# Patient Record
Sex: MALE | Race: WHITE | Employment: OTHER | URBAN - METROPOLITAN AREA
[De-identification: names, ages, dates, MRNs, and addresses within clinical notes are randomized per-mention and may not be internally consistent; named-entity substitution may affect disease eponyms.]

---

## 2017-02-15 ENCOUNTER — APPOINTMENT (OUTPATIENT)
Dept: LAB | Facility: HOSPITAL | Age: 77
End: 2017-02-15
Attending: UROLOGY
Payer: MEDICARE

## 2017-02-15 ENCOUNTER — OFFICE VISIT (OUTPATIENT)
Dept: SURGERY | Facility: CLINIC | Age: 77
End: 2017-02-15

## 2017-02-15 VITALS — HEART RATE: 66 BPM | RESPIRATION RATE: 20 BRPM | DIASTOLIC BLOOD PRESSURE: 83 MMHG | SYSTOLIC BLOOD PRESSURE: 149 MMHG

## 2017-02-15 DIAGNOSIS — R97.20 ELEVATED PSA: Primary | ICD-10-CM

## 2017-02-15 DIAGNOSIS — R97.20 ELEVATED PSA: ICD-10-CM

## 2017-02-15 DIAGNOSIS — N40.1 BENIGN NON-NODULAR PROSTATIC HYPERPLASIA WITH LOWER URINARY TRACT SYMPTOMS: ICD-10-CM

## 2017-02-15 LAB — PSA SERPL-MCNC: 5.8 NG/ML (ref 0–4)

## 2017-02-15 PROCEDURE — 84153 ASSAY OF PSA TOTAL: CPT

## 2017-02-15 PROCEDURE — 36415 COLL VENOUS BLD VENIPUNCTURE: CPT

## 2017-02-15 PROCEDURE — 99214 OFFICE O/P EST MOD 30 MIN: CPT | Performed by: UROLOGY

## 2017-02-15 PROCEDURE — G0463 HOSPITAL OUTPT CLINIC VISIT: HCPCS | Performed by: UROLOGY

## 2017-02-15 RX ORDER — TAMSULOSIN HYDROCHLORIDE 0.4 MG/1
CAPSULE ORAL
Qty: 90 CAPSULE | Refills: 3 | Status: SHIPPED | OUTPATIENT
Start: 2017-02-15 | End: 2019-04-03

## 2017-02-15 RX ORDER — FINASTERIDE 5 MG/1
TABLET, FILM COATED ORAL
Qty: 90 TABLET | Refills: 3 | Status: SHIPPED | OUTPATIENT
Start: 2017-02-15 | End: 2018-02-17

## 2017-02-16 NOTE — PROGRESS NOTES
Mario Linares is a 67year old male.     HPI:   Patient presents with:  BPH: f/u  elevated psa: f/u      76-year-old male presents in follow-up to a previous visit November 7, 2016 with a history of elevated serum PSA status post prostate biopsy which was notified of results once they are available. Otherwise he will continue on medications as previously described. Follow-up in 6 months I spent a total of 25 minutes with patient more than half the time in face-to-face discussion.          Orders This Visit

## 2017-03-01 ENCOUNTER — TELEPHONE (OUTPATIENT)
Dept: SURGERY | Facility: CLINIC | Age: 77
End: 2017-03-01

## 2017-03-01 NOTE — TELEPHONE ENCOUNTER
Spoke with pt and gave results in msg below and he has an appt in aug and he will complete psa prior and I also cxld an appt that he had in Nov. And does not need at this time.

## 2017-03-01 NOTE — TELEPHONE ENCOUNTER
----- Message from Christa Sanchez MD sent at 3/1/2017  9:57 AM CST -----  Staff please call this patient and let him know his PSA from 2 weeks ago was elevated at 5.8.   This is probably related to the fact he stopped his finasteride and I would recommend t

## 2017-06-06 ENCOUNTER — OFFICE VISIT (OUTPATIENT)
Dept: INTERNAL MEDICINE CLINIC | Facility: CLINIC | Age: 77
End: 2017-06-06

## 2017-06-06 VITALS
TEMPERATURE: 98 F | HEIGHT: 72 IN | DIASTOLIC BLOOD PRESSURE: 75 MMHG | HEART RATE: 60 BPM | BODY MASS INDEX: 25.19 KG/M2 | WEIGHT: 186 LBS | SYSTOLIC BLOOD PRESSURE: 128 MMHG

## 2017-06-06 DIAGNOSIS — R97.20 ELEVATED PSA: Primary | ICD-10-CM

## 2017-06-06 PROCEDURE — 99213 OFFICE O/P EST LOW 20 MIN: CPT | Performed by: INTERNAL MEDICINE

## 2017-06-06 PROCEDURE — G0463 HOSPITAL OUTPT CLINIC VISIT: HCPCS | Performed by: INTERNAL MEDICINE

## 2017-06-06 NOTE — PROGRESS NOTES
Patient ID: Shayan Posada is a 67year old male. Patient presents with:  Establish Care       HISTORY OF PRESENT ILLNESS:   HPI  Patient presents for above. Patient was told by his urologist to come see a primary care physician.   His last physician ret Alcohol Use: No    Drug Use: No    Sexual Activity: Not on file   Not on file  Other Topics Concern    Caffeine Concern Yes    Comment: coffee, occasionally    Reaction to local anesthetic No     Social History Narrative           PHYSICAL EXAM:      06/06

## 2017-06-06 NOTE — PATIENT INSTRUCTIONS
Prevention Guidelines, Men Ages 72 and Older  Screening tests and vaccines are an important part of managing your health. Health counseling is essential, too. Below are guidelines for these, for men ages 72 and older.  Talk with your healthcare provider t Prostate cancer All men in this age group, talk to healthcare provider about risks and benefits of digital rectal exam (BARRY) and prostate-specific antigen (PSA) screening1 At routine exams   Syphilis Men at increased risk for infection – talk with your hea Fall prevention (exercise, vitamin D supplements) All men in this age group At routine exams   Sexually transmitted infection Men at increased risk for infection – talk with your healthcare provider At routine exams   Use of daily aspirin Men ages 39 to 78

## 2017-06-20 ENCOUNTER — OFFICE VISIT (OUTPATIENT)
Dept: INTERNAL MEDICINE CLINIC | Facility: CLINIC | Age: 77
End: 2017-06-20

## 2017-06-20 ENCOUNTER — LAB ENCOUNTER (OUTPATIENT)
Dept: LAB | Age: 77
End: 2017-06-20
Attending: INTERNAL MEDICINE
Payer: MEDICARE

## 2017-06-20 VITALS
HEIGHT: 72 IN | WEIGHT: 184 LBS | BODY MASS INDEX: 24.92 KG/M2 | SYSTOLIC BLOOD PRESSURE: 127 MMHG | HEART RATE: 62 BPM | TEMPERATURE: 98 F | DIASTOLIC BLOOD PRESSURE: 78 MMHG

## 2017-06-20 DIAGNOSIS — Z00.00 ENCOUNTER FOR ANNUAL HEALTH EXAMINATION: ICD-10-CM

## 2017-06-20 DIAGNOSIS — E78.00 HYPERCHOLESTEROLEMIA: ICD-10-CM

## 2017-06-20 DIAGNOSIS — E78.00 HYPERCHOLESTEROLEMIA: Chronic | ICD-10-CM

## 2017-06-20 DIAGNOSIS — R97.20 ELEVATED PSA: ICD-10-CM

## 2017-06-20 DIAGNOSIS — Z00.00 MEDICARE ANNUAL WELLNESS VISIT, SUBSEQUENT: ICD-10-CM

## 2017-06-20 DIAGNOSIS — Z00.00 MEDICARE ANNUAL WELLNESS VISIT, SUBSEQUENT: Primary | ICD-10-CM

## 2017-06-20 PROCEDURE — 84153 ASSAY OF PSA TOTAL: CPT

## 2017-06-20 PROCEDURE — 80053 COMPREHEN METABOLIC PANEL: CPT

## 2017-06-20 PROCEDURE — 85025 COMPLETE CBC W/AUTO DIFF WBC: CPT

## 2017-06-20 PROCEDURE — 36415 COLL VENOUS BLD VENIPUNCTURE: CPT

## 2017-06-20 PROCEDURE — 99213 OFFICE O/P EST LOW 20 MIN: CPT | Performed by: INTERNAL MEDICINE

## 2017-06-20 PROCEDURE — G0439 PPPS, SUBSEQ VISIT: HCPCS | Performed by: INTERNAL MEDICINE

## 2017-06-20 PROCEDURE — 84443 ASSAY THYROID STIM HORMONE: CPT

## 2017-06-20 PROCEDURE — 80061 LIPID PANEL: CPT

## 2017-06-20 NOTE — PATIENT INSTRUCTIONS
1.  Get labs done as ordered. Prevention Guidelines, Men Ages 72 and Older  Screening tests and vaccines are an important part of managing your health. Health counseling is essential, too. Below are guidelines for these, for men ages 72 and older.  Talk wi Prostate cancer All men in this age group, talk to healthcare provider about risks and benefits of digital rectal exam (BARRY) and prostate-specific antigen (PSA) screening1 At routine exams   Syphilis Men at increased risk for infection – talk with your hea Fall prevention (exercise, vitamin D supplements) All men in this age group At routine exams   Sexually transmitted infection Men at increased risk for infection – talk with your healthcare provider At routine exams   Use of daily aspirin Men ages 39 to 78 CHOLESTEROL, TOTAL (mg/dL)   Date Value   07/09/2015 232*     TRIGLYCERIDES (mg/dL)   Date Value   07/09/2015 93        EKG - covered if needed at Welcome to Medicare, and non-screening if indicated for medical reasons    Electrocardiogram date Routine EKG Pneumococcal 23 (Pneumovax)  Covered Once after 65 No orders found for this or any previous visit. Please get once after your 65th birthday    Hepatitis B for Moderate/High Risk No orders found for this or any previous visit.  Medium/high risk factors:   E

## 2017-06-20 NOTE — PROGRESS NOTES
HPI:   Cortes Almonte is a 67year old male who presents for a Medicare Subsequent Annual Wellness visit (Pt already had Initial Annual Wellness).     His last annual assessment has been over 1 year: Annual Physical due on 08/11/1942       Without complaint Constitutional: Negative. HENT: Negative. Respiratory: Negative. Cardiovascular: Negative. Gastrointestinal: Negative. Endocrine: Negative. Genitourinary: Negative. Musculoskeletal: Negative. Skin: Negative.     Allergic/Immunologi Right Eye Chart Acuity: 20/50   Left Eye Visual Acuity: Uncorrected Left Eye Chart Acuity: 20/40   Both Eyes Visual Acuity: Uncorrected Both Eyes Chart Acuity: 20/50   Able To Tolerate Visual Acuity: Yes      Physical Exam    Constitutional: He is oriented taking his statin because he study felt great. He is debating whether he will start a gun if his labs show that he needs it. Diet and exercise discussed. Elevated PSA  -     PSA;  Future    Encounter for annual health examination  -     Gastro Referral another office such as Influenza, Hepatitis B, Tetanus, or Pneumococcal?: No     Functional Ability     Bathing or Showering: Able without help    Toileting: Able without help    Dressing: Able without help    Eating: Able without help    Driving: Able wit separate sheet to patient  PREVENTATIVE SERVICES  INDICATIONS AND SCHEDULE Internal Lab or Procedure External Lab or Procedure   Diabetes Screening      HbgA1C   Annually No results found for: A1C    No flowsheet data found.     Fasting Blood Sugar (FSB)Jennifer Conc  Annually No results found for: DIGOXIN, DIG, VALP No flowsheet data found. Diabetes      HgbA1C  Annually No results found for: A1C    No flowsheet data found.     Creat/alb ratio  Annually      LDL  Annually LDL CHOLESTEROL (mg/dL)   Date Value

## 2017-08-15 ENCOUNTER — APPOINTMENT (OUTPATIENT)
Dept: LAB | Facility: HOSPITAL | Age: 77
End: 2017-08-15
Attending: UROLOGY
Payer: MEDICARE

## 2017-08-15 DIAGNOSIS — R97.20 ELEVATED PSA: ICD-10-CM

## 2017-08-15 LAB — PSA SERPL-MCNC: 2.7 NG/ML (ref 0–4)

## 2017-08-15 PROCEDURE — 36415 COLL VENOUS BLD VENIPUNCTURE: CPT

## 2017-08-15 PROCEDURE — 84153 ASSAY OF PSA TOTAL: CPT

## 2017-08-16 ENCOUNTER — OFFICE VISIT (OUTPATIENT)
Dept: SURGERY | Facility: CLINIC | Age: 77
End: 2017-08-16

## 2017-08-16 VITALS
DIASTOLIC BLOOD PRESSURE: 76 MMHG | BODY MASS INDEX: 24 KG/M2 | TEMPERATURE: 98 F | HEART RATE: 73 BPM | WEIGHT: 180 LBS | SYSTOLIC BLOOD PRESSURE: 127 MMHG

## 2017-08-16 DIAGNOSIS — R97.20 ELEVATED PSA: ICD-10-CM

## 2017-08-16 DIAGNOSIS — N40.1 BENIGN NON-NODULAR PROSTATIC HYPERPLASIA WITH LOWER URINARY TRACT SYMPTOMS: Primary | ICD-10-CM

## 2017-08-16 PROCEDURE — G0463 HOSPITAL OUTPT CLINIC VISIT: HCPCS | Performed by: UROLOGY

## 2017-08-16 PROCEDURE — 99213 OFFICE O/P EST LOW 20 MIN: CPT | Performed by: UROLOGY

## 2017-08-29 ENCOUNTER — OFFICE VISIT (OUTPATIENT)
Dept: GASTROENTEROLOGY | Facility: CLINIC | Age: 77
End: 2017-08-29

## 2017-08-29 ENCOUNTER — TELEPHONE (OUTPATIENT)
Dept: GASTROENTEROLOGY | Facility: CLINIC | Age: 77
End: 2017-08-29

## 2017-08-29 ENCOUNTER — TELEPHONE (OUTPATIENT)
Dept: OTHER | Age: 77
End: 2017-08-29

## 2017-08-29 VITALS
BODY MASS INDEX: 25.49 KG/M2 | WEIGHT: 188.19 LBS | DIASTOLIC BLOOD PRESSURE: 70 MMHG | HEIGHT: 72 IN | HEART RATE: 67 BPM | SYSTOLIC BLOOD PRESSURE: 131 MMHG

## 2017-08-29 DIAGNOSIS — Z12.11 COLON CANCER SCREENING: Primary | ICD-10-CM

## 2017-08-29 DIAGNOSIS — Z12.11 SCREENING FOR COLON CANCER: Primary | ICD-10-CM

## 2017-08-29 PROCEDURE — G0463 HOSPITAL OUTPT CLINIC VISIT: HCPCS | Performed by: INTERNAL MEDICINE

## 2017-08-29 PROCEDURE — 99203 OFFICE O/P NEW LOW 30 MIN: CPT | Performed by: INTERNAL MEDICINE

## 2017-08-29 NOTE — TELEPHONE ENCOUNTER
One hour block per PL.      Scheduled for:  Colon  Provider Name: PL  Date:  11-9-17  Location:  [facility]  Sedation:  MAC  Time:  11:00am, arrival 10am  Prep: Colyte  Meds/Allergies Reconciled?:  yes  Diagnosis with codes:  Screening Z12.11  Was patient informed

## 2017-08-29 NOTE — TELEPHONE ENCOUNTER
Please advise regarding referral change. Received call from [address] regarding dx code for GI. Paulina Bush Pt is being seen for CNL screening. Referral pending for review.

## 2017-08-29 NOTE — TELEPHONE ENCOUNTER
Pt has consult with Dr Imani Beyer today. The referral is for Medicare annual wellness visit and hypercholesterolemia. I contacted Tim MELCHOR at Dr Fidelia Gibbons office to change to a GI diagnosis for the HMO. We need to know why we are seeing pt.  She will send reques

## 2017-08-29 NOTE — PROGRESS NOTES
Balbina Belcher is a 65year old male. HPI:   Patient presents with: Follow - Up: Annual       The patient is a 74-year-old male with a history of hip surgery and spinal fusion in the past.  He presents for colon cancer screening.   Patient denies any ab 74-year-old male who presents for colon cancer screening. I discussed the screening options with the patient he agrees to proceed with colonoscopy.   The risks /benefits and alternatives were outlined including risk of perforation, bleeding, missed lesion

## 2017-09-19 ENCOUNTER — TELEPHONE (OUTPATIENT)
Dept: GASTROENTEROLOGY | Facility: CLINIC | Age: 77
End: 2017-09-19

## 2017-09-19 DIAGNOSIS — Z12.11 COLON CANCER SCREENING: Primary | ICD-10-CM

## 2017-09-19 NOTE — TELEPHONE ENCOUNTER
Pt removed from provider schedule and cancellation request sent to endo. Pt does not want to reschedule at this time.

## 2017-09-22 NOTE — TELEPHONE ENCOUNTER
Your request for:     PATIENT'S NAME: Alex Snyder   :  [birth year]   ORDERING PHYSICIAN:  Dr. Kamila Liz REQUESTED:  Colonoscopy   ANESTHESIA: [facility]   DATE REQUESTED:  17   TIME REQUESTED:  11:00 a.m.      This procedure was CANCELLED     Dana

## 2018-02-19 NOTE — TELEPHONE ENCOUNTER
Dr. Mary Rodriguez, pt [address] [place] [address] 8/16/17 pt pharmacy requesting refill on tamsulosin and finasteride if you agree please review and sign med, thank you. I copied and pasted part of your last note below.     Patient presents with:  elevated psa: Nocturia 2x per night and

## 2018-02-20 RX ORDER — TAMSULOSIN HYDROCHLORIDE 0.4 MG/1
CAPSULE ORAL
Qty: 30 CAPSULE | Refills: 11 | Status: SHIPPED | OUTPATIENT
Start: 2018-02-20 | End: 2019-04-02

## 2018-02-20 RX ORDER — FINASTERIDE 5 MG/1
TABLET, FILM COATED ORAL
Qty: 30 TABLET | Refills: 11 | Status: SHIPPED | OUTPATIENT
Start: 2018-02-20 | End: 2019-04-02

## 2018-06-04 ENCOUNTER — TELEPHONE (OUTPATIENT)
Dept: INTERNAL MEDICINE CLINIC | Facility: CLINIC | Age: 78
End: 2018-06-04

## 2018-06-07 ENCOUNTER — OFFICE VISIT (OUTPATIENT)
Dept: INTERNAL MEDICINE CLINIC | Facility: CLINIC | Age: 78
End: 2018-06-07

## 2018-06-07 VITALS
WEIGHT: 183 LBS | HEART RATE: 73 BPM | TEMPERATURE: 98 F | HEIGHT: 72 IN | SYSTOLIC BLOOD PRESSURE: 110 MMHG | BODY MASS INDEX: 24.79 KG/M2 | DIASTOLIC BLOOD PRESSURE: 67 MMHG

## 2018-06-07 DIAGNOSIS — R97.20 ELEVATED PSA: ICD-10-CM

## 2018-06-07 DIAGNOSIS — Z12.11 COLON CANCER SCREENING: ICD-10-CM

## 2018-06-07 DIAGNOSIS — R21 RASH: ICD-10-CM

## 2018-06-07 DIAGNOSIS — E78.00 HYPERCHOLESTEROLEMIA: Chronic | ICD-10-CM

## 2018-06-07 DIAGNOSIS — Z00.00 ENCOUNTER FOR ANNUAL HEALTH EXAMINATION: Primary | ICD-10-CM

## 2018-06-07 DIAGNOSIS — Z13.6 SCREENING FOR CARDIOVASCULAR CONDITION: ICD-10-CM

## 2018-06-07 PROCEDURE — G0463 HOSPITAL OUTPT CLINIC VISIT: HCPCS | Performed by: INTERNAL MEDICINE

## 2018-06-07 PROCEDURE — G0439 PPPS, SUBSEQ VISIT: HCPCS | Performed by: INTERNAL MEDICINE

## 2018-06-07 PROCEDURE — 99213 OFFICE O/P EST LOW 20 MIN: CPT | Performed by: INTERNAL MEDICINE

## 2018-06-07 NOTE — PATIENT INSTRUCTIONS
Justyn Hall's SCREENING SCHEDULE   Tests on this list are recommended by your physician but may not be covered, or covered at this frequency, by your insurer. Please check with your insurance carrier before scheduling to verify coverage.     PREVENTATI with a family history    Colorectal Cancer Screening Covered up to Age 76     Colonoscopy Screen   Covered every 10 years- more often if abnormal There are no preventive care reminders to display for this patient.  Update Health Maintenance if applicable by Medicare Part B) No orders found for this or any previous visit.  This may be covered with your prescription benefits, but Medicare does not cover unless Medically needed    Zoster (Not covered by Medicare Part B) No orders found for this or any previous immunochemical test; or a stool DNA test as often as your healthcare provider advises; talk with your healthcare provider about which tests are best for you and when you no longer need colonoscopies (generally after age 76)   Depression All men in this age risk for infection – talk with your healthcare provider 3 doses over 6 months; second dose should be given 1 month after the first dose; the third dose should be given at least 2 months after the second dose and at least 4 months after the first dose   Asha

## 2018-06-07 NOTE — PROGRESS NOTES
HPI:   Mayco Sagastume is a 65year old male who presents for a Medicare Subsequent Annual Wellness visit (Pt already had Initial Annual Wellness). Patient presents for above. Here for Medicare annual wellness exam.  History of high cholesterol.   Tia The patient has this document but we do not have it in [employer], and patient is instructed to get our office a copy of it for scanning into Epic. He does have a POA but we do NOT have it on file in [employer].    The patient has this document but we do not ha He  has a past surgical history that includes spinal fusion; other surgical history (2014 ); and hip surgery. His family history is not on file. SOCIAL HISTORY:   He  reports that he has never smoked.  He has never used smokeless tobacco. He reports th Abdominal: Soft. Bowel sounds are normal. He exhibits no distension. Musculoskeletal: Normal range of motion. Neurological: He is alert and oriented to person, place, and time.    Skin:          Vaccination History     Immunization History   Administere In the past six months, have you lost more than 10 pounds without trying?: 2 - No  Has your appetite been poor?: No  How does the patient maintain a good energy level?: Other  How would you describe your daily physical activity?: None  How would you descri Covered Once after 65 No vaccine history found Please get once after your 65th birthday    Hepatitis B for Moderate/High Risk No vaccine history found Medium/high risk factors:   End-stage renal disease   Hemophiliacs who received Factor VIII or IX concent

## 2018-06-08 ENCOUNTER — LAB ENCOUNTER (OUTPATIENT)
Dept: LAB | Age: 78
End: 2018-06-08
Attending: INTERNAL MEDICINE
Payer: MEDICARE

## 2018-06-08 DIAGNOSIS — Z13.6 SCREENING FOR CARDIOVASCULAR CONDITION: ICD-10-CM

## 2018-06-08 DIAGNOSIS — Z00.00 ENCOUNTER FOR ANNUAL HEALTH EXAMINATION: ICD-10-CM

## 2018-06-08 DIAGNOSIS — E78.00 HYPERCHOLESTEROLEMIA: Chronic | ICD-10-CM

## 2018-06-08 PROCEDURE — 85025 COMPLETE CBC W/AUTO DIFF WBC: CPT

## 2018-06-08 PROCEDURE — 36415 COLL VENOUS BLD VENIPUNCTURE: CPT

## 2018-06-08 PROCEDURE — 80053 COMPREHEN METABOLIC PANEL: CPT

## 2018-06-08 PROCEDURE — 80061 LIPID PANEL: CPT

## 2018-06-08 PROCEDURE — 84443 ASSAY THYROID STIM HORMONE: CPT

## 2018-06-11 ENCOUNTER — APPOINTMENT (OUTPATIENT)
Dept: LAB | Age: 78
End: 2018-06-11
Attending: INTERNAL MEDICINE
Payer: MEDICARE

## 2018-06-11 DIAGNOSIS — Z12.11 COLON CANCER SCREENING: ICD-10-CM

## 2018-06-11 PROCEDURE — 82274 ASSAY TEST FOR BLOOD FECAL: CPT

## 2018-08-14 ENCOUNTER — TELEPHONE (OUTPATIENT)
Dept: SURGERY | Facility: CLINIC | Age: 78
End: 2018-08-14

## 2018-08-14 DIAGNOSIS — R97.20 ELEVATED PSA: Primary | ICD-10-CM

## 2018-08-15 ENCOUNTER — APPOINTMENT (OUTPATIENT)
Dept: LAB | Facility: HOSPITAL | Age: 78
End: 2018-08-15
Attending: UROLOGY
Payer: MEDICARE

## 2018-08-15 ENCOUNTER — OFFICE VISIT (OUTPATIENT)
Dept: SURGERY | Facility: CLINIC | Age: 78
End: 2018-08-15
Payer: MEDICARE

## 2018-08-15 VITALS
BODY MASS INDEX: 24.65 KG/M2 | DIASTOLIC BLOOD PRESSURE: 77 MMHG | WEIGHT: 182 LBS | HEIGHT: 72 IN | SYSTOLIC BLOOD PRESSURE: 132 MMHG | HEART RATE: 83 BPM | TEMPERATURE: 98 F

## 2018-08-15 DIAGNOSIS — N40.1 BENIGN NON-NODULAR PROSTATIC HYPERPLASIA WITH LOWER URINARY TRACT SYMPTOMS: ICD-10-CM

## 2018-08-15 DIAGNOSIS — R97.20 ELEVATED PSA: ICD-10-CM

## 2018-08-15 DIAGNOSIS — R97.20 ELEVATED PSA: Primary | ICD-10-CM

## 2018-08-15 LAB — PSA SERPL-MCNC: 3 NG/ML (ref 0–4)

## 2018-08-15 PROCEDURE — 84153 ASSAY OF PSA TOTAL: CPT

## 2018-08-15 PROCEDURE — 99214 OFFICE O/P EST MOD 30 MIN: CPT | Performed by: UROLOGY

## 2018-08-15 PROCEDURE — G0463 HOSPITAL OUTPT CLINIC VISIT: HCPCS | Performed by: UROLOGY

## 2018-08-15 PROCEDURE — 36415 COLL VENOUS BLD VENIPUNCTURE: CPT

## 2018-08-15 NOTE — PROGRESS NOTES
Estephania Loya is a 77year old male. HPI:   Patient presents with:  BPH: patient presents for annual visit here to discuss psa results has no new complaints at this time      77year old male with BPH and elevated PSA last seen in the office 8/16/2017. year.         Orders This Visit:  No orders of the defined types were placed in this encounter.       Meds This Visit:    No prescriptions requested or ordered in this encounter    Imaging & Referrals:  None     8/15/2018  Almas Mayers MD

## 2018-10-02 ENCOUNTER — OFFICE VISIT (OUTPATIENT)
Dept: PODIATRY CLINIC | Facility: CLINIC | Age: 78
End: 2018-10-02
Payer: MEDICARE

## 2018-10-02 DIAGNOSIS — B35.1 ONYCHOMYCOSIS: ICD-10-CM

## 2018-10-02 DIAGNOSIS — M79.675 PAIN IN TOES OF BOTH FEET: Primary | ICD-10-CM

## 2018-10-02 DIAGNOSIS — M79.674 PAIN IN TOES OF BOTH FEET: Primary | ICD-10-CM

## 2018-10-02 PROCEDURE — 11721 DEBRIDE NAIL 6 OR MORE: CPT | Performed by: PODIATRIST

## 2018-10-02 PROCEDURE — 99212 OFFICE O/P EST SF 10 MIN: CPT | Performed by: PODIATRIST

## 2018-10-02 NOTE — PROGRESS NOTES
HPI:    Patient ID: Padmini Velazquez is a 77year old male. HPI  This 16-year-old pain associated with his nails. I have not seen this patient in over 2 years. He admits to trying but is unable to care for most of the nails.   Review of Systems    I did

## 2018-10-08 ENCOUNTER — MED REC SCAN ONLY (OUTPATIENT)
Dept: INTERNAL MEDICINE CLINIC | Facility: CLINIC | Age: 78
End: 2018-10-08

## 2019-04-03 RX ORDER — FINASTERIDE 5 MG/1
TABLET, FILM COATED ORAL
Qty: 90 TABLET | Refills: 3 | Status: SHIPPED | OUTPATIENT
Start: 2019-04-03 | End: 2020-09-02

## 2019-04-03 RX ORDER — TAMSULOSIN HYDROCHLORIDE 0.4 MG/1
CAPSULE ORAL
Qty: 90 CAPSULE | Refills: 3 | Status: SHIPPED | OUTPATIENT
Start: 2019-04-03 | End: 2020-09-02

## 2019-04-23 ENCOUNTER — OFFICE VISIT (OUTPATIENT)
Dept: PODIATRY CLINIC | Facility: CLINIC | Age: 79
End: 2019-04-23
Payer: MEDICARE

## 2019-04-23 DIAGNOSIS — M79.674 PAIN IN TOES OF BOTH FEET: Primary | ICD-10-CM

## 2019-04-23 DIAGNOSIS — M79.675 PAIN IN TOES OF BOTH FEET: Primary | ICD-10-CM

## 2019-04-23 DIAGNOSIS — B35.1 ONYCHOMYCOSIS: ICD-10-CM

## 2019-04-23 PROCEDURE — 11721 DEBRIDE NAIL 6 OR MORE: CPT | Performed by: PODIATRIST

## 2019-04-23 NOTE — PROGRESS NOTES
HPI:    Patient ID: Ivis Lemons is a 77year old male. 69-year-old male presents for care associated with his painful toenails. He reports relief by previous treatment.       ROS:     I did review medical status, medications were noted he has no known

## 2019-06-10 ENCOUNTER — OFFICE VISIT (OUTPATIENT)
Dept: INTERNAL MEDICINE CLINIC | Facility: CLINIC | Age: 79
End: 2019-06-10
Payer: MEDICARE

## 2019-06-10 VITALS
TEMPERATURE: 97 F | WEIGHT: 182 LBS | SYSTOLIC BLOOD PRESSURE: 110 MMHG | HEIGHT: 72 IN | DIASTOLIC BLOOD PRESSURE: 76 MMHG | BODY MASS INDEX: 24.65 KG/M2 | HEART RATE: 90 BPM

## 2019-06-10 DIAGNOSIS — M79.641 RIGHT HAND PAIN: ICD-10-CM

## 2019-06-10 DIAGNOSIS — E78.00 HYPERCHOLESTEROLEMIA: Chronic | ICD-10-CM

## 2019-06-10 DIAGNOSIS — R97.20 ELEVATED PSA: ICD-10-CM

## 2019-06-10 DIAGNOSIS — Z13.6 SCREENING FOR CARDIOVASCULAR CONDITION: ICD-10-CM

## 2019-06-10 DIAGNOSIS — Z12.11 COLON CANCER SCREENING: ICD-10-CM

## 2019-06-10 DIAGNOSIS — Z00.00 ENCOUNTER FOR ANNUAL HEALTH EXAMINATION: Primary | ICD-10-CM

## 2019-06-10 DIAGNOSIS — R21 RASH: ICD-10-CM

## 2019-06-10 PROCEDURE — G0463 HOSPITAL OUTPT CLINIC VISIT: HCPCS | Performed by: INTERNAL MEDICINE

## 2019-06-10 PROCEDURE — 99213 OFFICE O/P EST LOW 20 MIN: CPT | Performed by: INTERNAL MEDICINE

## 2019-06-10 PROCEDURE — G0439 PPPS, SUBSEQ VISIT: HCPCS | Performed by: INTERNAL MEDICINE

## 2019-06-10 NOTE — PROGRESS NOTES
HPI:   Cassie Land is a 77year old male who presents for a MA (Medicare Advantage) [address] [place] [address] (Once per calendar year). Patient presents for above. Here for Medicare annual wellness exam.  History of high cholesterol.   Patient was previously on a The patient has this document but we do not have it in [facility], and patient is instructed to get our office a copy of it for scanning into Epic. He does NOT have a Power of  for [facility] on file in [address] [place] [address] Rd.    Advance care planning including th He  has a past medical history of Fracture of arm (1960). He  has a past surgical history that includes spinal fusion; other surgical history (2014 ); and hip surgery. His family history is not on file.    SOCIAL HISTORY:   He  reports that he has nev I especially have trouble understanding the speech of women and children:  No I have trouble understanding the speaker in a large room such as at a meeting or place of [employer]:  No   Many people I talk to seem to mumble (or don't speak clearly):  No People Diagnoses and all orders for this visit:    Encounter for annual health examination  -     LIPID PANEL; Future  -     COMP METABOLIC PANEL (14); Future  -     TSH W REFLEX TO FREE T4; Future  -     CBC WITH DIFFERENTIAL WITH PLATELET;  Future    Hypercholes 06/08/2018 91     GLUCOSE (P) (mg/dL)   Date Value   07/09/2015 100 (H)          Cardiovascular Disease Screening     LDL Annually LDL Cholesterol (mg/dL)   Date Value   06/08/2018 149 (H)   07/09/2015 165 (H)        EKG - w/ Initial Preventative Physical Not covered by Medicare Part B No vaccine history found This may be covered with your pharmacy  prescription benefits

## 2019-06-10 NOTE — PATIENT INSTRUCTIONS
Justyn Hall's SCREENING SCHEDULE   Tests on this list are recommended by your physician but may not be covered, or covered at this frequency, by your insurer. Please check with your insurance carrier before scheduling to verify coverage.     PREVENTATI Colorectal Cancer Screening Covered up to Age 76     Colonoscopy Screen   Covered every 10 years- more often if abnormal There are no preventive care reminders to display for this patient.  Update [employer] if applicable    Flex Sigmoidoscopy Scr by Medicare Part B) No orders found for this or any previous visit.  This may be covered with your prescription benefits, but Medicare does not cover unless Medically needed    Zoster (Not covered by Medicare Part B) No orders found for this or any previous group Yearly checkup if your blood pressure is normal  Normal blood pressure is less than 120/80 mm Hg  If your blood pressure reading is higher than normal, follow the advice of your healthcare provider   Colorectal cancer All men in this age group Flexib have a chronic health condition, ask your healthcare provider if you needs exams more often   Vaccine Who needs it How often   Chickenpox (varicella) All men in this age group who have no record of this infection or vaccine 2 doses; second dose should be g 2/1/2017  © 4682-7854 [employer] [address] [facility] [place]. All rights reserved. This information is not intended as a substitute for professional medical care. Always follow your healthcare professional's instructions.

## 2019-06-11 ENCOUNTER — HOSPITAL ENCOUNTER (OUTPATIENT)
Dept: GENERAL RADIOLOGY | Facility: HOSPITAL | Age: 79
Discharge: HOME | End: 2019-06-11
Attending: INTERNAL MEDICINE
Payer: MEDICARE

## 2019-06-11 ENCOUNTER — LAB ENCOUNTER (OUTPATIENT)
Dept: LAB | Facility: HOSPITAL | Age: 79
End: 2019-06-11
Attending: INTERNAL MEDICINE
Payer: MEDICARE

## 2019-06-11 DIAGNOSIS — Z13.6 SCREENING FOR CARDIOVASCULAR CONDITION: ICD-10-CM

## 2019-06-11 DIAGNOSIS — Z00.00 ENCOUNTER FOR ANNUAL HEALTH EXAMINATION: ICD-10-CM

## 2019-06-11 DIAGNOSIS — M79.641 RIGHT HAND PAIN: ICD-10-CM

## 2019-06-11 DIAGNOSIS — E78.00 HYPERCHOLESTEROLEMIA: Chronic | ICD-10-CM

## 2019-06-11 PROCEDURE — 36415 COLL VENOUS BLD VENIPUNCTURE: CPT

## 2019-06-11 PROCEDURE — 73130 X-RAY EXAM OF HAND: CPT | Performed by: INTERNAL MEDICINE

## 2019-06-11 PROCEDURE — 85025 COMPLETE CBC W/AUTO DIFF WBC: CPT

## 2019-06-11 PROCEDURE — 80061 LIPID PANEL: CPT

## 2019-06-11 PROCEDURE — 84443 ASSAY THYROID STIM HORMONE: CPT

## 2019-06-11 PROCEDURE — 80053 COMPREHEN METABOLIC PANEL: CPT

## 2019-06-12 ENCOUNTER — TELEPHONE (OUTPATIENT)
Dept: INTERNAL MEDICINE CLINIC | Facility: CLINIC | Age: 79
End: 2019-06-12

## 2019-06-12 DIAGNOSIS — M79.641 RIGHT HAND PAIN: Primary | ICD-10-CM

## 2019-06-12 NOTE — TELEPHONE ENCOUNTER
Patient advised of x-ray result notes. Verbalized understanding and agreed with plan. Please advise on referral, contact pt with info once entered in system. Notes recorded by Pj Garcia MD on 6/11/2019 at 3:41 PM CDT  Results reviewed.  Please inform

## 2019-06-12 NOTE — TELEPHONE ENCOUNTER
The patient was called and informed. Phone number given for Dr. Stephania Holt. Instructed to call us back if he cannot see the specialist soon with understanding.

## 2019-06-17 ENCOUNTER — OFFICE VISIT (OUTPATIENT)
Dept: DERMATOLOGY CLINIC | Facility: CLINIC | Age: 79
End: 2019-06-17
Payer: MEDICARE

## 2019-06-17 DIAGNOSIS — L30.9 DERMATITIS: Primary | ICD-10-CM

## 2019-06-17 DIAGNOSIS — L81.0 POSTINFLAMMATORY HYPERPIGMENTATION: ICD-10-CM

## 2019-06-17 DIAGNOSIS — I83.11 VARICOSE VEINS OF BOTH LOWER EXTREMITIES WITH INFLAMMATION: ICD-10-CM

## 2019-06-17 DIAGNOSIS — I83.12 VARICOSE VEINS OF BOTH LOWER EXTREMITIES WITH INFLAMMATION: ICD-10-CM

## 2019-06-17 PROCEDURE — 99202 OFFICE O/P NEW SF 15 MIN: CPT | Performed by: DERMATOLOGY

## 2019-06-17 PROCEDURE — G0463 HOSPITAL OUTPT CLINIC VISIT: HCPCS | Performed by: DERMATOLOGY

## 2019-06-27 ENCOUNTER — OFFICE VISIT (OUTPATIENT)
Dept: SURGERY | Facility: CLINIC | Age: 79
End: 2019-06-27
Payer: MEDICARE

## 2019-06-27 DIAGNOSIS — M19.041 PRIMARY OSTEOARTHRITIS OF RIGHT HAND: ICD-10-CM

## 2019-06-27 DIAGNOSIS — M79.641 PAIN IN RIGHT HAND: Primary | ICD-10-CM

## 2019-06-27 PROCEDURE — G0463 HOSPITAL OUTPT CLINIC VISIT: HCPCS | Performed by: PLASTIC SURGERY

## 2019-06-27 PROCEDURE — 99203 OFFICE O/P NEW LOW 30 MIN: CPT | Performed by: PLASTIC SURGERY

## 2019-06-27 NOTE — H&P
Mayco Sagastume is a 77year old male that presents with Patient presents with:  Pain: RRF, RSF volar distal henao  .     REFERRED BY:  Cecil Rain      Pacemaker: No  Latex Allergy: no  Coumadin: No  Plavix: No  Other anticoagulants: No  Cardiac stents: No SURGERY     • OTHER SURGICAL HISTORY  2014     prostate biopsy   • SPINAL FUSION       Social History    Socioeconomic History      Marital status: Single      Spouse name: Not on file      Number of children: Not on file      Years of education: Not on fi Grew up on a farm: Not Asked        History of tanning: Not Asked        Outdoor occupation: Not Asked        Pt has a pacemaker: Not Asked        Pt has a defibrillator: Not Asked        Reaction to local anesthetic: No        Left Handed: Not Asked program    If symptoms are not relieved, I would recommend the patient see physiatry. ASSESSMENT/PLAN:     No diagnosis found.       6/27/2019  Tano Yanez MD

## 2019-06-29 NOTE — PROGRESS NOTES
Mario Linares is a 77year old male. Patient presents with:  Rash: LOV 6/2015 with SD. Patient presents with new issue. Rash to BLE x 2 months. Red spots with leg swelling possibly, per patient.  USing TAC cream from Dr. Ada Davis with improvement to redn Financial resource strain: Not on file      Food insecurity:        Worry: Not on file        Inability: Not on file      Transportation needs:        Medical: Not on file        Non-medical: Not on file    Tobacco Use      Smoking status: Never Smoker Sunlamp Treatments for Acne: Not Asked        Hx of Spending [place] of Time in [place]: Not Asked        Bad sunburns in the past: Not Asked        Tanning Salons in the Past: Not Asked        Hx of Radiation Treatments: Not Asked        Regular use of sun bilaterally.   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 No orders of the defined types were placed in this encounter.       Meds & Refills for this Visit:   Requested Prescriptions      No prescriptions requested or ordered in this encounter       Dermatitis  (primary encounter diagnosis)  Postin

## 2019-07-02 ENCOUNTER — OFFICE VISIT (OUTPATIENT)
Dept: SURGERY | Facility: CLINIC | Age: 79
End: 2019-07-02
Payer: MEDICARE

## 2019-07-02 DIAGNOSIS — M79.601 PAIN OF RIGHT UPPER EXTREMITY: Primary | ICD-10-CM

## 2019-07-02 DIAGNOSIS — M19.041 ARTHRITIS OF HAND, RIGHT: ICD-10-CM

## 2019-07-02 DIAGNOSIS — M25.641 JOINT STIFFNESS OF HAND, RIGHT: ICD-10-CM

## 2019-07-02 PROCEDURE — 97166 OT EVAL MOD COMPLEX 45 MIN: CPT | Performed by: OCCUPATIONAL THERAPIST

## 2019-07-02 PROCEDURE — 97110 THERAPEUTIC EXERCISES: CPT | Performed by: OCCUPATIONAL THERAPIST

## 2019-07-02 PROCEDURE — 97018 PARAFFIN BATH THERAPY: CPT | Performed by: OCCUPATIONAL THERAPIST

## 2019-07-02 NOTE — PROGRESS NOTES
OCCUPATIONAL THERAPY EVALUATION:   Cassie Land   [id number]       SUBJECTIVE:    HX of Injury: Right hand stiffness, swelling and pain. Chief Complaint:   As above.     Precautions: None  Premorbid Functional Status: Independent w/ driving / sitting, In Education    GOALS:  Short term goals to be reached in x 1 month:  . Patient will have a decrease in pain of 2 points on a 1/10 scale for increased independence with self-care tasks.     Long term goals to be reached in x 1 month:    2)  Patient will get

## 2019-07-24 ENCOUNTER — OFFICE VISIT (OUTPATIENT)
Dept: PODIATRY CLINIC | Facility: CLINIC | Age: 79
End: 2019-07-24
Payer: MEDICARE

## 2019-07-24 DIAGNOSIS — M79.675 PAIN IN TOES OF BOTH FEET: Primary | ICD-10-CM

## 2019-07-24 DIAGNOSIS — B35.1 ONYCHOMYCOSIS: ICD-10-CM

## 2019-07-24 DIAGNOSIS — M79.674 PAIN IN TOES OF BOTH FEET: Primary | ICD-10-CM

## 2019-07-24 PROCEDURE — 11721 DEBRIDE NAIL 6 OR MORE: CPT | Performed by: PODIATRIST

## 2019-07-24 NOTE — PROGRESS NOTES
HPI:    Patient ID: Cortes Almonte is a 77year old male. 34-year-old male presents with recurrent pain associated with his toenails. He reports relief by previous care.       ROS:   I did review medical status, medications were noted he has no known heather

## 2019-08-07 ENCOUNTER — OFFICE VISIT (OUTPATIENT)
Dept: SURGERY | Facility: CLINIC | Age: 79
End: 2019-08-07
Payer: MEDICARE

## 2019-08-07 DIAGNOSIS — M62.81 DISTAL MUSCLE WEAKNESS: ICD-10-CM

## 2019-08-07 DIAGNOSIS — M25.641 JOINT STIFFNESS OF HAND, RIGHT: Primary | ICD-10-CM

## 2019-08-07 NOTE — PROGRESS NOTES
Subjective: I actually never got a home paraffin unit, but I am doing fine.       Objective:     Current level of performance:  ADL: Independent  Work: Retired  Leisure: Not addressed    Measurements/Tests:  ROM:         Full right hand range of motion with

## 2019-10-10 ENCOUNTER — APPOINTMENT (OUTPATIENT)
Dept: LAB | Facility: HOSPITAL | Age: 79
End: 2019-10-10
Attending: UROLOGY
Payer: MEDICARE

## 2019-10-10 ENCOUNTER — OFFICE VISIT (OUTPATIENT)
Dept: SURGERY | Facility: CLINIC | Age: 79
End: 2019-10-10
Payer: MEDICARE

## 2019-10-10 VITALS
WEIGHT: 182 LBS | SYSTOLIC BLOOD PRESSURE: 151 MMHG | DIASTOLIC BLOOD PRESSURE: 81 MMHG | BODY MASS INDEX: 25 KG/M2 | HEART RATE: 61 BPM

## 2019-10-10 DIAGNOSIS — R97.20 ELEVATED PSA: ICD-10-CM

## 2019-10-10 DIAGNOSIS — N40.1 BENIGN NON-NODULAR PROSTATIC HYPERPLASIA WITH LOWER URINARY TRACT SYMPTOMS: ICD-10-CM

## 2019-10-10 DIAGNOSIS — R97.20 ELEVATED PSA: Primary | ICD-10-CM

## 2019-10-10 PROCEDURE — 36415 COLL VENOUS BLD VENIPUNCTURE: CPT

## 2019-10-10 PROCEDURE — 99213 OFFICE O/P EST LOW 20 MIN: CPT | Performed by: UROLOGY

## 2019-10-10 PROCEDURE — G0463 HOSPITAL OUTPT CLINIC VISIT: HCPCS | Performed by: UROLOGY

## 2019-10-10 PROCEDURE — 84153 ASSAY OF PSA TOTAL: CPT

## 2019-10-11 NOTE — PROGRESS NOTES
Marissa Garcia is a [de-identified]year old male. HPI:   Patient presents with:  BPH: patient presents for annual visit       60-year-old male in follow-up to visit August 15, 2018. Has a history of BPH and elevated PSA.   His urination symptoms continue to be st Encounter      PSA Diagnostic [E]      Meds This Visit:  Requested Prescriptions      No prescriptions requested or ordered in this encounter       Imaging & Referrals:  None     10/11/2019  Tereza Olivares MD Bill [id number] For Specimen Handling/Conveyance To Laboratory?: no Wound Care: Bacitracin Was A Bandage Applied: Yes Consent was obtained from the patient. The risks and benefits to therapy were discussed in detail. Specifically, the risks of infection, scarring, bleeding, prolonged wound healing, incomplete removal, allergy to anesthesia, nerve injury and recurrence were addressed. Prior to the procedure, the treatment site was clearly identified and confirmed by the patient. All components of Universal Protocol/PAUSE Rule completed. Biopsy Method: Dermablade Billing Type: [employer] Body Location Override (Optional - Billing Will Still Be Based On Selected Body Map Location If Applicable): left preauricular Path Notes (To The Dermatopathologist): R/o:  DN\\n0.8 cm Size Of Lesion In Cm (Required): 0.8 Medical Necessity Information: It is in your best interest to select a reason for this procedure from the list below. All of these items fulfill various CMS LCD requirements except the new and changing color options. Detail Level: Detailed Lab: [id number] Medical Necessity Clause: This procedure was medically necessary because the lesion that was treated was: mild atypia Anesthesia Type: 1% lidocaine with epinephrine Notification Instructions: Patient will be notified of biopsy results. However, patient instructed to call the office if not contacted within 2 weeks. X Size Of Lesion In Cm (Optional): 0 Post-Care Instructions: I reviewed with the patient in detail post-care instructions. Patient is to keep the biopsy site dry overnight, and then apply bacitracin twice daily until healed. Patient may apply hydrogen peroxide soaks to remove any crusting. Hemostasis: Electrocautery Body Location Override (Optional - Billing Will Still Be Based On Selected Body Map Location If Applicable): left lateral chest wall Lab: [id number] Path Notes (To The Dermatopathologist): R/o:  DN\\n0.6 cm Size Of Lesion In Cm (Required): 0.6 Billing Type: Third-Party Bill Path Notes (To The Dermatopathologist): R/o:  DN\\n0.4 cm Size Of Lesion In Cm (Required): 0.4 Lab: [id number] Body Location Override (Optional - Billing Will Still Be Based On Selected Body Map Location If Applicable): left distal palm Path Notes (To The Dermatopathologist): R/o: DN\\n0.4 cm Lab: [id number] Body Location Override (Optional - Billing Will Still Be Based On Selected Body Map Location If Applicable): right medial thigh Body Location Override (Optional - Billing Will Still Be Based On Selected Body Map Location If Applicable): left lower abdomen Lab: [id number] Path Notes (To The Dermatopathologist): R/o: DN\\n0.8 cm

## 2019-10-14 ENCOUNTER — TELEPHONE (OUTPATIENT)
Dept: SURGERY | Facility: CLINIC | Age: 79
End: 2019-10-14

## 2019-10-14 NOTE — TELEPHONE ENCOUNTER
----- Message from DEEPAK Dial sent at 10/10/2019  3:17 PM CDT -----  Staff,    Please let patient know his PSA level is stable at 2.46.  Continue with plan as discussed with Dr. Freddie Burns.     Thank you,  [address]

## 2019-12-03 ENCOUNTER — MED REC SCAN ONLY (OUTPATIENT)
Dept: INTERNAL MEDICINE CLINIC | Facility: CLINIC | Age: 79
End: 2019-12-03

## 2019-12-03 ENCOUNTER — OFFICE VISIT (OUTPATIENT)
Dept: PODIATRY CLINIC | Facility: CLINIC | Age: 79
End: 2019-12-03
Payer: MEDICARE

## 2019-12-03 DIAGNOSIS — M79.675 PAIN IN TOES OF BOTH FEET: Primary | ICD-10-CM

## 2019-12-03 DIAGNOSIS — M79.674 PAIN IN TOES OF BOTH FEET: Primary | ICD-10-CM

## 2019-12-03 DIAGNOSIS — B35.1 ONYCHOMYCOSIS: ICD-10-CM

## 2019-12-03 PROCEDURE — 11721 DEBRIDE NAIL 6 OR MORE: CPT | Performed by: PODIATRIST

## 2019-12-03 NOTE — PROGRESS NOTES
HPI:    Patient ID: Sandi Holder is a [de-identified]year old male. Pleasant 60-year-old male presents with recurrent pain. Patient states that he is here for care associated with his recurrent painful toenails. He reports relief by previous treatment.     ROS:

## 2020-09-02 RX ORDER — FINASTERIDE 5 MG/1
TABLET, FILM COATED ORAL
Qty: 90 TABLET | Refills: 0 | Status: SHIPPED | OUTPATIENT
Start: 2020-09-02

## 2020-09-02 RX ORDER — TAMSULOSIN HYDROCHLORIDE 0.4 MG/1
CAPSULE ORAL
Qty: 90 CAPSULE | Refills: 0 | Status: SHIPPED | OUTPATIENT
Start: 2020-09-02

## 2020-09-25 ENCOUNTER — MED REC SCAN ONLY (OUTPATIENT)
Dept: INTERNAL MEDICINE CLINIC | Facility: CLINIC | Age: 80
End: 2020-09-25

## 2021-02-24 ENCOUNTER — TELEPHONE (OUTPATIENT)
Dept: INTERNAL MEDICINE CLINIC | Facility: CLINIC | Age: 81
End: 2021-02-24

## 2021-03-13 DIAGNOSIS — Z23 NEED FOR VACCINATION: ICD-10-CM

## 2022-03-16 ENCOUNTER — TELEPHONE (OUTPATIENT)
Dept: INTERNAL MEDICINE CLINIC | Facility: CLINIC | Age: 82
End: 2022-03-16

## 2022-09-22 ENCOUNTER — TELEPHONE (OUTPATIENT)
Dept: INTERNAL MEDICINE CLINIC | Facility: CLINIC | Age: 82
End: 2022-09-22

## 2023-10-04 NOTE — PROGRESS NOTES
Intubation    Date/Time: 10/4/2023 11:59 AM    Performed by: Crystal Murray CRNA  Authorized by: Russel Howard DO    Intubation:     Induction:  Intravenous    Intubated:  Postinduction    Mask Ventilation:  Easy mask    Attempts:  1    Attempted By:  CRNA    Method of Intubation:  Direct    Blade:  Blum 2    Laryngeal View Grade: Grade I - full view of cords      Difficult Airway Encountered?: No      Complications:  None    Airway Device:  Oral endotracheal tube    Airway Device Size:  7.5    Style/Cuff Inflation:  Cuffed (inflated to minimal occlusive pressure)    Tube secured:  23    Secured at:  The lips    Placement Verified By:  Capnometry    Complicating Factors:  None    Findings Post-Intubation:  BS equal bilateral and atraumatic/condition of teeth unchanged       Paolo Rodgers is a 65year old male. HPI:   Patient presents with:  elevated psa: Nocturia 2x per night and c/o weak stream. Denies dysuria and gross hematuria. 74-year-old male in follow-up to a visit February 15, 2017.   He has a history of elev with patient. Recommended follow-up in 1 year with a repeat PSA 1-2 days prior to the visit. Patient understands plan and agrees and will follow up accordingly. Orders This Visit:  No orders of the defined types were placed in this encounter.

## 2025-04-04 ENCOUNTER — APPOINTMENT (OUTPATIENT)
Dept: GENERAL RADIOLOGY | Facility: HOSPITAL | Age: 85
End: 2025-04-04
Attending: EMERGENCY MEDICINE
Payer: MEDICARE

## 2025-04-04 ENCOUNTER — HOSPITAL ENCOUNTER (OUTPATIENT)
Facility: HOSPITAL | Age: 85
Setting detail: OBSERVATION
Discharge: HOME HEALTH | End: 2025-04-06
Attending: EMERGENCY MEDICINE | Admitting: HOSPITALIST
Payer: MEDICARE

## 2025-04-04 DIAGNOSIS — S40.012A CONTUSION OF LEFT SHOULDER, INITIAL ENCOUNTER: ICD-10-CM

## 2025-04-04 DIAGNOSIS — S70.02XA CONTUSION OF LEFT HIP, INITIAL ENCOUNTER: Primary | ICD-10-CM

## 2025-04-04 DIAGNOSIS — G20.C PARKINSONISM, UNSPECIFIED PARKINSONISM TYPE (HCC): ICD-10-CM
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
## 2025-04-04 PROBLEM — M25.552 LEFT HIP PAIN: Status: ACTIVE | Noted: 2025-04-04

## 2025-04-04 LAB
ANION GAP SERPL CALC-SCNC: 11 MMOL/L (ref 0–18)
BASOPHILS # BLD AUTO: 0.06 X10(3) UL (ref 0–0.2)
BASOPHILS NFR BLD AUTO: 0.4 %
BUN BLD-MCNC: 28 MG/DL (ref 9–23)
BUN/CREAT SERPL: 22 (ref 10–20)
CALCIUM BLD-MCNC: 9 MG/DL (ref 8.7–10.4)
CHLORIDE SERPL-SCNC: 102 MMOL/L (ref 98–112)
CO2 SERPL-SCNC: 26 MMOL/L (ref 21–32)
CREAT BLD-MCNC: 1.27 MG/DL
DEPRECATED RDW RBC AUTO: 43.4 FL (ref 35.1–46.3)
EGFRCR SERPLBLD CKD-EPI 2021: 55 ML/MIN/1.73M2 (ref 60–?)
EOSINOPHIL # BLD AUTO: 0.02 X10(3) UL (ref 0–0.7)
EOSINOPHIL NFR BLD AUTO: 0.1 %
ERYTHROCYTE [DISTWIDTH] IN BLOOD BY AUTOMATED COUNT: 14.4 % (ref 11–15)
GLUCOSE BLD-MCNC: 121 MG/DL (ref 70–99)
HCT VFR BLD AUTO: 47.1 %
HGB BLD-MCNC: 15.3 G/DL
IMM GRANULOCYTES # BLD AUTO: 0.07 X10(3) UL (ref 0–1)
IMM GRANULOCYTES NFR BLD: 0.4 %
LYMPHOCYTES # BLD AUTO: 0.56 X10(3) UL (ref 1–4)
LYMPHOCYTES NFR BLD AUTO: 3.5 %
MCH RBC QN AUTO: 27 PG (ref 26–34)
MCHC RBC AUTO-ENTMCNC: 32.5 G/DL (ref 31–37)
MCV RBC AUTO: 83.2 FL
MONOCYTES # BLD AUTO: 0.82 X10(3) UL (ref 0.1–1)
MONOCYTES NFR BLD AUTO: 5.2 %
NEUTROPHILS # BLD AUTO: 14.25 X10 (3) UL (ref 1.5–7.7)
NEUTROPHILS # BLD AUTO: 14.25 X10(3) UL (ref 1.5–7.7)
NEUTROPHILS NFR BLD AUTO: 90.4 %
OSMOLALITY SERPL CALC.SUM OF ELEC: 295 MOSM/KG (ref 275–295)
PLATELET # BLD AUTO: 331 10(3)UL (ref 150–450)
POTASSIUM SERPL-SCNC: 4.1 MMOL/L (ref 3.5–5.1)
RBC # BLD AUTO: 5.66 X10(6)UL
SODIUM SERPL-SCNC: 139 MMOL/L (ref 136–145)
WBC # BLD AUTO: 15.8 X10(3) UL (ref 4–11)

## 2025-04-04 PROCEDURE — 73502 X-RAY EXAM HIP UNI 2-3 VIEWS: CPT | Performed by: EMERGENCY MEDICINE

## 2025-04-04 PROCEDURE — 99222 1ST HOSP IP/OBS MODERATE 55: CPT | Performed by: HOSPITALIST

## 2025-04-04 RX ORDER — ENOXAPARIN SODIUM 100 MG/ML
40 INJECTION SUBCUTANEOUS DAILY
Status: DISCONTINUED | OUTPATIENT
Start: 2025-04-04 | End: 2025-04-06

## 2025-04-04 RX ORDER — ACETAMINOPHEN 500 MG
500 TABLET ORAL EVERY 4 HOURS PRN
Status: DISCONTINUED | OUTPATIENT
Start: 2025-04-04 | End: 2025-04-06

## 2025-04-04 RX ORDER — METOCLOPRAMIDE HYDROCHLORIDE 5 MG/ML
10 INJECTION INTRAMUSCULAR; INTRAVENOUS EVERY 8 HOURS PRN
Status: DISCONTINUED | OUTPATIENT
Start: 2025-04-04 | End: 2025-04-06

## 2025-04-04 RX ORDER — TEMAZEPAM 15 MG/1
15 CAPSULE ORAL NIGHTLY PRN
Status: DISCONTINUED | OUTPATIENT
Start: 2025-04-04 | End: 2025-04-06

## 2025-04-04 RX ORDER — HYDROCODONE BITARTRATE AND ACETAMINOPHEN 5; 325 MG/1; MG/1
1 TABLET ORAL EVERY 4 HOURS PRN
Status: DISCONTINUED | OUTPATIENT
Start: 2025-04-04 | End: 2025-04-06

## 2025-04-04 RX ORDER — ACETAMINOPHEN 500 MG
1000 TABLET ORAL ONCE
Status: COMPLETED | OUTPATIENT
Start: 2025-04-04 | End: 2025-04-04

## 2025-04-04 RX ORDER — ACETAMINOPHEN 325 MG/1
650 TABLET ORAL EVERY 4 HOURS PRN
Status: DISCONTINUED | OUTPATIENT
Start: 2025-04-04 | End: 2025-04-06

## 2025-04-04 RX ORDER — KETOROLAC TROMETHAMINE 10 MG/1
10 TABLET, FILM COATED ORAL EVERY 6 HOURS PRN
Qty: 20 TABLET | Refills: 0 | Status: SHIPPED | OUTPATIENT
Start: 2025-04-04 | End: 2025-04-06

## 2025-04-04 RX ORDER — ONDANSETRON 2 MG/ML
4 INJECTION INTRAMUSCULAR; INTRAVENOUS EVERY 6 HOURS PRN
Status: DISCONTINUED | OUTPATIENT
Start: 2025-04-04 | End: 2025-04-06

## 2025-04-04 RX ORDER — HYDROCODONE BITARTRATE AND ACETAMINOPHEN 5; 325 MG/1; MG/1
2 TABLET ORAL EVERY 4 HOURS PRN
Status: DISCONTINUED | OUTPATIENT
Start: 2025-04-04 | End: 2025-04-06

## 2025-04-04 NOTE — PLAN OF CARE
Patient admitted from ED for fall at home with pain to L hip. A/Ox4, delayed responses. On RA. Tolerating diet. Check void. Bedrest. Call light and personal items within reach. Plan for PT/OT eval tomorrow.      Problem: PAIN - ADULT  Goal: Verbalizes/displays adequate comfort level or patient's stated pain goal  Description: INTERVENTIONS:- Encourage pt to monitor pain and request assistance- Assess pain using appropriate pain scale- Administer analgesics based on type and severity of pain and evaluate response- Implement non-pharmacological measures as appropriate and evaluate response- Consider cultural and social influences on pain and pain management- Manage/alleviate anxiety- Utilize distraction and/or relaxation techniques- Monitor for opioid side effects- Notify MD/LIP if interventions unsuccessful or patient reports new pain- Anticipate increased pain with activity and pre-medicate as appropriate  Outcome: Progressing     Problem: SAFETY ADULT - FALL  Goal: Free from fall injury  Description: INTERVENTIONS:- Assess pt frequently for physical needs- Identify cognitive and physical deficits and behaviors that affect risk of falls.- [place] fall precautions as indicated by assessment.- Educate pt/family on patient safety including physical limitations- Instruct pt to call for assistance with activity based on assessment- Modify environment to reduce risk of injury- Provide assistive devices as appropriate- Consider OT/PT consult to assist with strengthening/mobility- Encourage toileting schedule  Outcome: Progressing     Problem: DISCHARGE PLANNING  Goal: Discharge to home or other facility with appropriate resources  Description: INTERVENTIONS:- Identify barriers to discharge w/pt and caregiver- Include patient/family/discharge partner in discharge planning- Arrange for needed discharge resources and transportation as appropriate- Identify discharge learning needs (meds, wound care, etc)- Arrange for  interpreters to assist at discharge as needed- Consider post-discharge preferences of patient/family/discharge partner- Complete POLST form as appropriate- Assess patient's ability to be responsible for managing their own health- Refer to Case Management Department for coordinating discharge planning if the patient needs post-hospital services based on physician/LIP order or complex needs related to functional status, cognitive ability or social support system  Outcome: Progressing     Problem: METABOLIC/FLUID AND ELECTROLYTES - ADULT  Goal: Electrolytes maintained within normal limits  Description: INTERVENTIONS:- Monitor labs and rhythm and assess patient for signs and symptoms of electrolyte imbalances- Administer electrolyte replacement as ordered- Monitor response to electrolyte replacements, including rhythm and repeat lab results as appropriate- Fluid restriction as ordered- Instruct patient on fluid and nutrition restrictions as appropriate  Outcome: Progressing     Problem: SKIN/TISSUE INTEGRITY - ADULT  Goal: Skin integrity remains intact  Description: INTERVENTIONS- Assess and document risk factors for pressure ulcer development- Assess and document skin integrity- Monitor for areas of redness and/or skin breakdown- Initiate interventions, skin care algorithm/standards of care as needed  Outcome: Progressing

## 2025-04-04 NOTE — ED QUICK NOTES
Per sister, does not want patient to go home because he cannot put weight on left leg. Sister stated \"even if its for one night in the hospital he will be taken care of\".    ED MD aware. Patient set up for admission.

## 2025-04-04 NOTE — ED PROVIDER NOTES
Patient Seen in: [facility] Emergency Department    History     Chief Complaint   Patient presents with    Fall       HPI    The patient presents to the ED after falling in his bathtub while taking a shower today.  He states he slipped on the soap and fell.  He hit his left hip in the fall and complains of left hip pain on ED arrival.  Also has mild left shoulder pain but states that he can move his shoulder without difficulty.  Denies any head injury or headache in the ED.  Denies being on anticoagulants.    History reviewed.   Past Medical History:    Fracture of arm    fracture of left arm - cast       History reviewed.   Past Surgical History:   Procedure Laterality Date    Hip surgery      Other surgical history  2014     prostate biopsy    Spinal fusion           Medications :  Prescriptions Prior to Admission[1]     No family history on file.    Smoking Status:   Social History     Socioeconomic History    Marital status: Single   Tobacco Use    Smoking status: Never    Smokeless tobacco: Never   Vaping Use    Vaping status: Never Used   Substance and Sexual Activity    Alcohol use: No     Alcohol/week: 0.0 standard drinks of alcohol    Drug use: No   Other Topics Concern    Caffeine Concern Yes     Comment: coffee, occasionally    Reaction to local anesthetic No    Right Handed Yes       Constitutional and vital signs reviewed.      Social History and Family History elements reviewed from today, pertinent positives to the presenting problem noted.    Physical Exam     ED Triage Vitals [04/04/25 1407]   BP (!) 156/101   Pulse 92   Resp 20   Temp 98 °F (36.7 °C)   Temp src Temporal   SpO2 98 %   O2 Device None (Room air)       All measures to prevent infection transmission during my interaction with the patient were taken. Handwashing was performed prior to and after the exam.  Stethoscope and any equipment used during my examination was cleaned with super sani-cloth germicidal wipes following the  exam.     Physical Exam  Vitals and nursing note reviewed.   Constitutional:       General: He is not in acute distress.     Appearance: He is well-developed. He is not ill-appearing or toxic-appearing.   HENT:      Head: Normocephalic and atraumatic.   Eyes:      General:         Right eye: No discharge.         Left eye: No discharge.      Conjunctiva/sclera: Conjunctivae normal.   Neck:      Trachea: No tracheal deviation.   Cardiovascular:      Rate and Rhythm: Normal rate and regular rhythm.   Pulmonary:      Effort: Pulmonary effort is normal. No respiratory distress.      Breath sounds: No stridor.   Abdominal:      General: There is no distension.      Palpations: Abdomen is soft.   Musculoskeletal:         General: Tenderness present. No deformity.      Comments: Tenderness to the left hip.  Able to raise the left leg passively without any discomfort.   Skin:     General: Skin is warm and dry.   Neurological:      Mental Status: He is alert and oriented to person, place, and time.   Psychiatric:         Mood and Affect: Mood normal.         Behavior: Behavior normal.         ED Course        Labs Reviewed   BASIC METABOLIC PANEL (8)   CBC WITH DIFFERENTIAL WITH PLATELET       As Interpreted by me    Imaging Results Available and Reviewed while in ED: XR HIP W OR WO PELVIS 2 OR 3 VIEWS, LEFT (CPT=73502)    Result Date: 4/4/2025  CONCLUSION:   No acute fracture or dislocation.  Prior postsurgical changes of left hip arthroplasty with no evidence of hardware complication.  Vascular calcifications.  Otherwise soft tissues are unremarkable.       elm-remote   Dictated by (CST): Mich Leach MD on 4/04/2025 at 3:18 PM     Finalized by (CST): Mich Leach MD on 4/04/2025 at 3:20 PM         ED Medications Administered:   Medications   acetaminophen (Tylenol Extra Strength) tab 1,000 mg (1,000 mg Oral Given 4/4/25 1650)         MDM     Vitals:    04/04/25 1407 04/04/25 1538   BP: (!) 156/101 150/86   Pulse: 92 88    Resp: 20    Temp: 98 °F (36.7 °C)    TempSrc: Temporal    SpO2: 98% 98%     *I personally reviewed and interpreted all ED vitals.    Pulse Ox: 98%, Room air, Normal       Differential Diagnosis/ Diagnostic Considerations: Hip contusion, hip fracture, other    Complicating Factors: The patient already has does not have any pertinent problems on file. to contribute to the complexity of this ED evaluation.    Medical Decision Making  Patient presents to the ED with left hip pain after falling in the shower.  No bony deformity on exam and x-ray imaging without fracture.  Patient states that he feels he will be unable to walk due to pain in the hip.  Able to stand and pivot with walker but states he cannot walk.  Will admit for PT OT eval.    Problems Addressed:  Contusion of left hip, initial encounter: acute illness or injury  Contusion of left shoulder, initial encounter: acute illness or injury    Amount and/or Complexity of Data Reviewed  Labs: ordered. Decision-making details documented in ED Course.  Radiology: ordered and independent interpretation performed. Decision-making details documented in ED Course.     Details: Personally viewed the patient's left hip x-ray images and noted no fracture  Discussion of management or test interpretation with external provider(s): I discussed with Dr. Clarke for admission.        Condition upon leaving the department: Stable    Disposition and Plan     Clinical Impression:  1. Contusion of left hip, initial encounter    2. Contusion of left shoulder, initial encounter        Disposition:  Admit    Follow-up:  No follow-up provider specified.    Medications Prescribed:  Current Discharge Medication List        START taking these medications    Details   Ketorolac Tromethamine 10 MG Oral Tab Take 1 tablet (10 mg total) by mouth every 6 (six) hours as needed for Pain.  Qty: 20 tablet, Refills: 0             Hospital Problems       Present on Admission  Date Reviewed:  12/3/2019            ICD-10-CM Noted POA    * (Principal) Contusion of left hip, initial encounter S70.02XA 4/4/2025 Unknown                       [1] (Not in a hospital admission)

## 2025-04-04 NOTE — CM/SW NOTE
[facility]. When I arrived in the room the patient was getting dressed, and putting on his shoes.  Patient states \" I am leaving and going to a better hospital.\" I introduced myself to the patient and his sister.  I offered respite options, but the sister said \" my brother is very stubborn and he will not pay for it. \" I offered a packet of caregiver for the home. The sister refused and said \" the little that they its not worth it . I help him.\" I offered HHC and both agreed to that. The patient is asking for pain medication and his discharge paperwork. The sister asked that I do the C referral. I verified address, number, and other information. Primary nurse and MD updated    9108- when nurse went in to discharge patient the sister and patient decided they no longer want to leave and want him to stay. The nurse and myself made them both aware that an observation will still not be enough forSAR placement. They both v/u and requested to stay. I will leave the C referral in Aidin and extend the deadline. Referral number [id number].

## 2025-04-04 NOTE — ED QUICK NOTES
Orders for admission, patient is aware of plan and ready to go upstairs. Any questions, please call ED RUIZ Welsh at extension [phone].     Patient Covid vaccination status: Fully vaccinated     COVID Test Ordered in ED: None    COVID Suspicion at Admission: N/A    Running Infusions:  None    Mental Status/LOC at time of transport: ALERT    Other pertinent information: On RA, ambulates with walker    CIWA score: N/A   NIH score:  N/A

## 2025-04-04 NOTE — ED INITIAL ASSESSMENT (HPI)
Patient arrives via EMS for a fall. Patient was in the shower when he slipped on soap. Denies blood thinner use.

## 2025-04-05 ENCOUNTER — APPOINTMENT (OUTPATIENT)
Dept: CT IMAGING | Facility: HOSPITAL | Age: 85
End: 2025-04-05
Attending: Other
Payer: MEDICARE

## 2025-04-05 PROBLEM — G20.C PARKINSONISM (HCC): Status: ACTIVE | Noted: 2025-04-05

## 2025-04-05 LAB
ANION GAP SERPL CALC-SCNC: 10 MMOL/L (ref 0–18)
BUN BLD-MCNC: 26 MG/DL (ref 9–23)
BUN/CREAT SERPL: 21.8 (ref 10–20)
CALCIUM BLD-MCNC: 8.8 MG/DL (ref 8.7–10.4)
CHLORIDE SERPL-SCNC: 103 MMOL/L (ref 98–112)
CK SERPL-CCNC: 122 U/L
CO2 SERPL-SCNC: 26 MMOL/L (ref 21–32)
CREAT BLD-MCNC: 1.19 MG/DL
DEPRECATED RDW RBC AUTO: 43.8 FL (ref 35.1–46.3)
EGFRCR SERPLBLD CKD-EPI 2021: 60 ML/MIN/1.73M2 (ref 60–?)
ERYTHROCYTE [DISTWIDTH] IN BLOOD BY AUTOMATED COUNT: 14.4 % (ref 11–15)
FOLATE SERPL-MCNC: 13.1 NG/ML (ref 5.4–?)
GLUCOSE BLD-MCNC: 129 MG/DL (ref 70–99)
HCT VFR BLD AUTO: 46.1 %
HGB BLD-MCNC: 14.8 G/DL
MCH RBC QN AUTO: 26.8 PG (ref 26–34)
MCHC RBC AUTO-ENTMCNC: 32.1 G/DL (ref 31–37)
MCV RBC AUTO: 83.5 FL
OSMOLALITY SERPL CALC.SUM OF ELEC: 294 MOSM/KG (ref 275–295)
PLATELET # BLD AUTO: 308 10(3)UL (ref 150–450)
POTASSIUM SERPL-SCNC: 3.8 MMOL/L (ref 3.5–5.1)
RBC # BLD AUTO: 5.52 X10(6)UL
SODIUM SERPL-SCNC: 139 MMOL/L (ref 136–145)
TSI SER-ACNC: 2.51 UIU/ML (ref 0.55–4.78)
VIT B12 SERPL-MCNC: 640 PG/ML (ref 211–911)
WBC # BLD AUTO: 12 X10(3) UL (ref 4–11)

## 2025-04-05 PROCEDURE — 70450 CT HEAD/BRAIN W/O DYE: CPT | Performed by: OTHER

## 2025-04-05 PROCEDURE — 99233 SBSQ HOSP IP/OBS HIGH 50: CPT | Performed by: INTERNAL MEDICINE

## 2025-04-05 PROCEDURE — 99223 1ST HOSP IP/OBS HIGH 75: CPT | Performed by: OTHER

## 2025-04-05 PROCEDURE — 72131 CT LUMBAR SPINE W/O DYE: CPT | Performed by: OTHER

## 2025-04-05 PROCEDURE — 72128 CT CHEST SPINE W/O DYE: CPT | Performed by: OTHER

## 2025-04-05 PROCEDURE — 72125 CT NECK SPINE W/O DYE: CPT | Performed by: OTHER

## 2025-04-05 RX ORDER — LABETALOL HYDROCHLORIDE 5 MG/ML
10 INJECTION, SOLUTION INTRAVENOUS EVERY 4 HOURS PRN
Status: DISCONTINUED | OUTPATIENT
Start: 2025-04-05 | End: 2025-04-06

## 2025-04-05 RX ORDER — SODIUM CHLORIDE 9 MG/ML
INJECTION, SOLUTION INTRAVENOUS CONTINUOUS
Status: DISCONTINUED | OUTPATIENT
Start: 2025-04-05 | End: 2025-04-06

## 2025-04-05 RX ORDER — CARBIDOPA AND LEVODOPA 25; 100 MG/1; MG/1
0.5 TABLET ORAL 3 TIMES DAILY
Status: DISCONTINUED | OUTPATIENT
Start: 2025-04-05 | End: 2025-04-06

## 2025-04-05 RX ORDER — LISINOPRIL 20 MG/1
20 TABLET ORAL DAILY
Status: DISCONTINUED | OUTPATIENT
Start: 2025-04-05 | End: 2025-04-06

## 2025-04-05 NOTE — PROGRESS NOTES
[facility]     Justyn Hall Patient Status:  Observation    1940 MRN [id number]   Location [facility] 4W/SW/SE Attending Williams Figueroa MD   Hosp Day # 0 PCP Chong Quintero MD     Chief Complaint:   Chief Complaint   Patient presents with    Fall        Subjective:     Patient seen sitting in chair.  No family at bedside.  Patient reports still having some left hip and left shoulder pain.  Pain improving with current pain regimen.  Able to work with PT earlier.    Objective:      Vital signs:  Vitals:    25 0616 25 0638 25 0639 25 0843   BP: (!) 171/86 160/80 (!) 167/86 (!) 182/85   BP Location: Right arm Left arm Left arm Right arm   Pulse:  70 71 69   Resp: 18   18   Temp: 98.2 °F (36.8 °C)   98 °F (36.7 °C)   TempSrc: Oral   Oral   SpO2:    98%   Weight:       Height:           Intake/Output Summary (Last 24 hours) at 2025 1101  Last data filed at 2025 0843  Gross per 24 hour   Intake --   Output 600 ml   Net -600 ml           Physical Exam:    GENERAL:  Awake and alert, in no acute distress.  HEART:  Regular rhythm, regular rate  LUNGS:  Air entry was good.  No increased work of breathing or wheezes   ABDOMEN: Soft and non-tender.    MUSCULOSKELETAL: Pain with range of motion of left upper and lower extremities  EXTREMITIES: No edema appreciated  NEUROLOGICAL: Awake and alert, mild confusion.  Faint upper extremity intention tremors  SKIN:  Warm and well perfused  PSYCHIATRIC: Flat mood    Diagnostic Data:    Labs:    Recent Labs   Lab 25   WBC 15.8* 12.0*   HGB 15.3 14.8   MCV 83.2 83.5   .0 308.0       Recent Labs   Lab 25   * 129*   BUN 28* 26*   CREATSERUM 1.27 1.19   CA 9.0 8.8    139   K 4.1 3.8    103   CO2 26.0 26.0           Estimated Creatinine Clearance: 49 mL/min (based on SCr of 1.19 mg/dL).    No  results for input(s): \"PTP\", \"INR\" in the last 168 hours.         COVID-19  No results found for: \"COVID19\"    Pro-Calcitonin  No results for input(s): \"PCT\" in the last 168 hours.    Cardiac  No results for input(s): \"TROP\", \"PBNP\" in the last 168 hours.    Inflammatory Markers  No results for input(s): \"CRP\", \"TARIK\", \"LDH\", \"DDIMER\" in the last 168 hours.    Culture:  No results found for this visit on 04/04/25.    XR HIP W OR WO PELVIS 2 OR 3 VIEWS, LEFT (CPT=73502)    Result Date: 4/4/2025  CONCLUSION:   No acute fracture or dislocation.  Prior postsurgical changes of left hip arthroplasty with no evidence of hardware complication.  Vascular calcifications.  Otherwise soft tissues are unremarkable.       elm-remote   Dictated by (CST): Mich Leach MD on 4/04/2025 at 3:18 PM     Finalized by (CST): Mich Leach MD on 4/04/2025 at 3:20 PM                 Medications:    lisinopril  20 mg Oral Daily    enoxaparin  40 mg Subcutaneous Daily       Assessment & Plan:      Fall  -contusion of left hip  -Contusion of left shoulder  - Without head trauma or loss of consciousness.  -X-ray of hip with no acute fracture or dislocation.  -Fall precautions  - Pain control as able  - PT/OT  - Neurology consulted, appreciate recommendations    HTN  - Elevations noted  -Decreased after working with PT.  - Started on lisinopril  -Check orthostatic vitals  - Monitor and adjust as needed      Plan of care discussed with patient at bedside . Discussed management/test result(s) with Rn and neurology consultant    Quality:  DVT Prophylaxis: Lovenox  CODE status: Full  Estimated date of discharge: TBD  Discharge is dependent on: clinical stability    52 minutes spent discussing with other providers, examining patient, obtaining history, reviewing medical records, interpreting and communicating test results/imaging, ordering tests/medications, discussing plan of care and documenting information.      Williams Figueroa MD          This  note was prepared using Dragon Medical voice recognition dictation software. As a result errors may occur. When identified these errors have been corrected. While every attempt is made to correct errors during dictation discrepancies may still exist

## 2025-04-05 NOTE — H&P
[facility]    PATIENT'S NAME: EFRA FLORES   ATTENDING PHYSICIAN: Tg Clarke MD   PATIENT ACCOUNT#:   [id number]    LOCATION:  [address] [place] [address]  MEDICAL RECORD #:   [id number]       YOB: 1940  ADMISSION DATE:       04/04/2025    HISTORY AND PHYSICAL EXAMINATION    CHIEF COMPLAINT:  Left hip pain.    HISTORY OF PRESENT ILLNESS:  The patient is an 84-year-old  male who woke up this morning and shortly after he sustained a fall.  He did not fall to the ground completely.  He thinks he pulled the muscle in his hip, and he has been afraid to walk.  Brought into the emergency department for evaluation.  X-ray of the left hip showed no acute fracture or dislocation prior surgical changes of left hip arthroplasty without evidence of hardware complication.  Patient will be admitted to the hospital overnight for observation and physical therapy evaluation.    PAST MEDICAL HISTORY:  Generalized osteoarthritis, benign prostate hypertrophy.    PAST SURGICAL HISTORY:  Left total hip arthroplasty, prostate biopsy, and lumbar spinal fusion.    MEDICATIONS:  Please see medication reconciliation list.      ALLERGIES:  No known drug allergies.    FAMILY HISTORY:  Positive for hypertension.    SOCIAL HISTORY:  No tobacco, alcohol, or drug use.  Lives with his sister.  Independent in his basic activities of daily living.     REVIEW OF SYSTEMS:  Patient reports pain in his left hip anterior area and goes to the medial aspect of his left thigh.  He is able to flex his hip and able to stand up, but he is afraid to walk.  Other 12-point review of systems is negative.       PHYSICAL EXAMINATION:    GENERAL:  Alert and oriented to time, place, and person.  No acute distress.  VITAL SIGNS:  Temperature 98.0, pulse 88, respiratory rate 20, blood pressure 150/86, pulse ox 98% on room air.    HEENT:  Atraumatic.  Oropharynx clear.  Moist mucous membranes.  Ears, nose normal.  Eyes:  Anicteric sclerae.    NECK:  Supple.  No lymphadenopathy.  Trachea midline.  Full range of motion.  LUNGS:  Clear to auscultation bilaterally.  Normal respiratory effort.    HEART:  Regular rate, rhythm.  S1 and S2 auscultated.  No murmur.  ABDOMEN:  Soft, nondistended.  No tenderness.  Positive bowel sounds.    EXTREMITIES:  No peripheral edema, clubbing, or cyanosis.  MUSCULOSKELETAL:  Patient is able to flex his left hip without difficulty.  He said when he does that he has some discomfort in his left medial aspect of his left thigh go to the anterior leg toward the knee.  Otherwise, musculoskeletal examination is negative.  NEUROLOGIC:  Motor and sensory intact.     ASSESSMENT AND PLAN:  Left hip pain without evidence of fracture or prosthetic complication.  Patient will be admitted to general medical floor.  Physical and occupational therapy.  Fall precautions.  Pain control.   evaluation.  Further recommendations to follow.     Dictated By Tg Clarke MD  d: 04/04/2025 17:38:31  t: 04/04/2025 19:12:37  Job [id number]  FB/

## 2025-04-05 NOTE — PHYSICAL THERAPY NOTE
PHYSICAL THERAPY EVALUATION - INPATIENT     Room Number: 420/420-A  Evaluation Date: 4/5/2025  Type of Evaluation: Initial   Physician Order: PT Eval and Treat    Presenting Problem: Pt with fall in home admitted with left hip and shoulder pain. Currently radiology in chart negative for fx of hip.  Per Primary care activity as tolerated.  Co-Morbidities : prior left [facility] many years ago  Reason for Therapy: Mobility Dysfunction and Discharge Planning    PHYSICAL THERAPY ASSESSMENT   Patient is a 84 year old male admitted 4/4/2025 for Presenting Problem: Pt with fall in home admitted with left hip and shoulder pain. Currently radiology in chart negative for fx of hip.  Per Primary care activity as tolerated..  Prior to admission, patient's baseline is Indep ADL and community mobility with no AD , pt reports driving prior to admission .  Pt reports he takes no medication and his sister he lives with does all the bills. Pt is alert , oriented to questions of person , place and time with a few prompts.  Pt provided HPI reporting this is his first fall slipping on soap getting out of shower .   Pt has a cane at home but does not use it.     Patient is currently functioning below baseline with bed mobility, transfers, gait, stair negotiation, standing prolonged periods, and performing household tasks.  Patient is requiring minimal assist and agreeable to only short distance ambulation in room today when offering further ambulation , pt did not progress to stair training today   as a result of the following impairments: decreased functional strength, decreased endurance/aerobic capacity, pain, impaired standing  balance, decreased muscular endurance, and medical status and decrease left hip AROM .     Physical Therapy will continue to follow for duration of hospitalization.    Patient will benefit from continued skilled PT Services at discharge to promote prior level of function.  Anticipate patient will return home with  home health PT.  Pt with goal for DC to home setting.  No family is present. Pt is currently requiring assisted fxn mobility presenting with increase fall risk. Pt lives with his sister , therapy unsure of sister ability to provide assistance .   Pt will  need a RW at DC .       PLAN DURING HOSPITALIZATION  Nursing Mobility Recommendation : 1 Assist     PT Treatment Plan: Bed mobility, Body mechanics, Endurance, Energy conservation, Patient education, Family education, Gait training, Balance training, Transfer training, Stair training  Rehab Potential : Good  Frequency (Obs): 5x/week     PHYSICAL THERAPY MEDICAL/SOCIAL HISTORY   History related to current admission:  From primary care MD note :     ASSESSMENT AND PLAN:  Left hip pain without evidence of fracture or prosthetic complication.  Patient will be admitted to general medical floor.  Physical and occupational therapy.  Fall precautions.  Pain control.   evaluation.  Further recommendations to follow.      Dictated By Tg Clarke MD    Problem List  Principal Problem:    Contusion of left hip, initial encounter  Active Problems:    Contusion of left shoulder, initial encounter    Left hip pain      HOME SITUATION  Type of Home: House  Home Layout: One level (5 steps to enter)                     Lives With: Family (sister)    Drives: Yes   Patient Regularly Uses: None     SUBJECTIVE  Agreeable to limited mobility requesting need for tolieting     PHYSICAL THERAPY EXAMINATION   OBJECTIVE  Precautions: Bed/chair alarm, Hard of hearing  Fall Risk: High fall risk    WEIGHT BEARING RESTRICTION  L Lower Extremity: Weight Bearing as Tolerated      PAIN ASSESSMENT  Ratin  Location: left hip and left shoulder  Management Techniques: Activity promotion, Body mechanics, Breathing techniques, Relaxation, Repositioning    COGNITION  Overall Cognitive Status:  WFL - within functional limits  Safety Judgement:  decreased awareness of need for  assistance and decreased awareness of need for safety  Awareness of Deficits:  decreased awareness of deficits    RANGE OF MOTION AND STRENGTH ASSESSMENT  Upper extremity ROM and strength are within functional limits   Right Lower extremity ROM is within functional limits   Right Lower extremity strength is within functional limits     Left LE not formally assessed ,pt requiring RW use at DC due to pain rated 2/10 with ambulation presenting with generalized decrease left Hip and knee ROM and strength and decrease postural muscle strength observing flexed posture .  Pt returning demonstration of B AP     BALANCE  Static Sitting: Good  Dynamic Sitting: Fair +  Static Standing: Fair -  Dynamic Standing: Poor +                   ACTIVITY TOLERANCE           BP:  (resting /85   post activity no symptoms  96/72)   RN informed of asymptomatic BP drop ,          O2 WALK  Oxygen Therapy  SPO2% on Room Air at Rest: 100    AM-PAC '6-Clicks' INPATIENT SHORT FORM - BASIC MOBILITY  How much difficulty does the patient currently have...  Patient Difficulty: Turning over in bed (including adjusting bedclothes, sheets and blankets)?: A Little   Patient Difficulty: Sitting down on and standing up from a chair with arms (e.g., wheelchair, bedside commode, etc.): A Little   Patient Difficulty: Moving from lying on back to sitting on the side of the bed?: A Little   How much help from another person does the patient currently need...   Help from Another: Moving to and from a bed to a chair (including a wheelchair)?: A Little   Help from Another: Need to walk in hospital room?: A Little   Help from Another: Climbing 3-5 steps with a railing?: A Lot     AM-PAC Score:  Raw Score: 17   Approx Degree of Impairment: 50.57%   Standardized Score (AM-PAC Scale): 42.13   CMS Modifier (G-Code): CK    FUNCTIONAL ABILITY STATUS  Functional Mobility/Gait Assessment  Gait Assistance: Minimum assistance  Distance (ft): 10 ft x 1   20 ft x  1  Assistive Device: Rolling walker  Pattern: L Decreased stance time, R Steppage, L Steppage, R Foot flat, L Foot flat (flexed posture   cues for proper use of AD and posture)    Supine to Sit: minimal assist    Sit to Stand: minimal assist to CGA  cues needed for proper sequencing and safety . Pt completed tolieting activity during session with OT support.  See OT note.  Ambulation into bathroom CGA to min support provided     Skilled Therapy Provided: Co session with OT , PT eval , fxn mobility training as above , pt education , B AP encouraged and DC Planning   Education Provided To: Patient   Patient Education: Role of Physical Therapy, Plan of Care, Discharge Recommendations, DME Recommendations, Functional Transfer Techniques, Fall Prevention, Posture/Positioning, Energy Conservation, Proper Body Mechanics, Gait Training, Other   Patient's Response to Education: Verbalized Understanding, Returned Demonstration, Requires Further Education      The patient's Approx Degree of Impairment: 50.57% has been calculated based on documentation in the [facility] '6 clicks' Inpatient Basic Mobility Short Form.  Research supports that patients with this level of impairment may benefit from home with family vs need for rehab facility .  Final disposition will be made by interdisciplinary medical team.    Patient End of Session: Up in chair, Needs met, Call light within reach, RN aware of session/findings, All patient questions and concerns addressed, Alarm set    CURRENT GOALS  Goals to be met by: 4/20/25  Patient Goal Patient's self-stated goal is: home with sister and tolieting    Goal #1 Patient is able to demonstrate supine - sit EOB @ level: supervision     Goal #1   Current Status    Goal #2 Patient is able to demonstrate transfers EOB to/from Chair/Wheelchair at assistance level: supervision with walker - rolling     Goal #2  Current Status    Goal #3 Patient is able to ambulate  progressing distance  feet with  assist device: walker - rolling at assistance level: CGA   Goal #3   Current Status    Goal #4 Patient will negotiate 5 stairs/one curb w/ assistive device CGA   Goal #4   Current Status    Goal #5 Patient to demonstrate independence with home activity/exercise instructions provided to patient in preparation for discharge.   Goal #5   Current Status    Goal #6    Goal #6  Current Status      Patient Evaluation Complexity Level:  History Moderate - 1 or 2 personal factors and/or co-morbidities   Examination of body systems Low -  addressing 1-2 elements   Clinical Presentation  Moderate - Evolving   Clinical Decision Making  Moderate Complexity   PT eval and therapy activity 2 units

## 2025-04-05 NOTE — OCCUPATIONAL THERAPY NOTE
OCCUPATIONAL THERAPY EVALUATION - INPATIENT     Room Number: 420/420-A  Evaluation Date: 4/5/2025  Type of Evaluation: Initial  Presenting Problem: contusion of L hip    Physician Order: IP Consult to Occupational Therapy  Reason for Therapy: ADL/IADL Dysfunction and Discharge Planning    OCCUPATIONAL THERAPY ASSESSMENT   Patient is a 84 year old male admitted 4/4/2025 for contusion of L hip.  Prior to admission, patient's baseline is I with ADLS, IADLs, driving.  Patient is currently functioning below baseline with toileting, bathing, upper body dressing, lower body dressing, bed mobility, and transfers.  Patient is requiring minimal assist as a result of the following impairments: decreased functional strength, decreased functional reach, decreased endurance, pain, impaired dynamic balance, impaired motor planning, decreased muscular endurance, and medical status. Occupational Therapy will continue to follow for duration of hospitalization.    Patient will benefit from continued skilled OT Services at discharge to promote prior level of function and safety with additional support and return home with home health OT.    PLAN DURING HOSPITALIZATION  OT Device Recommendations: TBD  OT Treatment Plan: Balance activities, Energy conservation/work simplification techniques, ADL training, IADL training, Functional transfer training, Patient/Family education, Patient/Family training     OCCUPATIONAL THERAPY MEDICAL/SOCIAL HISTORY   Problem List   Principal Problem:    Contusion of left hip, initial encounter  Active Problems:    Contusion of left shoulder, initial encounter    Left hip pain    HOME SITUATION  Type of Home: House  Home Layout: One level (5 steps to enter)  Lives With: Family (sister)  Toilet and Equipment: Standard height toilet  Shower/Tub and Equipment: Tub-shower combo  Drives: Yes  Patient Regularly Uses: None    Stairs in Home: 5 to enter  Assistive Device(s) Used: none, has a cane     Prior Level of  [place]: I with ADLs, IADLs, driving    SUBJECTIVE  \"I need to use the bathroom\"    OCCUPATIONAL THERAPY EXAMINATION   OBJECTIVE  Precautions: Bed/chair alarm; Hard of hearing  Fall Risk: High fall risk    WEIGHT BEARING RESTRICTION  L Lower Extremity: Weight Bearing as Tolerated    PAIN ASSESSMENT  Ratin  Location: L hip      COGNITION  Overall Cognitive Status:  WFL - within functional limits    RANGE OF MOTION   Upper extremity ROM is within functional limits     STRENGTH ASSESSMENT  Upper extremity strength is within functional limits     ACTIVITIES OF DAILY LIVING ASSESSMENT  AM-PAC ‘6-Clicks’ Inpatient Daily Activity Short Form  How much help from another person does the patient currently need…  -   Putting on and taking off regular lower body clothing?: A Little  -   Bathing (including washing, rinsing, drying)?: A Little  -   Toileting, which includes using toilet, bedpan or urinal? : A Little  -   Putting on and taking off regular upper body clothing?: A Little  -   Taking care of personal grooming such as brushing teeth?: None  -   Eating meals?: None    AM-PAC Score:  Score: 20  Approx Degree of Impairment: 38.32%  Standardized Score (AM-PAC Scale): 42.03  CMS Modifier (G-Code): CJ    FUNCTIONAL TRANSFER ASSESSMENT  Sit to Stand: Edge of Bed  Edge of Bed: Minimal Assist  Toilet Transfer: Minimal Assist    BED MOBILITY  Supine to Sit : Minimal Assist    BALANCE ASSESSMENT  Static Sitting: Stand-by Assist  Static Standing: Minimal Assist    FUNCTIONAL ADL ASSESSMENT  Eating: Stand-by Assist  Grooming Seated: Stand-by Assist  Bathing Seated: Minimal Assist  UB Dressing Seated: Contact Guard Assist  LB Dressing Seated: Minimal Assist  Toileting Seated: Minimal Assist    THERAPEUTIC EXERCISE     Skilled Therapy Provided: Pt seen for skilled OT evaluation session to address pt's I and safety with functional mobility, t/fs, and ADLs. Pt's RN approved session and pt agreeable. Pt performing functional  mobility and t/fs with MIN A with RW, max cues for safe use of RW. Pt performed toileting and LBD with MIN A. Pt edu provided re: role of OT, safe t/f techniques, DME/AE for ADLs, fall prevention, safe RW management. Pt demo'd understanding of all concepts covered. End of session, pt up in recliner, with alarm on, all needs met. RN aware of pt status. Continue skilled OT.       EDUCATION PROVIDED  Patient Education : Role of Occupational Therapy; Plan of Care; Discharge Recommendations; DME Recommendations; Functional Transfer Techniques; Fall Prevention; Weight Bear Status; Edema Reduction; Posture/Positioning; Energy Conservation; Proper Body Mechanics  Patient's Response to Education: Returned Demonstration; Verbalized Understanding    The patient's Approx Degree of Impairment: 38.32% has been calculated based on documentation in the [facility] '6 clicks' Inpatient Daily Activity Short Form.  Research supports that patients with this level of impairment may benefit from HHOT.  Final disposition will be made by interdisciplinary medical team.    Patient End of Session: Up in chair, Needs met, RN aware of session/findings, Call light within reach, All patient questions and concerns addressed, Alarm set, Ice applied, Hospital anti-slip socks    OT Goals  Patient self-stated goal is: unknown     Patient will complete LE dressing with MOD I   Comment:     Patient will complete toilet transfer with MOD I   Comment:     Patient will complete self care task at sink level with MOD I    Comment:             Goals  on: 25  Frequency: 3-5x/week    Patient Evaluation Complexity Level:   Occupational Profile/Medical History MODERATE - Expanded review of history including review of medical or therapy record   Specific performance deficits impacting engagement in ADL/IADL MODERATE  3 - 5 performance deficits   Client Assessment/Performance Deficits MODERATE - Comorbidities and min to mod modifications of tasks    Clinical  Decision Making MODERATE - Analysis of occupational profile, detailed assessments, several treatment options    Overall Complexity MODERATE     OT Session Time:   Self-Care Home Management: 15 minutes    Tri Lindsay, Occupational Therapist, MS, OTR/L

## 2025-04-05 NOTE — PLAN OF CARE
Pt alert and oriented x4 on room air but forgetful at times. Denies need for PRN pain meds, states pain is easing up a little. PRN labetalol given for BP. Had a BM overnight. Voiding via urinal. Call light within reach.    Problem: Patient Centered Care  Goal: Patient preferences are identified and integrated in the patient's plan of care  Description: Interventions:- What would you like us to know as we care for you? - Provide timely, complete, and accurate information to patient/family- Incorporate patient and family knowledge, values, beliefs, and cultural backgrounds into the planning and delivery of care- Encourage patient/family to participate in care and decision-making at the level they choose- Honor patient and family perspectives and choices  Outcome: Progressing     Problem: Patient/Family Goals  Goal: Patient/Family Long Term Goal  Description: Patient's Long Term Goal: Interventions:-   - See additional Care Plan goals for specific interventions  Outcome: Progressing  Goal: Patient/Family Short Term Goal  Description: Patient's Short Term Goal: Interventions: -   - See additional Care Plan goals for specific interventions  Outcome: Progressing     Problem: PAIN - ADULT  Goal: Verbalizes/displays adequate comfort level or patient's stated pain goal  Description: INTERVENTIONS:- Encourage pt to monitor pain and request assistance- Assess pain using appropriate pain scale- Administer analgesics based on type and severity of pain and evaluate response- Implement non-pharmacological measures as appropriate and evaluate response- Consider cultural and social influences on pain and pain management- Manage/alleviate anxiety- Utilize distraction and/or relaxation techniques- Monitor for opioid side effects- Notify MD/LIP if interventions unsuccessful or patient reports new pain- Anticipate increased pain with activity and pre-medicate as appropriate  Outcome: Progressing     Problem: SAFETY ADULT - FALL  Goal:  Free from fall injury  Description: INTERVENTIONS:- Assess pt frequently for physical needs- Identify cognitive and physical deficits and behaviors that affect risk of falls.- [place] fall precautions as indicated by assessment.- Educate pt/family on patient safety including physical limitations- Instruct pt to call for assistance with activity based on assessment- Modify environment to reduce risk of injury- Provide assistive devices as appropriate- Consider OT/PT consult to assist with strengthening/mobility- Encourage toileting schedule  Outcome: Progressing     Problem: DISCHARGE PLANNING  Goal: Discharge to home or other facility with appropriate resources  Description: INTERVENTIONS:- Identify barriers to discharge w/pt and caregiver- Include patient/family/discharge partner in discharge planning- Arrange for needed discharge resources and transportation as appropriate- Identify discharge learning needs (meds, wound care, etc)- Arrange for interpreters to assist at discharge as needed- Consider post-discharge preferences of patient/family/discharge partner- Complete POLST form as appropriate- Assess patient's ability to be responsible for managing their own health- Refer to Case Management Department for coordinating discharge planning if the patient needs post-hospital services based on physician/LIP order or complex needs related to functional status, cognitive ability or social support system  Outcome: Progressing     Problem: METABOLIC/FLUID AND ELECTROLYTES - ADULT  Goal: Electrolytes maintained within normal limits  Description: INTERVENTIONS:- Monitor labs and rhythm and assess patient for signs and symptoms of electrolyte imbalances- Administer electrolyte replacement as ordered- Monitor response to electrolyte replacements, including rhythm and repeat lab results as appropriate- Fluid restriction as ordered- Instruct patient on fluid and nutrition restrictions as appropriate  Outcome: Progressing      Problem: SKIN/TISSUE INTEGRITY - ADULT  Goal: Skin integrity remains intact  Description: INTERVENTIONS- Assess and document risk factors for pressure ulcer development- Assess and document skin integrity- Monitor for areas of redness and/or skin breakdown- Initiate interventions, skin care algorithm/standards of care as needed  Outcome: Progressing

## 2025-04-05 NOTE — CONSULTS
[facility]  [address] [place] [address], [address]  [facility] [address]  [phone]          NEUROLOGY CONSULTATION NOTE    [facility]  part of [facility]    Report of Consultation  Justyn Hall Patient Status:  Observation     1940 MRN [id number]    Location [facility] 4W/SW/SE Attending Williams Figueroa MD    Hosp Day # 0 PCP Chong Quintero MD      Date of Admission:  2025  Date of Consult:  2025  Reason for Admission/Consultation: evaluate for Parkinson disease   Requested by: Williams Figueroa MD  _________________________________________________________________________________________  Chief Complaint:   Chief Complaint   Patient presents with    Fall     HPI:   Justyn Hall is a 84-year-old man with a past medical history of a left arm fracture, hip surgery, spinal fusion, hypertension, nummular dermatitis, who presents after a fall in the shower resulting in contusion of the left hip and left shoulder and is now being evaluate for Parkinson disease.       Patient reports slipping on soap on the floor of the shower. No head trauma or loss of consciousness reported. Patient was down for approximately half an hour before being found. He is being evaluated for Parkinsonism due to slow movements and elevated blood pressures. Patient reports difficulty initiating movement and has been observed to have slow movements, particularly in his hands. He denies noticing any tremors himself. Patient reports his balance is 'all right' but later mentions it's getting worse due to the recent injury. He denies double vision, blurred vision, or his voice getting quieter. Patient reports some difficulty getting out of a chair, car, or bed, stating 'It's a little bit difficult.' He denies problems with eating, dressing, or grooming himself. Patient reports nocturia but denies constipation. He denies lightheadedness upon standing up (prior to the  fall). Patient reports pain in his hip and a little in his shoulder following the fall.      Parkinson Review    Motor symptoms:   Tremor: no but observed in the room on exam   Dyskinesia:  no  Dystonia:  no  Slowness/stiffness:  ?yes  Hard time reaching behind:  no  Difficulty bending over:   no  Gait:  no  Balance / Falls:  balance is worse  dt last injury/fall in the shower per pt  Freezing / Festination:  no  Difficulty getting out of a chair, car, or a bed:  yes  Speech/Hypophonia:  no  Drooling / Chewing / Swallowing:  no   Diplopia:  no  Dystonia:  no   Fatigue:  no  Eating Tasks / Dressing / Hygiene:  no   Handwriting / Micrographia:  no \"more rigid.\"   Hobbies & Activities: no     Non-motor symptoms:  Nocturia / Urinary Problems:  yes   Constipation:  no  Orthostasis / Autonomic Dysfunction:  no  Cognition:  no issues. Lives w sister. He is driving.     Hallucination:  no  Psychosis:  no  Driving:  no    Sleep-night/ Bed Mobility:  no  Sleep-day/ Daytime Drowsiness:  no  REM Behavioral Symptoms:  no   Hypo- / Anosmia:  no  Pain:  ?  No chronic pain but reports acute pain in his left hip and possibly left shoulder.    Became tearful after the possibility the diagnosis of Parkinson was discussed.  I reassured the patient as best I could.  Provided patient education on Parkinson's disease, emphasizing that it is treatable and does not shorten lifespan, and if he does have Parkinson disease treating it would improve the quality of his life.    The patient was counseled on the possibility of Parkinson's disease, but reassured that even if confirmed, it is very treatable.  Explained we will start medication and closely monitor his response. The patient was encouraged to report any changes in symptoms or new concerns.      Review and summation of prior records      ROS:  Pertinent positive and negatives per HPI.  All others were reviewed and negative.    Past Medical History:    Fracture of arm    fracture of left  arm - cast       Past Surgical History:   Procedure Laterality Date    Hip surgery      Other surgical history  2014     prostate biopsy    Spinal fusion         No family history on file.    Social History     Socioeconomic History    Marital status: Single     Spouse name: Not on file    Number of children: Not on file    Years of education: Not on file    Highest education level: Not on file   Occupational History    Not on file   Tobacco Use    Smoking status: Never    Smokeless tobacco: Never   Vaping Use    Vaping status: Never Used   Substance and Sexual Activity    Alcohol use: No     Alcohol/week: 0.0 standard drinks of alcohol    Drug use: No    Sexual activity: Not on file   Other Topics Concern     Service Not Asked    Blood Transfusions Not Asked    Caffeine Concern Yes     Comment: coffee, occasionally    Occupational Exposure Not Asked    Hobby Hazards Not Asked    Sleep Concern Not Asked    Stress Concern Not Asked    Weight Concern Not Asked    Special Diet Not Asked    Back Care Not Asked    Exercise Not Asked    Bike Helmet Not Asked    Seat Belt Not Asked    Self-Exams Not Asked    Grew up on a farm Not Asked    History of tanning Not Asked    Outdoor occupation Not Asked    Pt has a pacemaker Not Asked    Pt has a defibrillator Not Asked    Reaction to local anesthetic No    Left Handed Not Asked    Right Handed Yes    Currently spends a great deal of time in the sun Not Asked    Past Sunlamp Treatments for Acne Not Asked    Hx of Spending Great Deal of Time in Sun Not Asked    Bad sunburns in the past Not Asked    Tanning Salons in the Past Not Asked    Hx of Radiation Treatments Not Asked    Regular use of sun block Not Asked   Social History Narrative    Not on file     Social Drivers of Health     Food Insecurity: No Food Insecurity (4/4/2025)    NCSS - Food Insecurity     Worried About Running Out of Food in the Last Year: No     Ran Out of Food in the Last Year: No   Transportation  Needs: No Transportation Needs (4/4/2025)    NCSS - Transportation     Lack of Transportation: No   Stress: Not on file   Housing Stability: Not At Risk (4/4/2025)    NCSS - Housing/Utilities     Has Housing: Yes     Worried About Losing Housing: No     Unable to Get Utilities: No       Objective:    Last vitals and weight :  Vitals:    04/05/25 0843   BP: (!) 182/85   Pulse: 69   Resp: 18   Temp: 98 °F (36.7 °C)     @FLOWCHS(14)@    Exam:  - General: appears stated age and no distress  - CV: Symmetric pulses.   Carotids:   - Pulmonary: No sign of respiratory distress.   Neurologic Exam  - Mental Status: Alert and attentive.  He was oriented except he said the date was 4/4/2025.  It is the fifth..  Speech is spontaneous, fluent, and prosodic. Comprehension intact. Repetition intact. Phrase length and rate are normal. No paraphasic errors, neologisms, anomia, acalculia, apraxia, anosognosia, or R/L confusion. No neglect.   - Cranial Nerves: No gaze preference. Visual fields:normal Pupils are 3mm briskly constricting to 2mm and equally round and reactive to light  in a well lit room.  EOMI. No nystagmus. No ptosis. V1-V3 intact B/L to light touch.No pathological facial asymmetry. No flattening of the nasolabial fold. .  Hearing grossly intact.  Tongue midline. No atrophy or fasiculations of the tongue noted. Palate and uvula elevate symmetrically.  Shoulder shrug symmetric.  He keeps his mouth open at rest.  He is not particularly hypophonic.  He may have some?  Dysarthria or his voice may be nasal at baseline.  A he has a head tremor.  Keeps mouth open at rest    - Fundoscopic exam:normal w/o hemorrhages, exudates, or papilledema.No attenuation. No pallor.  - Motor: He has very minimal rigidity in his left arm/left wrist.  Tone is increased on the left.  He has minimal/questionable. Very bradykinetic  Minimal left sided rigidity No interosseous wasting. No flattening of hypothenar eminences.       Right Left      Motor Strength   Deltoids 5 5  Triceps 5 5  Biceps 5 5  Wrist Extensors 5 5   5 5   Hip Flexors   Knee extensors 5 4 - limited d/t hip pain   Plantar flexion 5 5  Dorsiflexion 5 5      Pronator drift: No pronator drift   Arm Rolling: No orbiting.   Finger Taps: Bradykinetic   Rapid movements: Slow.   Leg Drift: none   Foot Taps: Foot taps are symmetric.      Asterixis: No asterixis noted.   Tremor: Head tremor.  No obvious rest tremor.  No intention tremor.      Reflexes:    C5 C6 C7  L4 S1   R 0 0 0 3+ 2+   L 2+ 2+ 2+ 3+ 2+   Adductor Spread: No adductor spread noted.    Frontal release signs:Not assessed.    Jaw Jerk:    Antwan's sign:absent   Nonsustained clonus: Absent   Sustained clonus: Absent   - Sensory:   Light touch: intact  Temperature: Gradient loss in lower extremities  Pinprick:   Vibration: Absent first toe bilaterally.  Gradient loss bilaterally     - Cerebellum: No truncal ataxia. No titubations. No dysmetria, no dysdiadochokinesis. No overshoot.   - Gait/station: He is stooped.  He walks slowly.  He does not have a classic stooped posture of Parkinson though..  He lifts his feet when he walks.  He leads with his left foot.  Questionable en bloc turning.  Walks with a walker.  Symmetric arm swing.     - Plantar response: flexor bilaterally    Inpatient Medications   lisinopril  20 mg Oral Daily    enoxaparin  40 mg Subcutaneous Daily          labetalol    acetaminophen    acetaminophen **OR** HYDROcodone-acetaminophen **OR** HYDROcodone-acetaminophen    ondansetron    metoclopramide    temazepam      Home Medications  Current Outpatient Medications   Medication Instructions    Ketorolac Tromethamine (TORADOL) 10 mg, Oral, Every 6 hours PRN        Data reviewed  Laboratory Data:  Lab Results   Component Value Date    WBC 12.0 (H) 04/05/2025    HGB 14.8 04/05/2025    HCT 46.1 04/05/2025    .0 04/05/2025    CREATSERUM 1.19 04/05/2025    BUN 26 (H) 04/05/2025     04/05/2025    K 3.8  04/05/2025     04/05/2025    CO2 26.0 04/05/2025     (H) 04/05/2025    CA 8.8 04/05/2025    ALB 3.9 06/11/2019    ALKPHO 103 06/11/2019    TP 7.9 06/11/2019    AST 17 06/11/2019    ALT 24 06/11/2019    T4F 1.10 07/09/2015    TSH 2.050 06/11/2019    PSA 2.46 10/10/2019     Recent Results (from the past 72 hours)   Basic Metabolic Panel (8)    Collection Time: 04/04/25  5:39 PM   Result Value Ref Range    Glucose 121 (H) 70 - 99 mg/dL    Sodium 139 136 - 145 mmol/L    Potassium 4.1 3.5 - 5.1 mmol/L    Chloride 102 98 - 112 mmol/L    CO2 26.0 21.0 - 32.0 mmol/L    Anion Gap 11 0 - 18 mmol/L    BUN 28 (H) 9 - 23 mg/dL    Creatinine 1.27 0.70 - 1.30 mg/dL    BUN/CREA Ratio 22.0 (H) 10.0 - 20.0    Calcium, Total 9.0 8.7 - 10.4 mg/dL    Calculated Osmolality 295 275 - 295 mOsm/kg    eGFR-Cr 55 (L) >=60 mL/min/1.73m2   CBC With Differential With Platelet    Collection Time: 04/04/25  5:39 PM   Result Value Ref Range    WBC 15.8 (H) 4.0 - 11.0 x10(3) uL    RBC 5.66 3.80 - 5.80 x10(6)uL    HGB 15.3 13.0 - 17.5 g/dL    HCT 47.1 39.0 - 53.0 %    MCV 83.2 80.0 - 100.0 fL    MCH 27.0 26.0 - 34.0 pg    MCHC 32.5 31.0 - 37.0 g/dL    RDW-SD 43.4 35.1 - 46.3 fL    RDW 14.4 11.0 - 15.0 %    .0 150.0 - 450.0 10(3)uL    Neutrophil Absolute Prelim 14.25 (H) 1.50 - 7.70 x10 (3) uL    Neutrophil Absolute 14.25 (H) 1.50 - 7.70 x10(3) uL    Lymphocyte Absolute 0.56 (L) 1.00 - 4.00 x10(3) uL    Monocyte Absolute 0.82 0.10 - 1.00 x10(3) uL    Eosinophil Absolute 0.02 0.00 - 0.70 x10(3) uL    Basophil Absolute 0.06 0.00 - 0.20 x10(3) uL    Immature Granulocyte Absolute 0.07 0.00 - 1.00 x10(3) uL    Neutrophil % 90.4 %    Lymphocyte % 3.5 %    Monocyte % 5.2 %    Eosinophil % 0.1 %    Basophil % 0.4 %    Immature Granulocyte % 0.4 %   Basic Metabolic Panel (8)    Collection Time: 04/05/25  5:21 AM   Result Value Ref Range    Glucose 129 (H) 70 - 99 mg/dL    Sodium 139 136 - 145 mmol/L    Potassium 3.8 3.5 - 5.1 mmol/L     Chloride 103 98 - 112 mmol/L    CO2 26.0 21.0 - 32.0 mmol/L    Anion Gap 10 0 - 18 mmol/L    BUN 26 (H) 9 - 23 mg/dL    Creatinine 1.19 0.70 - 1.30 mg/dL    BUN/CREA Ratio 21.8 (H) 10.0 - 20.0    Calcium, Total 8.8 8.7 - 10.4 mg/dL    Calculated Osmolality 294 275 - 295 mOsm/kg    eGFR-Cr 60 >=60 mL/min/1.73m2   CBC, Platelet; No Differential    Collection Time: 04/05/25  5:21 AM   Result Value Ref Range    WBC 12.0 (H) 4.0 - 11.0 x10(3) uL    RBC 5.52 3.80 - 5.80 x10(6)uL    HGB 14.8 13.0 - 17.5 g/dL    HCT 46.1 39.0 - 53.0 %    MCV 83.5 80.0 - 100.0 fL    MCH 26.8 26.0 - 34.0 pg    MCHC 32.1 31.0 - 37.0 g/dL    RDW 14.4 11.0 - 15.0 %    RDW-SD 43.8 35.1 - 46.3 fL    .0 150.0 - 450.0 10(3)uL        HGBA1C: No results found for: \"A1C\", \"EAG\"     Test results/Imaging:   XR HIP W OR WO PELVIS 2 OR 3 VIEWS, LEFT (CPT=73502)    Result Date: 4/4/2025  CONCLUSION:   No acute fracture or dislocation.  Prior postsurgical changes of left hip arthroplasty with no evidence of hardware complication.  Vascular calcifications.  Otherwise soft tissues are unremarkable.       elm-remote   Dictated by (CST): Mich Leach MD on 4/04/2025 at 3:18 PM     Finalized by (CST): Mich Leach MD on 4/04/2025 at 3:20 PM             No results found.    Performed an independent visualization of xr hip  Imaging revealed:   agree w read         He does have features of parkinsonism including bradykinesia and minimal rigidity in his left arm.  Did not appreciate a significant rest tremor.  He does have a head tremor.  Gait abnormalities could also be multifactorial including undiagnosed peripheral neuropathy and suspected cervical and lumbar spinal stenosis and spondyloarthropathy.  Will trial Sinemet.  Will start on half a tablet of  mg of carbidopa levodopa 3 times a day every with 5 hours between each dose.  Monitor for orthostatic hypotension, hallucinations, impulsivity etc.  May not have significant improvement with half a  tab alone.  Will also evaluate for peripheral neuropathy creating gradient loss to temperature vibration multiple modalities.  Will order imaging to evaluate for other possibilities such as normal pressure hydrocephalus and cervical and lumbar myelopathy.  Lastly we will also order imaging to exclude NPH.    Parkinsonism and fall  Differential Diagnosis:  Idiopathic Parkinson disease  Vascular parkinsonism ;patient has sustained hypertension  NPH  Cervical and lumbar myelopathy which may be comorbid with parkinsonism  Diagnostics:  Neuropathy labs as below  Intracranial imaging; can start off with CT head and CT of the C, T and, L-spine.  Patient denies hitting his head but will also help exclude any acute process such as subdural.  If there is concern for findings of myelopathy on his imaging that would order MRI of the C and L-spine.  Therapeutics:  Neuro checks Q4 while awake.  Start carbidopa levodopa /2 a tablet 3 times a day.  5 hours between doses  PT OT   delirium precaution  - carbidopa-levodopa (SINEMET)  MG per tab 0.5 table  - CT BRAIN OR HEAD (CPT=70450)  - CT SPINE CERVICAL (CPT=72125)  - CT SPINE THORACIC (CPT=72128)  - CT SPINE LUMBAR (CPT=72131)      2.  Concern for peripheral neuropathy  Differential Diagnosis:    Diagnostics:  Neuropathy labs;    Therapeutics:  Pt ot        Education/Instructions given to: patient   Barriers to Learning:None  Content: Refer to note above. Evaluation/Outcome: Verbalized understanding    This document is not intended to support charting by exception.  Sections left blank in a completed note should be presumed not to have been done.    Disclaimer:   This record was dictated using Dragon software. There may be errors due to voice recognition problems that were not realized and corrected during the completion of the note.            Thank you.  Ck Melendrez D.O.   Vascular & General Neurology  4/5/2025  2:37 PM

## 2025-04-05 NOTE — PLAN OF CARE
Alert and oriented x 3-4. Up with one and a walker. Frequent ambulation encouraged. Voiding via urinal and toilet. SCDs for DVT prophylaxis, refusing Lovenox, educated on benefits and potential risks, MD made aware. Denies need for pain medication. Vital signs monitored. No acute changes noted throughout shift. Tolerating diet. Fall precautions in place- bed in lowest position, call light and personal belongings within reach, non-skid socks in place. Frequent rounding by nursing staff. Neurology placed on consult. Plan is home pending clearance.    Problem: Patient Centered Care  Goal: Patient preferences are identified and integrated in the patient's plan of care  Description: Interventions:- What would you like us to know as we care for you? From home with family  - Provide timely, complete, and accurate information to patient/family- Incorporate patient and family knowledge, values, beliefs, and cultural backgrounds into the planning and delivery of care- Encourage patient/family to participate in care and decision-making at the level they choose- Honor patient and family perspectives and choices  Outcome: Progressing     Problem: Patient/Family Goals  Goal: Patient/Family Long Term Goal  Description: Patient's Long Term Goal: go back home  Interventions:- therapy eval  - See additional Care Plan goals for specific interventions  Outcome: Progressing  Goal: Patient/Family Short Term Goal  Description: Patient's Short Term Goal: be able to walk again  Interventions: - medications  -diagnostics   - See additional Care Plan goals for specific interventions  Outcome: Progressing     Problem: PAIN - ADULT  Goal: Verbalizes/displays adequate comfort level or patient's stated pain goal  Description: INTERVENTIONS:- Encourage pt to monitor pain and request assistance- Assess pain using appropriate pain scale- Administer analgesics based on type and severity of pain and evaluate response- Implement non-pharmacological  measures as appropriate and evaluate response- Consider cultural and social influences on pain and pain management- Manage/alleviate anxiety- Utilize distraction and/or relaxation techniques- Monitor for opioid side effects- Notify MD/LIP if interventions unsuccessful or patient reports new pain- Anticipate increased pain with activity and pre-medicate as appropriate  Outcome: Progressing     Problem: SAFETY ADULT - FALL  Goal: Free from fall injury  Description: INTERVENTIONS:- Assess pt frequently for physical needs- Identify cognitive and physical deficits and behaviors that affect risk of falls.- [place] fall precautions as indicated by assessment.- Educate pt/family on patient safety including physical limitations- Instruct pt to call for assistance with activity based on assessment- Modify environment to reduce risk of injury- Provide assistive devices as appropriate- Consider OT/PT consult to assist with strengthening/mobility- Encourage toileting schedule  Outcome: Progressing     Problem: DISCHARGE PLANNING  Goal: Discharge to home or other facility with appropriate resources  Description: INTERVENTIONS:- Identify barriers to discharge w/pt and caregiver- Include patient/family/discharge partner in discharge planning- Arrange for needed discharge resources and transportation as appropriate- Identify discharge learning needs (meds, wound care, etc)- Arrange for interpreters to assist at discharge as needed- Consider post-discharge preferences of patient/family/discharge partner- Complete POLST form as appropriate- Assess patient's ability to be responsible for managing their own health- Refer to Case Management Department for coordinating discharge planning if the patient needs post-hospital services based on physician/LIP order or complex needs related to functional status, cognitive ability or social support system  Outcome: Progressing     Problem: METABOLIC/FLUID AND ELECTROLYTES - ADULT  Goal:  Electrolytes maintained within normal limits  Description: INTERVENTIONS:- Monitor labs and rhythm and assess patient for signs and symptoms of electrolyte imbalances- Administer electrolyte replacement as ordered- Monitor response to electrolyte replacements, including rhythm and repeat lab results as appropriate- Fluid restriction as ordered- Instruct patient on fluid and nutrition restrictions as appropriate  Outcome: Progressing     Problem: SKIN/TISSUE INTEGRITY - ADULT  Goal: Skin integrity remains intact  Description: INTERVENTIONS- Assess and document risk factors for pressure ulcer development- Assess and document skin integrity- Monitor for areas of redness and/or skin breakdown- Initiate interventions, skin care algorithm/standards of care as needed  Outcome: Progressing

## 2025-04-06 VITALS
TEMPERATURE: 98 F | HEIGHT: 72 IN | OXYGEN SATURATION: 100 % | SYSTOLIC BLOOD PRESSURE: 150 MMHG | DIASTOLIC BLOOD PRESSURE: 100 MMHG | BODY MASS INDEX: 22.78 KG/M2 | RESPIRATION RATE: 17 BRPM | HEART RATE: 74 BPM | WEIGHT: 168.19 LBS

## 2025-04-06 LAB
ANION GAP SERPL CALC-SCNC: 11 MMOL/L (ref 0–18)
BASOPHILS # BLD AUTO: 0.1 X10(3) UL (ref 0–0.2)
BASOPHILS NFR BLD AUTO: 0.8 %
BUN BLD-MCNC: 29 MG/DL (ref 9–23)
BUN/CREAT SERPL: 22 (ref 10–20)
C DIFF TOX B STL QL: NEGATIVE
CALCIUM BLD-MCNC: 8.4 MG/DL (ref 8.7–10.4)
CHLORIDE SERPL-SCNC: 104 MMOL/L (ref 98–112)
CO2 SERPL-SCNC: 24 MMOL/L (ref 21–32)
CREAT BLD-MCNC: 1.32 MG/DL
DEPRECATED RDW RBC AUTO: 44.6 FL (ref 35.1–46.3)
EGFRCR SERPLBLD CKD-EPI 2021: 53 ML/MIN/1.73M2 (ref 60–?)
EOSINOPHIL # BLD AUTO: 0.62 X10(3) UL (ref 0–0.7)
EOSINOPHIL NFR BLD AUTO: 4.8 %
ERYTHROCYTE [DISTWIDTH] IN BLOOD BY AUTOMATED COUNT: 14.6 % (ref 11–15)
GLUCOSE BLD-MCNC: 116 MG/DL (ref 70–99)
HCT VFR BLD AUTO: 42.8 %
HGB BLD-MCNC: 14.2 G/DL
IMM GRANULOCYTES # BLD AUTO: 0.07 X10(3) UL (ref 0–1)
IMM GRANULOCYTES NFR BLD: 0.5 %
LYMPHOCYTES # BLD AUTO: 1.69 X10(3) UL (ref 1–4)
LYMPHOCYTES NFR BLD AUTO: 13.1 %
MCH RBC QN AUTO: 27.8 PG (ref 26–34)
MCHC RBC AUTO-ENTMCNC: 33.2 G/DL (ref 31–37)
MCV RBC AUTO: 83.9 FL
MONOCYTES # BLD AUTO: 0.92 X10(3) UL (ref 0.1–1)
MONOCYTES NFR BLD AUTO: 7.1 %
NEUTROPHILS # BLD AUTO: 9.54 X10 (3) UL (ref 1.5–7.7)
NEUTROPHILS # BLD AUTO: 9.54 X10(3) UL (ref 1.5–7.7)
NEUTROPHILS NFR BLD AUTO: 73.7 %
OSMOLALITY SERPL CALC.SUM OF ELEC: 295 MOSM/KG (ref 275–295)
PLATELET # BLD AUTO: 286 10(3)UL (ref 150–450)
POTASSIUM SERPL-SCNC: 3.9 MMOL/L (ref 3.5–5.1)
RBC # BLD AUTO: 5.1 X10(6)UL
SODIUM SERPL-SCNC: 139 MMOL/L (ref 136–145)
WBC # BLD AUTO: 12.9 X10(3) UL (ref 4–11)

## 2025-04-06 PROCEDURE — 99239 HOSP IP/OBS DSCHRG MGMT >30: CPT | Performed by: INTERNAL MEDICINE

## 2025-04-06 PROCEDURE — 99233 SBSQ HOSP IP/OBS HIGH 50: CPT | Performed by: OTHER

## 2025-04-06 RX ORDER — CARBIDOPA AND LEVODOPA 25; 100 MG/1; MG/1
TABLET ORAL
Qty: 249 TABLET | Refills: 0 | Status: ON HOLD | OUTPATIENT
Start: 2025-04-06 | End: 2025-07-05

## 2025-04-06 RX ORDER — ASPIRIN 81 MG/1
81 TABLET ORAL DAILY
Qty: 30 TABLET | Refills: 0 | Status: ON HOLD | OUTPATIENT
Start: 2025-04-06

## 2025-04-06 RX ORDER — HYDROCODONE BITARTRATE AND ACETAMINOPHEN 5; 325 MG/1; MG/1
1 TABLET ORAL EVERY 6 HOURS PRN
Qty: 10 TABLET | Refills: 0 | Status: ON HOLD | OUTPATIENT
Start: 2025-04-06

## 2025-04-06 RX ORDER — CARVEDILOL 6.25 MG/1
6.25 TABLET ORAL 2 TIMES DAILY WITH MEALS
Qty: 60 TABLET | Refills: 0 | Status: ON HOLD | OUTPATIENT
Start: 2025-04-06 | End: 2025-05-06

## 2025-04-06 RX ORDER — CARVEDILOL 6.25 MG/1
6.25 TABLET ORAL 2 TIMES DAILY WITH MEALS
Status: DISCONTINUED | OUTPATIENT
Start: 2025-04-06 | End: 2025-04-06

## 2025-04-06 RX ORDER — ASPIRIN 81 MG/1
81 TABLET ORAL DAILY
Status: DISCONTINUED | OUTPATIENT
Start: 2025-04-06 | End: 2025-04-06

## 2025-04-06 RX ORDER — LISINOPRIL 20 MG/1
20 TABLET ORAL DAILY
Qty: 30 TABLET | Refills: 0 | Status: ON HOLD | OUTPATIENT
Start: 2025-04-07

## 2025-04-06 NOTE — PLAN OF CARE
Problem: Patient Centered Care  Goal: Patient preferences are identified and integrated in the patient's plan of care  Description: Interventions:- What would you like us to know as we care for you? From home with family  - Provide timely, complete, and accurate information to patient/family- Incorporate patient and family knowledge, values, beliefs, and cultural backgrounds into the planning and delivery of care- Encourage patient/family to participate in care and decision-making at the level they choose- Honor patient and family perspectives and choices  Outcome: Progressing     Problem: Patient/Family Goals  Goal: Patient/Family Long Term Goal  Description: Patient's Long Term Goal: go back home  Interventions:- therapy eval  - See additional Care Plan goals for specific interventions  Outcome: Progressing  Goal: Patient/Family Short Term Goal  Description: Patient's Short Term Goal:   Interventions: -   - See additional Care Plan goals for specific interventions  Outcome: Progressing     Problem: PAIN - ADULT  Goal: Verbalizes/displays adequate comfort level or patient's stated pain goal  Description: INTERVENTIONS:- Encourage pt to monitor pain and request assistance- Assess pain using appropriate pain scale- Administer analgesics based on type and severity of pain and evaluate response- Implement non-pharmacological measures as appropriate and evaluate response- Consider cultural and social influences on pain and pain management- Manage/alleviate anxiety- Utilize distraction and/or relaxation techniques- Monitor for opioid side effects- Notify MD/LIP if interventions unsuccessful or patient reports new pain- Anticipate increased pain with activity and pre-medicate as appropriate  Outcome: Progressing     Problem: SAFETY ADULT - FALL  Goal: Free from fall injury  Description: INTERVENTIONS:- Assess pt frequently for physical needs- Identify cognitive and physical deficits and behaviors that affect risk of  falls.- [place] fall precautions as indicated by assessment.- Educate pt/family on patient safety including physical limitations- Instruct pt to call for assistance with activity based on assessment- Modify environment to reduce risk of injury- Provide assistive devices as appropriate- Consider OT/PT consult to assist with strengthening/mobility- Encourage toileting schedule  Outcome: Progressing     Problem: DISCHARGE PLANNING  Goal: Discharge to home or other facility with appropriate resources  Description: INTERVENTIONS:- Identify barriers to discharge w/pt and caregiver- Include patient/family/discharge partner in discharge planning- Arrange for needed discharge resources and transportation as appropriate- Identify discharge learning needs (meds, wound care, etc)- Arrange for interpreters to assist at discharge as needed- Consider post-discharge preferences of patient/family/discharge partner- Complete POLST form as appropriate- Assess patient's ability to be responsible for managing their own health- Refer to Case Management Department for coordinating discharge planning if the patient needs post-hospital services based on physician/LIP order or complex needs related to functional status, cognitive ability or social support system  Outcome: Progressing     Problem: METABOLIC/FLUID AND ELECTROLYTES - ADULT  Goal: Electrolytes maintained within normal limits  Description: INTERVENTIONS:- Monitor labs and rhythm and assess patient for signs and symptoms of electrolyte imbalances- Administer electrolyte replacement as ordered- Monitor response to electrolyte replacements, including rhythm and repeat lab results as appropriate- Fluid restriction as ordered- Instruct patient on fluid and nutrition restrictions as appropriate  Outcome: Progressing     Problem: SKIN/TISSUE INTEGRITY - ADULT  Goal: Skin integrity remains intact  Description: INTERVENTIONS- Assess and document risk factors for pressure ulcer  development- Assess and document skin integrity- Monitor for areas of redness and/or skin breakdown- Initiate interventions, skin care algorithm/standards of care as needed  Outcome: Maite Alejandra was up on the recliner at beginning of shift, able to ambulate x 1 assist with a walker to the bathroom and voided freely. PRN Norco was given for pain management. He c/o more of weakness. He also had a BM last night with stool sample sent down to lab. Orthostatic BP was done during shift as ordered, pt denied any lightheadedness during ambulation. IVF infusing. PT eval scheduled later today.

## 2025-04-06 NOTE — PHYSICAL THERAPY NOTE
Chart reviewed attempted treatment pt declined therapy today. Pt just returned from the chair and the bathroom. Discussed with the pt as PCT was doing orthostatic BP. Assisted pt back to supine in the bed with min A with B LE's. Will follow up tomorrow as appropriate.

## 2025-04-06 NOTE — DISCHARGE INSTRUCTIONS
Home health  [facility]  [address] [place] [address]  Phone: [phone]  Fax: [phone]

## 2025-04-06 NOTE — DISCHARGE SUMMARY
[facility]  part of [facility]    Discharge Summary    Justyn Hall Patient Status:  Observation    1940 MRN [id number]   Location [facility] 4W/SW/SE Attending Williams Figueroa MD   Hosp Day # 0 PCP Chong Quintero MD     Date of Admission: 2025     Date of Discharge: 25      Lace+ Score: 46  59-90 High Risk  29-58 Medium Risk  0-28   Low Risk.    TCM Follow-Up Recommendation:  LACE 29-58: Moderate Risk of readmission after discharge from the hospital.    DISCHARGE DX: Principal Problem:    Contusion of left hip, initial encounter  Active Problems:    Contusion of left shoulder, initial encounter    Left hip pain    Parkinsonism (HCC)       The patient was seen and examined on day of discharge and this discharge summary is in conjunction with any daily progress note from day of discharge.    HPI per admitting physician: \"The patient is an 84-year-old  male who woke up this morning and shortly after he sustained a fall. He did not fall to the ground completely. He thinks he pulled the muscle in his hip, and he has been afraid to walk. Brought into the emergency department for evaluation. X-ray of the left hip showed no acute fracture or dislocation prior surgical changes of left hip arthroplasty without evidence of hardware complication. Patient will be admitted to the hospital overnight for observation and physical therapy evaluation. \"    Hospital Course:       Fall  -contusion of left hip  -Contusion of left shoulder  - Without head trauma or loss of consciousness.  -X-ray of hip with no acute fracture or dislocation.  -Fall precautions  - Pain control as able  - PT/OT  - Concern for possible signs of parkinsonism, Neurology consulted  -Imaging reviewed without significant acute process  -Started on trial of Carbidopa/levidopa.   -Plan for follow up as outpt.       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  -No acute changes on imaging  -BP control as above  -ASA 81      Physical Exam:    Vitals:    04/06/25 0826 04/06/25 1043 04/06/25 1045 04/06/25 1047   BP: 140/89 136/70 (!) 137/91 (!) 150/100   BP Location: Right arm Right arm Right arm Right arm   Pulse: 69 66 80 74   Resp: 16 16 16 17   Temp: 97.8 °F (36.6 °C)      TempSrc: Oral      SpO2: 100% 98% 99% 100%   Weight:       Height:         Patient Weight for the past 72 hrs:   Weight   04/04/25 1832 168 lb 3.2 oz (76.3 kg)       Intake/Output Summary (Last 24 hours) at 4/6/2025 1250  Last data filed at 4/6/2025 0951  Gross per 24 hour   Intake 200 ml   Output 52 ml   Net 148 ml       GENERAL:  Awake and alert, in no acute distress.  HEART:  Regular rhythm, regular rate  LUNGS:  Air entry was good.  No increased work of breathing or wheezes   ABDOMEN: Soft and non-tender.    SKIN:  Warm and well perfused  PSYCHIATRIC: Normal mood    CULTURE:   No results found for this visit on 04/04/25.    IMAGING STUDIES: SOME MAY NEED FOLLOW UP WITH PCP   CT SPINE LUMBAR (CPT=72131)    Result Date: 4/6/2025  CONCLUSION:  1. No acute fracture or subluxation. 2. Multilevel degenerative disc disease and facet arthrosis with ligamentum flavum hypertrophy.  Mild-to-moderate central narrowing at L3-4.  No high-grade central narrowing. 3. 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 4. 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     Dictated by (CST): Clay Richards MD on 4/06/2025 at 0:29 AM     Finalized by (CST): Clay Richards MD on 4/06/2025 at 0:40 AM          CT SPINE THORACIC (CPT=72128)    Result Date: 4/6/2025  CONCLUSION:  1. No acute finding.  No spinal stenosis. 2. Moderate hiatal hernia with gastroesophageal reflux. 3. Coronary artery calcification.     Dictated by (CST): Clay Richards MD on 4/06/2025 at 0:18 AM     Finalized by (CST):  Clay Richards MD on 4/06/2025 at 0:28 AM          CT SPINE CERVICAL (CPT=72125)    Result Date: 4/6/2025  CONCLUSION:  1. Multilevel disc and facet degeneration.  No significant central narrowing.  Moderate to severe right C4 foraminal narrowing. 2. No acute fracture or subluxation.    Dictated by (CST): Clay Richards MD on 4/05/2025 at 11:57 PM     Finalized by (CST): Clay Richards MD on 4/06/2025 at 0:02 AM          CT BRAIN OR HEAD (CPT=70450)    Result Date: 4/5/2025  CONCLUSION:  1. No acute intracranial finding. 2. Advanced changes of chronic small vessel disease in cerebral white matter.    Dictated by (CST): Clay Richards MD on 4/05/2025 at 11:54 PM     Finalized by (CST): Clay Richards MD on 4/05/2025 at 11:57 PM          XR HIP W OR WO PELVIS 2 OR 3 VIEWS, LEFT (CPT=73502)    Result Date: 4/4/2025  CONCLUSION:   No acute fracture or dislocation.  Prior postsurgical changes of left hip arthroplasty with no evidence of hardware complication.  Vascular calcifications.  Otherwise soft tissues are unremarkable.       elm-remote   Dictated by (CST): Mich Leach MD on 4/04/2025 at 3:18 PM     Finalized by (CST): Mich Leach MD on 4/04/2025 at 3:20 PM           LABS :     Lab Results   Component Value Date    WBC 12.9 (H) 04/06/2025    HGB 14.2 04/06/2025    HCT 42.8 04/06/2025    .0 04/06/2025    CREATSERUM 1.32 (H) 04/06/2025    BUN 29 (H) 04/06/2025     04/06/2025    K 3.9 04/06/2025     04/06/2025    CO2 24.0 04/06/2025     (H) 04/06/2025    CA 8.4 (L) 04/06/2025    ALB 3.9 06/11/2019    ALKPHO 103 06/11/2019    BILT 1.2 06/11/2019    TP 7.9 06/11/2019    AST 17 06/11/2019    ALT 24 06/11/2019    T4F 1.10 07/09/2015    TSH 2.507 04/05/2025    PSA 2.46 10/10/2019     04/05/2025    B12 640 04/05/2025       Recent Labs   Lab 04/04/25  1739 04/05/25  0521 04/06/25  0506   RBC 5.66 5.52 5.10   HGB 15.3 14.8 14.2   HCT 47.1 46.1 42.8   MCV 83.2 83.5 83.9   MCH 27.0 26.8 27.8    MCHC 32.5 32.1 33.2   RDW 14.4 14.4 14.6   NEPRELIM 14.25*  --  9.54*   WBC 15.8* 12.0* 12.9*   .0 308.0 286.0     Recent Labs   Lab 04/04/25  1739 04/05/25  0521 04/06/25  0506   * 129* 116*   BUN 28* 26* 29*   CREATSERUM 1.27 1.19 1.32*   CA 9.0 8.8 8.4*    139 139   K 4.1 3.8 3.9    103 104   CO2 26.0 26.0 24.0     No results found for: \"PT\", \"INR\"    Disposition: Discharge to [facility]with [facility]    Condition at Discharge: Stable     Discharge Medications:      Discharge Medications        START taking these medications        Instructions Prescription details   aspirin 81 MG Tbec      Take 1 tablet (81 mg total) by mouth daily.   Quantity: 30 tablet  Refills: 0     carbidopa-levodopa  MG Tabs  Commonly known as: SINEMET  Start taking on: April 6, 2025      Take 0.5 tablets by mouth 3 (three) times daily for 14 days, THEN 1 tablet 3 (three) times daily. Give half a tab 3 times a day, 5 hours between each dose for 3 days.  Then full tab 3 times a day 5 hours between each dose.  Give ideally on empty stomach.  Give 30 to 45 minutes before a meal or 45 to 60 minutes after a meal..   Stop taking on: July 5, 2025  Quantity: 249 tablet  Refills: 0     carvedilol 6.25 MG Tabs  Commonly known as: Coreg      Take 1 tablet (6.25 mg total) by mouth 2 (two) times daily with meals.   Stop taking on: May 6, 2025  Quantity: 60 tablet  Refills: 0     HYDROcodone-acetaminophen 5-325 MG Tabs  Commonly known as: Norco      Take 1 tablet by mouth every 6 (six) hours as needed.   Quantity: 10 tablet  Refills: 0     Ketorolac Tromethamine 10 MG Tabs  Commonly known as: TORADOL      Take 1 tablet (10 mg total) by mouth every 6 (six) hours as needed for Pain.   Stop taking on: April 9, 2025  Quantity: 20 tablet  Refills: 0     lisinopril 20 MG Tabs  Commonly known as: Prinivil; Zestril  Start taking on: April 7, 2025      Take 1 tablet (20 mg total) by mouth daily.   Quantity: 30 tablet  Refills: 0                Where to Get Your Medications        These medications were sent to [id number] - [place], [place] - [address] [address] [phone], [phone] [address] [facility] [facility] [address], [facility] [address]      Phone: [phone]   aspirin 81 MG Tbec  carbidopa-levodopa  MG Tabs  carvedilol 6.25 MG Tabs  HYDROcodone-acetaminophen 5-325 MG Tabs  Ketorolac Tromethamine 10 MG Tabs  lisinopril 20 MG Tabs         Follow up Visits  Chong Quintero MD  [address] [place] [address] [id number] [phone]    Follow up in 1 week(s)      Ck Melendrez DO  [address] [address]  [phone]    Follow up in 1 month(s)      Chong Quintero MD    Consultants         Provider   Role Specialty     Ck Melendrez DO      Consulting Physician NEUROLOGY              Other Discharge Instructions:   Discharge References/Attachments    Hip Contusion (English)         ----------------------------------------------------  38 MIN SPENT ON THIS DC   Williams Figueroa MD    4/6/2025
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
## 2025-04-06 NOTE — CM/SW NOTE
CANDACE followed up on DC planning.     CANDACE met with sister and pt in the room to discuss DC planning    Confirmed plan is for home with HHC.     C list provided - family would like [facility]    CANDACE notified them they are choice    [facility]  [address] [place] [address]  Phone: [phone]  Fax: [phone]    Addendum, 4/7/2025    Received message from [facility] stating they can no longer see the pt.     CANDACE reopened the referral and called pt to update. Spoke with pt's sister, who state they would like to cancel HHC for right now as pt is moving fine but just not feeling well.     CANDACE educated on contacting the PCP to notify when they feel ready for HHC again. Sister voiced appreciation and understanding    PLAN: home, self care with family    Hoa SALMONW, MSW ext. [phone]

## 2025-04-06 NOTE — PLAN OF CARE
Patient cleared for discharge. After visit summary reviewed with patient, sister, and niece. Aware to  prescriptions at pharmacy on file and follow up appointments as listed. Personal belongings sent with patient, walker provided, transported via car.    Problem: Patient Centered Care  Goal: Patient preferences are identified and integrated in the patient's plan of care  Description: Interventions:- What would you like us to know as we care for you? From home with family  - Provide timely, complete, and accurate information to patient/family- Incorporate patient and family knowledge, values, beliefs, and cultural backgrounds into the planning and delivery of care- Encourage patient/family to participate in care and decision-making at the level they choose- Honor patient and family perspectives and choices  Outcome: Adequate for Discharge     Problem: Patient/Family Goals  Goal: Patient/Family Long Term Goal  Description: Patient's Long Term Goal: go back home  Interventions:- therapy eval  - See additional Care Plan goals for specific interventions  Outcome: Adequate for Discharge  Goal: Patient/Family Short Term Goal  Description: Patient's Short Term Goal: tolerable pain  Interventions: - medications  -ice  -rest  - See additional Care Plan goals for specific interventions  Outcome: Adequate for Discharge     Problem: PAIN - ADULT  Goal: Verbalizes/displays adequate comfort level or patient's stated pain goal  Description: INTERVENTIONS:- Encourage pt to monitor pain and request assistance- Assess pain using appropriate pain scale- Administer analgesics based on type and severity of pain and evaluate response- Implement non-pharmacological measures as appropriate and evaluate response- Consider cultural and social influences on pain and pain management- Manage/alleviate anxiety- Utilize distraction and/or relaxation techniques- Monitor for opioid side effects- Notify MD/LIP if interventions unsuccessful or  patient reports new pain- Anticipate increased pain with activity and pre-medicate as appropriate  Outcome: Adequate for Discharge     Problem: SAFETY ADULT - FALL  Goal: Free from fall injury  Description: INTERVENTIONS:- Assess pt frequently for physical needs- Identify cognitive and physical deficits and behaviors that affect risk of falls.- [place] fall precautions as indicated by assessment.- Educate pt/family on patient safety including physical limitations- Instruct pt to call for assistance with activity based on assessment- Modify environment to reduce risk of injury- Provide assistive devices as appropriate- Consider OT/PT consult to assist with strengthening/mobility- Encourage toileting schedule  Outcome: Adequate for Discharge     Problem: DISCHARGE PLANNING  Goal: Discharge to home or other facility with appropriate resources  Description: INTERVENTIONS:- Identify barriers to discharge w/pt and caregiver- Include patient/family/discharge partner in discharge planning- Arrange for needed discharge resources and transportation as appropriate- Identify discharge learning needs (meds, wound care, etc)- Arrange for interpreters to assist at discharge as needed- Consider post-discharge preferences of patient/family/discharge partner- Complete POLST form as appropriate- Assess patient's ability to be responsible for managing their own health- Refer to Case Management Department for coordinating discharge planning if the patient needs post-hospital services based on physician/LIP order or complex needs related to functional status, cognitive ability or social support system  Outcome: Adequate for Discharge     Problem: METABOLIC/FLUID AND ELECTROLYTES - ADULT  Goal: Electrolytes maintained within normal limits  Description: INTERVENTIONS:- Monitor labs and rhythm and assess patient for signs and symptoms of electrolyte imbalances- Administer electrolyte replacement as ordered- Monitor response to electrolyte  replacements, including rhythm and repeat lab results as appropriate- Fluid restriction as ordered- Instruct patient on fluid and nutrition restrictions as appropriate  Outcome: Adequate for Discharge     Problem: SKIN/TISSUE INTEGRITY - ADULT  Goal: Skin integrity remains intact  Description: INTERVENTIONS- Assess and document risk factors for pressure ulcer development- Assess and document skin integrity- Monitor for areas of redness and/or skin breakdown- Initiate interventions, skin care algorithm/standards of care as needed  Outcome: Adequate for Discharge

## 2025-04-06 NOTE — PHYSICAL THERAPY NOTE
PHYSICAL THERAPY TREATMENT NOTE - INPATIENT     Room Number: 420/420-A       Presenting Problem: Pt with fall in home admitted with left hip and shoulder pain. Currently radiology in chart negative for fx of hip.  Per Primary care activity as tolerated.  Co-Morbidities : prior left [facility] many years ago    Problem List  Principal Problem:    Contusion of left hip, initial encounter  Active Problems:    Contusion of left shoulder, initial encounter    Left hip pain    Parkinsonism (HCC)      PHYSICAL THERAPY ASSESSMENT   Patient demonstrates limited progress this session, goals  remain in progress.      Patient is requiring contact guard assist as a result of the following impairments: decreased functional strength, decreased endurance/aerobic capacity, pain, and decreased muscular endurance.     Patient continues to function below baseline with bed mobility, transfers, gait, stair negotiation, maintaining seated position, and standing prolonged periods.  Next session anticipate patient to progress bed mobility, transfers, gait, stair negotiation, maintaining seated position, and standing prolonged periods.  Physical Therapy will continue to follow patient for duration of hospitalization.    Patient continues to benefit from continued skilled PT services: at discharge to promote prior level of function and safety with additional support and return home with home health PT.    PLAN DURING HOSPITALIZATION  Nursing Mobility Recommendation : 1 Assist     PT Treatment Plan: Bed mobility, Body mechanics, Coordination, Endurance, Energy conservation, Patient education, Gait training, Strengthening, Stoop training, Stair training, Transfer training, Balance training  Frequency (Obs): 5x/week     SUBJECTIVE  Pt was agreeable to therapy session.         OBJECTIVE  Precautions: Bed/chair alarm, Hard of hearing    WEIGHT BEARING RESTRICTION  L Lower Extremity: Weight Bearing as Tolerated      PAIN ASSESSMENT   Rating:  (did not  rate)  Location: L hip  Management Techniques: Activity promotion, Body mechanics, Relaxation, Repositioning    BALANCE  Static Sitting: Fair +  Dynamic Sitting: Fair  Static Standing: Fair -  Dynamic Standing: Fair -    ACTIVITY TOLERANCE                          O2 WALK       AM-PAC '6-Clicks' INPATIENT SHORT FORM - BASIC MOBILITY  How much difficulty does the patient currently have...  Patient Difficulty: Turning over in bed (including adjusting bedclothes, sheets and blankets)?: A Little   Patient Difficulty: Sitting down on and standing up from a chair with arms (e.g., wheelchair, bedside commode, etc.): A Little   Patient Difficulty: Moving from lying on back to sitting on the side of the bed?: A Little   How much help from another person does the patient currently need...   Help from Another: Moving to and from a bed to a chair (including a wheelchair)?: A Little   Help from Another: Need to walk in hospital room?: A Little   Help from Another: Climbing 3-5 steps with a railing?: A Little     AM-PAC Score:  Raw Score: 18   Approx Degree of Impairment: 46.58%   Standardized Score (AM-PAC Scale): 43.63   CMS Modifier (G-Code): CK    FUNCTIONAL ABILITY STATUS  Functional Mobility/Gait Assessment  Gait Assistance: Contact guard assist  Distance (ft): 50'  Assistive Device: Rolling walker  Pattern: L Decreased stance time, Shuffle  Stairs: Stairs  How Many Stairs: 4  Device: 2 Rails  Assist: Contact guard assist  Pattern: Ascend and Descend  Ascend and Descend : Step to  Rolling: minimal assist  Supine to Sit: minimal assist  Sit to Supine:  NT  Sit to Stand: minimal assist    Skilled Therapy Provided: Pt is received AMB out of the bathroom with the RN. Pt was CGA with sit<>stand transfers with the RW. Pt was able to AMB about 50' with the RW CGA for balance and safety. Pt with slow gait and shuffling steps. Pt was brought to the therapy gym for stair training. Pt was educated and able to negotiate 4 stairs with 2  HR's CGA for balance and safety. Pt is cued for proper sequencing and technique. Pt with good balance on the stairs. Returned pt back to the room and to sitting in the chair with all needs within reach. Pt is on track to dc to home once medically cleared. Reported to the RN on the status of the pt.     The patient's Approx Degree of Impairment: 46.58% has been calculated based on documentation in the [facility] '6 clicks' Inpatient Daily Activity Short Form.  Research supports that patients with this level of impairment may benefit from Home with HHC and assist.  Final disposition will be made by interdisciplinary medical team.      NT  Patient End of Session: Up in chair, Needs met, Call light within reach, RN aware of session/findings, All patient questions and concerns addressed, Hospital anti-slip socks    CURRENT GOALS   Goals to be met by: 4/20/25  Patient Goal Patient's self-stated goal is: home with sister and jese    Goal #1 Patient is able to demonstrate supine - sit EOB @ level: supervision     Goal #1   Current Status NT received in the bathroom with RN   Goal #2 Patient is able to demonstrate transfers EOB to/from Chair/Wheelchair at assistance level: supervision with walker - rolling     Goal #2  Current Status CGA with the RW    Goal #3 Patient is able to ambulate  progressing distance  feet with assist device: walker - rolling at assistance level: CGA   Goal #3   Current Status 50' with the RW CGA   Goal #4 Patient will negotiate 5 stairs/one curb w/ assistive device CGA   Goal #4   Current Status 4 stairs with 2 HR's CGA    Goal #5 Patient to demonstrate independence with home activity/exercise instructions provided to patient in preparation for discharge.   Goal #5   Current Status IN PROGRESS   Goal #6    Goal #6  Current Status        Therapeutic Activity: 25 minutes

## 2025-04-07 ENCOUNTER — PATIENT OUTREACH (OUTPATIENT)
Dept: CASE MANAGEMENT | Age: 85
End: 2025-04-07

## 2025-04-07 LAB
ALBUMIN SERPL ELPH-MCNC: 3.59 G/DL (ref 3.75–5.21)
ALBUMIN/GLOB SERPL: 1.23 {RATIO} (ref 1–2)
ALPHA1 GLOB SERPL ELPH-MCNC: 0.34 G/DL (ref 0.19–0.46)
ALPHA2 GLOB SERPL ELPH-MCNC: 0.72 G/DL (ref 0.48–1.05)
B-GLOBULIN SERPL ELPH-MCNC: 0.82 G/DL (ref 0.68–1.23)
GAMMA GLOB SERPL ELPH-MCNC: 1.04 G/DL (ref 0.62–1.7)
KAPPA LC FREE SER-MCNC: 2.78 MG/DL (ref 0.33–1.94)
KAPPA LC FREE/LAMBDA FREE SER NEPH: 1.07 {RATIO} (ref 0.26–1.65)
LAMBDA LC FREE SERPL-MCNC: 2.6 MG/DL (ref 0.57–2.63)
PROT SERPL-MCNC: 6.5 G/DL (ref 5.7–8.2)

## 2025-04-07 NOTE — PROGRESS NOTES
Transitional Care Management   Discharge Date: 25  Contact Date: 2025    Assessment:  (Insert appropriate Smartphrase below after completing flowsheet)  TCM Initial Assessment    General:  Assessment completed with: Family  Patient Subjective: He had a really bad night. Stomach was upset all night he was vomitting. He slep in a recliner, this morning he was able to eat a little bit.  Chief Complaint: Contusion of left hip, initial encounter  Contusion of left shoulder, initial encounter  Left hip pain  Parkinsonism (HCC)  Verify patient name and  with patient/ caregiver: Yes    Hospital Stay/Discharge:  Tell me what you understand of why you were in the hospital or emergency department: He went in after a fall, injuring his hip and shoulder.  Prior to leaving the hospital were your Discharge Instructions reviewed with you?: Yes  Did you receive a copy of your written Discharge Instructions?: Yes  What questions do you have about your Discharge Instructions?: None  Do you feel better or worse since you left the hospital or emergency department?: Same    Follow - Up Appointment:  Do you have a follow-up appointment?: No  Are there any barriers to getting to your follow-up appointment?: No    Home Health/DME:  Prior to leaving the hospital was Home Health (HH) arranged for you?: Yes  Has HH been out or set up a visit to see you?: No (The patient sister declined)     Prior to leaving the hospital or emergency department was Durable Medical Equipment (DME), medical supplies, or infusions arranged for you?: No  Are DME/medical supply/infusions needs identified by staff during this assessment?: No     Medications/Diet:       Were you given a different diet per your Discharge Instructions?: No     Questions/Concerns:  Do you have any questions or concerns that have not been discussed?: No      Follow-up Appointments:  Your appointments       Date & Time Appointment Department ([place])    Apr 10, 2025 12:30 PM CDT  Hospital Follow Up with Fahad Aly MD [facility] ([facility])              [address] [address]  [phone]          Transitional Care Clinic  Was TCC Ordered: No  Was TCC Scheduled: No   - If yes: []  Advised patient to bring all medications and blood glucose meter/supplies if applicable.    Primary Care Provider (If no TCC appointment)  Does patient already have a PCP appointment scheduled? No  Care Manager Scheduled PCP office TCM appointment with patient   -If no appointment scheduled:The patient sister declined an appointment for tomorrow. States she wants to give his symtoms some time. I encouraged her to reconsider HH and PT.    Specialist  Does the patient have any other follow-up appointment(s) that need to be scheduled? No   -If yes: Care Manager reviewed upcoming specialist appointments with patient: No   -Does the patient need assistance scheduling appointment(s): No      Book By Date: 4/18/2025

## 2025-04-09 LAB — VITAMIN B6: 6.5 UG/L

## 2025-04-10 ENCOUNTER — APPOINTMENT (OUTPATIENT)
Dept: CT IMAGING | Facility: HOSPITAL | Age: 85
End: 2025-04-10
Attending: EMERGENCY MEDICINE
Payer: MEDICARE

## 2025-04-10 ENCOUNTER — APPOINTMENT (OUTPATIENT)
Dept: CT IMAGING | Facility: HOSPITAL | Age: 85
End: 2025-04-10
Attending: INTERNAL MEDICINE
Payer: MEDICARE

## 2025-04-10 ENCOUNTER — HOSPITAL ENCOUNTER (INPATIENT)
Facility: HOSPITAL | Age: 85
LOS: 7 days | Discharge: NURSING, REHAB OR LONG-TERM CARE | End: 2025-04-17
Attending: EMERGENCY MEDICINE | Admitting: EMERGENCY MEDICINE
Payer: MEDICARE

## 2025-04-10 ENCOUNTER — APPOINTMENT (OUTPATIENT)
Dept: GENERAL RADIOLOGY | Facility: HOSPITAL | Age: 85
End: 2025-04-10
Attending: EMERGENCY MEDICINE
Payer: MEDICARE

## 2025-04-10 DIAGNOSIS — E87.5 HYPERKALEMIA: ICD-10-CM

## 2025-04-10 DIAGNOSIS — N17.9 ACUTE RENAL FAILURE, UNSPECIFIED ACUTE RENAL FAILURE TYPE: ICD-10-CM

## 2025-04-10 DIAGNOSIS — R10.9 ABDOMINAL PAIN, ACUTE: ICD-10-CM

## 2025-04-10 DIAGNOSIS — R33.9 URINARY RETENTION: Primary | ICD-10-CM

## 2025-04-10 DIAGNOSIS — D64.9 ANEMIA, UNSPECIFIED TYPE: ICD-10-CM

## 2025-04-10 DIAGNOSIS — J18.9 COMMUNITY ACQUIRED PNEUMONIA, UNSPECIFIED LATERALITY: ICD-10-CM

## 2025-04-10 PROBLEM — N13.9 OBSTRUCTED, UROPATHY: Status: ACTIVE | Noted: 2025-04-10

## 2025-04-10 LAB
ALBUMIN SERPL-MCNC: 3.5 G/DL (ref 3.2–4.8)
ALP LIVER SERPL-CCNC: 62 U/L (ref 45–117)
ALT SERPL-CCNC: <7 U/L (ref 10–49)
ANION GAP SERPL CALC-SCNC: 15 MMOL/L (ref 0–18)
ANION GAP SERPL CALC-SCNC: 16 MMOL/L (ref 0–18)
APTT PPP: 27.8 SECONDS (ref 23–36)
AST SERPL-CCNC: 13 U/L (ref ?–34)
ATRIAL RATE: 76 BPM
BASOPHILS # BLD AUTO: 0.14 X10(3) UL (ref 0–0.2)
BASOPHILS NFR BLD AUTO: 0.6 %
BILIRUB DIRECT SERPL-MCNC: 0.1 MG/DL (ref ?–0.3)
BILIRUB SERPL-MCNC: 0.4 MG/DL (ref 0.2–1.1)
BILIRUB UR QL: NEGATIVE
BUN BLD-MCNC: 147 MG/DL (ref 9–23)
BUN BLD-MCNC: 163 MG/DL (ref 9–23)
BUN/CREAT SERPL: 21.6 (ref 10–20)
BUN/CREAT SERPL: 24.9 (ref 10–20)
CALCIUM BLD-MCNC: 6.2 MG/DL (ref 8.7–10.4)
CALCIUM BLD-MCNC: 7.3 MG/DL (ref 8.7–10.4)
CHLORIDE SERPL-SCNC: 103 MMOL/L (ref 98–112)
CHLORIDE SERPL-SCNC: 98 MMOL/L (ref 98–112)
CLARITY UR: CLEAR
CO2 SERPL-SCNC: 14 MMOL/L (ref 21–32)
CO2 SERPL-SCNC: 19 MMOL/L (ref 21–32)
COLOR UR: YELLOW
CREAT BLD-MCNC: 5.91 MG/DL (ref 0.7–1.3)
CREAT BLD-MCNC: 7.54 MG/DL (ref 0.7–1.3)
DEPRECATED HBV CORE AB SER IA-ACNC: 171 NG/ML (ref 50–336)
DEPRECATED RDW RBC AUTO: 43.2 FL (ref 35.1–46.3)
EGFRCR SERPLBLD CKD-EPI 2021: 7 ML/MIN/1.73M2 (ref 60–?)
EGFRCR SERPLBLD CKD-EPI 2021: 9 ML/MIN/1.73M2 (ref 60–?)
EOSINOPHIL # BLD AUTO: 0.07 X10(3) UL (ref 0–0.7)
EOSINOPHIL NFR BLD AUTO: 0.3 %
ERYTHROCYTE [DISTWIDTH] IN BLOOD BY AUTOMATED COUNT: 14.6 % (ref 11–15)
FLUAV + FLUBV RNA SPEC NAA+PROBE: NEGATIVE
FLUAV + FLUBV RNA SPEC NAA+PROBE: NEGATIVE
GLUCOSE BLD-MCNC: 109 MG/DL (ref 70–99)
GLUCOSE BLD-MCNC: 158 MG/DL (ref 70–99)
GLUCOSE BLDC GLUCOMTR-MCNC: 127 MG/DL (ref 70–99)
GLUCOSE UR-MCNC: NEGATIVE MG/DL
HCT VFR BLD AUTO: 33.6 % (ref 39–53)
HGB BLD-MCNC: 11.6 G/DL (ref 13–17.5)
IMM GRANULOCYTES # BLD AUTO: 0.23 X10(3) UL (ref 0–1)
IMM GRANULOCYTES NFR BLD: 1.1 %
INR BLD: 1.11 (ref 0.8–1.2)
KETONES UR-MCNC: NEGATIVE MG/DL
LACTATE SERPL-SCNC: 0.9 MMOL/L (ref 0.5–2)
LEUKOCYTE ESTERASE UR QL STRIP.AUTO: NEGATIVE
LIPASE SERPL-CCNC: 63 U/L (ref 12–53)
LYMPHOCYTES # BLD AUTO: 0.7 X10(3) UL (ref 1–4)
LYMPHOCYTES NFR BLD AUTO: 3.2 %
MCH RBC QN AUTO: 27.9 PG (ref 26–34)
MCHC RBC AUTO-ENTMCNC: 34.5 G/DL (ref 31–37)
MCV RBC AUTO: 80.8 FL (ref 80–100)
MONOCYTES # BLD AUTO: 1.48 X10(3) UL (ref 0.1–1)
MONOCYTES NFR BLD AUTO: 6.8 %
NEUTROPHILS # BLD AUTO: 19.22 X10 (3) UL (ref 1.5–7.7)
NEUTROPHILS # BLD AUTO: 19.22 X10(3) UL (ref 1.5–7.7)
NEUTROPHILS NFR BLD AUTO: 88 %
NITRITE UR QL STRIP.AUTO: NEGATIVE
NT-PROBNP SERPL-MCNC: 1520 PG/ML (ref ?–450)
OSMOLALITY SERPL CALC.SUM OF ELEC: 320 MOSM/KG (ref 275–295)
OSMOLALITY SERPL CALC.SUM OF ELEC: 335 MOSM/KG (ref 275–295)
P AXIS: 23 DEGREES
P-R INTERVAL: 296 MS
PH UR: 5.5 [PH] (ref 5–8)
PLATELET # BLD AUTO: 376 10(3)UL (ref 150–450)
POTASSIUM SERPL-SCNC: 4.7 MMOL/L (ref 3.5–5.1)
POTASSIUM SERPL-SCNC: 6.5 MMOL/L (ref 3.5–5.1)
PROT SERPL-MCNC: 5.6 G/DL (ref 5.7–8.2)
PROT UR-MCNC: NEGATIVE MG/DL
PROTHROMBIN TIME: 15 SECONDS (ref 11.6–14.8)
Q-T INTERVAL: 346 MS
QRS DURATION: 90 MS
QTC CALCULATION (BEZET): 389 MS
R AXIS: -47 DEGREES
RBC # BLD AUTO: 4.16 X10(6)UL (ref 3.8–5.8)
RBC #/AREA URNS AUTO: >10 /HPF
RBC #/AREA URNS AUTO: >10 /HPF
RSV RNA SPEC NAA+PROBE: NEGATIVE
SARS-COV-2 RNA RESP QL NAA+PROBE: NOT DETECTED
SODIUM SERPL-SCNC: 128 MMOL/L (ref 136–145)
SODIUM SERPL-SCNC: 137 MMOL/L (ref 136–145)
SP GR UR STRIP: <=1.005 (ref 1–1.03)
T AXIS: 29 DEGREES
TROPONIN I SERPL HS-MCNC: 11 NG/L (ref ?–53)
UROBILINOGEN UR STRIP-ACNC: 0.2
VENTRICULAR RATE: 76 BPM
VITAMIN B1 WHOLE BLD: 154 NMOL/L
WBC # BLD AUTO: 21.8 X10(3) UL (ref 4–11)

## 2025-04-10 PROCEDURE — 99223 1ST HOSP IP/OBS HIGH 75: CPT | Performed by: INTERNAL MEDICINE

## 2025-04-10 PROCEDURE — 99223 1ST HOSP IP/OBS HIGH 75: CPT | Performed by: HOSPITALIST

## 2025-04-10 PROCEDURE — 71250 CT THORAX DX C-: CPT | Performed by: INTERNAL MEDICINE

## 2025-04-10 PROCEDURE — 74176 CT ABD & PELVIS W/O CONTRAST: CPT | Performed by: EMERGENCY MEDICINE

## 2025-04-10 PROCEDURE — 99223 1ST HOSP IP/OBS HIGH 75: CPT | Performed by: UROLOGY

## 2025-04-10 PROCEDURE — 71045 X-RAY EXAM CHEST 1 VIEW: CPT | Performed by: EMERGENCY MEDICINE

## 2025-04-10 RX ORDER — POLYETHYLENE GLYCOL 3350 17 G/17G
17 POWDER, FOR SOLUTION ORAL 2 TIMES DAILY
Status: DISCONTINUED | OUTPATIENT
Start: 2025-04-10 | End: 2025-04-17

## 2025-04-10 RX ORDER — MORPHINE SULFATE 2 MG/ML
2 INJECTION, SOLUTION INTRAMUSCULAR; INTRAVENOUS ONCE
Status: COMPLETED | OUTPATIENT
Start: 2025-04-10 | End: 2025-04-10

## 2025-04-10 RX ORDER — TAMSULOSIN HYDROCHLORIDE 0.4 MG/1
0.4 CAPSULE ORAL NIGHTLY
Status: DISCONTINUED | OUTPATIENT
Start: 2025-04-10 | End: 2025-04-17

## 2025-04-10 RX ORDER — METOCLOPRAMIDE HYDROCHLORIDE 5 MG/ML
10 INJECTION INTRAMUSCULAR; INTRAVENOUS DAILY PRN
Status: DISCONTINUED | OUTPATIENT
Start: 2025-04-10 | End: 2025-04-16

## 2025-04-10 RX ORDER — ONDANSETRON 2 MG/ML
4 INJECTION INTRAMUSCULAR; INTRAVENOUS EVERY 6 HOURS PRN
Status: DISCONTINUED | OUTPATIENT
Start: 2025-04-10 | End: 2025-04-17

## 2025-04-10 RX ORDER — INDOMETHACIN 25 MG/1
50 CAPSULE ORAL ONCE
Status: COMPLETED | OUTPATIENT
Start: 2025-04-10 | End: 2025-04-10

## 2025-04-10 RX ORDER — HEPARIN SODIUM 5000 [USP'U]/ML
5000 INJECTION, SOLUTION INTRAVENOUS; SUBCUTANEOUS EVERY 12 HOURS SCHEDULED
Status: CANCELLED | OUTPATIENT
Start: 2025-04-10

## 2025-04-10 RX ORDER — CALCIUM GLUCONATE 10 MG/ML
1 INJECTION, SOLUTION INTRAVENOUS ONCE
Status: COMPLETED | OUTPATIENT
Start: 2025-04-10 | End: 2025-04-10

## 2025-04-10 RX ORDER — SODIUM CHLORIDE 9 MG/ML
INJECTION, SOLUTION INTRAVENOUS CONTINUOUS
Status: CANCELLED | OUTPATIENT
Start: 2025-04-10

## 2025-04-10 RX ORDER — ACETAMINOPHEN 500 MG
500 TABLET ORAL EVERY 4 HOURS PRN
Status: DISCONTINUED | OUTPATIENT
Start: 2025-04-10 | End: 2025-04-17

## 2025-04-10 RX ORDER — ALBUTEROL SULFATE 5 MG/ML
10 SOLUTION RESPIRATORY (INHALATION) ONCE
Status: COMPLETED | OUTPATIENT
Start: 2025-04-10 | End: 2025-04-10

## 2025-04-10 RX ORDER — DEXTROSE MONOHYDRATE AND SODIUM CHLORIDE 5; .9 G/100ML; G/100ML
INJECTION, SOLUTION INTRAVENOUS CONTINUOUS
Status: DISCONTINUED | OUTPATIENT
Start: 2025-04-10 | End: 2025-04-12

## 2025-04-10 RX ORDER — METOCLOPRAMIDE HYDROCHLORIDE 5 MG/ML
10 INJECTION INTRAMUSCULAR; INTRAVENOUS EVERY 8 HOURS PRN
Status: DISCONTINUED | OUTPATIENT
Start: 2025-04-10 | End: 2025-04-10

## 2025-04-10 RX ORDER — SODIUM CHLORIDE 9 MG/ML
1000 INJECTION, SOLUTION INTRAVENOUS ONCE
Status: COMPLETED | OUTPATIENT
Start: 2025-04-10 | End: 2025-04-10

## 2025-04-10 RX ORDER — SODIUM CHLORIDE 9 MG/ML
1000 INJECTION, SOLUTION INTRAVENOUS ONCE
Status: DISCONTINUED | OUTPATIENT
Start: 2025-04-10 | End: 2025-04-10

## 2025-04-10 RX ORDER — DEXTROSE MONOHYDRATE 25 G/50ML
50 INJECTION, SOLUTION INTRAVENOUS ONCE
Status: COMPLETED | OUTPATIENT
Start: 2025-04-10 | End: 2025-04-10

## 2025-04-10 RX ORDER — CARVEDILOL 6.25 MG/1
6.25 TABLET ORAL 2 TIMES DAILY WITH MEALS
Status: DISCONTINUED | OUTPATIENT
Start: 2025-04-10 | End: 2025-04-16

## 2025-04-10 RX ORDER — CARBIDOPA AND LEVODOPA 25; 100 MG/1; MG/1
0.5 TABLET ORAL 3 TIMES DAILY
Status: DISCONTINUED | OUTPATIENT
Start: 2025-04-10 | End: 2025-04-17

## 2025-04-10 NOTE — PROGRESS NOTES
04/10/25 1353   VISIT TYPE   SLP Inpatient Visit Type (Documentation Required) Attempted Evaluation  (BSE order received and acknowledged, chart reviewed. D/W RN, will keep pt NPO for possible GI w/u. SLP to f/u 4/11 if appropriate.)   FOLLOW UP/PLAN   Follow Up Needed (Documentation Required) Yes   SLP Follow-up Date 04/11/25     Lily Schwartz M.S. CCC-SLP  Speech Language Pathologist  Phone Number Ext. [phone]

## 2025-04-10 NOTE — CONSULTS
[facility]  part of [facility]    Report of Consultation    Justyn Hall Patient Status:  Emergency    1940 MRN [id number]   Location [facility] EMERGENCY DEPARTMENT Attending Elliott Hill DO   Hosp Day # 0 PCP Chong Quintero MD     Date of Admission:  4/10/2025    Reason for Consultation:   Hemoptysis    History of Present Illness:   Patient is a 84-year-old male with past medical history significant for hypertension, recent fall with hospitalization discharged on 2025 who presents with chief complaint of some lower quadrant abdominal discomfort and hemoptysis for 3 days duration.  Denies any significant history of known lung disease.  Denies pleuritic pain.  Significant kidney injury on presentation.  Family states he has not been urinating over the last several days.  Patient not on anticoagulation therapy    Past Medical History  Past Medical History[1]    Past Surgical History  Past Surgical History[2]    Family History  Family History[3]    Social History  Social Hx on file[4]        Current Medications:  Current Hospital Medications[5]  Prescriptions Prior to Admission[6]    Allergies  Allergies[7]    Review of Systems:   Constitutional: Fatigue, weakness  HEENT: denies headache, sore throat, vision loss  Cardio: denies chest pain, chest pressure, palpitations  Respiratory: denies dyspnea, cough, wheezing, + hemoptysis  GI: denies nausea, vomiting, + abdominal pain  : denies dysuria, hematuria  Musculoskeletal: denies arthralgia, myalgia  Integumentary: denies rash, itching  Neurological: denies syncope, weakness, dizziness,   Psychiatric: denies depression, anxiety  Hematologic: denies bruising        Physical Exam:   Blood pressure 135/73, pulse 83, temperature 97.5 °F (36.4 °C), temperature source Temporal, resp. rate 26, height 6' (1.829 m), weight 180 lb (81.6 kg), SpO2 100%.    Constitutional: no acute distress  Eyes: PERRL  ENT: nares patent  Neck: neck  supple, no JVD  Cardio: RRR, S1 S2  Respiratory: clear to auscultation bilaterally, no wheezing, rales, rhonchi, crackles  GI: abdomen soft, non tender, active bowel souds, no organomegaly  Extremities: no clubbing, cyanosis, edema  Neurologic: no gross motor deficits  Skin: warm, dry    Results:   Laboratory Data  Lab Results   Component Value Date    WBC 21.8 (H) 04/10/2025    HGB 11.6 (L) 04/10/2025    HCT 33.6 (L) 04/10/2025    .0 04/10/2025    CREATSERUM 7.54 (H) 04/10/2025     (H) 04/10/2025     (L) 04/10/2025    K 6.5 (HH) 04/10/2025    CL 98 04/10/2025    CO2 14.0 (L) 04/10/2025     (H) 04/10/2025    CA 6.2 (L) 04/10/2025    ALB 3.5 04/10/2025    ALKPHO 62 04/10/2025    TP 5.6 (L) 04/10/2025    AST 13 04/10/2025    ALT <7 (L) 04/10/2025    PTT 27.8 04/10/2025    INR 1.11 04/10/2025    PTP 15.0 (H) 04/10/2025    T4F 1.10 07/09/2015    TSH 2.507 04/05/2025    LIP 63 (H) 04/10/2025    PSA 2.46 10/10/2019     04/05/2025    B12 640 04/05/2025         Imaging  CT ABDOMEN+PELVIS(CPT=74176)  Result Date: 4/10/2025  CONCLUSION:  1.  Urinary bladder is collapsed around a Espinal catheter.  Mild bilateral hydroureteronephrosis which may be related to recent bladder outlet obstruction.  However correlate for signs of upper tract infection. 2.  Granulomatous calcifications within the liver and spleen. 3.  High density material within the gallbladder suggesting vicarious excretion of IV contrast, stones, or sludge. 4.  Coarse calcifications within the pancreatic head which may represent sequela of previous pancreatitis. 5.  Small bilateral pleural effusions. 6.  Moderate-sized hiatal hernia. 7.  Coronary atherosclerosis. 8.  Post left hip arthroplasty.  The hardware causes beam hardening artifact limiting assessment of adjacent structures. 9.  Prostate enlargement.    Dictated by (CST): Luis Terrell MD on 4/10/2025 at 9:28 AM     Finalized by (CST): Luis Terrell MD on 4/10/2025 at 9:33 AM           XR CHEST AP PORTABLE  (CPT=71045)  Result Date: 4/10/2025  CONCLUSION: Patchy bibasilar pulmonary opacities which could represent atelectasis or pneumonia.  Small left effusion.   Dictated by (CST): Luis Terrell MD on 4/10/2025 at 7:06 AM     Finalized by (CST): Luis Terrell MD on 4/10/2025 at 7:07 AM            Assessment   1.  Acute kidney injury  2.  Hyperkalemia  3.  Hemoptysis  4.  Hypertension  5.  Recent fall with left hip and shoulder contusion  6.  Leukocytosis    Plan   -Patient presents with evidence of lower quadrant abdominal discomfort with decreased urination and hemoptysis after discharge on 4/6/2025.  - Chest x-ray with bibasilar opacities seen.  - In light of hemoptysis will obtain CT chest to further evaluate.  Patient not on any anticoagulation therapy.  Monitor hemoptysis at this time.  - Further recommendations per nephrology for management of acute kidney injury and hyperkalemia.  Significant output after Espinal catheter placed.  Concern for urinary obstruction.  - Reviewed vitals, labs and imaging    Tony Chauhan DO  Pulmonary Critical Care Medicine  [facility]  4/10/2025  11:30 AM        [1]   Past Medical History:   Fracture of arm    fracture of left arm - cast   [2]   Past Surgical History:  Procedure Laterality Date    Hip surgery      Other surgical history  2014     prostate biopsy    Spinal fusion     [3] History reviewed. No pertinent family history.  [4]   Social History  Socioeconomic History    Marital status: Single   Tobacco Use    Smoking status: Never    Smokeless tobacco: Never   Vaping Use    Vaping status: Never Used   Substance and Sexual Activity    Alcohol use: No     Alcohol/week: 0.0 standard drinks of alcohol    Drug use: No   Other Topics Concern    Caffeine Concern Yes     Comment: coffee, occasionally    Reaction to local anesthetic No    Right Handed Yes   [5]   Current Facility-Administered Medications   Medication Dose Route Frequency    calcium  gluconate 1g in 100mL iso-NaCl IVPB premix  1 g Intravenous Once   [6] (Not in a hospital admission)  [7] No Known Allergies

## 2025-04-10 NOTE — ED INITIAL ASSESSMENT (HPI)
Brought in from home with complaint of constipation for 1 week, abdominal pain,  hemoptysis for 1 week. Pt has been  here in the ED for a fall last week, hx of Parkinson's.

## 2025-04-10 NOTE — ED QUICK NOTES
Rounding Completed. Wife at the bedside, updated on the plan of care    Plan of Care reviewed. Waiting for labs and Xray result  Elimination needs assessed.  Provided urinal at the bedside.    Bed is locked and in lowest position. Call light within reach.

## 2025-04-10 NOTE — ED QUICK NOTES
Lab was called regarding blood work, she states they will run the holds now. MD made aware, CT pending.

## 2025-04-10 NOTE — ED QUICK NOTES
Orders for admission, patient is aware of plan and ready to go upstairs. Any questions, please call ED RN Yolie at extension [phone].     Patient Covid vaccination status: Fully vaccinated     COVID Test Ordered in ED: SARS-CoV-2/Flu A and B/RSV by PCR (GeneXpert)    COVID Suspicion at Admission: N/A    Running Infusions: Medication Infusions[1]     Mental Status/LOC at time of transport: Aox3-4    Other pertinent information:   CIWA score: N/A   NIH score:  N/A             [1]

## 2025-04-10 NOTE — CONSULTS
[facility]  part of [facility]    Urology Consult Note    History of Present Illness:   Patient is a 84 year old male with PMHx of Parkinsons, HTN, and BPH who presented to the ER this morning with c/o constipation, abdominal pain, and hemoptysis x 1 week. The patient was noted to be in urinary retention and a santiago was placed with an output of 1600 mL of tea colored urine. He was also noted to have a significantly elevated creatinine and potassium. He is being admitted for JORDON, hyperkalemia, hemoptysis, HTN, leukocytosis, and urinary retention and urology was placed on consult.     He was previously seen by Dr Osorio in 2019 for BPH and elevated PSA, however he was lost to follow-up. At that time, he was on Tamsulosin and Finasteride.    The patient is currently resting in bed. Afebrile, VSS. Santiago is draining clear yellow urine.    Cr (baseline 1.19): 7.54 =>   WBC 21.8 =>  Lactic acid 0.9 =>    UA showed >10 RBC, no evidence of infection.    4/10/2025 CT showed urinary bladder is collapsed around a Santiago catheter.  Mild bilateral hydroureteronephrosis which may be related to recent bladder outlet obstruction.  However correlate for signs of upper tract infection. Prostate enlargement.    HISTORY:  Past Medical History[1]   Past Surgical History[2]   Family History[3]   Social History: Short Social Hx on File[4]     Allergies  Allergies[5]    Review of Systems:   A 10-point review of systems was completed and is negative other than as noted above.    Physical Exam:   /52 (BP Location: Right arm)   Pulse 88   Temp 97.5 °F (36.4 °C) (Temporal)   Resp 18   Ht 6' (1.829 m)   Wt 175 lb 4.3 oz (79.5 kg)   SpO2 97%   BMI 23.77 kg/m²     GENERAL APPEARANCE: in no acute distress  NEUROLOGIC: terse conversation  HEAD: atraumatic, normocephalic  EYES: sclera non-icteric  ORAL CAVITY: mucosa moist  NECK/THYROID: no obvious masses or goiter  LUNGS: non-labored breathing    Results:      Laboratory Data:  Lab Results   Component Value Date    WBC 21.8 (H) 04/10/2025    HGB 11.6 (L) 04/10/2025    .0 04/10/2025     Lab Results   Component Value Date     (L) 04/10/2025    K 6.5 (HH) 04/10/2025    CL 98 04/10/2025    CO2 14.0 (L) 04/10/2025     (H) 04/10/2025     (H) 04/10/2025    GFRAA 62 06/11/2019    AST 13 04/10/2025    ALT <7 (L) 04/10/2025    TP 5.6 (L) 04/10/2025    ALB 3.5 04/10/2025    CA 6.2 (L) 04/10/2025       Urinalysis Results (last three years):  Recent Labs     04/10/25  0907   COLORUR Yellow   CLARITY Clear   SPECGRAVITY <=1.005   PHURINE 5.5   PROUR Negative   GLUUR Negative   KETUR Negative   BILUR Negative   BLOODURINE Moderate*   NITRITE Negative   UROBILINOGEN 0.2   LEUUR Negative   WBCUR 1-5  1-5   RBCUR >10*  >10*   BACUR None Seen  None Seen       Urine Culture Results (last three years):  No results found for: \"URINECUL\"    Imaging  CT ABDOMEN+PELVIS(CPT=74176)  Result Date: 4/10/2025  PROCEDURE:   CT ABDOMEN+PELVIS(CPT=74176)  COMPARISON:   None.  INDICATIONS:   constipation, abdominal pain, coughing out blood  TECHNIQUE: CT images of the abdomen and pelvis were obtained without intravenous contrast material.  Automated exposure control for dose reduction was used. Adjustment of the mA and/or kV was done based on the patient's size. Use of iterative reconstruction technique for dose reduction was used.  Dose information is transmitted to the ACR (American College of Radiology) NRDR (National Radiology Data Registry) which includes the Dose Index Registry.  FINDINGS:  LIVER: Granulomatous calcification in the posterior right hepatic lobe.  Otherwise no suspicious non contrast hepatic lesion, biliary ductal dilatation, or fatty infiltration.  BILIARY: High density foci within the gallbladder which could represent vicarious excretion of IV contrast, tiny stones, and/or sludge. SPLEEN: Spleen has normal size.  Granulomatous calcifications in the  spleen.  STOMACH: Moderate-sized hiatal hernia.  No gastric obstruction.  Duodenum is unremarkable. PANCREAS: Coarse calcifications within the pancreatic head/uncinate process. ADRENALS: No nodule or enlargement. KIDNEYS: Mild bilateral hydroureteronephrosis extending from the renal calices to the urinary bladder.  No urinary stone to account for this appearance. AORTA/VASCULAR:   Atherosclerosis of the abdominal aorta.  No aneurysm. RETROPERITONEUM: No mass or enlarged adenopathy.  BOWEL/MESENTERY:  There is no small or large bowel obstruction. The terminal ileum and appendix (series 5, image 51) are within normal limits. There is no significant colonic diverticulosis. There is no free air, free fluid, or lymphadenopathy in the abdomen or pelvis. ABDOMINAL WALL: No suspicious mass lesion or significant hernia. URINARY BLADDER: Bladder is collapsed around a Espinal catheter. PELVIC NODES: No enlarged mass or adenopathy.   PELVIC ORGANS: Prostate is enlarged at 63 mm in transverse diameter. BONES:   Left hip arthroplasty is present causing beam hardening artifact limiting assessment of adjacent structures.  Mild degenerative change throughout the spine. LUNG BASES: Small bilateral pleural effusions.  Patchy and linear bibasilar pulmonary opacities.  Coronary atherosclerosis. OTHER: Negative.          CONCLUSION:  1.  Urinary bladder is collapsed around a Espinal catheter.  Mild bilateral hydroureteronephrosis which may be related to recent bladder outlet obstruction.  However correlate for signs of upper tract infection. 2.  Granulomatous calcifications within the liver and spleen. 3.  High density material within the gallbladder suggesting vicarious excretion of IV contrast, stones, or sludge. 4.  Coarse calcifications within the pancreatic head which may represent sequela of previous pancreatitis. 5.  Small bilateral pleural effusions. 6.  Moderate-sized hiatal hernia. 7.  Coronary atherosclerosis. 8.  Post left hip  arthroplasty.  The hardware causes beam hardening artifact limiting assessment of adjacent structures. 9.  Prostate enlargement.    Dictated by (CST): Luis Terrell MD on 4/10/2025 at 9:28 AM     Finalized by (CST): Luis Terrell MD on 4/10/2025 at 9:33 AM          XR CHEST AP PORTABLE  (CPT=71045)  Result Date: 4/10/2025  PROCEDURE: XR CHEST AP PORTABLE  (CPT=71045) TIME: 6:51 a.m.   COMPARISON: [facility], X CHEST PA LAT ROUTINE, 7/09/2015, 11:07 AM.  INDICATIONS: Constipation and hemoptysis for one week.  TECHNIQUE:   Single view.   FINDINGS:  CARDIAC/VASC: No cardiac silhouette abnormality or cardiomegaly.  Unremarkable pulmonary vasculature.  MEDIAST/MAITE:   Atherosclerotic aorta with no visible aneurysm.  LUNGS/PLEURA: Patchy bibasilar pulmonary opacities.  Small left effusion. BONES: Scattered mild degenerative endplate changes in the visualized thoracolumbar spine. OTHER: Negative.          CONCLUSION: Patchy bibasilar pulmonary opacities which could represent atelectasis or pneumonia.  Small left effusion.   Dictated by (CST): Luis Terrell MD on 4/10/2025 at 7:06 AM     Finalized by (CST): Luis Terrell MD on 4/10/2025 at 7:07 AM          CT SPINE LUMBAR (CPT=72131)  Result Date: 4/6/2025  PROCEDURE: CT SPINE LUMBAR (CPT=72131)  COMPARISON: None.  INDICATIONS: fall , trauma low back pain, left hip pain; eval for myelopathy  TECHNIQUE:   Multi-planar CT images were obtained without intravenous contrast material.  Automated exposure control for dose reduction was used. Adjustment of the mA and/or kV was done based on the patient's size. Use of iterative reconstruction technique for dose reduction was used.  Dose information is transmitted to the ACR (American College of Radiology) NRDR (National Radiology Data Registry) which includes the Dose Index Registry.   FINDINGS:  PARASPINAL AREA: Normal with no visible mass.  BONES:   No acute fracture or subluxation.  L1 and L3 vertebral body  hemangiomas.  Vertebral bodies are intact and anatomically aligned.  Mild multilevel degenerative disc disease/spondylosis and moderate L4-5 and advanced L5-S1 facet arthrosis.  Mild facet arthrosis at other lumbar levels.  Ligamentum flavum hypertrophy at all lumbar levels.  Mild-to-moderate central narrowing at L3-4.  Mild central narrowing at other lumbar levels.  No high-grade central narrowing mild to moderate lateral recess narrowing at L4-5 .  Mild-to-moderate multilevel foraminal narrowing.                  Mild bilateral SI joint osteoarthritis.  OTHER: Chronic focal dissection of the infrarenal abdominal aorta and proximal left common iliac artery and sequential focal dissections of the right external iliac artery with displaced intimal calcifications.  Partially visualized distended urinary bladder.         CONCLUSION:  1. No acute fracture or subluxation. 2. Multilevel degenerative disc disease and facet arthrosis with ligamentum flavum hypertrophy.  Mild-to-moderate central narrowing at L3-4.  No high-grade central narrowing. 3. 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 4. 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     Dictated by (CST): Clay Richards MD on 4/06/2025 at 0:29 AM     Finalized by (CST): Clay Richards MD on 4/06/2025 at 0:40 AM          CT SPINE THORACIC (CPT=72128)  Result Date: 4/6/2025  PROCEDURE: CT SPINE THORACIC (CPT=72128)  COMPARISON: None.  INDICATIONS: Recent fall.  Back pain,  eval for myleopathy  TECHNIQUE:   Multi-planar CT images were obtained without intravenous contrast.  Automated exposure control for dose reduction was used. Adjustment of the mA and/or kV was done based on the patient's size. Use of iterative reconstruction technique for dose reduction was used.  Dose information is transmitted to the ACR (American College of Radiology) NRDR (National Radiology Data Registry) which includes the Dose Index Registry.  FINDINGS:  PARASPINAL  AREA: Normal with no visible mass.   BONES/SPINE: Vertebral bodies are intact and anatomically aligned.  A T9 and a L1 vertebral body hemangioma.  Small Schmorl's node along the inferior endplate of L1 and T7.  Mild-to-moderate multilevel degenerative disc disease/spondylosis.  No significant posterior disc contour abnormality or spinal stenosis.  No moderate or high-grade foraminal narrowing.  OTHER:    Atherosclerotic vascular calcification including coronary artery calcification.  Moderate sized hiatal hernia with small amount of fluid in the esophagus compatible with reflux.         CONCLUSION:  1. No acute finding.  No spinal stenosis. 2. Moderate hiatal hernia with gastroesophageal reflux. 3. Coronary artery calcification.     Dictated by (CST): Clay Richards MD on 4/06/2025 at 0:18 AM     Finalized by (CST): Clay Richards MD on 4/06/2025 at 0:28 AM          CT SPINE CERVICAL (CPT=72125)  Result Date: 4/6/2025  PROCEDURE: CT SPINE CERVICAL (CPT=72125)  COMPARISON: None.  INDICATIONS: Neck pain, recent fall.  A  eval for myelopathy; absent reflexes in R UE; intact in L UE  TECHNIQUE:   Multi-planar CT images were obtained without intravenous contrast material.  Automated exposure control for dose reduction was used. Adjustment of the mA and/or kV was done based on the patient's size. Use of iterative reconstruction technique for dose reduction was used.  Dose information is transmitted to the ACR (American College of Radiology) NRDR (National Radiology Data Registry) which includes the Dose Index Registry.   FINDINGS:  CRANIOCERVICAL AREA:   Normal foramen magnum with no Chiari malformation. PARASPINAL AREA: Normal with no visible mass.  BONES: Vertebral bodies are intact and anatomically aligned.  Degeneration between the anterior arch of C1 and the dens.  Multilevel degenerative disc disease/spondylosis most pronounced at C6-7 and to a lesser extent C5-6.  Mild multilevel facet arthrosis.  No significant  central narrowing.  Moderate to severe right C4 foraminal narrowing.  Other levels of mild foraminal narrowing.  No additional high-grade stenosis.   OTHER:   Vascular calcification including carotid bifurcations.          CONCLUSION:  1. Multilevel disc and facet degeneration.  No significant central narrowing.  Moderate to severe right C4 foraminal narrowing. 2. No acute fracture or subluxation.    Dictated by (CST): Clay Richards MD on 4/05/2025 at 11:57 PM     Finalized by (CST): Clay Richards MD on 4/06/2025 at 0:02 AM          CT BRAIN OR HEAD (CPT=70450)  Result Date: 4/5/2025  PROCEDURE: CT BRAIN OR HEAD (CPT=70450)  COMPARISON: None.  INDICATIONS: Loss of balance resulting in a fall.  eval for nph eval for small vessel disease; possible vascular parkinson disease  TECHNIQUE: CT images were obtained without contrast material.  Automated exposure control for dose reduction was used.  Dose information is transmitted to the ACR (American College of Radiology) NRDR (National Radiology Data Registry) which includes the Dose Index Registry.  FINDINGS:  CEREBRUM: Can decreased attenuation in periventricular and subcortical white matter bilaterally.  No edema, hemorrhage, mass or acute infarct. CEREBELLUM: No edema, hemorrhage, mass or acute infarct. BRAINSTEM: No edema, hemorrhage, mass or acute infarct. CSF SPACES: Age-appropriate prominence of ventricles and sulci.  No hydrocephalus, subarachnoid hemorrhage, or mass.  SKULL: Skull base and calvarium are intact. SINUSES: Normal visualized portions of paranasal sinuses and mastoids. ORBITS: Limited views are unremarkable.  OTHER: Mild atherosclerotic calcification cavernous carotid arteries.         CONCLUSION:  1. No acute intracranial finding. 2. Advanced changes of chronic small vessel disease in cerebral white matter.    Dictated by (CST): Clay Richards MD on 4/05/2025 at 11:54 PM     Finalized by (CST): Clay Richards MD on 4/05/2025 at 11:57 PM          XR  HIP W OR WO PELVIS 2 OR 3 VIEWS, LEFT (CPT=73502)  Result Date: 4/4/2025  PROCEDURE: XR HIP W OR WO PELVIS 2 OR 3 VIEWS, LEFT (CPT=73502)  COMPARISON: [facility], X HIP UNILATERAL 1 VIEW LT, 8/05/2015, 9:17 AM.  [facility] 2nd Floor, X HIP LT, 6/15/2015, 11:17 AM.  [facility], X HIP LT, 12/19/2012, 3:16 PM.  INDICATIONS: Left hip and leg pain due to fall today.  FINDINGS: Combined with conclusion.         CONCLUSION:   No acute fracture or dislocation.  Prior postsurgical changes of left hip arthroplasty with no evidence of hardware complication.  Vascular calcifications.  Otherwise soft tissues are unremarkable.       elm-remote   Dictated by (CST): Mich Leach MD on 4/04/2025 at 3:18 PM     Finalized by (CST): Mich Leach MD on 4/04/2025 at 3:20 PM              Impression:     Patient is an 84 year old male admitted for JORDON, hemoptysis, abnormal labs, and urinary retention. Cr elevated to 7.54, elevated WBC and CT showing mild hydronephrosis. Patient does not recall if he's stopped BPH medications, but per chart review it appears he stopped them a few years ago.     Recommendations:  - Maintain santiago for 7-10 days  - Constipation management  - Trend creatinine  - Restart Tamsulosin 0.4 mg at bedtime   - RBUS in 48 hours to assess hydronephrosis    Thank you very much for this consult. Please call if there are any questions or concerns. Urology will follow peripherally.     Brooks Mart PA-C      Date: 4/10/2025           [1]   Past Medical History:   Fracture of arm    fracture of left arm - cast   [2]   Past Surgical History:  Procedure Laterality Date    Hip surgery      Other surgical history  2014     prostate biopsy    Spinal fusion     [3] History reviewed. No pertinent family history.  [4]   Social History  Socioeconomic History    Marital status: Single   Tobacco Use    Smoking status: Never    Smokeless tobacco: Never   Vaping Use     Vaping status: Never Used   Substance and Sexual Activity    Alcohol use: No     Alcohol/week: 0.0 standard drinks of alcohol    Drug use: No   Other Topics Concern    Caffeine Concern Yes     Comment: coffee, occasionally    Reaction to local anesthetic No    Right Handed Yes     Social Drivers of Health     Food Insecurity: No Food Insecurity (4/10/2025)    NCSS - Food Insecurity     Worried About Running Out of Food in the Last Year: No     Ran Out of Food in the Last Year: No   Transportation Needs: No Transportation Needs (4/10/2025)    NCSS - Transportation     Lack of Transportation: No   Housing Stability: Not At Risk (4/10/2025)    NCSS - Housing/Utilities     Has Housing: Yes     Worried About Losing Housing: No     Unable to Get Utilities: No   [5] No Known Allergies

## 2025-04-10 NOTE — H&P
[facility]    PATIENT'S NAME: EFRA FLORES   ATTENDING PHYSICIAN: Tg Clarke MD   PATIENT ACCOUNT#:   [id number]    LOCATION:  [address] [place] [address]  MEDICAL RECORD #:   [id number]       YOB: 1940  ADMISSION DATE:       04/10/2025    HISTORY AND PHYSICAL EXAMINATION    CHIEF COMPLAINT:  Acute kidney injury secondary to obstructive uropathy, possible aspiration pneumonia, hyperkalemia, and possible upper gastrointestinal bleed.    HISTORY OF PRESENT ILLNESS:  Patient is an 84-year-old  male who was hospitalized earlier this month for hip pain after a fall.  Imaging studies showed degenerative joint disease of cervical, thoracic, and lumbar spine; no hip fracture.  Also found to have chronic infrarenal abdominal aortic aneurysm.  Started on low-dose aspirin.  Evaluated by Neurology and diagnosed with possible Parkinson disease and started on carbidopa/levodopa.  Discharged home in a stable condition.  Family brought him in today for inability to urinate for the last few days and abdominal discomfort.  Also has been spitting up blood per his wife.  CBC today showed hemoglobin 11.6, which is way below his baseline; white blood cell count of 21.8 with left shift.  Urinalysis showed no evidence of urinary tract infection.  Chemistry showed GFR of 7, BUN and creatinine of 163 and 7.54, potassium 6.5.  He received hyperkalemia medications including dextrose D50, calcium gluconate, and sodium bicarb.  He was given IV fluids and Espinal catheter inserted, around 1.5 L released from his bladder.  Chest x-ray showed bibasilar infiltrates, and CT scan of the abdomen showed no acute findings.  He does have large hiatal hernia.      PAST MEDICAL HISTORY:  As outlined above.  Recent diagnosis with Parkinson disease.  He has generalized osteoarthritis, history of pancreatitis, benign prostatic hypertrophy, infrarenal abdominal aortic aneurysm, and hypertension.       PAST SURGICAL HISTORY:  Left total  hip arthroplasty, prostate biopsy, and lumbar spinal fusion.     MEDICATIONS:  Please see medication reconciliation list.     ALLERGIES:  No known drug allergies.     FAMILY HISTORY:  Positive for hypertension.  Unable to obtain further details.     SOCIAL HISTORY:  No tobacco, alcohol, or drug use.  Lives with his family.  Requires assistance in his basic activities of daily living.     REVIEW OF SYSTEMS:  Difficult to obtain from the patient.  Per his wife, he has been having difficulty urinating since he was discharged from the hospital.  He does have underlying history of benign prostatic hypertrophy, but he does not take medications for it.  Also, he has been spitting up blood, not vomiting, per his wife.  It is not clear to get accurate information.  Patient himself is obtunded and unable to provide any significant history.  His EKG showed peak T waves in the anterolateral leads.        PHYSICAL EXAMINATION:    GENERAL:  Obtunded, but no acute distress.  VITAL SIGNS:  Temperature 97.5, pulse 82, respiratory rate 26, blood pressure 125/69, pulse ox 100% on room air.   HEENT:  Atraumatic.  Oropharynx clear.  Dry mucous membranes.  Ears and nose normal.  Eyes:  Anicteric sclerae.   NECK:  Supple.  No lymphadenopathy.  Trachea midline.  Full range of motion.   LUNGS:  Clear to auscultation bilaterally.  Diminished breathing sounds, both lung bases.     HEART:  Regular rate and rhythm.  S1 and S2 auscultated.  No murmur.    ABDOMEN:  Soft, nondistended.  No tenderness.  Positive bowel sounds.   EXTREMITIES:  No edema, clubbing or cyanosis.   GENITOURINARY:  Espinal catheter in place.  Clear urine.  No blood.    ASSESSMENT:    1.   Obstructive uropathy with acute kidney injury.  2.   Hyperkalemia.  3.   Drop in hemoglobin.  Rule out upper gastrointestinal bleed.  4.   Possible aspiration pneumonia with leukocytosis.    PLAN:  Patient will be admitted to progressive care unit.  IV fluids.  N.p.o.  Aspiration  precautions.  IV Protonix.  Gastroenterology, nephrology, urology, and pulmonary consults.  Monitor his kidney function and rhythm.  Monitor his hemodynamic status and temperature curve.  Further recommendations to follow.     Dictated By Tg Clarke MD  d: 04/10/2025 11:49:08  t: 04/10/2025 12:28:59  Job [id number]  FB/

## 2025-04-10 NOTE — PLAN OF CARE
Problem: Patient Centered Care  Goal: Patient preferences are identified and integrated in the patient's plan of care  Description: Interventions:- What would you like us to know as we care for you? From home with sister - Provide timely, complete, and accurate information to patient/family- Incorporate patient and family knowledge, values, beliefs, and cultural backgrounds into the planning and delivery of care- Encourage patient/family to participate in care and decision-making at the level they choose- Honor patient and family perspectives and choices  Outcome: Progressing     Problem: Patient/Family Goals  Goal: Patient/Family Long Term Goal  Description: Patient's Long Term Goal: Discharge home   Interventions:  - IV abx   - O2 as needed  - Monitor VS  - Follow MD orders  - See additional Care Plan goals for specific interventions  Outcome: Progressing  Goal: Patient/Family Short Term Goal  Description: Patient's Short Term Goal: Relief of abdominal pain  Interventions: -   - IV abx   - Pain meds as needed  - Monitor VS  - Follow MD orders  - See additional Care Plan goals for specific interventions  Outcome: Progressing     Problem: RESPIRATORY - ADULT  Goal: Achieves optimal ventilation and oxygenation  Description: INTERVENTIONS:- Assess for changes in respiratory status- Assess for changes in mentation and behavior- Position to facilitate oxygenation and minimize respiratory effort- Oxygen supplementation based on oxygen saturation or ABGs- Provide Smoking Cessation handout, if applicable- Encourage broncho-pulmonary hygiene including cough, deep breathe, Incentive Spirometry- Assess the need for suctioning and perform as needed- Assess and instruct to report SOB or any respiratory difficulty- Respiratory Therapy support as indicated- Manage/alleviate anxiety- Monitor for signs/symptoms of CO2 retention  Outcome: Progressing     Problem: GASTROINTESTINAL - ADULT  Goal: Minimal or absence of nausea and  vomiting  Description: INTERVENTIONS:- Maintain adequate hydration with IV or PO as ordered and tolerated- Nasogastric tube to low intermittent suction as ordered- Evaluate effectiveness of ordered antiemetic medications- Provide nonpharmacologic comfort measures as appropriate- Advance diet as tolerated, if ordered- Obtain nutritional consult as needed- Evaluate fluid balance  Outcome: Progressing  Goal: Maintains or returns to baseline bowel function  Description: INTERVENTIONS:- Assess bowel function- Maintain adequate hydration with IV or PO as ordered and tolerated- Evaluate effectiveness of GI medications- Encourage mobilization and activity- Obtain nutritional consult as needed- Establish a toileting routine/schedule- Consider collaborating with pharmacy to review patient's medication profile  Outcome: Progressing  Goal: Maintains adequate nutritional intake (undernourished)  Description: INTERVENTIONS:- Monitor percentage of each meal consumed- Identify factors contributing to decreased intake, treat as appropriate- Assist with meals as needed- Monitor I&O, WT and lab values- Obtain nutritional consult as needed- Optimize oral hygiene and moisture- Encourage food from home; allow for food preferences- Enhance eating environment  Outcome: Progressing  Goal: Achieves appropriate nutritional intake (bariatric)  Description: INTERVENTIONS:- Monitor for over-consumption- Identify factors contributing to increased intake, treat as appropriate- Monitor I&O, WT and lab values- Obtain nutritional consult as needed- Evaluate psychosocial factors contributing to over-consumption  Outcome: Progressing     Problem: GENITOURINARY - ADULT  Goal: Absence of urinary retention  Description: INTERVENTIONS:- Assess patient’s ability to void and empty bladder- Monitor intake/output and perform bladder scan as needed- Follow urinary retention protocol/standard of care- Consider collaborating with pharmacy to review patient's  medication profile- Implement strategies to promote bladder emptying  Outcome: Progressing     Problem: PAIN - ADULT  Goal: Verbalizes/displays adequate comfort level or patient's stated pain goal  Description: INTERVENTIONS:- Encourage pt to monitor pain and request assistance- Assess pain using appropriate pain scale- Administer analgesics based on type and severity of pain and evaluate response- Implement non-pharmacological measures as appropriate and evaluate response- Consider cultural and social influences on pain and pain management- Manage/alleviate anxiety- Utilize distraction and/or relaxation techniques- Monitor for opioid side effects- Notify MD/LIP if interventions unsuccessful or patient reports new pain- Anticipate increased pain with activity and pre-medicate as appropriate  Outcome: Progressing     Problem: SAFETY ADULT - FALL  Goal: Free from fall injury  Description: INTERVENTIONS:- Assess pt frequently for physical needs- Identify cognitive and physical deficits and behaviors that affect risk of falls.- [place] fall precautions as indicated by assessment.- Educate pt/family on patient safety including physical limitations- Instruct pt to call for assistance with activity based on assessment- Modify environment to reduce risk of injury- Provide assistive devices as appropriate- Consider OT/PT consult to assist with strengthening/mobility- Encourage toileting schedule  Outcome: Progressing     Problem: DISCHARGE PLANNING  Goal: Discharge to home or other facility with appropriate resources  Description: INTERVENTIONS:- Identify barriers to discharge w/pt and caregiver- Include patient/family/discharge partner in discharge planning- Arrange for needed discharge resources and transportation as appropriate- Identify discharge learning needs (meds, wound care, etc)- Arrange for interpreters to assist at discharge as needed- Consider post-discharge preferences of patient/family/discharge partner-  Complete POLST form as appropriate- Assess patient's ability to be responsible for managing their own health- Refer to Case Management Department for coordinating discharge planning if the patient needs post-hospital services based on physician/LIP order or complex needs related to functional status, cognitive ability or social support system  Outcome: Progressing

## 2025-04-10 NOTE — ED PROVIDER NOTES
Patient Seen in: [facility] Emergency Department    History     Chief Complaint   Patient presents with    Constipation    Abdomen/Flank Pain    Hemoptysis       HPI    84-year-old male with a history of Parkinson's, hypertension, BPH who presents ER today complaining of constipation abdominal pain over the past week.  Also has been coughing up blood for the past week.  Patient's family at the bedside and giving history along with EMS and previous chart records    History reviewed. Past Medical History[1]    History reviewed. Past Surgical History[2]      Medications :  Prescriptions Prior to Admission[3]     Family History[4]    Smoking Status: Social Hx on file[5]    Constitutional and vital signs reviewed.      Social History and Family History elements reviewed from today, pertinent positives to the presenting problem noted.    Physical Exam     ED Triage Vitals [04/10/25 0602]   /85   Pulse 75   Resp (!) 30   Temp 97.5 °F (36.4 °C)   Temp src Temporal   SpO2 100 %   O2 Device None (Room air)       All measures to prevent infection transmission during my interaction with the patient were taken. Handwashing was performed prior to and after the exam.  Stethoscope and any equipment used during my examination was cleaned with super sani-cloth germicidal wipes following the exam.     Physical Exam  Vitals and nursing note reviewed.   Cardiovascular:      Rate and Rhythm: Normal rate.      Heart sounds: Normal heart sounds.   Pulmonary:      Effort: Pulmonary effort is normal.   Abdominal:      Palpations: Abdomen is soft.      Tenderness: There is generalized abdominal tenderness.   Skin:     General: Skin is warm and dry.   Neurological:      Mental Status: He is alert.         ED Course        Labs Reviewed   CBC WITH DIFFERENTIAL WITH PLATELET - Abnormal; Notable for the following components:       Result Value    WBC 21.8 (*)     HGB 11.6 (*)     HCT 33.6 (*)     Neutrophil Absolute Prelim 19.22  (*)     Neutrophil Absolute 19.22 (*)     Lymphocyte Absolute 0.70 (*)     Monocyte Absolute 1.48 (*)     All other components within normal limits   BASIC METABOLIC PANEL (8) - Abnormal; Notable for the following components:    Glucose 109 (*)     Sodium 128 (*)     Potassium 6.5 (*)     CO2 14.0 (*)      (*)     Creatinine 7.54 (*)     BUN/CREA Ratio 21.6 (*)     Calcium, Total 6.2 (*)     Calculated Osmolality 320 (*)     eGFR-Cr 7 (*)     All other components within normal limits   HEPATIC FUNCTION PANEL (7) - Abnormal; Notable for the following components:    ALT <7 (*)     Total Protein 5.6 (*)     All other components within normal limits   LIPASE - Abnormal; Notable for the following components:    Lipase 63 (*)     All other components within normal limits   URINALYSIS, ROUTINE - Abnormal; Notable for the following components:    Blood Urine Moderate (*)     RBC Urine >10 (*)     All other components within normal limits   PRO BETA NATRIURETIC PEPTIDE - Abnormal; Notable for the following components:    Pro-Beta Natriuretic Peptide 1,520 (*)     All other components within normal limits   PROTHROMBIN TIME (PT) - Abnormal; Notable for the following components:    PT 15.0 (*)     All other components within normal limits   UA MICROSCOPIC ONLY, URINE - Abnormal; Notable for the following components:    RBC Urine >10 (*)     All other components within normal limits   POCT GLUCOSE - Abnormal; Notable for the following components:    POC Glucose  127 (*)     All other components within normal limits   TROPONIN I HIGH SENSITIVITY - Normal   PTT, ACTIVATED - Normal   LACTIC ACID, PLASMA - Normal   SARS-COV-2/FLU A AND B/RSV BY PCR (GEN[employer]) - Normal    Narrative:     This test is intended for the qualitative detection and differentiation of SARS-CoV-2, influenza A, influenza B, and respiratory syncytial virus (RSV) viral RNA in nasopharyngeal or nares swabs from individuals suspected of respiratory viral  infection consistent with COVID-19 by their healthcare provider. Signs and symptoms of respiratory viral infection due to SARS-CoV-2, influenza, and RSV can be similar.                                    Test performed using the Xpert Xpress SARS-CoV-2/FLU/RSV (real time RT-PCR)  assay on the GeneXpert instrument, [employer], [place] [address].                   This test is being used under the Food and Drug Administration's Emergency Use Authorization.                                    The authorized Fact Sheet for Healthcare Providers for this assay is available upon request from the laboratory.   RAINBOW DRAW LAVENDER   RAINBOW DRAW LIGHT GREEN   RAINBOW DRAW BLUE   BLOOD CULTURE   BLOOD CULTURE     EKG    Rate, intervals and axes as noted on EKG Report.  Rate: 76  Rhythm: Sinus Rhythm  Reading: Sinus rhythm, heart rate 76, first-degree AV block, prolonged SD interval, left axis deviation.           As Interpreted by me    Imaging Results Available and Reviewed while in ED: CT ABDOMEN+PELVIS(CPT=74176)  Result Date: 4/10/2025  CONCLUSION:  1.  Urinary bladder is collapsed around a Espinal catheter.  Mild bilateral hydroureteronephrosis which may be related to recent bladder outlet obstruction.  However correlate for signs of upper tract infection. 2.  Granulomatous calcifications within the liver and spleen. 3.  High density material within the gallbladder suggesting vicarious excretion of IV contrast, stones, or sludge. 4.  Coarse calcifications within the pancreatic head which may represent sequela of previous pancreatitis. 5.  Small bilateral pleural effusions. 6.  Moderate-sized hiatal hernia. 7.  Coronary atherosclerosis. 8.  Post left hip arthroplasty.  The hardware causes beam hardening artifact limiting assessment of adjacent structures. 9.  Prostate enlargement.    Dictated by (CST): Luis Terrell MD on 4/10/2025 at 9:28 AM     Finalized by (CST): Luis Terrell MD on 4/10/2025 at 9:33 AM          XR CHEST  AP PORTABLE  (CPT=71045)  Result Date: 4/10/2025  CONCLUSION: Patchy bibasilar pulmonary opacities which could represent atelectasis or pneumonia.  Small left effusion.   Dictated by (CST): Luis Terrell MD on 4/10/2025 at 7:06 AM     Finalized by (CST): Luis Terrell MD on 4/10/2025 at 7:07 AM          ED Medications Administered:   Medications   calcium gluconate 1g in 100mL iso-NaCl IVPB premix (1 g Intravenous Handoff 4/10/25 1143)   sodium chloride 0.9 % IV bolus 500 mL (0 mL Intravenous Stopped 4/10/25 1144)   morphINE PF 2 MG/ML injection 2 mg (2 mg Intravenous Given 4/10/25 0711)   cefTRIAXone (Rocephin) 2 g in sodium chloride 0.9% 100 mL IVPB-ADDV (0 g Intravenous Stopped 4/10/25 1144)   azithromycin (Zithromax) 500 mg in sodium chloride 0.9% 250mL IVPB premix (0 mg Intravenous Stopped 4/10/25 1144)   sodium bicarbonate injection 50 mEq (50 mEq Intravenous Given 4/10/25 1022)   insulin regular human (Novolin R, Humulin R) 100 UNIT/ML injection 10 Units (10 Units Intravenous Given 4/10/25 1027)   dextrose 50% injection 50 mL (50 mL Intravenous Given 4/10/25 1022)   albuterol (Ventolin) (5 MG/ML) 0.5% nebulizer solution 10 mg (10 mg Nebulization Given 4/10/25 1038)   sodium zirconium cyclosilicate (Lokelma) oral packet 10 g (10 g Oral Given 4/10/25 1022)   sodium chloride 0.9% infusion 1,000 mL (1,000 mL Intravenous Handoff 4/10/25 1143)         MDM     Vitals:    04/10/25 0602 04/10/25 0630 04/10/25 1100 04/10/25 1115   BP: 140/85 156/81 135/73 125/69   Pulse: 75 75 83 82   Resp: (!) 30 26 26 26   Temp: 97.5 °F (36.4 °C)      TempSrc: Temporal      SpO2: 100% 98% 100% 100%   Weight: 81.6 kg      Height: 182.9 cm (6')        *I personally reviewed and interpreted all ED vitals.    Pulse Ox: 100%, Room air, Normal     Monitor Interpretation:   normal sinus rhythm as interpreted by me.  The cardiac monitor was ordered to monitor cardiac rate and rhythm.    Differential Diagnosis/ Diagnostic Considerations:  Obstruction, gastroenteritis, diverticulitis, pneumonia, aspiration, bronchitis    Complicating Factors: The patient already has does not have any pertinent problems on file. to contribute to the complexity of this ED evaluation.    Medical Decision Making  Amount and/or Complexity of Data Reviewed  Independent Historian: spouse and EMS  External Data Reviewed: labs, radiology and notes.     Details: Reviewed multiple outpatient notes from his primary care provider of internal medicine along with previous hospitalization help contribute to patient's medical history, treatment plan, medication list with previous labs and imaging to compare to today's  Labs: ordered. Decision-making details documented in ED Course.  Radiology: ordered and independent interpretation performed. Decision-making details documented in ED Course.  ECG/medicine tests: ordered and independent interpretation performed. Decision-making details documented in ED Course.    Risk  Decision regarding hospitalization.    Critical Care  Total time providing critical care: 45 minutes    Is notified by RN that after Espinal was placed patient had over 1 L of urine output immediately.    I reviewed all results with the patient and his wife at the bedside.  Patient has multiple electrolyte abnormalities including elevated potassium, BUN, creatinine low sodium, all these electrolyte abnormalities are most likely from urinary obstruction caused by his urinary retention which was resolved via Espinal being placed.  Patient's x-ray and CT do show bilateral opacities which could be pneumonia especially with his hemoptysis.  Symptoms blood cultures given Rocephin and azithromycin.  Also gave him multiple medications to treat the hyperkalemia including IV meds, albuterol and oral Lokelma.  Due to all these medical issues will need to be admitted to the PCU for further evaluation along with multiple specialty consultations.  They both agree with this plan    I  discussed case with Dr. Clarke who accepts admission    I discussed case with Dr. Ashley-nephrology notified of consult    I discussed case with [facility] and notified of consult    I discussed case with Dr. Akbar-pulmonology notified of critical consult    Discussed case with Dr. [id number] of consult        Condition upon leaving the department: Stable    Disposition and Plan     Clinical Impression:  1. Urinary retention    2. Abdominal pain, acute    3. Acute renal failure, unspecified acute renal failure type    4. Community acquired pneumonia, unspecified laterality    5. Hyperkalemia    6. Anemia, unspecified type        Disposition:  Admit    Follow-up:  No follow-up provider specified.    Medications Prescribed:  Current Discharge Medication List          Hospital Problems       Present on Admission  Date Reviewed: 12/3/2019          ICD-10-CM Noted POA    * (Principal) Urinary retention R33.9 4/10/2025 Unknown                       [1]   Past Medical History:   Fracture of arm    fracture of left arm - cast   [2]   Past Surgical History:  Procedure Laterality Date    Hip surgery      Other surgical history  2014     prostate biopsy    Spinal fusion     [3] (Not in a hospital admission)   [4] History reviewed. No pertinent family history.  [5]   Social History  Socioeconomic History    Marital status: Single   Tobacco Use    Smoking status: Never    Smokeless tobacco: Never   Vaping Use    Vaping status: Never Used   Substance and Sexual Activity    Alcohol use: No     Alcohol/week: 0.0 standard drinks of alcohol    Drug use: No   Other Topics Concern    Caffeine Concern Yes     Comment: coffee, occasionally    Reaction to local anesthetic No    Right Handed Yes

## 2025-04-10 NOTE — CONSULTS
[facility]   Gastroenterology Consultation Note    Justyn Hall  Patient Status:    Inpatient  Date of Admission:         4/10/2025, Hospital day #0  Attending:   Tg Clarke MD  PCP:     Chong Quintero MD    Reason for Consultation:  anemia    History of Present Illness:  Justyn Hall is a a(n) 84 year old male w/ a history of Parkinson's disease, hypertension, BPH, who presents with lower abdominal pain, urinary retention, constipation.  Patient was seen this afternoon after Espinal catheter placement for urinary retention, he states his abdominal pain is significantly better and nearly resolved.  No nausea or vomiting.  No diarrhea.  No blood in stool such as melena or hematochezia.  States he has chronic constipation and this has not changed recently.  No hematemesis.    History:  Past Medical History[1]  Past Surgical History[2]  Family History[3]   reports that he has never smoked. He has never used smokeless tobacco. He reports that he does not drink alcohol and does not use drugs.    Allergies:  Allergies[4]    Medications:  Current Hospital Medications[5]  Prescriptions Prior to Admission[6]    Review of Systems:  CONSTITUTIONAL:  negative for fevers, rigors  EYES:  negative for diplopia   RESPIRATORY:  negative for severe shortness of breath  CARDIOVASCULAR:  negative for crushing sub-sternal chest pain  GASTROINTESTINAL:  see HPI  GENITOURINARY:  negative for dysuria or gross hematuria  INTEGUMENT/BREAST:  SKIN:  negative for jaundice   ALLERGIC/IMMUNOLOGIC:  negative for hay fever  ENDOCRINE:  negative for cold intolerance and heat intolerance  MUSCULOSKELETAL:  negative for joint effusion/severe erythema  NEURO: negative for dizziness or loss of conscious or paresthesias  BEHAVIOR/PSYCH:  negative for psychotic behavior    Physical Exam:    Blood pressure 102/52, pulse 88, temperature 97.7 °F (36.5 °C), temperature source Axillary, resp. rate 18, height 6' (1.829 m), weight 175  lb 4.3 oz (79.5 kg), SpO2 95%. Body mass index is 23.77 kg/m².    General: awake, alert and oriented, no acute distress  HEENT: moist mucus membranes  PULM: no conversational dyspnea  CARDIOVASCULAR: regular rate and rhythm, the extremities are warm and well perfused  GI: soft, non-tender, non-distended, + BS, no rebound/guarding   EXTREMITIES: no edema, moving all extremities  SKIN: no visible rash  NEURO: appropriate and interactive    Laboratory Data:  Lab Results   Component Value Date    WBC 21.8 04/10/2025    HGB 11.6 04/10/2025    HCT 33.6 04/10/2025    .0 04/10/2025    CREATSERUM 7.54 04/10/2025     04/10/2025     04/10/2025    K 6.5 04/10/2025    CL 98 04/10/2025    CO2 14.0 04/10/2025     04/10/2025    CA 6.2 04/10/2025    ALB 3.5 04/10/2025    ALKPHO 62 04/10/2025    BILT 0.4 04/10/2025    TP 5.6 04/10/2025    AST 13 04/10/2025    ALT <7 04/10/2025    PTT 27.8 04/10/2025    INR 1.11 04/10/2025    LIP 63 04/10/2025       Imaging:  CT ABDOMEN+PELVIS(CPT=74176)  Result Date: 4/10/2025  CONCLUSION:  1.  Urinary bladder is collapsed around a Espinal catheter.  Mild bilateral hydroureteronephrosis which may be related to recent bladder outlet obstruction.  However correlate for signs of upper tract infection. 2.  Granulomatous calcifications within the liver and spleen. 3.  High density material within the gallbladder suggesting vicarious excretion of IV contrast, stones, or sludge. 4.  Coarse calcifications within the pancreatic head which may represent sequela of previous pancreatitis. 5.  Small bilateral pleural effusions. 6.  Moderate-sized hiatal hernia. 7.  Coronary atherosclerosis. 8.  Post left hip arthroplasty.  The hardware causes beam hardening artifact limiting assessment of adjacent structures. 9.  Prostate enlargement.    Dictated by (CST): Luis Terrell MD on 4/10/2025 at 9:28 AM     Finalized by (CST): Luis Terrell MD on 4/10/2025 at 9:33 AM          XR CHEST AP PORTABLE   (CPT=71045)  Result Date: 4/10/2025  CONCLUSION: Patchy bibasilar pulmonary opacities which could represent atelectasis or pneumonia.  Small left effusion.   Dictated by (CST): Luis Terrell MD on 4/10/2025 at 7:06 AM     Finalized by (CST): Luis Terrell MD on 4/10/2025 at 7:07 AM            Assessment & Plan   Justyn Hall is a a(n) 84 year old male w/ a history of Parkinson's disease, hypertension, BPH, who presents with lower abdominal pain, urinary retention, constipation.  CT on admission with mild bilateral hydro ureteral nephrosis in the setting of acute renal failure with creatinine 7.5.  GI consulted for acute anemia, hemoglobin 11.6.  No signs of overt GI bleeding such as melena or hematochezia.  Will check iron studies but suspect there is a degree of anemia related to renal failure.  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  However unclear if recent prior colonoscopy thus an underlying occult neoplasm cannot be excluded.  No plans for immediate endoscopic evaluation.      Hattie Alex MD  [facility] Gastroenterology  4/10/2025    This note was partially prepared using Dragon Medical voice recognition dictation software. As a result, errors may occur. When identified, these errors have been corrected. While every attempt is made to correct errors during dictation, discrepancies may still exist.          [1]   Past Medical History:   Fracture of arm    fracture of left arm - cast   [2]   Past Surgical History:  Procedure Laterality Date    Hip surgery      Other surgical history  2014     prostate biopsy    Spinal fusion     [3] History reviewed. No pertinent family history.  [4] No Known Allergies  [5]   Current Facility-Administered Medications:     acetaminophen (Tylenol Extra Strength) tab 500 mg, 500 mg, Oral, Q4H PRN    ondansetron (Zofran) 4 MG/2ML injection 4 mg, 4 mg, Intravenous, Q6H PRN    carbidopa-levodopa (SINEMET)  MG per tab 0.5 tablet, 0.5  tablet, Oral, TID    carvedilol (Coreg) tab 6.25 mg, 6.25 mg, Oral, BID with meals    piperacillin-tazobactam (Zosyn) 3.375 g in dextrose 5% 100 mL IVPB-ADDV, 3.375 g, Intravenous, Q12H    dextrose 5%-sodium chloride 0.9% infusion, , Intravenous, Continuous    pantoprazole (Protonix) 40 mg in sodium chloride 0.9% PF 10 mL IV push, 40 mg, Intravenous, Daily    metoclopramide (Reglan) 5 mg/mL injection 10 mg, 10 mg, Intravenous, Daily PRN    tamsulosin (Flomax) cap 0.4 mg, 0.4 mg, Oral, Nightly  [6]   Medications Prior to Admission   Medication Sig Dispense Refill Last Dose/Taking    carbidopa-levodopa  MG Oral Tab Take 0.5 tablets by mouth 3 (three) times daily for 14 days, THEN 1 tablet 3 (three) times daily. Give half a tab 3 times a day, 5 hours between each dose for 3 days.  Then full tab 3 times a day 5 hours between each dose.  Give ideally on empty stomach.  Give 30 to 45 minutes before a meal or 45 to 60 minutes after a meal.. 249 tablet 0 4/9/2025 Evening    aspirin 81 MG Oral Tab EC Take 1 tablet (81 mg total) by mouth daily. 30 tablet 0 4/9/2025 Morning    carvedilol 6.25 MG Oral Tab Take 1 tablet (6.25 mg total) by mouth 2 (two) times daily with meals. 60 tablet 0 4/9/2025 Evening    lisinopril 20 MG Oral Tab Take 1 tablet (20 mg total) by mouth daily. 30 tablet 0 4/9/2025 Morning    HYDROcodone-acetaminophen 5-325 MG Oral Tab Take 1 tablet by mouth every 6 (six) hours as needed. (Patient not taking: Reported on 4/10/2025) 10 tablet 0 Not Taking

## 2025-04-11 ENCOUNTER — APPOINTMENT (OUTPATIENT)
Dept: ULTRASOUND IMAGING | Facility: HOSPITAL | Age: 85
End: 2025-04-11
Payer: MEDICARE

## 2025-04-11 ENCOUNTER — APPOINTMENT (OUTPATIENT)
Dept: GENERAL RADIOLOGY | Facility: HOSPITAL | Age: 85
End: 2025-04-11
Attending: REGISTERED NURSE
Payer: MEDICARE

## 2025-04-11 PROBLEM — Z51.5 PALLIATIVE CARE BY SPECIALIST: Status: ACTIVE | Noted: 2025-04-11

## 2025-04-11 PROBLEM — Z91.81 HISTORY OF RECENT FALL: Status: ACTIVE | Noted: 2025-04-11

## 2025-04-11 PROBLEM — D72.829 LEUKOCYTOSIS: Status: ACTIVE | Noted: 2025-04-11

## 2025-04-11 PROBLEM — K59.00 CONSTIPATION: Status: ACTIVE | Noted: 2025-04-11

## 2025-04-11 PROBLEM — R13.10 DYSPHAGIA: Status: ACTIVE | Noted: 2025-04-11

## 2025-04-11 LAB
ANION GAP SERPL CALC-SCNC: 14 MMOL/L (ref 0–18)
BASOPHILS # BLD AUTO: 0.1 X10(3) UL (ref 0–0.2)
BASOPHILS NFR BLD AUTO: 0.8 %
BUN BLD-MCNC: 136 MG/DL (ref 9–23)
BUN/CREAT SERPL: 27.5 (ref 10–20)
CALCIUM BLD-MCNC: 7.2 MG/DL (ref 8.7–10.4)
CHLORIDE SERPL-SCNC: 107 MMOL/L (ref 98–112)
CO2 SERPL-SCNC: 20 MMOL/L (ref 21–32)
CREAT BLD-MCNC: 4.95 MG/DL (ref 0.7–1.3)
DEPRECATED RDW RBC AUTO: 43.5 FL (ref 35.1–46.3)
EGFRCR SERPLBLD CKD-EPI 2021: 11 ML/MIN/1.73M2 (ref 60–?)
EOSINOPHIL # BLD AUTO: 0.11 X10(3) UL (ref 0–0.7)
EOSINOPHIL NFR BLD AUTO: 0.9 %
ERYTHROCYTE [DISTWIDTH] IN BLOOD BY AUTOMATED COUNT: 15 % (ref 11–15)
GLUCOSE BLD-MCNC: 173 MG/DL (ref 70–99)
HCT VFR BLD AUTO: 29.8 % (ref 39–53)
HGB BLD-MCNC: 10 G/DL (ref 13–17.5)
IMM GRANULOCYTES # BLD AUTO: 0.17 X10(3) UL (ref 0–1)
IMM GRANULOCYTES NFR BLD: 1.3 %
IRON SATN MFR SERPL: 9 % (ref 20–50)
IRON SERPL-MCNC: 21 UG/DL (ref 65–175)
LYMPHOCYTES # BLD AUTO: 0.37 X10(3) UL (ref 1–4)
LYMPHOCYTES NFR BLD AUTO: 2.9 %
MCH RBC QN AUTO: 26.9 PG (ref 26–34)
MCHC RBC AUTO-ENTMCNC: 33.6 G/DL (ref 31–37)
MCV RBC AUTO: 80.1 FL (ref 80–100)
MONOCYTES # BLD AUTO: 0.84 X10(3) UL (ref 0.1–1)
MONOCYTES NFR BLD AUTO: 6.6 %
NEUTROPHILS # BLD AUTO: 11.05 X10 (3) UL (ref 1.5–7.7)
NEUTROPHILS # BLD AUTO: 11.05 X10(3) UL (ref 1.5–7.7)
NEUTROPHILS NFR BLD AUTO: 87.5 %
OSMOLALITY SERPL CALC.SUM OF ELEC: 340 MOSM/KG (ref 275–295)
PLATELET # BLD AUTO: 335 10(3)UL (ref 150–450)
POTASSIUM SERPL-SCNC: 4.4 MMOL/L (ref 3.5–5.1)
RBC # BLD AUTO: 3.72 X10(6)UL (ref 3.8–5.8)
SODIUM SERPL-SCNC: 141 MMOL/L (ref 136–145)
TOTAL IRON BINDING CAPACITY: 226 UG/DL (ref 250–425)
TRANSFERRIN SERPL-MCNC: 163 MG/DL (ref 215–365)
WBC # BLD AUTO: 12.6 X10(3) UL (ref 4–11)

## 2025-04-11 PROCEDURE — 71045 X-RAY EXAM CHEST 1 VIEW: CPT | Performed by: REGISTERED NURSE

## 2025-04-11 PROCEDURE — 99223 1ST HOSP IP/OBS HIGH 75: CPT | Performed by: NURSE PRACTITIONER

## 2025-04-11 PROCEDURE — 99232 SBSQ HOSP IP/OBS MODERATE 35: CPT | Performed by: INTERNAL MEDICINE

## 2025-04-11 PROCEDURE — 99497 ADVNCD CARE PLAN 30 MIN: CPT | Performed by: NURSE PRACTITIONER

## 2025-04-11 PROCEDURE — 99232 SBSQ HOSP IP/OBS MODERATE 35: CPT | Performed by: REGISTERED NURSE

## 2025-04-11 PROCEDURE — 99233 SBSQ HOSP IP/OBS HIGH 50: CPT | Performed by: INTERNAL MEDICINE

## 2025-04-11 PROCEDURE — 76770 US EXAM ABDO BACK WALL COMP: CPT

## 2025-04-11 PROCEDURE — 99233 SBSQ HOSP IP/OBS HIGH 50: CPT | Performed by: HOSPITALIST

## 2025-04-11 RX ORDER — IPRATROPIUM BROMIDE AND ALBUTEROL SULFATE 2.5; .5 MG/3ML; MG/3ML
3 SOLUTION RESPIRATORY (INHALATION)
Status: DISCONTINUED | OUTPATIENT
Start: 2025-04-11 | End: 2025-04-12

## 2025-04-11 NOTE — PROGRESS NOTES
[facility]  part of [facility]    Progress Note    Justyn Hall Patient Status:  Inpatient    1940 MRN [id number]   Location [facility]5W Attending Denise Huertas MD   Hosp Day # 1 PCP Chong Quintero MD     Chief Complaint: JORDON    SUBJECTIVE:    Patient failed swallow eval today.  With significant sputum production.  No fevers/chills.  No chest pain or sob.     10 ROS completed and otherwise negative.    OBJECTIVE:  Vital signs in last 24 hours:  BP 94/58 (BP Location: Right arm)   Pulse 89   Temp 98 °F (36.7 °C) (Axillary)   Resp 20   Ht 6' (1.829 m)   Wt 175 lb 4.3 oz (79.5 kg)   SpO2 99%   BMI 23.77 kg/m²     Intake/Output:    Intake/Output Summary (Last 24 hours) at 2025 1349  Last data filed at 2025 1149  Gross per 24 hour   Intake 1295 ml   Output 6550 ml   Net -5255 ml       Wt Readings from Last 3 Encounters:   04/10/25 175 lb 4.3 oz (79.5 kg)   25 168 lb 3.2 oz (76.3 kg)   10/10/19 182 lb (82.6 kg)       Exam     Gen: No acute distress  Pulm: coarse breath sounds  CV: Heart with regular rate and rhythm  Abd: Abdomen soft, nontender, bowel sounds present  Neuro: some dysarthria  MSK: moves extremities  Skin: Warm and dry  Psych: Normal affect  Ext: no c/c/e    Data Review:     Labs:   Lab Results   Component Value Date    WBC 12.6 2025    HGB 10.0 2025    HCT 29.8 2025    .0 2025    CREATSERUM 4.95 2025     2025     2025    K 4.4 2025     2025    CO2 20.0 2025     2025    CA 7.2 2025       Imaging: Reviewed    CT ABDOMEN+PELVIS(CPT=74176)  Result Date: 4/10/2025  CONCLUSION:  1.  Urinary bladder is collapsed around a Espinal catheter.  Mild bilateral hydroureteronephrosis which may be related to recent bladder outlet obstruction.  However correlate for signs of upper tract infection. 2.  Granulomatous calcifications within the liver and  spleen. 3.  High density material within the gallbladder suggesting vicarious excretion of IV contrast, stones, or sludge. 4.  Coarse calcifications within the pancreatic head which may represent sequela of previous pancreatitis. 5.  Small bilateral pleural effusions. 6.  Moderate-sized hiatal hernia. 7.  Coronary atherosclerosis. 8.  Post left hip arthroplasty.  The hardware causes beam hardening artifact limiting assessment of adjacent structures. 9.  Prostate enlargement.    Dictated by (CST): Luis Terrell MD on 4/10/2025 at 9:28 AM     Finalized by (CST): Luis Terrell MD on 4/10/2025 at 9:33 AM          XR CHEST AP PORTABLE  (CPT=71045)  Result Date: 4/10/2025  CONCLUSION: Patchy bibasilar pulmonary opacities which could represent atelectasis or pneumonia.  Small left effusion.   Dictated by (CST): Luis Terrell MD on 4/10/2025 at 7:06 AM     Finalized by (CST): Luis Terrell MD on 4/10/2025 at 7:07 AM            Meds:   Current Hospital Medications[1]      Assessment  Problem List[2]    Plan:     Obstructive uropathy with JORDON  BPH  - seen by urology and nephrology  - started IVFs  - santiago placed  - cont flomax     Aspiration pneumonia  Hemoptysis  - failed swallow eval  - pt would want to having feeding tube if unable to pass swallow  - at this time place dobhoff  - gi on consult for feeding tube if needed  - cont IV abx  - pulm following for pneumonia    Constipation  Abnormal CT chest  Anemia - CHRISTINA  - gi following  - cont ppi bid  - keep npo  - place DHT  - consider EGD but not at this time  - miralax bid  - IV iron    HTN  - bp running low at this time, hold bp meds for now    Parkinsons disease  - cont home meds with DHT placed    Global A/P  - reviewed labs and vitals from today  - reviewed notes of the day  - cbc, bmp, mag ordered for tomorrow  - discussed need to stay in the hospital today due to above  - cont IV abx  - discussed with patient/RN and care team  - dispo pending clinical course      Denise  MD Kiya  4/11/2025  1:49 PM    Supplementary Documentation:   DVT Mechanical Prophylaxis:        DVT Pharmacologic Prophylaxis   Medication   None                Code Status: Not on file  Espinal: Espinal catheter in place  Espinal Duration (in days): 2  Central line:    ELISE:         **Certification      PHYSICIAN Certification of Need for Inpatient Hospitalization - Initial Certification    Patient will require inpatient services that will reasonably be expected to span two midnight's based on the clinical documentation in H+P.   Based on patients current state of illness, I anticipate that, after discharge, patient will require TBD.                   MDM: High complexity- acute illness posing a threat to life. IV meds requiring close inpatient monitoring. I personally spent time on chart/note review, review of labs/imaging, discussion with patient, physical exam, discussion with staff, writing progress note, and discussion of plan of care.         [1]   Current Facility-Administered Medications   Medication Dose Route Frequency    ipratropium-albuterol (Duoneb) 0.5-2.5 (3) MG/3ML inhalation solution 3 mL  3 mL Nebulization Q4H WA (5 times daily)    pantoprazole (Protonix) 40 mg in sodium chloride 0.9% PF 10 mL IV push  40 mg Intravenous Q12H    sodium ferric gluconate (Ferrlecit) 125 mg in sodium chloride 0.9% 100mL IVPB premix  125 mg Intravenous Daily    acetaminophen (Tylenol Extra Strength) tab 500 mg  500 mg Oral Q4H PRN    ondansetron (Zofran) 4 MG/2ML injection 4 mg  4 mg Intravenous Q6H PRN    carbidopa-levodopa (SINEMET)  MG per tab 0.5 tablet  0.5 tablet Oral TID    carvedilol (Coreg) tab 6.25 mg  6.25 mg Oral BID with meals    piperacillin-tazobactam (Zosyn) 3.375 g in dextrose 5% 100 mL IVPB-ADDV  3.375 g Intravenous Q12H    dextrose 5%-sodium chloride 0.9% infusion   Intravenous Continuous    metoclopramide (Reglan) 5 mg/mL injection 10 mg  10 mg Intravenous Daily PRN    tamsulosin (Flomax)  cap 0.4 mg  0.4 mg Oral Nightly    polyethylene glycol (PEG 3350) (Miralax) 17 g oral packet 17 g  17 g Oral BID   [2]   Patient Active Problem List  Diagnosis    Elevated PSA    Hypercholesterolemia    Contusion of left hip, initial encounter    Contusion of left shoulder, initial encounter    Left hip pain    Parkinsonism (HCC)    Urinary retention    Abdominal pain, acute    Acute renal failure, unspecified acute renal failure type    Community acquired pneumonia, unspecified laterality    Hyperkalemia    Anemia, unspecified type    Obstructed, uropathy

## 2025-04-11 NOTE — PLAN OF CARE
Problem: Patient Centered Care  Goal: Patient preferences are identified and integrated in the patient's plan of care  Description: Interventions:- What would you like us to know as we care for you? From home with sister - Provide timely, complete, and accurate information to patient/family- Incorporate patient and family knowledge, values, beliefs, and cultural backgrounds into the planning and delivery of care- Encourage patient/family to participate in care and decision-making at the level they choose- Honor patient and family perspectives and choices  Outcome: Progressing     Problem: Patient/Family Goals  Goal: Patient/Family Long Term Goal  Description: Patient's Long Term Goal: Discharge home   Interventions:  - IV abx   - O2 as needed  - Monitor VS  - Follow MD orders  - See additional Care Plan goals for specific interventions  Outcome: Progressing  Goal: Patient/Family Short Term Goal  Description: Patient's Short Term Goal: Relief of abdominal pain  Interventions: -   - IV abx   - Pain meds as needed  - Monitor VS  - Follow MD orders  - See additional Care Plan goals for specific interventions  Outcome: Progressing     Problem: RESPIRATORY - ADULT  Goal: Achieves optimal ventilation and oxygenation  Description: INTERVENTIONS:- Assess for changes in respiratory status- Assess for changes in mentation and behavior- Position to facilitate oxygenation and minimize respiratory effort- Oxygen supplementation based on oxygen saturation or ABGs- Provide Smoking Cessation handout, if applicable- Encourage broncho-pulmonary hygiene including cough, deep breathe, Incentive Spirometry- Assess the need for suctioning and perform as needed- Assess and instruct to report SOB or any respiratory difficulty- Respiratory Therapy support as indicated- Manage/alleviate anxiety- Monitor for signs/symptoms of CO2 retention  Outcome: Progressing     Problem: GASTROINTESTINAL - ADULT  Goal: Minimal or absence of nausea and  vomiting  Description: INTERVENTIONS:- Maintain adequate hydration with IV or PO as ordered and tolerated- Nasogastric tube to low intermittent suction as ordered- Evaluate effectiveness of ordered antiemetic medications- Provide nonpharmacologic comfort measures as appropriate- Advance diet as tolerated, if ordered- Obtain nutritional consult as needed- Evaluate fluid balance  Outcome: Progressing  Goal: Maintains or returns to baseline bowel function  Description: INTERVENTIONS:- Assess bowel function- Maintain adequate hydration with IV or PO as ordered and tolerated- Evaluate effectiveness of GI medications- Encourage mobilization and activity- Obtain nutritional consult as needed- Establish a toileting routine/schedule- Consider collaborating with pharmacy to review patient's medication profile  Outcome: Progressing  Goal: Maintains adequate nutritional intake (undernourished)  Description: INTERVENTIONS:- Monitor percentage of each meal consumed- Identify factors contributing to decreased intake, treat as appropriate- Assist with meals as needed- Monitor I&O, WT and lab values- Obtain nutritional consult as needed- Optimize oral hygiene and moisture- Encourage food from home; allow for food preferences- Enhance eating environment  Outcome: Progressing  Goal: Achieves appropriate nutritional intake (bariatric)  Description: INTERVENTIONS:- Monitor for over-consumption- Identify factors contributing to increased intake, treat as appropriate- Monitor I&O, WT and lab values- Obtain nutritional consult as needed- Evaluate psychosocial factors contributing to over-consumption  Outcome: Progressing     Problem: GENITOURINARY - ADULT  Goal: Absence of urinary retention  Description: INTERVENTIONS:- Assess patient’s ability to void and empty bladder- Monitor intake/output and perform bladder scan as needed- Follow urinary retention protocol/standard of care- Consider collaborating with pharmacy to review patient's  medication profile- Implement strategies to promote bladder emptying  Outcome: Progressing     Problem: PAIN - ADULT  Goal: Verbalizes/displays adequate comfort level or patient's stated pain goal  Description: INTERVENTIONS:- Encourage pt to monitor pain and request assistance- Assess pain using appropriate pain scale- Administer analgesics based on type and severity of pain and evaluate response- Implement non-pharmacological measures as appropriate and evaluate response- Consider cultural and social influences on pain and pain management- Manage/alleviate anxiety- Utilize distraction and/or relaxation techniques- Monitor for opioid side effects- Notify MD/LIP if interventions unsuccessful or patient reports new pain- Anticipate increased pain with activity and pre-medicate as appropriate  Outcome: Progressing     Problem: SAFETY ADULT - FALL  Goal: Free from fall injury  Description: INTERVENTIONS:- Assess pt frequently for physical needs- Identify cognitive and physical deficits and behaviors that affect risk of falls.- [place] fall precautions as indicated by assessment.- Educate pt/family on patient safety including physical limitations- Instruct pt to call for assistance with activity based on assessment- Modify environment to reduce risk of injury- Provide assistive devices as appropriate- Consider OT/PT consult to assist with strengthening/mobility- Encourage toileting schedule  Outcome: Progressing     Problem: DISCHARGE PLANNING  Goal: Discharge to home or other facility with appropriate resources  Description: INTERVENTIONS:- Identify barriers to discharge w/pt and caregiver- Include patient/family/discharge partner in discharge planning- Arrange for needed discharge resources and transportation as appropriate- Identify discharge learning needs (meds, wound care, etc)- Arrange for interpreters to assist at discharge as needed- Consider post-discharge preferences of patient/family/discharge partner-  Complete POLST form as appropriate- Assess patient's ability to be responsible for managing their own health- Refer to Case Management Department for coordinating discharge planning if the patient needs post-hospital services based on physician/LIP order or complex needs related to functional status, cognitive ability or social support system  Outcome: Progressing

## 2025-04-11 NOTE — CM/SW NOTE
Anticipated therapy need: Gradual Rehabilitative Therapy.    CM started BRIANNA referrals.  CM will follow-up with family and assist with dc planning needs.    Evette Gardiner MBA BSN RN CRRN   RN Case Manager  [phone]

## 2025-04-11 NOTE — PROGRESS NOTES
[facility]     Gastroenterology Progress Note    Justyn Hall Patient Status:  Inpatient    1940 MRN [id number]   Location [facility]5W Attending Denise Huertas MD   Hosp Day # 1 PCP Chong Quintero MD       Subjective:   Pt sitting up in bed with sister at bedside  States he's thirsty  No complaints of difficult/painful swallowing, dyspepsia, early satiety, regurgitation  No BM but passing gas  No fever, chills  No chest pain, SOB  Objective:   Blood pressure 116/61, pulse 85, temperature 98.2 °F (36.8 °C), temperature source Axillary, resp. rate 20, height 6' (1.829 m), weight 175 lb 4.3 oz (79.5 kg), SpO2 97%. Body mass index is 23.77 kg/m².    Gen: awake, alert patient, NAD  HEENT: EOMI, the sclera appears anicteric, oropharynx clear, mucus membranes appear moist  CV: RRR  Lung: no conversational dyspnea   Abdomen: soft NTND abdomen with NABS appreciated   Skin: dry, warm, no jaundice  Ext: no LE edema is evident  Neuro: Alert, oriented x2-3 and interactive, jerking/tremors  Psych: calm, cooperative    Assessment and Plan:   Justyn Hall is a a(n) 84 year old male w/ a history of Parkinson's disease, hypertension, BPH, who presents with lower abdominal pain, urinary retention, constipation.  GI consulted for anemia.    #Anemia  -CT on admission with mild bilateral hydro ureteral nephrosis in the setting of acute renal failure with creatinine 7.5.    -Hemoglobin 11.6.  No signs of overt GI bleeding such as melena or hematochezia.    -Iron studies indicate deficiency, will order IV iron.  -Suspect there is a degree of anemia related to renal failure.      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  However unclear if prior colonoscopy thus an underlying occult neoplasm cannot be excluded.    -No plans for immediate endoscopic evaluation.  Mobility as tolerated along with bowel regimen.    #Abnormal CT chest  -CT chest  with wall thickening and fat stranding around the upper intrathoracic esophagus  -Pt denies dysphagia/odynophagia, vomiting/regurgitation  -Failed bedside swallow, has not been able to take his medications including sinemet though Pt's sister is not convince he has Parkinson's so he has not been taking medication at home either  -No prior EGD  -Discussed DHT placement for medications and possible initiation of enteral feedings of swallow does not improve with sinemet.  Pt and sister agree to placement  -Continue conservative management with empiric PPI BID    Recommend:  -NPO given failed swallow study  -DHT placement  -SLP following, appreciate recs  -Empiric PPI BID  -PT for increased mobility  -Miralax BID once DHT placed and TWE prn  -IV iron  -Monitor for overt GIB  -Trend Hgb, transfuse to goal >7  -Endoscopic evaluation pending clinical course    Case discussed with Stephanie Weaver MD and Mary MELCHOR.    Minerva Black DNP, FNP-BC  [facility]  4/11/2025      Results:     Lab Results   Component Value Date    WBC 12.6 (H) 04/11/2025    HGB 10.0 (L) 04/11/2025    HCT 29.8 (L) 04/11/2025    .0 04/11/2025    CREATSERUM 4.95 (H) 04/11/2025     (H) 04/11/2025     04/11/2025    K 4.4 04/11/2025     04/11/2025    CO2 20.0 (L) 04/11/2025     (H) 04/11/2025    CA 7.2 (L) 04/11/2025    ALB 3.5 04/10/2025    ALKPHO 62 04/10/2025    BILT 0.4 04/10/2025    TP 5.6 (L) 04/10/2025    AST 13 04/10/2025    ALT <7 (L) 04/10/2025    PTT 27.8 04/10/2025    INR 1.11 04/10/2025    T4F 1.10 07/09/2015    TSH 2.507 04/05/2025    LIP 63 (H) 04/10/2025    PSA 2.46 10/10/2019     04/05/2025    B12 640 04/05/2025       CT ABDOMEN+PELVIS(CPT=74176)  Result Date: 4/10/2025  CONCLUSION:  1.  Urinary bladder is collapsed around a Espinal catheter.  Mild bilateral hydroureteronephrosis which may be related to recent bladder outlet obstruction.  However correlate for signs of upper tract  infection. 2.  Granulomatous calcifications within the liver and spleen. 3.  High density material within the gallbladder suggesting vicarious excretion of IV contrast, stones, or sludge. 4.  Coarse calcifications within the pancreatic head which may represent sequela of previous pancreatitis. 5.  Small bilateral pleural effusions. 6.  Moderate-sized hiatal hernia. 7.  Coronary atherosclerosis. 8.  Post left hip arthroplasty.  The hardware causes beam hardening artifact limiting assessment of adjacent structures. 9.  Prostate enlargement.    Dictated by (CST): Luis Terrell MD on 4/10/2025 at 9:28 AM     Finalized by (CST): Luis Terrell MD on 4/10/2025 at 9:33 AM          XR CHEST AP PORTABLE  (CPT=71045)  Result Date: 4/10/2025  CONCLUSION: Patchy bibasilar pulmonary opacities which could represent atelectasis or pneumonia.  Small left effusion.   Dictated by (CST): Luis Terrell MD on 4/10/2025 at 7:06 AM     Finalized by (CST): Luis Terrell MD on 4/10/2025 at 7:07 AM          EKG  Result Date: 4/10/2025  Sinus rhythm with 1st degree A-V block Left axis deviation Abnormal ECG No previous ECGs found in [facility] Confirmed by DO Kilgore Asif ([id number]) on 4/10/2025 9:52:25 PM

## 2025-04-11 NOTE — PLAN OF CARE
Problem: Patient Centered Care  Goal: Patient preferences are identified and integrated in the patient's plan of care  Description: Interventions:- What would you like us to know as we care for you? From home with sister - Provide timely, complete, and accurate information to patient/family- Incorporate patient and family knowledge, values, beliefs, and cultural backgrounds into the planning and delivery of care- Encourage patient/family to participate in care and decision-making at the level they choose- Honor patient and family perspectives and choices  Outcome: Progressing     Problem: Patient/Family Goals  Goal: Patient/Family Short Term Goal  Description: Patient's Short Term Goal: Relief of abdominal pain  Interventions: -   - IV abx   - Pain meds as needed  - Monitor VS  - Follow MD orders  - See additional Care Plan goals for specific interventions  Outcome: Progressing     Problem: RESPIRATORY - ADULT  Goal: Achieves optimal ventilation and oxygenation  Description: INTERVENTIONS:- Assess for changes in respiratory status- Assess for changes in mentation and behavior- Position to facilitate oxygenation and minimize respiratory effort- Oxygen supplementation based on oxygen saturation or ABGs- Provide Smoking Cessation handout, if applicable- Encourage broncho-pulmonary hygiene including cough, deep breathe, Incentive Spirometry- Assess the need for suctioning and perform as needed- Assess and instruct to report SOB or any respiratory difficulty- Respiratory Therapy support as indicated- Manage/alleviate anxiety- Monitor for signs/symptoms of CO2 retention  Outcome: Progressing     Problem: GASTROINTESTINAL - ADULT  Goal: Minimal or absence of nausea and vomiting  Description: INTERVENTIONS:- Maintain adequate hydration with IV or PO as ordered and tolerated- Nasogastric tube to low intermittent suction as ordered- Evaluate effectiveness of ordered antiemetic medications- Provide nonpharmacologic comfort  measures as appropriate- Advance diet as tolerated, if ordered- Obtain nutritional consult as needed- Evaluate fluid balance  Outcome: Progressing  Goal: Maintains adequate nutritional intake (undernourished)  Description: INTERVENTIONS:- Monitor percentage of each meal consumed- Identify factors contributing to decreased intake, treat as appropriate- Assist with meals as needed- Monitor I&O, WT and lab values- Obtain nutritional consult as needed- Optimize oral hygiene and moisture- Encourage food from home; allow for food preferences- Enhance eating environment  Outcome: Progressing     Problem: GENITOURINARY - ADULT  Goal: Absence of urinary retention  Description: INTERVENTIONS:- Assess patient’s ability to void and empty bladder- Monitor intake/output and perform bladder scan as needed- Follow urinary retention protocol/standard of care- Consider collaborating with pharmacy to review patient's medication profile- Implement strategies to promote bladder emptying  Outcome: Progressing     Problem: PAIN - ADULT  Goal: Verbalizes/displays adequate comfort level or patient's stated pain goal  Description: INTERVENTIONS:- Encourage pt to monitor pain and request assistance- Assess pain using appropriate pain scale- Administer analgesics based on type and severity of pain and evaluate response- Implement non-pharmacological measures as appropriate and evaluate response- Consider cultural and social influences on pain and pain management- Manage/alleviate anxiety- Utilize distraction and/or relaxation techniques- Monitor for opioid side effects- Notify MD/LIP if interventions unsuccessful or patient reports new pain- Anticipate increased pain with activity and pre-medicate as appropriate  Outcome: Progressing     Problem: SAFETY ADULT - FALL  Goal: Free from fall injury  Description: INTERVENTIONS:- Assess pt frequently for physical needs- Identify cognitive and physical deficits and behaviors that affect risk of  falls.- [place] fall precautions as indicated by assessment.- Educate pt/family on patient safety including physical limitations- Instruct pt to call for assistance with activity based on assessment- Modify environment to reduce risk of injury- Provide assistive devices as appropriate- Consider OT/PT consult to assist with strengthening/mobility- Encourage toileting schedule  Outcome: Progressing     Problem: DISCHARGE PLANNING  Goal: Discharge to home or other facility with appropriate resources  Description: INTERVENTIONS:- Identify barriers to discharge w/pt and caregiver- Include patient/family/discharge partner in discharge planning- Arrange for needed discharge resources and transportation as appropriate- Identify discharge learning needs (meds, wound care, etc)- Arrange for interpreters to assist at discharge as needed- Consider post-discharge preferences of patient/family/discharge partner- Complete POLST form as appropriate- Assess patient's ability to be responsible for managing their own health- Refer to Case Management Department for coordinating discharge planning if the patient needs post-hospital services based on physician/LIP order or complex needs related to functional status, cognitive ability or social support system  Outcome: Progressing     Problem: Patient/Family Goals  Goal: Patient/Family Long Term Goal  Description: Patient's Long Term Goal: Discharge home   Interventions:  - IV abx   - O2 as needed  - Monitor VS  - Follow MD orders  - See additional Care Plan goals for specific interventions  Outcome: Not Progressing     Problem: GASTROINTESTINAL - ADULT  Goal: Maintains or returns to baseline bowel function  Description: INTERVENTIONS:- Assess bowel function- Maintain adequate hydration with IV or PO as ordered and tolerated- Evaluate effectiveness of GI medications- Encourage mobilization and activity- Obtain nutritional consult as needed- Establish a toileting routine/schedule-  Consider collaborating with pharmacy to review patient's medication profile  Outcome: Not Progressing

## 2025-04-11 NOTE — PHYSICAL THERAPY NOTE
PHYSICAL THERAPY EVALUATION - INPATIENT     Room Number: 515/515-A  Evaluation Date: 4/11/2025  Type of Evaluation: Initial   Physician Order: PT Eval and Treat    Presenting Problem: Urinary retention, acute renal failure, pneumonia  Co-Morbidities : Parkinson's disease, OA, pancreatitis, BPH, AAA, HTN, L EDITH, lumbar fusion  Reason for Therapy: Mobility Dysfunction and Discharge Planning    PHYSICAL THERAPY ASSESSMENT   Patient is a 84 year old male admitted 4/10/2025 for urinary retention, acute renal failure, pneumonia.  Prior to admission, patient's baseline is independent without an assistive device prior to recent hospital admission, since has been using a walker.  Patient is currently functioning below baseline with bed mobility, transfers, gait, and stair negotiation.  Patient is requiring moderate assist and maximum assist as a result of the following impairments: impaired standing balance, impaired coordination, and medical status.  Physical Therapy will continue to follow for duration of hospitalization.    Patient will benefit from continued skilled PT Services to promote return to prior level of function and safety with continuous assistance and gradual rehabilitative therapy .    PLAN DURING HOSPITALIZATION  Nursing Mobility Recommendation : 1 Assist  PT Device Recommendation: Rolling walker, Gait belt  PT Treatment Plan: Bed mobility, Body mechanics, Endurance, Energy conservation, Patient education, Family education, Gait training, Range of motion, Strengthening, Stair training, Transfer training, Balance training  Rehab Potential : Fair  Frequency (Obs): 3-5x/week     PHYSICAL THERAPY MEDICAL/SOCIAL HISTORY   History related to current admission: Admitted 4/4/25  for L hip and shoulder pain, was CGA-David using a walker, recommended  PT which family and patient declined 2/2 resolution of sx and D/C'ed home 4/6/25     Problem List  Principal Problem:    Urinary retention  Active Problems:     Abdominal pain, acute    Acute renal failure, unspecified acute renal failure type    Community acquired pneumonia, unspecified laterality    Hyperkalemia    Anemia, unspecified type    Obstructed, uropathy      HOME SITUATION  Type of Home: House  Home Layout: One level  Stairs to Enter : 5   Railing: Yes    Stairs to Bedroom: 0    Railing: No    Lives With: Family (sister)    Drives: Yes   Patient Regularly Uses: None, Rolling walker (prior to recent admission no device, since has been using walker)     Prior Level of [place]: independent no device prior to admission 4/4/25, since discharge on 4/6/25 patient reports has been using walker    SUBJECTIVE  \"Something still doesn't feel fully right.\" When asked about L sided pain.    PHYSICAL THERAPY EXAMINATION   OBJECTIVE  Precautions: Bed/chair alarm, Aspiration  Fall Risk: High fall risk    WEIGHT BEARING RESTRICTION  none    PAIN ASSESSMENT  Rating: Unable to rate  Location: L hip, shoulder  Management Techniques: Body mechanics    COGNITION  Overall Cognitive Status:  WFL - within functional limits  Memory:  decreased recall of recent events and patient forgetful (not remembering he is NPO, initially saying he had already gotten up this AM but then denying being assisted to bathroom or chair prior to therapy)    RANGE OF MOTION AND STRENGTH ASSESSMENT  Upper extremity ROM and strength are within functional limits BUEs to shoulder height  Lower extremity ROM is within functional limits BLEs  Lower extremity strength is functionally limited requiring increased assist for transfers    BALANCE  Static Sitting: Fair +  Dynamic Sitting: Fair  Static Standing: Poor +  Dynamic Standing: Poor    ACTIVITY TOLERANCE  Pulse: 78  Heart Rate Source: Monitor     BP: 119/78  BP Location: Right arm  BP Method: Automatic  Patient Position: Sitting    O2 WALK  Oxygen Therapy  SPO2% on Room Air at Rest: 97    AM-PAC '6-Clicks' INPATIENT SHORT FORM - BASIC MOBILITY  How much  difficulty does the patient currently have...  Patient Difficulty: Turning over in bed (including adjusting bedclothes, sheets and blankets)?: A Little   Patient Difficulty: Sitting down on and standing up from a chair with arms (e.g., wheelchair, bedside commode, etc.): A Lot   Patient Difficulty: Moving from lying on back to sitting on the side of the bed?: A Little   How much help from another person does the patient currently need...   Help from Another: Moving to and from a bed to a chair (including a wheelchair)?: A Lot   Help from Another: Need to walk in hospital room?: A Lot   Help from Another: Climbing 3-5 steps with a railing?: Total     AM-PAC Score:  Raw Score: 13   Approx Degree of Impairment: 64.91%   Standardized Score (AM-PAC Scale): 36.74   CMS Modifier (G-Code): CL    FUNCTIONAL ABILITY STATUS  Functional Mobility/Gait Assessment  Gait Assistance: Moderate assistance, Maximum assistance  Distance (ft): 3  Assistive Device: Rolling walker  Pattern: Shuffle, Festinating (retropulsion, difficulty with forward weight shift and RLE limb advancement, assist with weight shift, balance, and walker management)  Supine to Sit: moderate assist - head of bed elevated, impaired sequencing, cues for motor initiation  Sit to Stand: minimal assist and moderate assist - David from elevated edge of bed, modA from padded bedside chair    Exercise/Education Provided:  Education Provided To: Patient  Patient Education: Role of Physical Therapy, Plan of Care, Discharge Recommendations, Functional Transfer Techniques, DME Recommendations, Fall Prevention, Posture/Positioning, Energy Conservation, Proper Body Mechanics, Gait Training  Patient's Response to Education: Requires Further Education          Skilled Therapy Provided: Patient presents with worsening of Parkinsonian sx including posterior lean/retropulsion, festinating gait, small movements, slowed motor initiation. Patient standing balance impaired requiring  mod-maxA to prevent posterior loss of balance. Patient unable to ambulate household distances 2/2 impaired propulsion and balance. Patient forgetful requiring increased repetition for clarification, cueing for sequencing.     Patient received semi-fowlers in bed, agreeable to physical therapy evaluation. Vital signs monitored as noted above, no adverse symptoms and patient stable during session. Next session anticipate to progress bed mobility, transfers, and gait.    Patient history and/or personal factors that may impact the plan of care include home accessibility concerns, history of falls, co-morbidities (Parkinson's disease, OA, L EDITH, lumbar fusion) affecting coordination, balance, medical status, fall risk, and has history of recent hospitalization. Based on the physical therapy examination of the noted systems and functional activity/participation limitations, the patient presentation is unstable given the patient demonstrates worsening of previously stable condition, demonstrates worsening of co-morbidities, and has history of frequent/recent falls.    The patient's Approx Degree of Impairment: 64.91% has been calculated based on documentation in the [facility] '6 clicks' Inpatient Basic Mobility Short Form.  Research supports that patients with this level of impairment may benefit from a rehab facility.  Final disposition will be made by interdisciplinary medical team.    Patient End of Session: Up in chair, Needs met, Call light within reach, RN aware of session/findings, All patient questions and concerns addressed, Hospital anti-slip socks, Alarm set    CURRENT GOALS  Goals to be met by: 5/11/25  Patient Goal Patient's self-stated goal is: fall prevention   Goal #1 Patient is able to demonstrate supine - sit EOB @ level: minimum assistance     Goal #1   Current Status    Goal #2 Patient is able to demonstrate transfers EOB to/from [facility] [place] [place] at assistance level: minimum assistance with walker - rolling     Goal  #2  Current Status    Goal #3 Patient is able to ambulate 50 feet with assist device: walker - rolling at assistance level: minimum assistance   Goal #3   Current Status    Goal #4 Patient will negotiate 5 stairs/one curb w/ assistive device and minimum assistance   Goal #4   Current Status    Goal #5 Patient to demonstrate independence with home activity/exercise instructions provided to patient in preparation for discharge.   Goal #5   Current Status    Goal #6    Goal #6  Current Status      Patient Evaluation Complexity Level:  History High - 3 or more personal factors and/or co-morbidities   Examination of body systems High - addressing a total of 4 or more elements   Clinical Presentation  High - Unstable   Clinical Decision Making  High Complexity       Therapeutic Activity:  16 minutes      Kary Franco, PT, DPT  [facility]  Rehab Services - Physical Therapy  [id number]

## 2025-04-11 NOTE — CONSULTS
Chart reviewed . Met with pt and sister. .Full consult to follow  Did meet with sister Geetha who is HCPOA- will try to bring in paperwork. and pt. Pt is now DNAR/ Selective. They will look over paperwork of POLST. Also talked more about dysphagia and asp pneumonia and more regarding LT feeding tube and just wants to see how trial goes. Sister does have a niece that is neurologist in [place] she may want Dr Dacosta to talk to her. Sister is realistic but remaining cautiously hopeful. She has gone through this with her other siblings and sees similarities.   PLAN:   -Continue current supportive medical plan of care with codes status limitation of DNAR/ selective treatment at this time  -Palliative care will continue to follow to facilitate ongoing discussions, education and guidance on evolving illness trajectory and need for change in GOC and thus POC moving forward   - Several written resources given to sister/ CHANTEL Iniguez for her review and provide reenforcement and further understanding   -Sister has my contact info and aware can call me over the weekend but not physically here again until Monday.

## 2025-04-11 NOTE — SLP NOTE
ADULT SWALLOWING EVALUATION    ASSESSMENT    ASSESSMENT/OVERALL IMPRESSION:      Proper PPE worn. Hands sanitized upon entrance/exit Pt room.         BSE ordered 2/2 RN dysphagia screen. Pt admitted 4/10/25 with urinary retention. Pt on solid/thin liquids at home with sister (Pt requires assistance). PMH includes AAA, Parkinson's, HTN, Pancreatitis, BPH. No PMH of dysphagia at [facility]. Pt denies any past swallowing difficulty. Currently, Pt NPO.    CXR 4/10/25:  CONCLUSION: Patchy bibasilar pulmonary opacities which could represent atelectasis or pneumonia. Small left effusion.     Pt alert, on room air, afebrile and assessed having placing upright in bed (after consulting with RN). Pt cooperative. Learning preference verbal. No verbal or non-verbal indication of pain. Vocal quality/intensity weak. Volitional swallow and cough present; considered reduced in strength. Oral motor exam revealed overall reduced labial and lingual skills for rate, ROM and strength. Functional dentition. Pt was fed 1/2 tsp mildly-thick liquids and tsp moderately-thick liquids. Bilabial seal functional; no anterior loss. Lingual skills slow and uncoordinated for bolus formation and preparation of both modified liquid trials. Oral cavity grossly clear post swallows.     Pharyngeal response appeared delayed per hyolaryngeal elevation to completion (considered reduced in strength/rise to palpation). Immediate aggressive cough S/P both mildly-thick and moderately thick controlled trials. Overall, swallow function appeared weak and uncoordinated (may be related to Parkinson's). No additional consistencies presented; deemed unsafe. Sp02 ~95% during this assessment.        IMPRESSIONS:    Pt presents with moderate oral dysphagia and probable pharyngeal dysfunction. Collaborated with RN regarding Pt's swallowing plan of care. BSE results/recommendations discussed with Pt. Pt with fair understanding; would benefit from additional reinforcement and  ongoing education. Call light within Pt's reach upon SLP discharge from room.            PLAN: Pt to remain NPO with RE-BSE 4/12/25. RN in agreement with plan of care. Oral care to be completed by nursing staff x3 daily.       RECOMMENDATIONS   Diet Recommendations - Solids: NPO  Diet Recommendations - Liquids: NPO    Medication Administration Recommendations: Non-oral  Treatment Plan/Recommendations: Aspiration precautions    HISTORY   MEDICAL HISTORY  Reason for Referral: RN dysphagia screen    Problem List  Principal Problem:    Urinary retention  Active Problems:    Abdominal pain, acute    Acute renal failure, unspecified acute renal failure type    Community acquired pneumonia, unspecified laterality    Hyperkalemia    Anemia, unspecified type    Obstructed, uropathy      Past Medical History  Past Medical History[1]    Prior Living Situation:  (Lives with sister)  Diet Prior to Admission: Regular, Thin liquids  Precautions: Aspiration    Patient/Family Goals: to eat    SWALLOWING HISTORY  Current Diet Consistency: NPO    OBJECTIVE   ORAL MOTOR EXAMINATION  Dentition: Natural, Functional  Symmetry: Within Functional Limits  Strength: Overall reduced  Range of Motion: Overall reduced  Rate of Motion: Reduced    Voice Quality: Weak  Respiratory Status: Unlabored  Consistencies Trialed: Honey thick liquids, Nectar thick liquids  Method of Presentation: Staff/Clinician assistance (1/2 tsp, tsp)  Patient Positioned: Upright, Midline    Oral Phase of Swallow: Impaired  Bolus Formation: Impaired  Bolus Propulsion: Impaired    Pharyngeal Phase of Swallow: Appears Impaired  Laryngeal Elevation Timing: Appears impaired  Laryngeal Elevation Strength: Appears impaired  Laryngeal Elevation Coordination: Appears impaired  (Please note: Silent aspiration cannot be evaluated clinically. Videofluoroscopic Swallow Study is required to rule-out silent aspiration.)      GOALS  Goal #1 Oral care to be completed x3 daily by nursing  staff.     In Progress   Goal #2 RE-BSE to be completed.     In Progress     FOLLOW UP  Treatment Plan/Recommendations: Aspiration precautions  Duration: 1 week  Follow Up Needed (Documentation Required): Yes  SLP Follow-up Date: 04/12/25    Thank you for your referral.   If you have any questions, please contact   Ibis Guillen M.S. [facility]/SLP  Speech-Language Pathologist  [id number]         [1]   Past Medical History:   Fracture of arm    fracture of left arm - cast

## 2025-04-11 NOTE — PROGRESS NOTES
[facility]  part of [facility]     Progress Note    Justyn Hall Patient Status:  Inpatient    1940 MRN [id number]   Location [facility]5W Attending Denise Huertas MD   Hosp Day # 1 PCP Chong Quintero MD       Subjective:   Patient seen and examined.  Admits to some occasional mild dyspnea.  Denies dysphagia although did not pass swallow evaluation.  Denies significant hemoptysis since arrival    Objective:   Blood pressure 116/61, pulse 85, temperature 98.2 °F (36.8 °C), temperature source Axillary, resp. rate 20, height 6' (1.829 m), weight 175 lb 4.3 oz (79.5 kg), SpO2 97%.  Intake/Output:   Last 3 shifts: I/O last 3 completed shifts:  In: 2245 [I.V.:1695; IV PIGGYBACK:550]  Out: 9950 [Urine:9950]   This shift: I/O this shift:  In: -   Out: 850 [Urine:850]     Vent Settings:      Hemodynamic parameters (last 24 hours):      Scheduled Meds: Current Hospital Medications[1]    Continuous Infusions: Medication Infusions[2]    Physical Exam  Constitutional: no acute distress  Eyes: PERRL  ENT: nares pateint  Neck: supple, no JVD  Cardio: RRR, S1 S2  Respiratory: Basilar crackles  GI: abdomen soft, non tender, active bowel sounds, no organomegaly  Extremities: no clubbing, cyanosis, edema  Neurologic: no gross motor deficits  Skin: warm, dry      Results:     Lab Results   Component Value Date    WBC 12.6 2025    HGB 10.0 2025    HCT 29.8 2025    .0 2025    CREATSERUM 4.95 2025     2025     2025    K 4.4 2025     2025    CO2 20.0 2025     2025    CA 7.2 2025    ALB 3.5 04/10/2025    ALKPHO 62 04/10/2025    BILT 0.4 04/10/2025    TP 5.6 04/10/2025    AST 13 04/10/2025    ALT <7 04/10/2025    LIP 63 04/10/2025       CT ABDOMEN+PELVIS(CPT=74176)  Result Date: 4/10/2025  CONCLUSION:  1.  Urinary bladder is collapsed around a Espinal catheter.  Mild bilateral  hydroureteronephrosis which may be related to recent bladder outlet obstruction.  However correlate for signs of upper tract infection. 2.  Granulomatous calcifications within the liver and spleen. 3.  High density material within the gallbladder suggesting vicarious excretion of IV contrast, stones, or sludge. 4.  Coarse calcifications within the pancreatic head which may represent sequela of previous pancreatitis. 5.  Small bilateral pleural effusions. 6.  Moderate-sized hiatal hernia. 7.  Coronary atherosclerosis. 8.  Post left hip arthroplasty.  The hardware causes beam hardening artifact limiting assessment of adjacent structures. 9.  Prostate enlargement.    Dictated by (CST): Luis Terrell MD on 4/10/2025 at 9:28 AM     Finalized by (CST): Luis Terrell MD on 4/10/2025 at 9:33 AM          XR CHEST AP PORTABLE  (CPT=71045)  Result Date: 4/10/2025  CONCLUSION: Patchy bibasilar pulmonary opacities which could represent atelectasis or pneumonia.  Small left effusion.   Dictated by (CST): Luis Terrell MD on 4/10/2025 at 7:06 AM     Finalized by (CST): Luis Terrell MD on 4/10/2025 at 7:07 AM            EKG  Result Date: 4/10/2025  Sinus rhythm with 1st degree A-V block Left axis deviation Abnormal ECG No previous ECGs found in [facility] Confirmed by DO Kilgore Asif ([id number]) on 4/10/2025 9:52:25 PM      Assessment   1.  Acute kidney injury  2.  Hyperkalemia, resolved  3.  Hemoptysis  4.  Hypertension  5.  Recent fall with left hip and shoulder contusion  6.  Leukocytosis  7.  Left lower lobe pneumonia  8.  Esophagitis  9.  Dysphagia     Plan   -Patient presents with evidence of lower quadrant abdominal discomfort with decreased urination and hemoptysis after discharge on 4/6/2025.  Renal function improving.  - Chest x-ray with bibasilar opacities seen.  -CT chest on 4/10/2025 with wall thickening and stranding seen around upper esophagus.  Mucous plugging seen in basilar airways most pronounced left lower lobe.  - No  additional hemoptysis since arrival to hospital.  Closely monitor  - Further recommendations per nephrology for management of acute kidney injury and hyperkalemia.  Significant output after Espinal catheter placed.  Concern for urinary obstruction.  - Continue antibiotic therapy with Zosyn at this time  - Further recommendations per GI  - DVT prophylaxis: Alexsandra Chauhan DO  Pulmonary Critical Care Medicine  [facility]        [1]   Current Facility-Administered Medications   Medication Dose Route Frequency    ipratropium-albuterol (Duoneb) 0.5-2.5 (3) MG/3ML inhalation solution 3 mL  3 mL Nebulization Q4H WA (5 times daily)    pantoprazole (Protonix) 40 mg in sodium chloride 0.9% PF 10 mL IV push  40 mg Intravenous Q12H    acetaminophen (Tylenol Extra Strength) tab 500 mg  500 mg Oral Q4H PRN    ondansetron (Zofran) 4 MG/2ML injection 4 mg  4 mg Intravenous Q6H PRN    carbidopa-levodopa (SINEMET)  MG per tab 0.5 tablet  0.5 tablet Oral TID    carvedilol (Coreg) tab 6.25 mg  6.25 mg Oral BID with meals    piperacillin-tazobactam (Zosyn) 3.375 g in dextrose 5% 100 mL IVPB-ADDV  3.375 g Intravenous Q12H    dextrose 5%-sodium chloride 0.9% infusion   Intravenous Continuous    metoclopramide (Reglan) 5 mg/mL injection 10 mg  10 mg Intravenous Daily PRN    tamsulosin (Flomax) cap 0.4 mg  0.4 mg Oral Nightly    polyethylene glycol (PEG 3350) (Miralax) 17 g oral packet 17 g  17 g Oral BID   [2]    dextrose 5%-sodium chloride 0.9% 125 mL/hr at 04/11/25 5155

## 2025-04-11 NOTE — CONSULTS
[facility]  part of [facility]    Report of Consultation    Justyn Hall Patient Status:  Inpatient    1940 MRN [id number]   Location [facility]5W Attending Tg Clarke MD   Hosp Day # 1 PCP Chong Quintero MD     Date of Admission:  4/10/2025  Date of Consult:  2025  Reason for Consultation:   Fernie due to urinary obstruction    History of Present Illness:   Patient is a 84 year old male who was admitted to the hospital for Urinary retention:  He has hx parkinson's diasease came in with abd pain for a few days and dec urinartion  K was 6.5  bicarb 14  bun 163  creat 7.5   on   bun 29 c resat 1.32  Pt admits to making very little urine  ct abd mild hydro  santiago placed so far 9500 ml  urine out     Pt came in a few days ago w contusion left hip no fx.  Pt is non smoke  hx arthritis pancreatitis bph  htn  Past Medical History  Past Medical History[1]    Past Surgical History  Past Surgical History[2]    Family History  Family History[3]    Social History  Social History     Socioeconomic History    Marital status: Single     Spouse name: Not on file    Number of children: Not on file    Years of education: Not on file    Highest education level: Not on file   Occupational History    Not on file   Tobacco Use    Smoking status: Never    Smokeless tobacco: Never   Vaping Use    Vaping status: Never Used   Substance and Sexual Activity    Alcohol use: No     Alcohol/week: 0.0 standard drinks of alcohol    Drug use: No    Sexual activity: Not on file   Other Topics Concern     Service Not Asked    Blood Transfusions Not Asked    Caffeine Concern Yes     Comment: coffee, occasionally    Occupational Exposure Not Asked    Hobby Hazards Not Asked    Sleep Concern Not Asked    Stress Concern Not Asked    Weight Concern Not Asked    Special Diet Not Asked    Back Care Not Asked    Exercise Not Asked    Bike Helmet Not Asked    Seat Belt Not Asked    Self-Exams Not  Asked    Grew up on a farm Not Asked    History of tanning Not Asked    Outdoor occupation Not Asked    Pt has a pacemaker Not Asked    Pt has a defibrillator Not Asked    Reaction to local anesthetic No    Left Handed Not Asked    Right Handed Yes    Currently spends a great deal of time in the sun Not Asked    Past Sunlamp Treatments for Acne Not Asked    Hx of Spending Great Deal of Time in Sun Not Asked    Bad sunburns in the past Not Asked    Tanning Salons in the Past Not Asked    Hx of Radiation Treatments Not Asked    Regular use of sun block Not Asked   Social History Narrative    Not on file     Social Drivers of Health     Food Insecurity: No Food Insecurity (4/10/2025)    NCSS - Food Insecurity     Worried About Running Out of Food in the Last Year: No     Ran Out of Food in the Last Year: No   Transportation Needs: No Transportation Needs (4/10/2025)    NCSS - Transportation     Lack of Transportation: No   Stress: Not on file   Housing Stability: Not At Risk (4/10/2025)    NCSS - Housing/Utilities     Has Housing: Yes     Worried About Losing Housing: No     Unable to Get Utilities: No          Current Medications:  Current Hospital Medications[4]  Prescriptions Prior to Admission[5]    Allergies  Allergies[6]      Review of Systems:   Constitutional:very weak  Eyes: negative for irritation, redness and visual disturbance  Ears, nose, mouth, throat, and face: negative for hearing loss and sore throat  Respiratory: negative for cough, hemoptysis and wheezing  Cardiovascular: negative for chest pain, exertional dyspnea, lower extremity edema and palpitations  Gastrointestinal:+ nausea emesis  Genitourinaryfeels miserable w 3 way santiago in  Hematologic/lymphatic: negative for bleeding and easy bruising  Musculoskeletal:negative for back pain, bone pain and muscle weakness  Neurological: negative for gait problems, memory problems and seizures  Behavioral/Psych: negative for anxiety and  depression  Endocrine: negative for polyuria and weight loss     Physical Exam:   Blood pressure 115/69, pulse 84, temperature 97.3 °F (36.3 °C), temperature source Oral, resp. rate 24, height 6' (1.829 m), weight 175 lb 4.3 oz (79.5 kg), SpO2 97%.    Intake/Output Summary (Last 24 hours) at 4/11/2025 0404  Last data filed at 4/11/2025 0336  Gross per 24 hour   Intake 1747.08 ml   Output 9500 ml   Net -7752.92 ml     Wt Readings from Last 3 Encounters:   04/10/25 175 lb 4.3 oz (79.5 kg)   04/04/25 168 lb 3.2 oz (76.3 kg)   10/10/19 182 lb (82.6 kg)       General appearance: alert, appears stated age and cooperative  Head: Normocephalic, atraumatic  Eyes: conjunctivae/corneas clear  Throat: lips, mucosa, and tongue normal; teeth and gums normal  Neck:  no JVD, supple, symmetrical  Pulmonary:  clear to auscultation bilaterally  Cardiovascular: S1, S2 normal, no rub or gallop, regular rate and rhythm  Abdominal: soft, non-tender; non distended, bowel sounds normal   Extremities: extremities normal, no edema  Pulses: pedal pulses palpable  Skin: No rashes or lesions  Lymph nodes: Cervical, supraclavicular normal.  Neurologic: + tremors  Psychiatric: calm    Results:     Laboratory Data:  Lab Results   Component Value Date    WBC 21.8 (H) 04/10/2025    HGB 11.6 (L) 04/10/2025    HCT 33.6 (L) 04/10/2025    .0 04/10/2025    CREATSERUM 5.91 (H) 04/10/2025     (H) 04/10/2025     04/10/2025    K 4.7 04/10/2025     04/10/2025    CO2 19.0 (L) 04/10/2025     (H) 04/10/2025    CA 7.3 (L) 04/10/2025    ALB 3.5 04/10/2025    ALKPHO 62 04/10/2025    BILT 0.4 04/10/2025    TP 5.6 (L) 04/10/2025    AST 13 04/10/2025    ALT <7 (L) 04/10/2025    PTT 27.8 04/10/2025    INR 1.11 04/10/2025    T4F 1.10 07/09/2015    TSH 2.507 04/05/2025    LIP 63 (H) 04/10/2025    PSA 2.46 10/10/2019     04/05/2025    B12 640 04/05/2025       Imaging:  [unfilled]   CT ABDOMEN+PELVIS(CPT=74176)  Result Date:  4/10/2025  CONCLUSION:  1.  Urinary bladder is collapsed around a Espinal catheter.  Mild bilateral hydroureteronephrosis which may be related to recent bladder outlet obstruction.  However correlate for signs of upper tract infection. 2.  Granulomatous calcifications within the liver and spleen. 3.  High density material within the gallbladder suggesting vicarious excretion of IV contrast, stones, or sludge. 4.  Coarse calcifications within the pancreatic head which may represent sequela of previous pancreatitis. 5.  Small bilateral pleural effusions. 6.  Moderate-sized hiatal hernia. 7.  Coronary atherosclerosis. 8.  Post left hip arthroplasty.  The hardware causes beam hardening artifact limiting assessment of adjacent structures. 9.  Prostate enlargement.    Dictated by (CST): Luis Terrell MD on 4/10/2025 at 9:28 AM     Finalized by (CST): Luis Terrell MD on 4/10/2025 at 9:33 AM          XR CHEST AP PORTABLE  (CPT=71045)  Result Date: 4/10/2025  CONCLUSION: Patchy bibasilar pulmonary opacities which could represent atelectasis or pneumonia.  Small left effusion.   Dictated by (CST): Luis Terrell MD on 4/10/2025 at 7:06 AM     Finalized by (CST): Luis Terrell MD on 4/10/2025 at 7:07 AM                 Impression:   Problem List[7]     Recommendations:  1 urnary obstruction  good output now   Appreciate urology consult  started flomax.. will keep cath for 7=10 days  urien culture pending  Wbc up,placed on zosyn    2.  Fernie   renal fx improved tonight  bicarb 19  k 4.7  creat down to 5.9   No dialysis indicated   continue ivf to keep ahead of output    3.  Parkinson's  same meds  4.  ? Uti on zosyn   Will follow w you   thanks  Thank you for allowing me to participate in the care of your patient.    Elliott Ashley MD  4/11/2025         [1]   Past Medical History:   Fracture of arm    fracture of left arm - cast   [2]   Past Surgical History:  Procedure Laterality Date    Hip surgery      Other surgical history   2014     prostate biopsy    Spinal fusion     [3] History reviewed. No pertinent family history.  [4]   Current Facility-Administered Medications   Medication Dose Route Frequency    acetaminophen (Tylenol Extra Strength) tab 500 mg  500 mg Oral Q4H PRN    ondansetron (Zofran) 4 MG/2ML injection 4 mg  4 mg Intravenous Q6H PRN    carbidopa-levodopa (SINEMET)  MG per tab 0.5 tablet  0.5 tablet Oral TID    carvedilol (Coreg) tab 6.25 mg  6.25 mg Oral BID with meals    piperacillin-tazobactam (Zosyn) 3.375 g in dextrose 5% 100 mL IVPB-ADDV  3.375 g Intravenous Q12H    dextrose 5%-sodium chloride 0.9% infusion   Intravenous Continuous    pantoprazole (Protonix) 40 mg in sodium chloride 0.9% PF 10 mL IV push  40 mg Intravenous Daily    metoclopramide (Reglan) 5 mg/mL injection 10 mg  10 mg Intravenous Daily PRN    tamsulosin (Flomax) cap 0.4 mg  0.4 mg Oral Nightly    polyethylene glycol (PEG 3350) (Miralax) 17 g oral packet 17 g  17 g Oral BID   [5]   Medications Prior to Admission   Medication Sig    carbidopa-levodopa  MG Oral Tab Take 0.5 tablets by mouth 3 (three) times daily for 14 days, THEN 1 tablet 3 (three) times daily. Give half a tab 3 times a day, 5 hours between each dose for 3 days.  Then full tab 3 times a day 5 hours between each dose.  Give ideally on empty stomach.  Give 30 to 45 minutes before a meal or 45 to 60 minutes after a meal..    aspirin 81 MG Oral Tab EC Take 1 tablet (81 mg total) by mouth daily.    carvedilol 6.25 MG Oral Tab Take 1 tablet (6.25 mg total) by mouth 2 (two) times daily with meals.    lisinopril 20 MG Oral Tab Take 1 tablet (20 mg total) by mouth daily.    HYDROcodone-acetaminophen 5-325 MG Oral Tab Take 1 tablet by mouth every 6 (six) hours as needed. (Patient not taking: Reported on 4/10/2025)   [6] No Known Allergies  [7]   Patient Active Problem List  Diagnosis    Elevated PSA    Hypercholesterolemia    Contusion of left hip, initial encounter    Contusion of  left shoulder, initial encounter    Left hip pain    Parkinsonism (HCC)    Urinary retention    Abdominal pain, acute    Acute renal failure, unspecified acute renal failure type    Community acquired pneumonia, unspecified laterality    Hyperkalemia    Anemia, unspecified type    Obstructed, uropathy

## 2025-04-11 NOTE — OCCUPATIONAL THERAPY NOTE
OCCUPATIONAL THERAPY EVALUATION - INPATIENT     Room Number: 515/515-A  Evaluation Date: 4/11/2025  Type of Evaluation: Initial  Presenting Problem: abdominal pain - urinary retention, JORDON; hx parkinson's (recent fall)    Physician Order: IP Consult to Occupational Therapy  Reason for Therapy: ADL/IADL Dysfunction and Discharge Planning    OCCUPATIONAL THERAPY ASSESSMENT   Patient is a 84 year old male admitted 4/10/2025 for abdominal pain - urinary retention, JORDON; hx parkinson's (recent fall).  Prior to admission, patient's baseline is Mod I with ADLs and functional mobility; recent fall at home.  Patient is currently functioning below baseline with ADLs and functional mobility.  Patient is requiring Mod-Max A as a result of the following impairments: impaired standing balance, limited reach, impaired motor planning, posterior lean, slowed processing. Occupational Therapy will continue to follow for duration of hospitalization.    Patient will benefit from continued skilled OT Services to promote return to prior level of function and safety with continuous assistance and gradual rehabilitative therapy.    PLAN DURING HOSPITALIZATION  OT Device Recommendations: TBD  OT Treatment Plan: Balance activities, Energy conservation/work simplification techniques, ADL training, Functional transfer training, Endurance training, Equipment eval/education, Compensatory technique education     OCCUPATIONAL THERAPY MEDICAL/SOCIAL HISTORY   Problem List  Principal Problem:    Urinary retention  Active Problems:    Abdominal pain, acute    Acute renal failure, unspecified acute renal failure type    Community acquired pneumonia, unspecified laterality    Hyperkalemia    Anemia, unspecified type    Obstructed, uropathy    HOME SITUATION  Type of Home: House  Home Layout: One level  Lives With: Family (sister)  Drives: Yes  Patient Regularly Uses: None; Rolling walker (prior to recent admission no device, since has been using  walker)      SUBJECTIVE  Agreeable to activity     OCCUPATIONAL THERAPY EXAMINATION      OBJECTIVE  Precautions: Bed/chair alarm; Aspiration  Fall Risk: High fall risk      PAIN ASSESSMENT  Rating: Unable to rate  Location: left side (moderate pain -  from fall)  Management Techniques: Activity promotion; Repositioning      ACTIVITY TOLERANCE  Pulse: 78                      O2 SATURATIONS  Oxygen Therapy  SPO2% on Room Air at Rest: 97    COGNITION  Impaired  Poor historian  Impaired short term memory     RANGE OF MOTION   Upper extremity ROM is within functional limits     STRENGTH ASSESSMENT  Upper extremity strength is within functional limits     ACTIVITIES OF DAILY LIVING ASSESSMENT  AM-PAC ‘6-Clicks’ Inpatient Daily Activity Short Form  How much help from another person does the patient currently need…  -   Putting on and taking off regular lower body clothing?: A Lot  -   Bathing (including washing, rinsing, drying)?: A Lot  -   Toileting, which includes using toilet, bedpan or urinal? : A Lot  -   Putting on and taking off regular upper body clothing?: A Little  -   Taking care of personal grooming such as brushing teeth?: A Little  -   Eating meals?: A Little    AM-PAC Score:  Score: 15  Approx Degree of Impairment: 56.46%  Standardized Score (AM-PAC Scale): 34.69  CMS Modifier (G-Code): CK    FUNCTIONAL TRANSFER ASSESSMENT  Sit to Stand: Edge of Bed; Chair  Edge of Bed: Moderate Assist  Chair: Moderate Assist    BED MOBILITY  Supine to Sit : Moderate Assist    FUNCTIONAL ADL ASSESSMENT  Grooming Seated: Contact Guard Assist (unsupported sitting balance)  LB Dressing Seated: Moderate Assist  LB dressing standing: MAX A     Skilled Therapy Provided: RN cleared pt for participation in occupational therapy session, which was completed in collaboration with PT . Upon arrival, pt was supine in bed and agreeable to activity. No family present during session. Pt benefited from additional time, verbal cues, RW to  maximize participation.    Pt with flat affect, followed all cues with additional time. Pt came to standing position with initial Max A , progress to Mod A with external cues for anterior hip placement. Simulated commode transfer with Mod A - Max A  , posterior lean with RW.  Pt's upper extremity strength and range of motion are WFL for oral facial hygiene and self- feeding in supported sitting position provided set-up.       EDUCATION PROVIDED  Patient Education : Role of Occupational Therapy; Plan of Care; Functional Transfer Techniques; Other (safety)  Patient's Response to Education: Verbalized Understanding; Returned Demonstration    The patient's Approx Degree of Impairment: 56.46% has been calculated based on documentation in the [facility] '6 clicks' Inpatient Daily Activity Short Form.  Research supports that patients with this level of impairment may benefit from rehab.  Final disposition will be made by interdisciplinary medical team.     Patient End of Session: Needs met, Call light within reach, RN aware of session/findings, Up in chair, Alarm set    OT Goals  Patients self stated goal is: did not state     Patient will complete functional transfer with SBA  Comment:     Patient will complete toileting with SBA  Comment:     Patient will tolerate standing for 3 minutes in prep for adls with SBA   Comment:    Patient will complete item retrieval with SBA  Comment:          Goals  on: 25  Frequency: 3-5x/w    Patient Evaluation Complexity Level:   Occupational Profile/Medical History MODERATE - Expanded review of history including review of medical or therapy record   Specific performance deficits impacting engagement in ADL/IADL MODERATE  3 - 5 performance deficits   Client Assessment/Performance Deficits MODERATE - Comorbidities and min to mod modifications of tasks    Clinical Decision Making MODERATE - Analysis of occupational profile, detailed assessments, several treatment options    Overall  Complexity MODERATE     OT Session Time: 15 minutes  Self-Care Home Management: 15 minutes

## 2025-04-12 LAB
ANION GAP SERPL CALC-SCNC: 10 MMOL/L (ref 0–18)
BASOPHILS # BLD AUTO: 0.17 X10(3) UL (ref 0–0.2)
BASOPHILS NFR BLD AUTO: 1.5 %
BUN BLD-MCNC: 98 MG/DL (ref 9–23)
BUN/CREAT SERPL: 32 (ref 10–20)
CALCIUM BLD-MCNC: 7.2 MG/DL (ref 8.7–10.4)
CHLORIDE SERPL-SCNC: 119 MMOL/L (ref 98–112)
CO2 SERPL-SCNC: 24 MMOL/L (ref 21–32)
CREAT BLD-MCNC: 3.06 MG/DL (ref 0.7–1.3)
DEPRECATED RDW RBC AUTO: 46.5 FL (ref 35.1–46.3)
EGFRCR SERPLBLD CKD-EPI 2021: 19 ML/MIN/1.73M2 (ref 60–?)
EOSINOPHIL # BLD AUTO: 0.23 X10(3) UL (ref 0–0.7)
EOSINOPHIL NFR BLD AUTO: 2 %
ERYTHROCYTE [DISTWIDTH] IN BLOOD BY AUTOMATED COUNT: 15.6 % (ref 11–15)
GLUCOSE BLD-MCNC: 145 MG/DL (ref 70–99)
HCT VFR BLD AUTO: 32 % (ref 39–53)
HGB BLD-MCNC: 10.5 G/DL (ref 13–17.5)
IMM GRANULOCYTES # BLD AUTO: 0.27 X10(3) UL (ref 0–1)
IMM GRANULOCYTES NFR BLD: 2.3 %
LYMPHOCYTES # BLD AUTO: 0.89 X10(3) UL (ref 1–4)
LYMPHOCYTES NFR BLD AUTO: 7.7 %
MAGNESIUM SERPL-MCNC: 2.2 MG/DL (ref 1.6–2.6)
MCH RBC QN AUTO: 27.1 PG (ref 26–34)
MCHC RBC AUTO-ENTMCNC: 32.8 G/DL (ref 31–37)
MCV RBC AUTO: 82.5 FL (ref 80–100)
MONOCYTES # BLD AUTO: 1.11 X10(3) UL (ref 0.1–1)
MONOCYTES NFR BLD AUTO: 9.6 %
NEUTROPHILS # BLD AUTO: 8.92 X10 (3) UL (ref 1.5–7.7)
NEUTROPHILS # BLD AUTO: 8.92 X10(3) UL (ref 1.5–7.7)
NEUTROPHILS NFR BLD AUTO: 76.9 %
OSMOLALITY SERPL CALC.SUM OF ELEC: 349 MOSM/KG (ref 275–295)
PLATELET # BLD AUTO: 376 10(3)UL (ref 150–450)
POTASSIUM SERPL-SCNC: 4.2 MMOL/L (ref 3.5–5.1)
RBC # BLD AUTO: 3.88 X10(6)UL (ref 3.8–5.8)
SODIUM SERPL-SCNC: 153 MMOL/L (ref 136–145)
WBC # BLD AUTO: 11.6 X10(3) UL (ref 4–11)

## 2025-04-12 PROCEDURE — 99232 SBSQ HOSP IP/OBS MODERATE 35: CPT | Performed by: INTERNAL MEDICINE

## 2025-04-12 PROCEDURE — 99233 SBSQ HOSP IP/OBS HIGH 50: CPT | Performed by: HOSPITALIST

## 2025-04-12 PROCEDURE — 99233 SBSQ HOSP IP/OBS HIGH 50: CPT

## 2025-04-12 RX ORDER — DEXTROSE MONOHYDRATE 50 MG/ML
INJECTION, SOLUTION INTRAVENOUS CONTINUOUS
Status: DISCONTINUED | OUTPATIENT
Start: 2025-04-12 | End: 2025-04-14

## 2025-04-12 RX ORDER — IPRATROPIUM BROMIDE AND ALBUTEROL SULFATE 2.5; .5 MG/3ML; MG/3ML
SOLUTION RESPIRATORY (INHALATION)
Status: COMPLETED
Start: 2025-04-12 | End: 2025-04-12

## 2025-04-12 RX ORDER — IPRATROPIUM BROMIDE AND ALBUTEROL SULFATE 2.5; .5 MG/3ML; MG/3ML
3 SOLUTION RESPIRATORY (INHALATION)
Status: DISCONTINUED | OUTPATIENT
Start: 2025-04-12 | End: 2025-04-17

## 2025-04-12 NOTE — CONSULTS
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 wishes/preferences , change in code status, confirmation of code status , family discussions to continue privately , and family to bring in medical ACP forms from home to be verified and scanned.   Summary:   Total time dedicated to ACP >16 minutes.     Assessment and Recommendations        Palliative Care encounter    Counseling regarding goals of care an advance care planning     Urinary retention     Bilateral hydronephrosis      Hemoptysis     Abdominal pain-lower , acute    Acute renal failure, unspecified acute renal failure type    Community acquired pneumonia, unspecified laterality    Hyperkalemia    Obstructed, uropathy    Constipation     Anemia acute, unspecified    Leukocytosis      Dysphagia     History of recent fall in shower with left hip and left shoulder contusions s/p admission/discharge, BPH, HTN  and recent diagnosis of parkinsonism       Goals of care: ongoing discussions are needed as pt's illness trajectory during this hospital admission evolves and upon discharge as well. Continue current supportive medical plan of care within code status limitation of DNAR/ selective treatment at this time . Several written resources given to sister/  CHANTEL Iniguez for her review and provide reenforcement and further understanding of all issues we discussed  Code Status: Change from Full Code DNAR/Selective Treatment POLST form provided and will be reviewed. Emphasized importance of completion prior to discharge   Healthcare Agent's Name: Janet Hall which is pt's sister who pt lives with in private home in [place], [place] She reports they have  completed HCPOA paperwork and she will work on bringing a copy in so we can secure into our records . She reports  it lists just her as primary HCPOA  with no successors       -Discussed today's visit with:   pt, RN, SW/CM, and hospitalist in person or.via secure and/or perfect serve     Palliative Care Follow Up: Palliative care team will Continue to follow while inpatient.to facilitate ongoing discussions & provide further education and guidance as pt's illness trajectory evolves during this admission.  Sister is aware PC does not round on the weekend but I let her know I am available by phone and she has my contact information if she needs to discuss issues or has questions   Palliative care follow up outpatient: is indicated and likely be helpful given what we have been seeing with pt's illness trajectory . Will discuss more at next visit     Thank you for allowing Palliative Care services to participate in the care of Justyn Hall.    A total of 75 minutes were spent on this consult, which included all of the following: chart review, direct face to face contact, history taking, physical examination, counseling and coordinating care, and documentation.     DEEPAK Ahmadi, CNP. ACHPN  4/11/2025  Palliative Care Services at [facility] :  extension [phone]   or [phone]         [1]   Past Medical History:   Fracture of arm    fracture of left arm - cast   [2]   Past Surgical History:  Procedure Laterality Date    Hip surgery      Other surgical history  2014     prostate biopsy    Spinal  fusion     [3] History reviewed. No pertinent family history.  [4] No Known Allergies  [5]   Current Facility-Administered Medications:     ipratropium-albuterol (Duoneb) 0.5-2.5 (3) MG/3ML inhalation solution 3 mL, 3 mL, Nebulization, Q4H WA (5 times daily)    pantoprazole (Protonix) 40 mg in sodium chloride 0.9% PF 10 mL IV push, 40 mg, Intravenous, Q12H    sodium ferric gluconate (Ferrlecit) 125 mg in sodium chloride 0.9% 100mL IVPB premix, 125 mg, Intravenous, Daily    acetaminophen (Tylenol Extra Strength) tab 500 mg, 500 mg, Oral, Q4H PRN    ondansetron (Zofran) 4 MG/2ML injection 4 mg, 4 mg, Intravenous, Q6H PRN    carbidopa-levodopa (SINEMET)  MG per tab 0.5 tablet, 0.5 tablet, Oral, TID    carvedilol (Coreg) tab 6.25 mg, 6.25 mg, Oral, BID with meals    piperacillin-tazobactam (Zosyn) 3.375 g in dextrose 5% 100 mL IVPB-ADDV, 3.375 g, Intravenous, Q12H    dextrose 5%-sodium chloride 0.9% infusion, , Intravenous, Continuous    metoclopramide (Reglan) 5 mg/mL injection 10 mg, 10 mg, Intravenous, Daily PRN    tamsulosin (Flomax) cap 0.4 mg, 0.4 mg, Oral, Nightly    polyethylene glycol (PEG 3350) (Miralax) 17 g oral packet 17 g, 17 g, Oral, BID

## 2025-04-12 NOTE — PLAN OF CARE
Passed swallow re-eval with speech, pureed and honey thick liquids; eating about 15-25% of meals. NG tube remains in place. Espinal draining and intact.     Problem: Patient Centered Care  Goal: Patient preferences are identified and integrated in the patient's plan of care  Description: Interventions:- What would you like us to know as we care for you? From home with sister - Provide timely, complete, and accurate information to patient/family- Incorporate patient and family knowledge, values, beliefs, and cultural backgrounds into the planning and delivery of care- Encourage patient/family to participate in care and decision-making at the level they choose- Honor patient and family perspectives and choices  Outcome: Progressing     Problem: Patient/Family Goals  Goal: Patient/Family Long Term Goal  Description: Patient's Long Term Goal: Discharge home   Interventions:  - IV abx   - O2 as needed  - Monitor VS  - Follow MD orders  - See additional Care Plan goals for specific interventions  Outcome: Progressing  Goal: Patient/Family Short Term Goal  Description: Patient's Short Term Goal: Relief of abdominal pain  Interventions: -   - IV abx   - Pain meds as needed  - Monitor VS  - Follow MD orders  - See additional Care Plan goals for specific interventions  Outcome: Progressing     Problem: RESPIRATORY - ADULT  Goal: Achieves optimal ventilation and oxygenation  Description: INTERVENTIONS:- Assess for changes in respiratory status- Assess for changes in mentation and behavior- Position to facilitate oxygenation and minimize respiratory effort- Oxygen supplementation based on oxygen saturation or ABGs- Provide Smoking Cessation handout, if applicable- Encourage broncho-pulmonary hygiene including cough, deep breathe, Incentive Spirometry- Assess the need for suctioning and perform as needed- Assess and instruct to report SOB or any respiratory difficulty- Respiratory Therapy support as indicated- Manage/alleviate  anxiety- Monitor for signs/symptoms of CO2 retention  Outcome: Progressing     Problem: GASTROINTESTINAL - ADULT  Goal: Minimal or absence of nausea and vomiting  Description: INTERVENTIONS:- Maintain adequate hydration with IV or PO as ordered and tolerated- Nasogastric tube to low intermittent suction as ordered- Evaluate effectiveness of ordered antiemetic medications- Provide nonpharmacologic comfort measures as appropriate- Advance diet as tolerated, if ordered- Obtain nutritional consult as needed- Evaluate fluid balance  Outcome: Progressing  Goal: Maintains or returns to baseline bowel function  Description: INTERVENTIONS:- Assess bowel function- Maintain adequate hydration with IV or PO as ordered and tolerated- Evaluate effectiveness of GI medications- Encourage mobilization and activity- Obtain nutritional consult as needed- Establish a toileting routine/schedule- Consider collaborating with pharmacy to review patient's medication profile  Outcome: Progressing  Goal: Maintains adequate nutritional intake (undernourished)  Description: INTERVENTIONS:- Monitor percentage of each meal consumed- Identify factors contributing to decreased intake, treat as appropriate- Assist with meals as needed- Monitor I&O, WT and lab values- Obtain nutritional consult as needed- Optimize oral hygiene and moisture- Encourage food from home; allow for food preferences- Enhance eating environment  Outcome: Progressing  Goal: Achieves appropriate nutritional intake (bariatric)  Description: INTERVENTIONS:- Monitor for over-consumption- Identify factors contributing to increased intake, treat as appropriate- Monitor I&O, WT and lab values- Obtain nutritional consult as needed- Evaluate psychosocial factors contributing to over-consumption  Outcome: Progressing     Problem: GENITOURINARY - ADULT  Goal: Absence of urinary retention  Description: INTERVENTIONS:- Assess patient’s ability to void and empty bladder- Monitor  intake/output and perform bladder scan as needed- Follow urinary retention protocol/standard of care- Consider collaborating with pharmacy to review patient's medication profile- Implement strategies to promote bladder emptying  Outcome: Progressing     Problem: PAIN - ADULT  Goal: Verbalizes/displays adequate comfort level or patient's stated pain goal  Description: INTERVENTIONS:- Encourage pt to monitor pain and request assistance- Assess pain using appropriate pain scale- Administer analgesics based on type and severity of pain and evaluate response- Implement non-pharmacological measures as appropriate and evaluate response- Consider cultural and social influences on pain and pain management- Manage/alleviate anxiety- Utilize distraction and/or relaxation techniques- Monitor for opioid side effects- Notify MD/LIP if interventions unsuccessful or patient reports new pain- Anticipate increased pain with activity and pre-medicate as appropriate  Outcome: Progressing     Problem: SAFETY ADULT - FALL  Goal: Free from fall injury  Description: INTERVENTIONS:- Assess pt frequently for physical needs- Identify cognitive and physical deficits and behaviors that affect risk of falls.- [place] fall precautions as indicated by assessment.- Educate pt/family on patient safety including physical limitations- Instruct pt to call for assistance with activity based on assessment- Modify environment to reduce risk of injury- Provide assistive devices as appropriate- Consider OT/PT consult to assist with strengthening/mobility- Encourage toileting schedule  Outcome: Progressing     Problem: DISCHARGE PLANNING  Goal: Discharge to home or other facility with appropriate resources  Description: INTERVENTIONS:- Identify barriers to discharge w/pt and caregiver- Include patient/family/discharge partner in discharge planning- Arrange for needed discharge resources and transportation as appropriate- Identify discharge learning needs  (meds, wound care, etc)- Arrange for interpreters to assist at discharge as needed- Consider post-discharge preferences of patient/family/discharge partner- Complete POLST form as appropriate- Assess patient's ability to be responsible for managing their own health- Refer to Case Management Department for coordinating discharge planning if the patient needs post-hospital services based on physician/LIP order or complex needs related to functional status, cognitive ability or social support system  Outcome: Progressing

## 2025-04-12 NOTE — PROGRESS NOTES
[facility]  part of [facility]    Progress Note    Justyn Hall Patient Status:  Inpatient    1940 MRN [id number]   Location [facility]5W Attending Denise Huertas MD   Hosp Day # 2 PCP Chong Quintero MD     Chief Complaint: JORDON    SUBJECTIVE:    Patient passed swallow eval today.  Still with significant sputum production.  No fevers/chills.  No chest pain or sob.     10 ROS completed and otherwise negative.    OBJECTIVE:  Vital signs in last 24 hours:  /82 (BP Location: Right arm)   Pulse 74   Temp 98.3 °F (36.8 °C) (Oral)   Resp 18   Ht 6' (1.829 m)   Wt 175 lb 4.3 oz (79.5 kg)   SpO2 99%   BMI 23.77 kg/m²     Intake/Output:    Intake/Output Summary (Last 24 hours) at 2025 1151  Last data filed at 2025 1050  Gross per 24 hour   Intake 3100.17 ml   Output 4000 ml   Net -899.83 ml       Wt Readings from Last 3 Encounters:   04/10/25 175 lb 4.3 oz (79.5 kg)   25 168 lb 3.2 oz (76.3 kg)   10/10/19 182 lb (82.6 kg)       Exam     Gen: No acute distress  Pulm: coarse breath sounds  CV: Heart with regular rate and rhythm  Abd: Abdomen soft, nontender, bowel sounds present  Neuro: some dysarthria  MSK: moves extremities  Skin: Warm and dry  Psych: Normal affect  Ext: no c/c/e    Data Review:     Labs:   Lab Results   Component Value Date    WBC 11.6 2025    HGB 10.5 2025    HCT 32.0 2025    .0 2025    CREATSERUM 3.06 2025    BUN 98 2025     2025    K 4.2 2025     2025    CO2 24.0 2025     2025    CA 7.2 2025    MG 2.2 2025       Imaging: Reviewed    CT ABDOMEN+PELVIS(CPT=74176)  Result Date: 4/10/2025  CONCLUSION:  1.  Urinary bladder is collapsed around a Espinal catheter.  Mild bilateral hydroureteronephrosis which may be related to recent bladder outlet obstruction.  However correlate for signs of upper tract infection. 2.  Granulomatous  calcifications within the liver and spleen. 3.  High density material within the gallbladder suggesting vicarious excretion of IV contrast, stones, or sludge. 4.  Coarse calcifications within the pancreatic head which may represent sequela of previous pancreatitis. 5.  Small bilateral pleural effusions. 6.  Moderate-sized hiatal hernia. 7.  Coronary atherosclerosis. 8.  Post left hip arthroplasty.  The hardware causes beam hardening artifact limiting assessment of adjacent structures. 9.  Prostate enlargement.    Dictated by (CST): Luis Terrell MD on 4/10/2025 at 9:28 AM     Finalized by (CST): Luis Terrell MD on 4/10/2025 at 9:33 AM          XR CHEST AP PORTABLE  (CPT=71045)  Result Date: 4/10/2025  CONCLUSION: Patchy bibasilar pulmonary opacities which could represent atelectasis or pneumonia.  Small left effusion.   Dictated by (CST): Luis Terrell MD on 4/10/2025 at 7:06 AM     Finalized by (CST): Luis Terrell MD on 4/10/2025 at 7:07 AM            Meds:   Current Hospital Medications[1]      Assessment  Problem List[2]    Plan:     Obstructive uropathy with JORDON  BPH  - seen by urology and nephrology  - started IVFs  - santiago placed  - cont flomax   - cr improving    Aspiration pneumonia  Hemoptysis  - failed swallow eval initially but now passed and will start modified diet  - at this time place dobhoff - hold tube feeds now and see how he does with diet  - no further hemoptysis noted  - gi on consult for feeding tube if needed  - cont IV abx  - pulm following for pneumonia    Constipation  Abnormal CT chest  Anemia - CHRISTINA  - gi following  - cont ppi bid  - keep npo  - place DHT 4/11 - hold for now and will remove if tolerating diet  - consider EGD but not at this time  - miralax bid  - IV iron    HTN  - bp running low at this time, hold bp meds for now    Parkinsons disease  - cont home meds with DHT placed    Global A/P  - reviewed labs and vitals from today  - reviewed notes of the day  - cbc, bmp, mag ordered  for tomorrow  - discussed need to stay in the hospital today due to above  - cont IV abx  - discussed with patient/RN and care team  - dispo pending clinical course        Supplementary Documentation:   DVT Mechanical Prophylaxis:        DVT Pharmacologic Prophylaxis   Medication   None                Code Status: DNAR/Selective Treatment  Espinal: Espinal catheter in place  Espinal Duration (in days): 2  Central line:    ELISE:         **Certification      PHYSICIAN Certification of Need for Inpatient Hospitalization - Initial Certification    Patient will require inpatient services that will reasonably be expected to span two midnight's based on the clinical documentation in H+P.   Based on patients current state of illness, I anticipate that, after discharge, patient will require TBD.                   MDM: High complexity- acute illness posing a threat to life. IV meds requiring close inpatient monitoring. I personally spent time on chart/note review, review of labs/imaging, discussion with patient, physical exam, discussion with staff, writing progress note, and discussion of plan of care.         [1]   Current Facility-Administered Medications   Medication Dose Route Frequency    dextrose 5% infusion   Intravenous Continuous    ipratropium-albuterol (Duoneb) 0.5-2.5 (3) MG/3ML inhalation solution 3 mL  3 mL Nebulization QID    pantoprazole (Protonix) 40 mg in sodium chloride 0.9% PF 10 mL IV push  40 mg Intravenous Q12H    sodium ferric gluconate (Ferrlecit) 125 mg in sodium chloride 0.9% 100mL IVPB premix  125 mg Intravenous Daily    acetaminophen (Tylenol Extra Strength) tab 500 mg  500 mg Oral Q4H PRN    ondansetron (Zofran) 4 MG/2ML injection 4 mg  4 mg Intravenous Q6H PRN    carbidopa-levodopa (SINEMET)  MG per tab 0.5 tablet  0.5 tablet Oral TID    carvedilol (Coreg) tab 6.25 mg  6.25 mg Oral BID with meals    piperacillin-tazobactam (Zosyn) 3.375 g in dextrose 5% 100 mL IVPB-ADDV  3.375 g Intravenous Q12H     metoclopramide (Reglan) 5 mg/mL injection 10 mg  10 mg Intravenous Daily PRN    tamsulosin (Flomax) cap 0.4 mg  0.4 mg Oral Nightly    polyethylene glycol (PEG 3350) (Miralax) 17 g oral packet 17 g  17 g Oral BID   [2]   Patient Active Problem List  Diagnosis    Elevated PSA    Hypercholesterolemia    Contusion of left hip, initial encounter    Contusion of left shoulder, initial encounter    Left hip pain    Parkinsonism (HCC)    Urinary retention    Abdominal pain, acute    Acute renal failure, unspecified acute renal failure type    Community acquired pneumonia, unspecified laterality    Hyperkalemia    Anemia, unspecified type    Obstructed, uropathy    Dysphagia    Palliative care by specialist    Constipation    Leukocytosis    History of recent fall

## 2025-04-12 NOTE — PLAN OF CARE
Problem: Patient Centered Care  Goal: Patient preferences are identified and integrated in the patient's plan of care  Description: Interventions:- What would you like us to know as we care for you? From home with sister - Provide timely, complete, and accurate information to patient/family- Incorporate patient and family knowledge, values, beliefs, and cultural backgrounds into the planning and delivery of care- Encourage patient/family to participate in care and decision-making at the level they choose- Honor patient and family perspectives and choices  Outcome: Progressing     Problem: Patient/Family Goals  Goal: Patient/Family Long Term Goal  Description: Patient's Long Term Goal: Discharge home   Interventions:  - IV abx   - O2 as needed  - Monitor VS  - Follow MD orders  - See additional Care Plan goals for specific interventions  Outcome: Progressing  Goal: Patient/Family Short Term Goal  Description: Patient's Short Term Goal: Relief of abdominal pain  Interventions: -   - IV abx   - Pain meds as needed  - Monitor VS  - Follow MD orders  - See additional Care Plan goals for specific interventions  Outcome: Progressing     Problem: RESPIRATORY - ADULT  Goal: Achieves optimal ventilation and oxygenation  Description: INTERVENTIONS:- Assess for changes in respiratory status- Assess for changes in mentation and behavior- Position to facilitate oxygenation and minimize respiratory effort- Oxygen supplementation based on oxygen saturation or ABGs- Provide Smoking Cessation handout, if applicable- Encourage broncho-pulmonary hygiene including cough, deep breathe, Incentive Spirometry- Assess the need for suctioning and perform as needed- Assess and instruct to report SOB or any respiratory difficulty- Respiratory Therapy support as indicated- Manage/alleviate anxiety- Monitor for signs/symptoms of CO2 retention  Outcome: Progressing     Problem: GASTROINTESTINAL - ADULT  Goal: Minimal or absence of nausea and  vomiting  Description: INTERVENTIONS:- Maintain adequate hydration with IV or PO as ordered and tolerated- Nasogastric tube to low intermittent suction as ordered- Evaluate effectiveness of ordered antiemetic medications- Provide nonpharmacologic comfort measures as appropriate- Advance diet as tolerated, if ordered- Obtain nutritional consult as needed- Evaluate fluid balance  Outcome: Progressing  Goal: Maintains or returns to baseline bowel function  Description: INTERVENTIONS:- Assess bowel function- Maintain adequate hydration with IV or PO as ordered and tolerated- Evaluate effectiveness of GI medications- Encourage mobilization and activity- Obtain nutritional consult as needed- Establish a toileting routine/schedule- Consider collaborating with pharmacy to review patient's medication profile  Outcome: Progressing  Goal: Maintains adequate nutritional intake (undernourished)  Description: INTERVENTIONS:- Monitor percentage of each meal consumed- Identify factors contributing to decreased intake, treat as appropriate- Assist with meals as needed- Monitor I&O, WT and lab values- Obtain nutritional consult as needed- Optimize oral hygiene and moisture- Encourage food from home; allow for food preferences- Enhance eating environment  Outcome: Progressing  Goal: Achieves appropriate nutritional intake (bariatric)  Description: INTERVENTIONS:- Monitor for over-consumption- Identify factors contributing to increased intake, treat as appropriate- Monitor I&O, WT and lab values- Obtain nutritional consult as needed- Evaluate psychosocial factors contributing to over-consumption  Outcome: Progressing     Problem: GENITOURINARY - ADULT  Goal: Absence of urinary retention  Description: INTERVENTIONS:- Assess patient’s ability to void and empty bladder- Monitor intake/output and perform bladder scan as needed- Follow urinary retention protocol/standard of care- Consider collaborating with pharmacy to review patient's  medication profile- Implement strategies to promote bladder emptying  Outcome: Progressing     Problem: PAIN - ADULT  Goal: Verbalizes/displays adequate comfort level or patient's stated pain goal  Description: INTERVENTIONS:- Encourage pt to monitor pain and request assistance- Assess pain using appropriate pain scale- Administer analgesics based on type and severity of pain and evaluate response- Implement non-pharmacological measures as appropriate and evaluate response- Consider cultural and social influences on pain and pain management- Manage/alleviate anxiety- Utilize distraction and/or relaxation techniques- Monitor for opioid side effects- Notify MD/LIP if interventions unsuccessful or patient reports new pain- Anticipate increased pain with activity and pre-medicate as appropriate  Outcome: Progressing     Problem: SAFETY ADULT - FALL  Goal: Free from fall injury  Description: INTERVENTIONS:- Assess pt frequently for physical needs- Identify cognitive and physical deficits and behaviors that affect risk of falls.- [place] fall precautions as indicated by assessment.- Educate pt/family on patient safety including physical limitations- Instruct pt to call for assistance with activity based on assessment- Modify environment to reduce risk of injury- Provide assistive devices as appropriate- Consider OT/PT consult to assist with strengthening/mobility- Encourage toileting schedule  Outcome: Progressing     Problem: DISCHARGE PLANNING  Goal: Discharge to home or other facility with appropriate resources  Description: INTERVENTIONS:- Identify barriers to discharge w/pt and caregiver- Include patient/family/discharge partner in discharge planning- Arrange for needed discharge resources and transportation as appropriate- Identify discharge learning needs (meds, wound care, etc)- Arrange for interpreters to assist at discharge as needed- Consider post-discharge preferences of patient/family/discharge partner-  Complete POLST form as appropriate- Assess patient's ability to be responsible for managing their own health- Refer to Case Management Department for coordinating discharge planning if the patient needs post-hospital services based on physician/LIP order or complex needs related to functional status, cognitive ability or social support system  Outcome: Progressing

## 2025-04-12 NOTE — PROGRESS NOTES
[facility]  part of [facility]    Progress Note    Justyn Hall Patient Status:  Inpatient    1940 MRN [id number]   Location [facility]5W Attending Denise Huertas MD   Hosp Day # 2 PCP Chong Quintero MD       Subjective:   Justyn Hall is a(n) 84 year old male who I am seeing for JORDON    Resting      Objective:   /82   Pulse 74   Temp 98.3 °F (36.8 °C)   Resp 18   Ht 6' (1.829 m)   Wt 175 lb 4.3 oz (79.5 kg)   SpO2 95%   BMI 23.77 kg/m²      Intake/Output Summary (Last 24 hours) at 2025 0909  Last data filed at 2025 0659  Gross per 24 hour   Intake 2754.17 ml   Output 3050 ml   Net -295.83 ml     Wt Readings from Last 1 Encounters:   04/10/25 175 lb 4.3 oz (79.5 kg)       Exam  Gen: No acute distress, Heent: NC AT, mucous memb clear, neck supple  Pulm: Lungs clear, normal respiratory effort  CV: Heart with regular rate and rhythm, no edema  Abd: Abdomen soft, nontender, nondistended, no organomegaly, bowel sounds present  Skin: no symptoms reported  Psych: alert and oriented    Assessment and Plan:     1 - JORDON/obstructive uropathy  Kidney function improving with FFoley  Excellent urine output.    2 -aspiration pneumonia/hemoptysis  The patient failed a swallowing trial.  Has an NG tube in place  Pulmonary is following him.  He is on a PPI and n.p.o.    3 -hypernatremia  Change IV fluids to dextrose, will also add water flushes  Discontinue normal saline    4 -Parkinson's disease  Continue medications      Results:     Recent Labs   Lab 04/10/25  0606 25  0452 25  0428   RBC 4.16 3.72* 3.88   HGB 11.6* 10.0* 10.5*   HCT 33.6* 29.8* 32.0*   MCV 80.8 80.1 82.5   MCH 27.9 26.9 27.1   MCHC 34.5 33.6 32.8   RDW 14.6 15.0 15.6*   NEPRELIM 19.22* 11.05* 8.92*   WBC 21.8* 12.6* 11.6*   .0 335.0 376.0         Recent Labs   Lab 04/10/25  2211 25  0452 25  0428   * 173* 145*   * 136* 98*   CREATSERUM 5.91* 4.95*  3.06*   CA 7.3* 7.2* 7.2*    141 153*   K 4.7 4.4 4.2    107 119*   CO2 19.0* 20.0* 24.0          CT ABDOMEN+PELVIS(CPT=74176)  Result Date: 4/10/2025  CONCLUSION:  1.  Urinary bladder is collapsed around a Espinal catheter.  Mild bilateral hydroureteronephrosis which may be related to recent bladder outlet obstruction.  However correlate for signs of upper tract infection. 2.  Granulomatous calcifications within the liver and spleen. 3.  High density material within the gallbladder suggesting vicarious excretion of IV contrast, stones, or sludge. 4.  Coarse calcifications within the pancreatic head which may represent sequela of previous pancreatitis. 5.  Small bilateral pleural effusions. 6.  Moderate-sized hiatal hernia. 7.  Coronary atherosclerosis. 8.  Post left hip arthroplasty.  The hardware causes beam hardening artifact limiting assessment of adjacent structures. 9.  Prostate enlargement.    Dictated by (CST): Luis Terrell MD on 4/10/2025 at 9:28 AM     Finalized by (CST): Luis Terrell MD on 4/10/2025 at 9:33 AM                THERESA DOMINGUEZ MD  4/12/2025

## 2025-04-12 NOTE — PROGRESS NOTES
[facility]  part of [facility]    Progress Note    Justyn Hall Patient Status:  Inpatient    1940 MRN [id number]   Location [facility]5W Attending Denise Huertas MD   Hosp Day # 1 PCP Chong Quintero MD       Subjective:   Justyn Hall is a(n) 84 year old male     ROS:     Constitutional: Feels very weak  ENMT:  Negative for ear drainage, hearing loss and nasal drainage  Eyes:  Negative for eye discharge and vision loss  Cardiovascular:  Negative for chest pain, sob, irregular heartbeat/palpitations  Respiratory:  Negative for cough, dyspnea and wheezing  Gastrointestinal:  Negative for abdominal pain, constipation, decreased appetite, diarrhea and vomiting  Genitourinary: Has Espinal in  Endocrine:  Negative for abnormal sleep patterns, increased activity, polydipsia and polyphagia  Hema/Lymph:  Negative for easy bleeding and easy bruising  Integumentary:  Negative for pruritus and rash  Musculoskeletal:  Negative for bone/joint symptoms  Neurological:  Negative for gait disturbance  Psychiatric: Very depressed      Vitals:    25 1559   BP: 104/65   Pulse: 75   Resp: 18   Temp: 97.4 °F (36.3 °C)           PHYSICAL EXAM:   Constitutional: appears chronically ill  Head/Face: normocephalic  Eyes/Vision: normal extraocular motion is intact  Nose/Mouth/Throat:mucous membranes are moist and no oral lesions are noted  Neck/Thyroid: neck is supple without adenopathy  Lymphatic: no abnormal cervical, supraclavicular adenopathy is noted  Respiratory:  lungs are clear to auscultation bilaterally, normal respiratory effort  Cardiovascular: regular rate and rhythm no murmurs, gallups, or rubs  Abdomen: soft, non-tender, non-distended, BS normal  Vascular: well perfused femoral, and pedal pulses normal  Skin/Hair: no unusual rashes present, no abnormal bruising noted  Back/Spine: no abnormalities noted  Musculoskeletal: full ROM all extremities good strength  no  deformities  Extremities: no edema, cyanosis  Neurological: Tremor from Parkinson's    Results:     Laboratory Data:  Lab Results   Component Value Date    WBC 12.6 (H) 04/11/2025    HGB 10.0 (L) 04/11/2025    HCT 29.8 (L) 04/11/2025    .0 04/11/2025    CREATSERUM 4.95 (H) 04/11/2025     (H) 04/11/2025     04/11/2025    K 4.4 04/11/2025     04/11/2025    CO2 20.0 (L) 04/11/2025     (H) 04/11/2025    CA 7.2 (L) 04/11/2025    ALB 3.5 04/10/2025    ALKPHO 62 04/10/2025    BILT 0.4 04/10/2025    TP 5.6 (L) 04/10/2025    AST 13 04/10/2025    ALT <7 (L) 04/10/2025    PTT 27.8 04/10/2025    INR 1.11 04/10/2025    T4F 1.10 07/09/2015    TSH 2.507 04/05/2025    LIP 63 (H) 04/10/2025    PSA 2.46 10/10/2019     04/05/2025    B12 640 04/05/2025       Imaging:  @[email or web address]@   CT ABDOMEN+PELVIS(CPT=74176)  Result Date: 4/10/2025  CONCLUSION:  1.  Urinary bladder is collapsed around a Espinal catheter.  Mild bilateral hydroureteronephrosis which may be related to recent bladder outlet obstruction.  However correlate for signs of upper tract infection. 2.  Granulomatous calcifications within the liver and spleen. 3.  High density material within the gallbladder suggesting vicarious excretion of IV contrast, stones, or sludge. 4.  Coarse calcifications within the pancreatic head which may represent sequela of previous pancreatitis. 5.  Small bilateral pleural effusions. 6.  Moderate-sized hiatal hernia. 7.  Coronary atherosclerosis. 8.  Post left hip arthroplasty.  The hardware causes beam hardening artifact limiting assessment of adjacent structures. 9.  Prostate enlargement.    Dictated by (CST): Luis Terrell MD on 4/10/2025 at 9:28 AM     Finalized by (CST): Luis Terrell MD on 4/10/2025 at 9:33 AM          XR CHEST AP PORTABLE  (CPT=71045)  Result Date: 4/10/2025  CONCLUSION: Patchy bibasilar pulmonary opacities which could represent atelectasis or pneumonia.  Small left effusion.   Dictated  by (CST): Luis Terrell MD on 4/10/2025 at 7:06 AM     Finalized by (CST): Luis Terrell MD on 4/10/2025 at 7:07 AM              ASSESSMENT/PLAN:   Assessment       #1 JORDON urinary obstruction is on Flomax urine output excellent  Over 9000 cc yesterday creatinine coming down  4.95 today bicarb better potassium okay continue IV fluids at 83 cc/h to keep up with output watch labs defer to urology     #2 Parkinson's not doing well appreciate palliative care consult  #3 anemia hemoglobin 10 iron 9% start IV iron    Not exactly sure why he is on antibiotics but I defer to Dr. Dacosta  4/11/2025  Elliott Ashley MD  '

## 2025-04-12 NOTE — PROGRESS NOTES
[facility]     Gastroenterology Progress Note    Justyn Hall Patient Status:  Inpatient    1940 MRN [id number]   Location [facility]5W Attending Denise Huertas MD   Hosp Day # 2 PCP Chong Quintero MD       Subjective:   Pt sitting up in chair. Oriented to person & year and general place.   He describes dysphagia as \"going down slowly\" but feels \"better\" today. He is thirsty, asking for water. He is on moderately thickened liquids. DHT is clamped.  No BM but passing gas    VSS    Objective:   Blood pressure 112/82, pulse 74, temperature 98.3 °F (36.8 °C), resp. rate 18, height 6' (1.829 m), weight 175 lb 4.3 oz (79.5 kg), SpO2 95%. Body mass index is 23.77 kg/m².    Gen: awake, alert patient, NAD  HEENT: EOMI, the sclera appears anicteric, oropharynx clear, mucus membranes appear moist DHT in nare  CV: RRR  Lung: no conversational dyspnea   Abdomen: soft NTND abdomen with NABS appreciated   Skin: dry, warm, no jaundice  Ext: no LE edema is evident  Neuro: Alert, oriented x2-3 and interactive, jerking/tremors  Psych: calm, cooperative    Assessment and Plan:   Justyn Hall is a a(n) 84 year old male w/ a history of Parkinson's disease, hypertension, BPH, who presents with lower abdominal pain, urinary retention, constipation.  GI consulted for anemia.    #Anemia  -CT on admission with mild bilateral hydro ureteral nephrosis in the setting of acute renal failure with creatinine 7.5.  lab improving, today creatinine 3.06  -Hemoglobin 10.5.  No signs of overt GI bleeding such as melena or hematochezia.    -Iron studies indicate deficiency, ordered IV iron.  -Suspect there is a degree of anemia related to renal failure.      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  However unclear if prior colonoscopy thus an underlying occult neoplasm cannot be excluded.    -No plans for immediate endoscopic evaluation.  Mobility as  tolerated along with bowel regimen.  -abdomen non-tender, states he is passing gas    #Abnormal CT chest  #dysphagia  -CT chest with wall thickening and fat stranding around the upper intrathoracic esophagus  -Pt denies dysphagia/odynophagia, vomiting/regurgitation  -Failed bedside swallow, has not been able to take his medications including sinemet though Pt's sister is not convince he has Parkinson's so he has not been taking medication at home either  -No prior EGD  -Discussed DHT placement for medications and possible initiation of enteral feedings of swallow does not improve with sinemet.  Pt and sister agree to placement  -Continue conservative management with empiric PPI BID  -spoke with sister Geetha. She reports dysphagia only the past few days . She feels swallowing is okay if he takes his time. She observed him this afternoon and felt he was doing well. Spoke with RN, patient only consumed about 15% of last meal. Discussed discontinuing DHT, will see how next meal goes. Okay to use DHT for free water in treatment of hypernatremia. RN reports IVF was changed in response to high Na.    Recommend:  -regular diet with thickened liquids per SLP  -DHT with tube feeds held, encourage PO intake.   -Empiric PPI BID  -PT for increased mobility  -Miralax BID   -IV iron x 3 doses  -Monitor for overt GIB  -Trend Hgb, transfuse to goal >7  -defer Endoscopic evaluation at this time.    Case discussed with Stephanie Weaver MD and Mary MELCHOR.    Lauren Barrow DNP, [facility] Gastroenterology  4/12/2025      Results:     Lab Results   Component Value Date    WBC 11.6 (H) 04/12/2025    HGB 10.5 (L) 04/12/2025    HCT 32.0 (L) 04/12/2025    .0 04/12/2025    CREATSERUM 3.06 (H) 04/12/2025    BUN 98 (H) 04/12/2025     (H) 04/12/2025    K 4.2 04/12/2025     (H) 04/12/2025    CO2 24.0 04/12/2025     (H) 04/12/2025    CA 7.2 (L) 04/12/2025    ALB 3.5 04/10/2025    ALKPHO 62 04/10/2025    BILT  0.4 04/10/2025    TP 5.6 (L) 04/10/2025    AST 13 04/10/2025    ALT <7 (L) 04/10/2025    PTT 27.8 04/10/2025    INR 1.11 04/10/2025    T4F 1.10 07/09/2015    TSH 2.507 04/05/2025    LIP 63 (H) 04/10/2025    PSA 2.46 10/10/2019    MG 2.2 04/12/2025     04/05/2025    B12 640 04/05/2025       CT ABDOMEN+PELVIS(CPT=74176)  Result Date: 4/10/2025  CONCLUSION:  1.  Urinary bladder is collapsed around a Espinal catheter.  Mild bilateral hydroureteronephrosis which may be related to recent bladder outlet obstruction.  However correlate for signs of upper tract infection. 2.  Granulomatous calcifications within the liver and spleen. 3.  High density material within the gallbladder suggesting vicarious excretion of IV contrast, stones, or sludge. 4.  Coarse calcifications within the pancreatic head which may represent sequela of previous pancreatitis. 5.  Small bilateral pleural effusions. 6.  Moderate-sized hiatal hernia. 7.  Coronary atherosclerosis. 8.  Post left hip arthroplasty.  The hardware causes beam hardening artifact limiting assessment of adjacent structures. 9.  Prostate enlargement.    Dictated by (CST): Luis Terrell MD on 4/10/2025 at 9:28 AM     Finalized by (CST): Luis Terrell MD on 4/10/2025 at 9:33 AM

## 2025-04-12 NOTE — PROGRESS NOTES
[facility]  part of [facility]     Progress Note    Justyn Hall Patient Status:  Inpatient    1940 MRN [id number]   Location [facility]5W Attending Denise Huertas MD   Hosp Day # 2 PCP Chong Quintero MD       Subjective:   Patient seen and examined.  Admits to some occasional mild dyspnea and cough.    Objective:   Blood pressure 112/82, pulse 74, temperature 98.3 °F (36.8 °C), resp. rate 18, height 6' (1.829 m), weight 175 lb 4.3 oz (79.5 kg), SpO2 100%.  Intake/Output:   Last 3 shifts: I/O last 3 completed shifts:  In: 4049.2 [I.V.:3549.2; NG/GT:100; IV PIGGYBACK:400]  Out: 6850 [Urine:6850]   This shift: No intake/output data recorded.     Vent Settings:      Hemodynamic parameters (last 24 hours):      Scheduled Meds: Current Hospital Medications[1]    Continuous Infusions: Medication Infusions[2]    Physical Exam  Constitutional: no acute distress  Eyes: PERRL  ENT: nares pateint  Neck: supple, no JVD  Cardio: RRR, S1 S2  Respiratory: Basilar crackles  GI: abdomen soft, non tender, active bowel sounds, no organomegaly  Extremities: no clubbing, cyanosis, edema  Neurologic: no gross motor deficits  Skin: warm, dry      Results:     Lab Results   Component Value Date    WBC 11.6 2025    HGB 10.5 2025    HCT 32.0 2025    .0 2025    CREATSERUM 3.06 2025    BUN 98 2025     2025    K 4.2 2025     2025    CO2 24.0 2025     2025    CA 7.2 2025    MG 2.2 2025       CT ABDOMEN+PELVIS(CPT=74176)  Result Date: 4/10/2025  CONCLUSION:  1.  Urinary bladder is collapsed around a Espinal catheter.  Mild bilateral hydroureteronephrosis which may be related to recent bladder outlet obstruction.  However correlate for signs of upper tract infection. 2.  Granulomatous calcifications within the liver and spleen. 3.  High density material within the gallbladder suggesting vicarious  excretion of IV contrast, stones, or sludge. 4.  Coarse calcifications within the pancreatic head which may represent sequela of previous pancreatitis. 5.  Small bilateral pleural effusions. 6.  Moderate-sized hiatal hernia. 7.  Coronary atherosclerosis. 8.  Post left hip arthroplasty.  The hardware causes beam hardening artifact limiting assessment of adjacent structures. 9.  Prostate enlargement.    Dictated by (CST): Luis Terrell MD on 4/10/2025 at 9:28 AM     Finalized by (CST): Luis Terrell MD on 4/10/2025 at 9:33 AM                    Assessment   1.  Acute kidney injury  2.  Hyperkalemia, resolved  3.  Hemoptysis  4.  Hypertension  5.  Recent fall with left hip and shoulder contusion  6.  Leukocytosis  7.  Left lower lobe pneumonia  8.  Esophagitis  9.  Dysphagia  10.  Parkinson's     Plan   -Patient presents with evidence of lower quadrant abdominal discomfort with decreased urination and hemoptysis after discharge on 4/6/2025.  Renal function improving.  - Chest x-ray with bibasilar opacities seen.  -CT chest on 4/10/2025 with wall thickening and stranding seen around upper esophagus.  Mucous plugging seen in basilar airways most pronounced left lower lobe.  - No additional hemoptysis since arrival to hospital.  Closely monitor  - Further recommendations per nephrology for management of acute kidney injury and hyperkalemia.  Significant output after Espinal catheter placed.  Concern for urinary obstruction.  - Continue antibiotic therapy with Zosyn at this time  - Further recommendations per GI.  Patient failed swallow study Dobbhoff placed.  Endoscopic evaluation depending on clinical course.  - DVT prophylaxis: Alexsandra Chauhan DO  Pulmonary Critical Care Medicine  [facility]          [1]   Current Facility-Administered Medications   Medication Dose Route Frequency    ipratropium-albuterol (Duoneb) 0.5-2.5 (3) MG/3ML inhalation solution 3 mL  3 mL Nebulization Q4H WA (5 times daily)     pantoprazole (Protonix) 40 mg in sodium chloride 0.9% PF 10 mL IV push  40 mg Intravenous Q12H    sodium ferric gluconate (Ferrlecit) 125 mg in sodium chloride 0.9% 100mL IVPB premix  125 mg Intravenous Daily    acetaminophen (Tylenol Extra Strength) tab 500 mg  500 mg Oral Q4H PRN    ondansetron (Zofran) 4 MG/2ML injection 4 mg  4 mg Intravenous Q6H PRN    carbidopa-levodopa (SINEMET)  MG per tab 0.5 tablet  0.5 tablet Oral TID    carvedilol (Coreg) tab 6.25 mg  6.25 mg Oral BID with meals    piperacillin-tazobactam (Zosyn) 3.375 g in dextrose 5% 100 mL IVPB-ADDV  3.375 g Intravenous Q12H    dextrose 5%-sodium chloride 0.9% infusion   Intravenous Continuous    metoclopramide (Reglan) 5 mg/mL injection 10 mg  10 mg Intravenous Daily PRN    tamsulosin (Flomax) cap 0.4 mg  0.4 mg Oral Nightly    polyethylene glycol (PEG 3350) (Miralax) 17 g oral packet 17 g  17 g Oral BID   [2]    dextrose 5%-sodium chloride 0.9% 125 mL/hr at 04/11/25 1800

## 2025-04-12 NOTE — SLP NOTE
ADULT SWALLOWING EVALUATION    ASSESSMENT    ASSESSMENT/OVERALL IMPRESSION:  PPE REQUIRED. THIS THERAPIST WORE GLOVES, DROPLET MASK, AND GOGGLES FOR DURATION OF EVALUATION. HANDS WASHED UPON ENTRANCE/EXIT.    SLP BSSE orders received and acknowledged. A swallow evaluation warranted as pt currently NPO and seen by speech therapy on 4/11. At that time, patient not appropriate for PO diet.   Pt afebrile with weak vocal quality, on room air, with oxygen saturation at 100%. Pt with no hx of dysphagia at [facility].   Pt positioned upright at 90 degrees in bed. Pt with no complaints of pain, RN aware. Patient provided trials of moderately-thick liquids, mildly-thick liquids, and puree solids. Pt with reduced oral acceptance and bilabial seal across all trials. Pt with reduced bolus formation and manipulation and reduced A-P transit. Pt's swallow response appears delayed with premature spillage and suspected reduced hyolaryngeal elevation/excursion judged clinically to palpation. With trials of puree and moderately-thick liquids, patient presented with no clinical signs of aspiration (e.g., immediate/delayed throat clear, immediate/delayed cough, wet vocal quality, increased O2 effort) observed across all trials. With trials of mildly-thick liquids, patient presented with immediate throat clearing and a wet/gurgly vocal quality.  4/10 CXR indicates \"Patchy bibasilar pulmonary opacities which could represent atelectasis or pneumonia.  Small left effusion \" . Oxygen status remained stable t/o the entire evaluation.     At this time, pt presents with moderate-severe oral dysphagia and probable pharyngeal dysfunction. Recommend a puree diet and moderately-thick liquids with strict adherence to safe swallowing compensatory strategies. Results and recommendations reviewed with RN, pt, and family. Pt v/u to all results/recommendations. Recommendations remain written on whiteboard. SLP collaborated with RN for MD diet orders.     PLAN:  SLP to f/u 2-3x to ensure tolerance of current diet, diet advancement trials when deemed appropriate, monitor CXR, and VFSS if clinically indicated.            RECOMMENDATIONS   Diet Recommendations - Solids: Puree  Diet Recommendations - Liquids: Honey thick liquids/ Moderately thick                        Compensatory Strategies Recommended: Alternate consistencies, Multiple swallows  Aspiration Precautions: Upright position, Slow rate, Small bites, Small sips, No straw, 1:1 feeding  Medication Administration Recommendations: Crushed in puree  Treatment Plan/Recommendations: Aspiration precautions    HISTORY   MEDICAL HISTORY  Reason for Referral: RN dysphagia screen, R/O aspiration    Problem List  Principal Problem:    Urinary retention  Active Problems:    Abdominal pain, acute    Acute renal failure, unspecified acute renal failure type    Community acquired pneumonia, unspecified laterality    Hyperkalemia    Anemia, unspecified type    Obstructed, uropathy    Dysphagia    Palliative care by specialist    Constipation    Leukocytosis    History of recent fall      Past Medical History  Past Medical History[1]    Prior Living Situation:  (Lives with sister)  Diet Prior to Admission: Regular, Thin liquids  Precautions: Aspiration    Patient/Family Goals: NA    SWALLOWING HISTORY  Current Diet Consistency: NPO  Dysphagia History: NA  Imaging Results:     4/10 CXR: Patchy bibasilar pulmonary opacities which could represent atelectasis or pneumonia.  Small left effusion.    SUBJECTIVE       OBJECTIVE   ORAL MOTOR EXAMINATION  Dentition: Natural, Functional  Symmetry: Within Functional Limits  Strength: Overall reduced     Range of Motion: Overall reduced  Rate of Motion: Reduced    Voice Quality: Weak  Respiratory Status: Unlabored  Consistencies Trialed: Nectar thick liquids, Honey thick liquids, Puree  Method of Presentation: Staff/Clinician assistance, Spoon, Cup  Patient Positioned: Upright, Midline    Oral  Phase of Swallow: Impaired  Bolus Retrieval: Impaired  Bilabial Seal: Intact  Bolus Formation: Impaired  Bolus Propulsion: Impaired          Pharyngeal Phase of Swallow: Appears Impaired  Laryngeal Elevation Timing: Appears impaired  Laryngeal Elevation Strength: Appears impaired  Laryngeal Elevation Coordination: Appears impaired  (Please note: Silent aspiration cannot be evaluated clinically. Videofluoroscopic Swallow Study is required to rule-out silent aspiration.)    Esophageal Phase of Swallow: No complaints consistent with possible esophageal involvement  Comments: NA              GOALS  Goal #1 The patient will tolerate puree consistency and moderately-thick liquids without overt signs or symptoms of aspiration with 100 % accuracy over 2 session(s).  In Progress   Goal #2 The patient/family/caregiver will demonstrate understanding and implementation of aspiration precautions and swallow strategies independently over 2 session(s).    In Progress   Goal #3 The patient will tolerate trial upgrade of minced/moist consistency and mildly-thick liquids without overt signs or symptoms of aspiration with 100 % accuracy over 2 session(s).  In Progress   Goal #4 The patient will utilize compensatory strategies as outlined by  BSSE (clinical evaluation) including Slow rate, Small bites, Small sips, Multiple swallows, Alternate liquids/solids, No straws, Upright 90 degrees, Upright 90 degrees 30 mins after meal with minimal assistance 100 % of the time across 2 sessions.    In Progress     FOLLOW UP  Treatment Plan/Recommendations: Aspiration precautions  Duration: 1 week  Follow Up Needed (Documentation Required): Yes  SLP Follow-up Date: 04/13/25    Thank you for your referral.   If you have any questions, please contact Christin Butler SLP       [1]   Past Medical History:   Fracture of arm    fracture of left arm - cast

## 2025-04-13 LAB
ALBUMIN SERPL-MCNC: 3.5 G/DL (ref 3.2–4.8)
ANION GAP SERPL CALC-SCNC: 10 MMOL/L (ref 0–18)
BASOPHILS # BLD AUTO: 0.09 X10(3) UL (ref 0–0.2)
BASOPHILS NFR BLD AUTO: 0.7 %
BUN BLD-MCNC: 61 MG/DL (ref 9–23)
BUN/CREAT SERPL: 28.4 (ref 10–20)
CALCIUM BLD-MCNC: 7.3 MG/DL (ref 8.7–10.4)
CHLORIDE SERPL-SCNC: 115 MMOL/L (ref 98–112)
CO2 SERPL-SCNC: 24 MMOL/L (ref 21–32)
CREAT BLD-MCNC: 2.15 MG/DL (ref 0.7–1.3)
DEPRECATED RDW RBC AUTO: 46.3 FL (ref 35.1–46.3)
EGFRCR SERPLBLD CKD-EPI 2021: 29 ML/MIN/1.73M2 (ref 60–?)
EOSINOPHIL # BLD AUTO: 0.32 X10(3) UL (ref 0–0.7)
EOSINOPHIL NFR BLD AUTO: 2.5 %
ERYTHROCYTE [DISTWIDTH] IN BLOOD BY AUTOMATED COUNT: 15.3 % (ref 11–15)
GLUCOSE BLD-MCNC: 128 MG/DL (ref 70–99)
HCT VFR BLD AUTO: 29.2 % (ref 39–53)
HGB BLD-MCNC: 9.9 G/DL (ref 13–17.5)
IMM GRANULOCYTES # BLD AUTO: 0.31 X10(3) UL (ref 0–1)
IMM GRANULOCYTES NFR BLD: 2.4 %
LYMPHOCYTES # BLD AUTO: 1.9 X10(3) UL (ref 1–4)
LYMPHOCYTES NFR BLD AUTO: 14.8 %
MCH RBC QN AUTO: 28.4 PG (ref 26–34)
MCHC RBC AUTO-ENTMCNC: 33.9 G/DL (ref 31–37)
MCV RBC AUTO: 83.9 FL (ref 80–100)
MONOCYTES # BLD AUTO: 1.11 X10(3) UL (ref 0.1–1)
MONOCYTES NFR BLD AUTO: 8.7 %
NEUTROPHILS # BLD AUTO: 9.07 X10 (3) UL (ref 1.5–7.7)
NEUTROPHILS # BLD AUTO: 9.07 X10(3) UL (ref 1.5–7.7)
NEUTROPHILS NFR BLD AUTO: 70.9 %
OSMOLALITY SERPL CALC.SUM OF ELEC: 327 MOSM/KG (ref 275–295)
PHOSPHATE SERPL-MCNC: 3.8 MG/DL (ref 2.4–5.1)
PLATELET # BLD AUTO: 341 10(3)UL (ref 150–450)
POTASSIUM SERPL-SCNC: 4 MMOL/L (ref 3.5–5.1)
RBC # BLD AUTO: 3.48 X10(6)UL (ref 3.8–5.8)
SODIUM SERPL-SCNC: 149 MMOL/L (ref 136–145)
WBC # BLD AUTO: 12.8 X10(3) UL (ref 4–11)

## 2025-04-13 PROCEDURE — 99233 SBSQ HOSP IP/OBS HIGH 50: CPT | Performed by: HOSPITALIST

## 2025-04-13 PROCEDURE — 99232 SBSQ HOSP IP/OBS MODERATE 35: CPT | Performed by: INTERNAL MEDICINE

## 2025-04-13 PROCEDURE — 99233 SBSQ HOSP IP/OBS HIGH 50: CPT | Performed by: INTERNAL MEDICINE

## 2025-04-13 RX ORDER — PANTOPRAZOLE SODIUM 40 MG/1
40 TABLET, DELAYED RELEASE ORAL
Status: DISCONTINUED | OUTPATIENT
Start: 2025-04-14 | End: 2025-04-17

## 2025-04-13 NOTE — PROGRESS NOTES
Pulmonary Medicine Inpatient Progress Note                 Subjective:  Feels better on RA         ALLERGIES:  Allergies[1]     MEDS:  Home Medications:  Medications Taking[2]  Scheduled Medication:  Scheduled Medications[3]  Continuous Infusing Medication:  Medication Infusions[4]  PRN Medications:  PRN Medications[5]       PHYSICAL EXAM:  /88 (BP Location: Right arm)   Pulse 79   Temp 97.5 °F (36.4 °C) (Oral)   Resp 18   Ht 6' (1.829 m)   Wt 175 lb 4.3 oz (79.5 kg)   SpO2 97%   BMI 23.77 kg/m²   CONSTITUTIONAL: alert, oriented, no apparent distress, appears weak  HEENT: atraumatic normocephalic. + dobhoff  MOUTH: mucous membranes are moist. No OP exudates  NECK/THROAT: no JVD. Trachea midline. No obvious thyromegaly  LUNG: clear upper b/l no wheezing, crackles. Diminished bases. Chest symmetric with respiratory motion  HEART: regular rate and rhythm, no obvious murmers or gallops note  ABD: soft non tender. + bowel sounds. No organomegaly noted  EXT: no clubbing, cyanosis, or edema noted       IMAGES:  Chest CT PE 4/10/25  CONCLUSION:   Wall thickening and fat stranding around the upper intrathoracic esophagus   Mucous plugging or aspiration in the basilar airways greatest at the left lung base where there is patchy atelectasis/consolidation       LABS:  Recent Labs   Lab 04/11/25 0452 04/12/25 0428 04/13/25  0431   RBC 3.72* 3.88 3.48*   HGB 10.0* 10.5* 9.9*   HCT 29.8* 32.0* 29.2*   MCV 80.1 82.5 83.9   MCH 26.9 27.1 28.4   MCHC 33.6 32.8 33.9   RDW 15.0 15.6* 15.3*   NEPRELIM 11.05* 8.92* 9.07*   WBC 12.6* 11.6* 12.8*   .0 376.0 341.0       Recent Labs   Lab 04/10/25  0917 04/10/25  2211 04/11/25  0452 04/12/25  0428 04/13/25  0431   *   < > 173* 145* 128*   *   < > 136* 98* 61*   CREATSERUM 7.54*   < > 4.95* 3.06* 2.15*   EGFRCR 7*   < > 11* 19* 29*   CA 6.2*   < > 7.2* 7.2* 7.3*   ALB 3.5  --   --   --  3.5   *   < > 141 153* 149*   K 6.5*   < > 4.4 4.2 4.0   CL 98    < > 107 119* 115*   CO2 14.0*   < > 20.0* 24.0 24.0   ALKPHO 62  --   --   --   --    AST 13  --   --   --   --    ALT <7*  --   --   --   --    BILT 0.4  --   --   --   --    TP 5.6*  --   --   --   --     < > = values in this interval not displayed.       ASSESSMENT/PLAN:  LLL PNA  -on zosyn  -overall improving  -afebrile and on RA  -denies further hemoptysis  -continue duonebs    JORDON, hyperK  -renal following   -monitor UO and renal labs    Esophagitis and failed swallow  -dobhoff in place  -GI following    Proph  -DVT: SCDS. Can restart hep or lovenox for DVT proph from pulm standpt     Thank you for the opportunity to care for Justyn Hall.     FELIPE Silva DO, MPH  Pulmonary Critical Care Medicine  [facility] Pulmonary and Critical Care Medicine          [1] No Known Allergies  [2]   Outpatient Medications Marked as Taking for the 4/10/25 encounter (Hospital Encounter)   Medication Sig Dispense Refill    carbidopa-levodopa  MG Oral Tab Take 0.5 tablets by mouth 3 (three) times daily for 14 days, THEN 1 tablet 3 (three) times daily. Give half a tab 3 times a day, 5 hours between each dose for 3 days.  Then full tab 3 times a day 5 hours between each dose.  Give ideally on empty stomach.  Give 30 to 45 minutes before a meal or 45 to 60 minutes after a meal.. 249 tablet 0    aspirin 81 MG Oral Tab EC Take 1 tablet (81 mg total) by mouth daily. 30 tablet 0    carvedilol 6.25 MG Oral Tab Take 1 tablet (6.25 mg total) by mouth 2 (two) times daily with meals. 60 tablet 0    lisinopril 20 MG Oral Tab Take 1 tablet (20 mg total) by mouth daily. 30 tablet 0   [3]    ipratropium-albuterol  3 mL Nebulization QID    pantoprazole  40 mg Intravenous Q12H    sodium ferric gluconate  125 mg Intravenous Daily    carbidopa-levodopa  0.5 tablet Oral TID    carvedilol  6.25 mg Oral BID with meals    piperacillin-tazobactam  3.375 g Intravenous Q12H    tamsulosin  0.4 mg Oral Nightly    polyethylene glycol (PEG  3350)  17 g Oral BID   [4]    dextrose 100 mL/hr at 04/12/25 6661   [5]   acetaminophen    ondansetron    metoclopramide

## 2025-04-13 NOTE — SLP NOTE
SPEECH DAILY NOTE - INPATIENT    ASSESSMENT & PLAN   ASSESSMENT  Patient seen on this date for assessment of tolerance of moderate thick liquids and puree and determine tolerance of upgraded diet. Patient sitting upright and midline in chair. He was awake and alert willing to participate. He had Sinemet earlier today. Patient with safe and efficient po intake of puree and moderate thin liquids.  Assessed patient with upgraded items with patient self presenting thin liquids and mildly thick liquids and soft easy to chew solids. Patient with functional oral acceptance, retrieval and containment with complete clearing. Patient with apparent functional pharyngeal phase of swallow as characterized by apparent timely initiation, functional hyolaryngeal elevation on palpation, no coughing or throat clearing. Patient's sister who is his primary caregiver reported that patient is at baseline for swallow function.   Recommend upgrade of diet to soft/easy to chew solids and thin liquids via cup with adherence to aspiration precautions.   Will follow up to ensure safety with diet and educate pt on compensatory strategies/swallow precautions. Video swallow study to be completed if CXR declines, increase in clinical signs of aspiration, and/or MD desires.     Diet Recommendations - Solids: Soft/ Easy to chew  Diet Recommendations - Liquids: Thin Liquids    Compensatory Strategies Recommended: Alternate consistencies, Multiple swallows  Aspiration Precautions: Upright position, Slow rate, No straw  Medication Administration Recommendations: Whole in puree    Patient Experiencing Pain: No                Treatment Plan  Treatment Plan/Recommendations: Aspiration precautions (Diet check)    Interdisciplinary Communication: Disussed with other staff          GOALS  Goal #1 The patient will tolerate puree consistency and moderately-thick liquids without overt signs or symptoms of aspiration with 100 % accuracy over 2 session(s). Met    Goal #2 The patient/family/caregiver will demonstrate understanding and implementation of aspiration precautions and swallow strategies independently over 2 session(s).     In Progress   Goal #3 The patient will tolerate trial upgrade of minced/moist consistency and mildly-thick liquids without overt signs or symptoms of aspiration with 100 % accuracy over 2 session(s).  Met   Goal #4 The patient will utilize compensatory strategies as outlined by  BSSE (clinical evaluation) including Slow rate, Small bites, Small sips, Multiple swallows, Alternate liquids/solids, No straws, Upright 90 degrees, Upright 90 degrees 30 mins after meal with minimal assistance 100 % of the time across 2 sessions.     In Progress   Goal #5 The patient will soft/easy to chew consistency and thin liquids without overt signs or symptoms of aspiration with 100 % accuracy over 2 session(s). In progress         FOLLOW UP  Follow Up Needed (Documentation Required): Yes  SLP Follow-up Date: 04/14/25  Duration: 1 week    Session: #1 tx following BSSE on 4/13    If you have any questions, please contact Mayda PORTER SLP

## 2025-04-13 NOTE — PROGRESS NOTES
[facility]     Gastroenterology Progress Note    Justyn Hall Patient Status:  Inpatient    1940 MRN [id number]   Location [facility] Attending Denise Huertas MD   Hosp Day # 3 PCP Chong Quintero MD       Subjective:   Pt sitting up in chair. DHT In place. Feels his swallowing is improving. NO ab pain no melena.    Objective:   Blood pressure 114/87, pulse 78, temperature 97.4 °F (36.3 °C), temperature source Axillary, resp. rate 18, height 6' (1.829 m), weight 175 lb 4.3 oz (79.5 kg), SpO2 98%. Body mass index is 23.77 kg/m².    Gen: awake, alert patient, NAD  HEENT: EOMI, the sclera appears anicteric, oropharynx clear, mucus membranes appear moist DHT in nare  CV: RRR  Lung: no conversational dyspnea   Abdomen: soft NTND abdomen with NABS appreciated   Skin: dry, warm, no jaundice  Ext: no LE edema is evident  Neuro: Alert, oriented x2-3 and interactive, jerking/tremors  Psych: calm, cooperative    Assessment and Plan:   Justyn Hall is a a(n) 84 year old male w/ a history of Parkinson's disease, hypertension, BPH, who presents with lower abdominal pain, urinary retention, constipation.  GI consulted for anemia.    #Anemia -  No signs of overt GI bleeding such as melena or hematochezia.    -Iron studies indicate deficiency, ordered IV iron.  -Suspect there is a degree of anemia related to renal failure.      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  Hold off on invasive testing.  -bowel regimen    #Abnormal CT chest  #dysphagia  -CT chest with wall thickening and fat stranding around the upper intrathoracic esophagus  -Failed bedside swallow, has not been able to take his medications including sinemet though Pt's sister is not convince he has Parkinson's so he has not been taking medication at home either  -No prior EGD    - plan for repeat swallow assessment, patient seems to be doing well after restarting  Sinemet PD medication.    Recommend:  -repeat swallow eval  -pantoprazole once a day  -Miralax BID   -IV iron x 3 doses  -Monitor for overt GIB  -Trend Hgb, transfuse to goal >7  -defer Endoscopic evaluation at this time.    ADDENDUM:  -noted swallow assessment, okay  for soft solids. Thin liquids.   -continue sinemet  -okay to dc when ready from GI standpoint  -okay to discontinue dobhoff tube if eating  -please call if questions    S Stephanie Weaver MD      Results:     Lab Results   Component Value Date    WBC 12.8 (H) 04/13/2025    HGB 9.9 (L) 04/13/2025    HCT 29.2 (L) 04/13/2025    .0 04/13/2025    CREATSERUM 2.15 (H) 04/13/2025    BUN 61 (H) 04/13/2025     (H) 04/13/2025    K 4.0 04/13/2025     (H) 04/13/2025    CO2 24.0 04/13/2025     (H) 04/13/2025    CA 7.3 (L) 04/13/2025    ALB 3.5 04/13/2025    ALKPHO 62 04/10/2025    BILT 0.4 04/10/2025    TP 5.6 (L) 04/10/2025    AST 13 04/10/2025    ALT <7 (L) 04/10/2025    PTT 27.8 04/10/2025    INR 1.11 04/10/2025    T4F 1.10 07/09/2015    TSH 2.507 04/05/2025    LIP 63 (H) 04/10/2025    PSA 2.46 10/10/2019    MG 2.2 04/12/2025    PHOS 3.8 04/13/2025     04/05/2025    B12 640 04/05/2025       No results found.

## 2025-04-13 NOTE — PLAN OF CARE
Problem: Patient Centered Care  Goal: Patient preferences are identified and integrated in the patient's plan of care  Description: Interventions:- What would you like us to know as we care for you? From home with sister - Provide timely, complete, and accurate information to patient/family- Incorporate patient and family knowledge, values, beliefs, and cultural backgrounds into the planning and delivery of care- Encourage patient/family to participate in care and decision-making at the level they choose- Honor patient and family perspectives and choices  Outcome: Progressing     Problem: Patient/Family Goals  Goal: Patient/Family Long Term Goal  Description: Patient's Long Term Goal: Discharge home   Interventions:  - IV abx   - O2 as needed  - Monitor VS  - Follow MD orders  - See additional Care Plan goals for specific interventions  Outcome: Progressing  Goal: Patient/Family Short Term Goal  Description: Patient's Short Term Goal: Relief of abdominal pain  Interventions: -   - IV abx   - Pain meds as needed  - Monitor VS  - Follow MD orders  - See additional Care Plan goals for specific interventions  Outcome: Progressing     Problem: RESPIRATORY - ADULT  Goal: Achieves optimal ventilation and oxygenation  Description: INTERVENTIONS:- Assess for changes in respiratory status- Assess for changes in mentation and behavior- Position to facilitate oxygenation and minimize respiratory effort- Oxygen supplementation based on oxygen saturation or ABGs- Provide Smoking Cessation handout, if applicable- Encourage broncho-pulmonary hygiene including cough, deep breathe, Incentive Spirometry- Assess the need for suctioning and perform as needed- Assess and instruct to report SOB or any respiratory difficulty- Respiratory Therapy support as indicated- Manage/alleviate anxiety- Monitor for signs/symptoms of CO2 retention  Outcome: Progressing     Problem: GASTROINTESTINAL - ADULT  Goal: Minimal or absence of nausea and  vomiting  Description: INTERVENTIONS:- Maintain adequate hydration with IV or PO as ordered and tolerated- Nasogastric tube to low intermittent suction as ordered- Evaluate effectiveness of ordered antiemetic medications- Provide nonpharmacologic comfort measures as appropriate- Advance diet as tolerated, if ordered- Obtain nutritional consult as needed- Evaluate fluid balance  Outcome: Progressing  Goal: Maintains or returns to baseline bowel function  Description: INTERVENTIONS:- Assess bowel function- Maintain adequate hydration with IV or PO as ordered and tolerated- Evaluate effectiveness of GI medications- Encourage mobilization and activity- Obtain nutritional consult as needed- Establish a toileting routine/schedule- Consider collaborating with pharmacy to review patient's medication profile  Outcome: Progressing  Goal: Maintains adequate nutritional intake (undernourished)  Description: INTERVENTIONS:- Monitor percentage of each meal consumed- Identify factors contributing to decreased intake, treat as appropriate- Assist with meals as needed- Monitor I&O, WT and lab values- Obtain nutritional consult as needed- Optimize oral hygiene and moisture- Encourage food from home; allow for food preferences- Enhance eating environment  Outcome: Progressing     Problem: GENITOURINARY - ADULT  Goal: Absence of urinary retention  Description: INTERVENTIONS:- Assess patient’s ability to void and empty bladder- Monitor intake/output and perform bladder scan as needed- Follow urinary retention protocol/standard of care- Consider collaborating with pharmacy to review patient's medication profile- Implement strategies to promote bladder emptying  Outcome: Progressing     Problem: PAIN - ADULT  Goal: Verbalizes/displays adequate comfort level or patient's stated pain goal  Description: INTERVENTIONS:- Encourage pt to monitor pain and request assistance- Assess pain using appropriate pain scale- Administer analgesics based  on type and severity of pain and evaluate response- Implement non-pharmacological measures as appropriate and evaluate response- Consider cultural and social influences on pain and pain management- Manage/alleviate anxiety- Utilize distraction and/or relaxation techniques- Monitor for opioid side effects- Notify MD/LIP if interventions unsuccessful or patient reports new pain- Anticipate increased pain with activity and pre-medicate as appropriate  Outcome: Progressing     Problem: SAFETY ADULT - FALL  Goal: Free from fall injury  Description: INTERVENTIONS:- Assess pt frequently for physical needs- Identify cognitive and physical deficits and behaviors that affect risk of falls.- [place] fall precautions as indicated by assessment.- Educate pt/family on patient safety including physical limitations- Instruct pt to call for assistance with activity based on assessment- Modify environment to reduce risk of injury- Provide assistive devices as appropriate- Consider OT/PT consult to assist with strengthening/mobility- Encourage toileting schedule  Outcome: Progressing     Problem: DISCHARGE PLANNING  Goal: Discharge to home or other facility with appropriate resources  Description: INTERVENTIONS:- Identify barriers to discharge w/pt and caregiver- Include patient/family/discharge partner in discharge planning- Arrange for needed discharge resources and transportation as appropriate- Identify discharge learning needs (meds, wound care, etc)- Arrange for interpreters to assist at discharge as needed- Consider post-discharge preferences of patient/family/discharge partner- Complete POLST form as appropriate- Assess patient's ability to be responsible for managing their own health- Refer to Case Management Department for coordinating discharge planning if the patient needs post-hospital services based on physician/LIP order or complex needs related to functional status, cognitive ability or social support  system  Outcome: Progressing

## 2025-04-13 NOTE — PLAN OF CARE
Problem: Patient Centered Care  Goal: Patient preferences are identified and integrated in the patient's plan of care  Description: Interventions:- What would you like us to know as we care for you? From home with sister - Provide timely, complete, and accurate information to patient/family- Incorporate patient and family knowledge, values, beliefs, and cultural backgrounds into the planning and delivery of care- Encourage patient/family to participate in care and decision-making at the level they choose- Honor patient and family perspectives and choices  Outcome: Progressing     Problem: Patient/Family Goals  Goal: Patient/Family Long Term Goal  Description: Patient's Long Term Goal: Discharge home   Interventions:  - IV abx   - O2 as needed  - Monitor VS  - Follow MD orders  - See additional Care Plan goals for specific interventions  Outcome: Progressing  Goal: Patient/Family Short Term Goal  Description: Patient's Short Term Goal: Relief of abdominal pain  Interventions: -   - IV abx   - Pain meds as needed  - Monitor VS  - Follow MD orders  - See additional Care Plan goals for specific interventions  Outcome: Progressing     Problem: RESPIRATORY - ADULT  Goal: Achieves optimal ventilation and oxygenation  Description: INTERVENTIONS:- Assess for changes in respiratory status- Assess for changes in mentation and behavior- Position to facilitate oxygenation and minimize respiratory effort- Oxygen supplementation based on oxygen saturation or ABGs- Provide Smoking Cessation handout, if applicable- Encourage broncho-pulmonary hygiene including cough, deep breathe, Incentive Spirometry- Assess the need for suctioning and perform as needed- Assess and instruct to report SOB or any respiratory difficulty- Respiratory Therapy support as indicated- Manage/alleviate anxiety- Monitor for signs/symptoms of CO2 retention  Outcome: Progressing     Problem: GASTROINTESTINAL - ADULT  Goal: Minimal or absence of nausea and  vomiting  Description: INTERVENTIONS:- Maintain adequate hydration with IV or PO as ordered and tolerated- Nasogastric tube to low intermittent suction as ordered- Evaluate effectiveness of ordered antiemetic medications- Provide nonpharmacologic comfort measures as appropriate- Advance diet as tolerated, if ordered- Obtain nutritional consult as needed- Evaluate fluid balance  Outcome: Progressing  Goal: Maintains or returns to baseline bowel function  Description: INTERVENTIONS:- Assess bowel function- Maintain adequate hydration with IV or PO as ordered and tolerated- Evaluate effectiveness of GI medications- Encourage mobilization and activity- Obtain nutritional consult as needed- Establish a toileting routine/schedule- Consider collaborating with pharmacy to review patient's medication profile  Outcome: Progressing  Goal: Maintains adequate nutritional intake (undernourished)  Description: INTERVENTIONS:- Monitor percentage of each meal consumed- Identify factors contributing to decreased intake, treat as appropriate- Assist with meals as needed- Monitor I&O, WT and lab values- Obtain nutritional consult as needed- Optimize oral hygiene and moisture- Encourage food from home; allow for food preferences- Enhance eating environment  Outcome: Progressing  Goal: Achieves appropriate nutritional intake (bariatric)  Description: INTERVENTIONS:- Monitor for over-consumption- Identify factors contributing to increased intake, treat as appropriate- Monitor I&O, WT and lab values- Obtain nutritional consult as needed- Evaluate psychosocial factors contributing to over-consumption  Outcome: Progressing     Problem: GENITOURINARY - ADULT  Goal: Absence of urinary retention  Description: INTERVENTIONS:- Assess patient’s ability to void and empty bladder- Monitor intake/output and perform bladder scan as needed- Follow urinary retention protocol/standard of care- Consider collaborating with pharmacy to review patient's  medication profile- Implement strategies to promote bladder emptying  Outcome: Progressing     Problem: PAIN - ADULT  Goal: Verbalizes/displays adequate comfort level or patient's stated pain goal  Description: INTERVENTIONS:- Encourage pt to monitor pain and request assistance- Assess pain using appropriate pain scale- Administer analgesics based on type and severity of pain and evaluate response- Implement non-pharmacological measures as appropriate and evaluate response- Consider cultural and social influences on pain and pain management- Manage/alleviate anxiety- Utilize distraction and/or relaxation techniques- Monitor for opioid side effects- Notify MD/LIP if interventions unsuccessful or patient reports new pain- Anticipate increased pain with activity and pre-medicate as appropriate  Outcome: Progressing     Problem: SAFETY ADULT - FALL  Goal: Free from fall injury  Description: INTERVENTIONS:- Assess pt frequently for physical needs- Identify cognitive and physical deficits and behaviors that affect risk of falls.- [place] fall precautions as indicated by assessment.- Educate pt/family on patient safety including physical limitations- Instruct pt to call for assistance with activity based on assessment- Modify environment to reduce risk of injury- Provide assistive devices as appropriate- Consider OT/PT consult to assist with strengthening/mobility- Encourage toileting schedule  Outcome: Progressing     Problem: DISCHARGE PLANNING  Goal: Discharge to home or other facility with appropriate resources  Description: INTERVENTIONS:- Identify barriers to discharge w/pt and caregiver- Include patient/family/discharge partner in discharge planning- Arrange for needed discharge resources and transportation as appropriate- Identify discharge learning needs (meds, wound care, etc)- Arrange for interpreters to assist at discharge as needed- Consider post-discharge preferences of patient/family/discharge partner-  Complete POLST form as appropriate- Assess patient's ability to be responsible for managing their own health- Refer to Case Management Department for coordinating discharge planning if the patient needs post-hospital services based on physician/LIP order or complex needs related to functional status, cognitive ability or social support system  Outcome: Progressing

## 2025-04-13 NOTE — PROGRESS NOTES
[facility]  part of [facility]    Progress Note    Justyn Hall Patient Status:  Inpatient    1940 MRN [id number]   Location [facility] [place]5W Attending Corrine Nair MD   Hosp Day # 3 PCP Chong Quintero MD       Subjective:   Justyn Hall is a(n) 84 year old male who I am seeing for JORDON    Resting      Objective:   /87 (BP Location: Right arm)   Pulse 78   Temp 97.4 °F (36.3 °C) (Axillary)   Resp 18   Ht 6' (1.829 m)   Wt 175 lb 4.3 oz (79.5 kg)   SpO2 98%   BMI 23.77 kg/m²      Intake/Output Summary (Last 24 hours) at 2025 1113  Last data filed at 2025 0900  Gross per 24 hour   Intake 3679.34 ml   Output 4400 ml   Net -720.66 ml     Wt Readings from Last 1 Encounters:   04/10/25 175 lb 4.3 oz (79.5 kg)       Exam  Gen: No acute distress, Heent: NC AT, mucous memb clear, neck supple  Pulm: Lungs clear, normal respiratory effort  CV: Heart with regular rate and rhythm, no edema  Abd: Abdomen soft, nontender, nondistended, no organomegaly, bowel sounds present  Skin: no symptoms reported  Psych: alert and oriented    Assessment and Plan:     1 - JORDON/obstructive uropathy  Kidney function improving with Espinal  Excellent urine output.     2 -aspiration pneumonia/hemoptysis  The patient failed a swallowing trial.  Has an NG tube in place  Pulmonary is following him.  He is on a PPI and n.p.o.     3 -hypernatremia  On IV dextrose and water flushes     4 -Parkinson's disease  Continue medications            Results:     Recent Labs   Lab 25  0452 25  0428 25  0431   RBC 3.72* 3.88 3.48*   HGB 10.0* 10.5* 9.9*   HCT 29.8* 32.0* 29.2*   MCV 80.1 82.5 83.9   MCH 26.9 27.1 28.4   MCHC 33.6 32.8 33.9   RDW 15.0 15.6* 15.3*   NEPRELIM 11.05* 8.92* 9.07*   WBC 12.6* 11.6* 12.8*   .0 376.0 341.0         Recent Labs   Lab 25  0452 25  0428 25  0431   * 145* 128*   * 98* 61*   CREATSERUM 4.95* 3.06* 2.15*   CA 7.2*  7.2* 7.3*    153* 149*   K 4.4 4.2 4.0    119* 115*   CO2 20.0* 24.0 24.0          No results found.        THERESA DOMINGUEZ MD  4/13/2025

## 2025-04-13 NOTE — PROGRESS NOTES
[facility]  part of [facility]    Progress Note    Justyn Hall Patient Status:  Inpatient    1940 MRN [id number]   Location [facility]5W Attending Denise Huertas MD   Hosp Day # 3 PCP Chong Quintero MD     Chief Complaint: JORDON    SUBJECTIVE:    Patient passed swallow eval today.    Feels better  Discussed plans to dc dobhoff if he is able to increase diet    No fevers/chills.  No chest pain or sob.     10 ROS completed and otherwise negative.    OBJECTIVE:  Vital signs in last 24 hours:  /66 (BP Location: Right arm)   Pulse 80   Temp 97.5 °F (36.4 °C) (Axillary)   Resp 18   Ht 6' (1.829 m)   Wt 175 lb 4.3 oz (79.5 kg)   SpO2 100%   BMI 23.77 kg/m²     Intake/Output:    Intake/Output Summary (Last 24 hours) at 2025 1348  Last data filed at 2025 1342  Gross per 24 hour   Intake 3070.67 ml   Output 3550 ml   Net -479.33 ml       Wt Readings from Last 3 Encounters:   04/10/25 175 lb 4.3 oz (79.5 kg)   25 168 lb 3.2 oz (76.3 kg)   10/10/19 182 lb (82.6 kg)       Exam     Gen: No acute distress  Pulm: coarse breath sounds  CV: Heart with regular rate and rhythm  Abd: Abdomen soft, nontender, bowel sounds present  Neuro: some dysarthria  MSK: moves extremities  Skin: Warm and dry  Psych: Normal affect  Ext: no c/c/e    Data Review:     Labs:   Lab Results   Component Value Date    WBC 12.8 2025    HGB 9.9 2025    HCT 29.2 2025    .0 2025    CREATSERUM 2.15 2025    BUN 61 2025     2025    K 4.0 2025     2025    CO2 24.0 2025     2025    CA 7.3 2025    ALB 3.5 2025    PHOS 3.8 2025       Imaging: Reviewed    No results found.      Meds:   Current Hospital Medications[1]      Assessment  Problem List[2]    Plan:     Obstructive uropathy with JORDON  BPH  Hydronephrosis  - seen by urology and nephrology  - started IVFs  - santiago placed  - cont  flomax   - cr improving  -will d/w urology timing to repeat ultrasound  -decath trial in 7-10 days per urology  -santiago placed on 4/10 in ED    Aspiration pneumonia  Hemoptysis  - failed swallow eval initially but now passed and will start modified diet  - at this time place dobhoff - hold tube feeds now and see how he does with diet  - no further hemoptysis noted  - gi on consult for feeding tube if needed  - cont IV abx  - pulm following for pneumonia    Constipation  Abnormal CT chest  Anemia - CHRISTINA  - gi following  - cont ppi bid  - place DHT 4/11 - hold for now and will remove if tolerating diet--> later today  - consider EGD but not at this time  - miralax bid  - IV iron    HTN  - bp running low at this time, hold bp meds for now    Parkinsons disease  - cont home meds with DHT placed    Global A/P  - reviewed labs and vitals from today  - reviewed notes of the day  - cbc, bmp, mag ordered for tomorrow  - discussed need to stay in the hospital today due to above  - cont IV abx  - discussed with patient/RN and care team  - dispo pending clinical course        Supplementary Documentation:   DVT Mechanical Prophylaxis:        DVT Pharmacologic Prophylaxis   Medication   None                Code Status: DNAR/Selective Treatment  Santiago: Santiago catheter in place  Santiago Duration (in days): 2  Central line:    ELISE:         **Certification      PHYSICIAN Certification of Need for Inpatient Hospitalization - Initial Certification    Patient will require inpatient services that will reasonably be expected to span two midnight's based on the clinical documentation in H+P.   Based on patients current state of illness, I anticipate that, after discharge, patient will require TBD.                   MDM: High complexity- acute illness posing a threat to life. IV meds requiring close inpatient monitoring. I personally spent time on chart/note review, review of labs/imaging, discussion with patient, physical exam, discussion with  staff, writing progress note, and discussion of plan of care.    YOHANNES HOPKINS MD         [1]   Current Facility-Administered Medications   Medication Dose Route Frequency    dextrose 5% infusion   Intravenous Continuous    ipratropium-albuterol (Duoneb) 0.5-2.5 (3) MG/3ML inhalation solution 3 mL  3 mL Nebulization QID    pantoprazole (Protonix) 40 mg in sodium chloride 0.9% PF 10 mL IV push  40 mg Intravenous Q12H    acetaminophen (Tylenol Extra Strength) tab 500 mg  500 mg Oral Q4H PRN    ondansetron (Zofran) 4 MG/2ML injection 4 mg  4 mg Intravenous Q6H PRN    carbidopa-levodopa (SINEMET)  MG per tab 0.5 tablet  0.5 tablet Oral TID    carvedilol (Coreg) tab 6.25 mg  6.25 mg Oral BID with meals    piperacillin-tazobactam (Zosyn) 3.375 g in dextrose 5% 100 mL IVPB-ADDV  3.375 g Intravenous Q12H    metoclopramide (Reglan) 5 mg/mL injection 10 mg  10 mg Intravenous Daily PRN    tamsulosin (Flomax) cap 0.4 mg  0.4 mg Oral Nightly    polyethylene glycol (PEG 3350) (Miralax) 17 g oral packet 17 g  17 g Oral BID   [2]   Patient Active Problem List  Diagnosis    Elevated PSA    Hypercholesterolemia    Contusion of left hip, initial encounter    Contusion of left shoulder, initial encounter    Left hip pain    Parkinsonism (HCC)    Urinary retention    Abdominal pain, acute    Acute renal failure, unspecified acute renal failure type    Community acquired pneumonia, unspecified laterality    Hyperkalemia    Anemia, unspecified type    Obstructed, uropathy    Dysphagia    Palliative care by specialist    Constipation    Leukocytosis    History of recent fall

## 2025-04-14 LAB
ALBUMIN SERPL-MCNC: 3.4 G/DL (ref 3.2–4.8)
ANION GAP SERPL CALC-SCNC: 8 MMOL/L (ref 0–18)
BASOPHILS # BLD AUTO: 0.13 X10(3) UL (ref 0–0.2)
BASOPHILS NFR BLD AUTO: 1.2 %
BUN BLD-MCNC: 38 MG/DL (ref 9–23)
BUN/CREAT SERPL: 21.8 (ref 10–20)
CALCIUM BLD-MCNC: 7.2 MG/DL (ref 8.7–10.4)
CHLORIDE SERPL-SCNC: 107 MMOL/L (ref 98–112)
CO2 SERPL-SCNC: 25 MMOL/L (ref 21–32)
CREAT BLD-MCNC: 1.74 MG/DL (ref 0.7–1.3)
DEPRECATED RDW RBC AUTO: 46.8 FL (ref 35.1–46.3)
EGFRCR SERPLBLD CKD-EPI 2021: 38 ML/MIN/1.73M2 (ref 60–?)
EOSINOPHIL # BLD AUTO: 0.59 X10(3) UL (ref 0–0.7)
EOSINOPHIL NFR BLD AUTO: 5.6 %
ERYTHROCYTE [DISTWIDTH] IN BLOOD BY AUTOMATED COUNT: 14.8 % (ref 11–15)
GLUCOSE BLD-MCNC: 115 MG/DL (ref 70–99)
HCT VFR BLD AUTO: 28 % (ref 39–53)
HGB BLD-MCNC: 8.8 G/DL (ref 13–17.5)
IMM GRANULOCYTES # BLD AUTO: 0.24 X10(3) UL (ref 0–1)
IMM GRANULOCYTES NFR BLD: 2.3 %
LYMPHOCYTES # BLD AUTO: 2.47 X10(3) UL (ref 1–4)
LYMPHOCYTES NFR BLD AUTO: 23.3 %
MCH RBC QN AUTO: 27.1 PG (ref 26–34)
MCHC RBC AUTO-ENTMCNC: 31.4 G/DL (ref 31–37)
MCV RBC AUTO: 86.2 FL (ref 80–100)
MONOCYTES # BLD AUTO: 0.92 X10(3) UL (ref 0.1–1)
MONOCYTES NFR BLD AUTO: 8.7 %
NEUTROPHILS # BLD AUTO: 6.27 X10 (3) UL (ref 1.5–7.7)
NEUTROPHILS # BLD AUTO: 6.27 X10(3) UL (ref 1.5–7.7)
NEUTROPHILS NFR BLD AUTO: 58.9 %
OSMOLALITY SERPL CALC.SUM OF ELEC: 300 MOSM/KG (ref 275–295)
PHOSPHATE SERPL-MCNC: 3.2 MG/DL (ref 2.4–5.1)
PLATELET # BLD AUTO: 350 10(3)UL (ref 150–450)
POTASSIUM SERPL-SCNC: 3.4 MMOL/L (ref 3.5–5.1)
RBC # BLD AUTO: 3.25 X10(6)UL (ref 3.8–5.8)
SODIUM SERPL-SCNC: 140 MMOL/L (ref 136–145)
WBC # BLD AUTO: 10.6 X10(3) UL (ref 4–11)

## 2025-04-14 PROCEDURE — 99233 SBSQ HOSP IP/OBS HIGH 50: CPT | Performed by: HOSPITALIST

## 2025-04-14 PROCEDURE — 99232 SBSQ HOSP IP/OBS MODERATE 35: CPT | Performed by: INTERNAL MEDICINE

## 2025-04-14 PROCEDURE — 99231 SBSQ HOSP IP/OBS SF/LOW 25: CPT | Performed by: NURSE PRACTITIONER

## 2025-04-14 RX ORDER — POTASSIUM CHLORIDE 1500 MG/1
40 TABLET, EXTENDED RELEASE ORAL ONCE
Status: COMPLETED | OUTPATIENT
Start: 2025-04-14 | End: 2025-04-14

## 2025-04-14 RX ORDER — HEPARIN SODIUM 5000 [USP'U]/ML
5000 INJECTION, SOLUTION INTRAVENOUS; SUBCUTANEOUS EVERY 12 HOURS SCHEDULED
Status: DISCONTINUED | OUTPATIENT
Start: 2025-04-14 | End: 2025-04-17

## 2025-04-14 NOTE — PROGRESS NOTES
[facility]  part of [facility]    Progress Note    Justyn Hall Patient Status:  Inpatient    1940 MRN [id number]   Location [facility]5W Attending Denise Huertas MD   Hosp Day # 4 PCP Chong Quintero MD         Subjective:   Subjective:  Comfortable on room air   Minimal cough     Objective:   Blood pressure 128/80, pulse 85, temperature 98.2 °F (36.8 °C), temperature source Oral, resp. rate 20, height 6' (1.829 m), weight 175 lb 4.3 oz (79.5 kg), SpO2 95%.  Physical Exam  Vitals and nursing note reviewed.   Constitutional:       General: He is not in acute distress.  HENT:      Head: Atraumatic.   Cardiovascular:      Rate and Rhythm: Normal rate.      Heart sounds:      No gallop.   Pulmonary:      Comments: Left base rales otherwise clear   Abdominal:      General: There is no distension.      Palpations: Abdomen is soft.   Skin:     General: Skin is dry.   Neurological:      Mental Status: Mental status is at baseline.         Results:   Lab Results   Component Value Date    WBC 10.6 2025    HGB 8.8 (L) 2025    HCT 28.0 (L) 2025    .0 2025    CREATSERUM 1.74 (H) 2025    BUN 38 (H) 2025     2025    K 3.4 (L) 2025     2025    CO2 25.0 2025     (H) 2025    CA 7.2 (L) 2025    ALB 3.4 2025    ALKPHO 62 04/10/2025    BILT 0.4 04/10/2025    TP 5.6 (L) 04/10/2025    AST 13 04/10/2025    ALT <7 (L) 04/10/2025    PTT 27.8 04/10/2025    INR 1.11 04/10/2025    T4F 1.10 2015    TSH 2.507 2025    LIP 63 (H) 04/10/2025    PSA 2.46 10/10/2019    MG 2.2 2025    PHOS 3.2 2025    TROPHS 11 04/10/2025     2025    B12 640 2025         Assessment & Plan:     1- acute LLL aspiration pneumonia  Chest ct LLL infiltrate with mucous plugging     Zosyn  Aspiration precautions and modified diet and bronchial hygiene   On room air today      2- JORDON /  obstructive uropathy and urinary retention   Urology and renal following   Espinal in-place      3- Esophagitis and failed swallow  -dobhoff pulled   -GI and speech following     4- parkinson's disease      5- DVT prophylaxis   Now with SCD   Ok to start heparin subcutaneous if ok with others                  Augustus Thomas MD  4/14/2025
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
## 2025-04-14 NOTE — PLAN OF CARE
Problem: Patient Centered Care  Goal: Patient preferences are identified and integrated in the patient's plan of care  Description: Interventions:- What would you like us to know as we care for you? From home with sister - Provide timely, complete, and accurate information to patient/family- Incorporate patient and family knowledge, values, beliefs, and cultural backgrounds into the planning and delivery of care- Encourage patient/family to participate in care and decision-making at the level they choose- Honor patient and family perspectives and choices  Outcome: Progressing     Problem: Patient/Family Goals  Goal: Patient/Family Long Term Goal  Description: Patient's Long Term Goal: Discharge home   Interventions:  - IV abx   - O2 as needed  - Monitor VS  - Follow MD orders  - See additional Care Plan goals for specific interventions  Outcome: Progressing  Goal: Patient/Family Short Term Goal  Description: Patient's Short Term Goal: Relief of abdominal pain  Interventions: -   - IV abx   - Pain meds as needed  - Monitor VS  - Follow MD orders  - See additional Care Plan goals for specific interventions  Outcome: Progressing     Problem: RESPIRATORY - ADULT  Goal: Achieves optimal ventilation and oxygenation  Description: INTERVENTIONS:- Assess for changes in respiratory status- Assess for changes in mentation and behavior- Position to facilitate oxygenation and minimize respiratory effort- Oxygen supplementation based on oxygen saturation or ABGs- Provide Smoking Cessation handout, if applicable- Encourage broncho-pulmonary hygiene including cough, deep breathe, Incentive Spirometry- Assess the need for suctioning and perform as needed- Assess and instruct to report SOB or any respiratory difficulty- Respiratory Therapy support as indicated- Manage/alleviate anxiety- Monitor for signs/symptoms of CO2 retention  Outcome: Progressing     Problem: GASTROINTESTINAL - ADULT  Goal: Minimal or absence of nausea and  vomiting  Description: INTERVENTIONS:- Maintain adequate hydration with IV or PO as ordered and tolerated- Nasogastric tube to low intermittent suction as ordered- Evaluate effectiveness of ordered antiemetic medications- Provide nonpharmacologic comfort measures as appropriate- Advance diet as tolerated, if ordered- Obtain nutritional consult as needed- Evaluate fluid balance  Outcome: Progressing  Goal: Maintains or returns to baseline bowel function  Description: INTERVENTIONS:- Assess bowel function- Maintain adequate hydration with IV or PO as ordered and tolerated- Evaluate effectiveness of GI medications- Encourage mobilization and activity- Obtain nutritional consult as needed- Establish a toileting routine/schedule- Consider collaborating with pharmacy to review patient's medication profile  Outcome: Progressing  Goal: Maintains adequate nutritional intake (undernourished)  Description: INTERVENTIONS:- Monitor percentage of each meal consumed- Identify factors contributing to decreased intake, treat as appropriate- Assist with meals as needed- Monitor I&O, WT and lab values- Obtain nutritional consult as needed- Optimize oral hygiene and moisture- Encourage food from home; allow for food preferences- Enhance eating environment  Outcome: Progressing  Goal: Achieves appropriate nutritional intake (bariatric)  Description: INTERVENTIONS:- Monitor for over-consumption- Identify factors contributing to increased intake, treat as appropriate- Monitor I&O, WT and lab values- Obtain nutritional consult as needed- Evaluate psychosocial factors contributing to over-consumption  Outcome: Progressing     Problem: GENITOURINARY - ADULT  Goal: Absence of urinary retention  Description: INTERVENTIONS:- Assess patient’s ability to void and empty bladder- Monitor intake/output and perform bladder scan as needed- Follow urinary retention protocol/standard of care- Consider collaborating with pharmacy to review patient's  medication profile- Implement strategies to promote bladder emptying  Outcome: Progressing     Problem: PAIN - ADULT  Goal: Verbalizes/displays adequate comfort level or patient's stated pain goal  Description: INTERVENTIONS:- Encourage pt to monitor pain and request assistance- Assess pain using appropriate pain scale- Administer analgesics based on type and severity of pain and evaluate response- Implement non-pharmacological measures as appropriate and evaluate response- Consider cultural and social influences on pain and pain management- Manage/alleviate anxiety- Utilize distraction and/or relaxation techniques- Monitor for opioid side effects- Notify MD/LIP if interventions unsuccessful or patient reports new pain- Anticipate increased pain with activity and pre-medicate as appropriate  Outcome: Progressing     Problem: SAFETY ADULT - FALL  Goal: Free from fall injury  Description: INTERVENTIONS:- Assess pt frequently for physical needs- Identify cognitive and physical deficits and behaviors that affect risk of falls.- [place] fall precautions as indicated by assessment.- Educate pt/family on patient safety including physical limitations- Instruct pt to call for assistance with activity based on assessment- Modify environment to reduce risk of injury- Provide assistive devices as appropriate- Consider OT/PT consult to assist with strengthening/mobility- Encourage toileting schedule  Outcome: Progressing     Problem: DISCHARGE PLANNING  Goal: Discharge to home or other facility with appropriate resources  Description: INTERVENTIONS:- Identify barriers to discharge w/pt and caregiver- Include patient/family/discharge partner in discharge planning- Arrange for needed discharge resources and transportation as appropriate- Identify discharge learning needs (meds, wound care, etc)- Arrange for interpreters to assist at discharge as needed- Consider post-discharge preferences of patient/family/discharge partner-  Complete POLST form as appropriate- Assess patient's ability to be responsible for managing their own health- Refer to Case Management Department for coordinating discharge planning if the patient needs post-hospital services based on physician/LIP order or complex needs related to functional status, cognitive ability or social support system  Outcome: Progressing

## 2025-04-14 NOTE — CM/SW NOTE
04/14/25 1500   CM/SW Referral Data   Referral Source    Reason for Referral Discharge planning   Informant Sibling   Medical Hx   Does patient have an established PCP? Yes   Patient Info   Patient's Current Mental Status at Time of Assessment Alert;Oriented   Patient's Home Environment House   Number of Levels in Home 1   Number of Stair in Home 3-4 in   Patient lives with Sibling   Patient Status Prior to Admission   Independent with ADLs and Mobility Yes   Discharge Needs   Anticipated D/C needs Subacute rehab   Services Requested   Submitted to [facility] Yes   PASRR Level 1 Submitted Yes     Sister will be at bedside shortly.  List of accepting [facility] sites left for Justyn and his sister to review.    PLAN:  [facility] at [place], will need choice from list.    Evette Gardiner MBA BSN RN CRRN   RN Case Manager  [phone]

## 2025-04-14 NOTE — PROGRESS NOTES
[facility]  part of [facility]    Progress Note    Justyn Hall Patient Status:  Inpatient    1940 MRN [id number]   Location [facility]5W Attending Denise Huertas MD   Hosp Day # 4 PCP Chong Quintero MD     Chief Complaint: JORDON    SUBJECTIVE:    Patient feeling well.  Tolerating diet.  No n/v/abd pain.  No sob.     10 ROS completed and otherwise negative.    OBJECTIVE:  Vital signs in last 24 hours:  BP 98/51 (BP Location: Right arm)   Pulse 69   Temp 97.8 °F (36.6 °C) (Oral)   Resp 20   Ht 6' (1.829 m)   Wt 175 lb 4.3 oz (79.5 kg)   SpO2 100%   BMI 23.77 kg/m²     Intake/Output:    Intake/Output Summary (Last 24 hours) at 2025 1216  Last data filed at 2025 1124  Gross per 24 hour   Intake 1116 ml   Output 3400 ml   Net -2284 ml       Wt Readings from Last 3 Encounters:   04/10/25 175 lb 4.3 oz (79.5 kg)   25 168 lb 3.2 oz (76.3 kg)   10/10/19 182 lb (82.6 kg)       Exam     Gen: No acute distress  Pulm: coarse breath sounds improving  CV: Heart with regular rate and rhythm  Abd: Abdomen soft, nontender, bowel sounds present  Neuro: some dysarthria  MSK: moves extremities  Skin: Warm and dry  Psych: Normal affect  Ext: no c/c/e    Data Review:     Labs:   Lab Results   Component Value Date    WBC 10.6 2025    HGB 8.8 2025    HCT 28.0 2025    .0 2025    CREATSERUM 1.74 2025    BUN 38 2025     2025    K 3.4 2025     2025    CO2 25.0 2025     2025    CA 7.2 2025    ALB 3.4 2025    PHOS 3.2 2025       Imaging: Reviewed    No results found.      Meds:   Current Hospital Medications[1]      Assessment  Problem List[2]    Plan:     Obstructive uropathy with JORDON  BPH  Hydronephrosis  - seen by urology and nephrology  - started IVFs  - santiago placed   - cont flomax   - cr improving  -will d/w urology timing to repeat ultrasound  -decath trial in  7-10 days per urology  -santiago placed on 4/10 in ED    Aspiration pneumonia  Hemoptysis  - failed swallow eval initially but now passed and will start modified diet  - at this time place dobhoff - hold tube feeds now and see how he does with diet  - no further hemoptysis noted  - gi on consult for feeding tube if needed  - cont IV abx  - pulm following for pneumonia    Constipation  Abnormal CT chest  Anemia - CHRISTINA  - gi following  - cont ppi bid  - place DHT 4/11 - now removed  - miralax bid  - IV iron given    HTN  - bp running low at this time, hold bp meds for now    Parkinsons disease  - cont home meds with DHT placed    Global A/P  - reviewed labs and vitals from today  - reviewed notes of the day  - cbc, bmp, mag ordered for tomorrow  - discussed need to stay in the hospital today due to above  - cont IV abx  - discussed with patient/RN and care team  - dispo pending clinical course        Supplementary Documentation:   DVT Mechanical Prophylaxis:        DVT Pharmacologic Prophylaxis   Medication    heparin (Porcine) 5000 UNIT/ML injection 5,000 Units                Code Status: DNAR/Selective Treatment  Santiago: Santiago catheter in place  Santiago Duration (in days): 2  Central line:    ELISE: 4/15/2025        **Certification      PHYSICIAN Certification of Need for Inpatient Hospitalization - Initial Certification    Patient will require inpatient services that will reasonably be expected to span two midnight's based on the clinical documentation in H+P.   Based on patients current state of illness, I anticipate that, after discharge, patient will require TBD.                   MDM: High complexity- acute illness posing a threat to life. IV meds requiring close inpatient monitoring. I personally spent time on chart/note review, review of labs/imaging, discussion with patient, physical exam, discussion with staff, writing progress note, and discussion of plan of care.             [1]   Current Facility-Administered  Medications   Medication Dose Route Frequency    ampicillin-sulbactam (Unasyn) 3 g in sodium chloride 0.9% 100mL IVPB-ADD  3 g Intravenous q6h    heparin (Porcine) 5000 UNIT/ML injection 5,000 Units  5,000 Units Subcutaneous 2 times per day    pantoprazole (Protonix) DR tab 40 mg  40 mg Oral QAM AC    ipratropium-albuterol (Duoneb) 0.5-2.5 (3) MG/3ML inhalation solution 3 mL  3 mL Nebulization QID    acetaminophen (Tylenol Extra Strength) tab 500 mg  500 mg Oral Q4H PRN    ondansetron (Zofran) 4 MG/2ML injection 4 mg  4 mg Intravenous Q6H PRN    carbidopa-levodopa (SINEMET)  MG per tab 0.5 tablet  0.5 tablet Oral TID    carvedilol (Coreg) tab 6.25 mg  6.25 mg Oral BID with meals    metoclopramide (Reglan) 5 mg/mL injection 10 mg  10 mg Intravenous Daily PRN    tamsulosin (Flomax) cap 0.4 mg  0.4 mg Oral Nightly    polyethylene glycol (PEG 3350) (Miralax) 17 g oral packet 17 g  17 g Oral BID   [2]   Patient Active Problem List  Diagnosis    Elevated PSA    Hypercholesterolemia    Contusion of left hip, initial encounter    Contusion of left shoulder, initial encounter    Left hip pain    Parkinsonism (HCC)    Urinary retention    Abdominal pain, acute    Acute renal failure, unspecified acute renal failure type    Community acquired pneumonia, unspecified laterality    Hyperkalemia    Anemia, unspecified type    Obstructed, uropathy    Dysphagia    Palliative care by specialist    Constipation    Leukocytosis    History of recent fall

## 2025-04-14 NOTE — PHYSICAL THERAPY NOTE
PHYSICAL THERAPY TREATMENT NOTE - INPATIENT    Room Number: 515/515-A     Session: 1          Presenting Problem: Urinary retention, acute renal failure, pneumonia  Co-Morbidities : Parkinson's disease, OA, pancreatitis, BPH, AAA, HTN, L EDITH, lumbar fusion    PHYSICAL THERAPY ASSESSMENT   Patient demonstrates good  progress this session, goals  updated to reflect patient performance.      Patient is requiring contact guard assist and minimal assist as a result of the following impairments: decreased endurance/aerobic capacity, impaired standing/gait balance, and decreased muscular endurance.     Patient continues to function below baseline with bed mobility, transfers, gait, and stair negotiation.  Next session anticipate patient to progress bed mobility, transfers, gait, and stair negotiation.  Physical Therapy will continue to follow patient for duration of hospitalization.    Patient continues to benefit from continued skilled PT services: to promote return to prior level of function and safety with continuous assistance and gradual rehabilitative therapy .    PLAN DURING HOSPITALIZATION  Nursing Mobility Recommendation : 1 Assist  PT Device Recommendation: Rolling walker, Gait belt  PT Treatment Plan: Bed mobility, Body mechanics, Endurance, Energy conservation, Patient education, Family education, Gait training, Range of motion, Strengthening, Stair training, Transfer training, Balance training  Frequency (Obs): 3-5x/week     CURRENT GOALS     Patient Goal Patient's self-stated goal is: fall prevention   Goal #1 Patient is able to demonstrate supine - sit EOB @ level: minimum assistance     Goal #1   Current Status NT    Goal #2 Patient is able to demonstrate transfers EOB to/from BSC at assistance level: minimum assistance with walker - rolling     Goal #2  Current Status CGA from bed to standing with RW   Goal #3 Patient is able to ambulate 50 feet with assist device: walker - rolling at assistance level:  minimum assistance   Goal #3   Current Status CGA/Min A 40ft with RW   Goal #4 Patient will negotiate 5 stairs/one curb w/ assistive device and minimum assistance   Goal #4   Current Status    Goal #5 Patient to demonstrate independence with home activity/exercise instructions provided to patient in preparation for discharge.   Goal #5   Current Status    Goal #6    Goal #6  Current Status      2025 all goals ongoing    SUBJECTIVE  \"I want to sit up in the chair.\"    OBJECTIVE  Precautions: Bed/chair alarm, Aspiration    WEIGHT BEARING RESTRICTION     PAIN ASSESSMENT   Ratin  Location: denies pain  Management Techniques: Body mechanics, Activity promotion    BALANCE                                                                                                                       Static Sitting: Fair +  Dynamic Sitting: Fair +           Static Standing: Poor +  Dynamic Standing: Poor +    ACTIVITY TOLERANCE  Pulse: 71  Heart Rate Source: Monitor     BP: 91/59  BP Location: Right arm  BP Method: Automatic  Patient Position: Sitting    O2 WALK  Oxygen Therapy  SPO2% on Room Air at Rest: 98    AM-PAC '6-Clicks' INPATIENT SHORT FORM - BASIC MOBILITY  How much difficulty does the patient currently have...  Patient Difficulty: Turning over in bed (including adjusting bedclothes, sheets and blankets)?: A Little   Patient Difficulty: Sitting down on and standing up from a chair with arms (e.g., wheelchair, bedside commode, etc.): A Little   Patient Difficulty: Moving from lying on back to sitting on the side of the bed?: A Little   How much help from another person does the patient currently need...   Help from Another: Moving to and from a bed to a chair (including a wheelchair)?: A Little   Help from Another: Need to walk in hospital room?: A Little   Help from Another: Climbing 3-5 steps with a railing?: A Little     AM-PAC Score:  Raw Score: 18   Approx Degree of Impairment: 46.58%   Standardized Score  (AM-PAC Scale): 43.63   CMS Modifier (G-Code): CK    FUNCTIONAL ABILITY STATUS  Gait Assessment   Functional Mobility/Gait Assessment  Gait Assistance: Minimum assistance  Distance (ft): 40  Assistive Device: Rolling walker  Pattern: Shuffle, Festinating    Skilled Therapy Provided    Bed Mobility:  Rolling: NT   Supine<>Sit: NT   Sit<>Supine: NT     Transfer Mobility:  Sit<>Stand: CGA from bed to standing, Min A from chair to standing   Stand<>Sit: CGA   Gait: CGA/Min A with RW    Therapist's Comments: Chart reviewed and RN approved PT session. Pt sitting up in chair and agreeable to PT. Pt instructed in proper mechanics, and LE alignment with sit<>stand. Pt cued for hand placement. Some difficulty with sit<>stand from chair. Pt then ambulated to bed, sat down EOB for rest as pt reported dizziness. Pt then standing and ambulating with RW. Pt cued for upright posture and to keep RW within ROSIBEL. Pt performed standing and seated exercises and left up in chair all needs in reach. RN notified.     BP assessment:   Sitting in chair: 91/59  Standin/71  Sitting at EOB: 99/57  After ambulation and sitting in chair: 94/61    RN notified of BP readings.       THERAPEUTIC EXERCISES  Lower Extremity Alternating marching  Knee extension  Toe raises  Standing: alt march, toe raises     Upper Extremity In standing, 5 times each UE should flexion, cervical spine rotation x 5     Position Sitting and Standing     Repetitions   10-15   Sets   1     Patient End of Session: Up in chair, Needs met, Call light within reach, RN aware of session/findings, All patient questions and concerns addressed, Hospital anti-slip socks, Alarm set    PT Session Time: 30 minutes  Gait Training: 15 minutes  Therapeutic Activity: 0 minutes  Therapeutic Exercise: 15 minutes   Neuromuscular Re-education: 0 minutes

## 2025-04-14 NOTE — SLP NOTE
SPEECH DAILY NOTE - INPATIENT    ASSESSMENT & PLAN   ASSESSMENT  PPE REQUIRED. THIS THERAPIST WORE GLOVES, DROPLET MASK, AND GOGGLES FOR DURATION OF EVALUATION. HANDS WASHED UPON ENTRANCE/EXIT.    SLP f/u for ongoing dysphagia tx/meal assessment per recommendations of easy to chew/thin liquids per upgrade. Pt denies any swallowing challenges.     Pt positioned upright in bedside chair. Pt afebrile, tolerating room air with oxygen status 97 prior to the start of oral trials. SLP reviewed aspiration precautions and safe swallowing compensatory strategies with the patient. Safe swallow guidelines remain written on the white board in purple. Diet recommendations remain on the whiteboard in the room. Patient v/u. Provided set up assistance, pt tolerates thin liquids via open cup with no overt clinical signs/symptoms of aspiration. Pt with throat clear x1 following eating dry cinnamon Dominican toast, no further s/s of aspiration throughout duration of entire AM meal. Oxygen status remained >95 t/o the entire session.    PLAN: SLP to f/u x2 meal assessments, monitor CXR, and VFSS if clinically warranted.     Diet Recommendations - Solids: Soft/ Easy to chew  Diet Recommendations - Liquids: Thin Liquids    Compensatory Strategies Recommended: Alternate consistencies, Multiple swallows  Aspiration Precautions: Upright position, Slow rate, No straw  Medication Administration Recommendations: Whole in puree    Patient Experiencing Pain: No      Treatment Plan  Treatment Plan/Recommendations: Aspiration precautions (Diet check)    Interdisciplinary Communication: Discussed with RN  Recommendations posted at bedside            GOALS  Goal #1 The patient will tolerate puree consistency and moderately-thick liquids without overt signs or symptoms of aspiration with 100 % accuracy over 2 session(s). Met   Goal #2 The patient/family/caregiver will demonstrate understanding and implementation of aspiration precautions and swallow  strategies independently over 2 session(s).     In Progress   Goal #3 The patient will tolerate trial upgrade of minced/moist consistency and mildly-thick liquids without overt signs or symptoms of aspiration with 100 % accuracy over 2 session(s).  Met   Goal #4 The patient will utilize compensatory strategies as outlined by  BSSE (clinical evaluation) including Slow rate, Small bites, Small sips, Multiple swallows, Alternate liquids/solids, No straws, Upright 90 degrees, Upright 90 degrees 30 mins after meal with minimal assistance 100 % of the time across 2 sessions.     In Progress   Goal #5 The patient will soft/easy to chew consistency and thin liquids without overt signs or symptoms of aspiration with 100 % accuracy over 2 session(s). In progress     FOLLOW UP  Follow Up Needed (Documentation Required): Yes  SLP Follow-up Date: 04/15/25  Duration: 1 week    Session: 2 following re-BSSE    If you have any questions, please contact RAMON Raza M.S. CCC-SLP  Speech Language Pathologist  Phone Number [phone]

## 2025-04-14 NOTE — CONGREGATE LIVING REVIEW
[facility] Living Authorization    The [facility] Review Committee has reviewed this case and the patient IS APPROVED for discharge to a facility for Short Term Skilled once the following procedure is followed:     - The physician discharge instructions (contained within the KAREN note for SNF) must inlcude the below appropriate and approved COVID instructions to the facility    For questions regarding CLRC approval process, please contact the CM assigned to the case.  For questions regarding RN discharge workflow, please contact the unit Clinical Leader.

## 2025-04-14 NOTE — PLAN OF CARE
Problem: Patient Centered Care  Goal: Patient preferences are identified and integrated in the patient's plan of care  Description: Interventions:- What would you like us to know as we care for you? From home with sister - Provide timely, complete, and accurate information to patient/family- Incorporate patient and family knowledge, values, beliefs, and cultural backgrounds into the planning and delivery of care- Encourage patient/family to participate in care and decision-making at the level they choose- Honor patient and family perspectives and choices  Outcome: Progressing     Problem: Patient/Family Goals  Goal: Patient/Family Long Term Goal  Description: Patient's Long Term Goal: Discharge home   Interventions:  - IV abx   - O2 as needed  - Monitor VS  - Follow MD orders  - See additional Care Plan goals for specific interventions  Outcome: Progressing  Goal: Patient/Family Short Term Goal  Description: Patient's Short Term Goal: Relief of abdominal pain  Interventions:   - Pain meds as needed  - Monitor VS  - Follow MD orders  - See additional Care Plan goals for specific interventions  Outcome: Progressing     Problem: RESPIRATORY - ADULT  Goal: Achieves optimal ventilation and oxygenation  Description: INTERVENTIONS:- Assess for changes in respiratory status- Assess for changes in mentation and behavior- Position to facilitate oxygenation and minimize respiratory effort- Oxygen supplementation based on oxygen saturation or ABGs- Provide Smoking Cessation handout, if applicable- Encourage broncho-pulmonary hygiene including cough, deep breathe, Incentive Spirometry- Assess the need for suctioning and perform as needed- Assess and instruct to report SOB or any respiratory difficulty- Respiratory Therapy support as indicated- Manage/alleviate anxiety- Monitor for signs/symptoms of CO2 retention  Outcome: Progressing     Problem: GASTROINTESTINAL - ADULT  Goal: Minimal or absence of nausea and  vomiting  Description: INTERVENTIONS:- Maintain adequate hydration with IV or PO as ordered and tolerated- Nasogastric tube to low intermittent suction as ordered- Evaluate effectiveness of ordered antiemetic medications- Provide nonpharmacologic comfort measures as appropriate- Advance diet as tolerated, if ordered- Obtain nutritional consult as needed- Evaluate fluid balance  Outcome: Progressing  Goal: Maintains or returns to baseline bowel function  Description: INTERVENTIONS:- Assess bowel function- Maintain adequate hydration with IV or PO as ordered and tolerated- Evaluate effectiveness of GI medications- Encourage mobilization and activity- Obtain nutritional consult as needed- Establish a toileting routine/schedule- Consider collaborating with pharmacy to review patient's medication profile  Outcome: Progressing  Goal: Maintains adequate nutritional intake (undernourished)  Description: INTERVENTIONS:- Monitor percentage of each meal consumed- Identify factors contributing to decreased intake, treat as appropriate- Assist with meals as needed- Monitor I&O, WT and lab values- Obtain nutritional consult as needed- Optimize oral hygiene and moisture- Encourage food from home; allow for food preferences- Enhance eating environment  Outcome: Progressing  Goal: Achieves appropriate nutritional intake (bariatric)  Description: INTERVENTIONS:- Monitor for over-consumption- Identify factors contributing to increased intake, treat as appropriate- Monitor I&O, WT and lab values- Obtain nutritional consult as needed- Evaluate psychosocial factors contributing to over-consumption  Outcome: Progressing     Problem: GENITOURINARY - ADULT  Goal: Absence of urinary retention  Description: INTERVENTIONS:- Assess patient’s ability to void and empty bladder- Monitor intake/output and perform bladder scan as needed- Follow urinary retention protocol/standard of care- Consider collaborating with pharmacy to review patient's  medication profile- Implement strategies to promote bladder emptying  Outcome: Progressing     Problem: PAIN - ADULT  Goal: Verbalizes/displays adequate comfort level or patient's stated pain goal  Description: INTERVENTIONS:- Encourage pt to monitor pain and request assistance- Assess pain using appropriate pain scale- Administer analgesics based on type and severity of pain and evaluate response- Implement non-pharmacological measures as appropriate and evaluate response- Consider cultural and social influences on pain and pain management- Manage/alleviate anxiety- Utilize distraction and/or relaxation techniques- Monitor for opioid side effects- Notify MD/LIP if interventions unsuccessful or patient reports new pain- Anticipate increased pain with activity and pre-medicate as appropriate  Outcome: Progressing     Problem: SAFETY ADULT - FALL  Goal: Free from fall injury  Description: INTERVENTIONS:- Assess pt frequently for physical needs- Identify cognitive and physical deficits and behaviors that affect risk of falls.- [place] fall precautions as indicated by assessment.- Educate pt/family on patient safety including physical limitations- Instruct pt to call for assistance with activity based on assessment- Modify environment to reduce risk of injury- Provide assistive devices as appropriate- Consider OT/PT consult to assist with strengthening/mobility- Encourage toileting schedule  Outcome: Progressing     Problem: DISCHARGE PLANNING  Goal: Discharge to home or other facility with appropriate resources  Description: INTERVENTIONS:- Identify barriers to discharge w/pt and caregiver- Include patient/family/discharge partner in discharge planning- Arrange for needed discharge resources and transportation as appropriate- Identify discharge learning needs (meds, wound care, etc)- Arrange for interpreters to assist at discharge as needed- Consider post-discharge preferences of patient/family/discharge partner-  Complete POLST form as appropriate- Assess patient's ability to be responsible for managing their own health- Refer to Case Management Department for coordinating discharge planning if the patient needs post-hospital services based on physician/LIP order or complex needs related to functional status, cognitive ability or social support system  Outcome: Progressing

## 2025-04-15 PROBLEM — Z71.89 COUNSELING REGARDING ADVANCE CARE PLANNING AND GOALS OF CARE: Status: ACTIVE | Noted: 2025-04-15

## 2025-04-15 LAB
ANION GAP SERPL CALC-SCNC: 9 MMOL/L (ref 0–18)
BUN BLD-MCNC: 31 MG/DL (ref 9–23)
BUN/CREAT SERPL: 20.3 (ref 10–20)
CALCIUM BLD-MCNC: 7.3 MG/DL (ref 8.7–10.4)
CHLORIDE SERPL-SCNC: 108 MMOL/L (ref 98–112)
CO2 SERPL-SCNC: 26 MMOL/L (ref 21–32)
CREAT BLD-MCNC: 1.53 MG/DL (ref 0.7–1.3)
DEPRECATED RDW RBC AUTO: 44 FL (ref 35.1–46.3)
EGFRCR SERPLBLD CKD-EPI 2021: 44 ML/MIN/1.73M2 (ref 60–?)
ERYTHROCYTE [DISTWIDTH] IN BLOOD BY AUTOMATED COUNT: 14.7 % (ref 11–15)
GLUCOSE BLD-MCNC: 102 MG/DL (ref 70–99)
HCT VFR BLD AUTO: 28.9 % (ref 39–53)
HGB BLD-MCNC: 9.5 G/DL (ref 13–17.5)
MAGNESIUM SERPL-MCNC: 1.5 MG/DL (ref 1.6–2.6)
MCH RBC QN AUTO: 27.1 PG (ref 26–34)
MCHC RBC AUTO-ENTMCNC: 32.9 G/DL (ref 31–37)
MCV RBC AUTO: 82.6 FL (ref 80–100)
OSMOLALITY SERPL CALC.SUM OF ELEC: 303 MOSM/KG (ref 275–295)
PLATELET # BLD AUTO: 364 10(3)UL (ref 150–450)
POTASSIUM SERPL-SCNC: 3.9 MMOL/L (ref 3.5–5.1)
POTASSIUM SERPL-SCNC: 3.9 MMOL/L (ref 3.5–5.1)
RBC # BLD AUTO: 3.5 X10(6)UL (ref 3.8–5.8)
SODIUM SERPL-SCNC: 143 MMOL/L (ref 136–145)
WBC # BLD AUTO: 11.2 X10(3) UL (ref 4–11)

## 2025-04-15 PROCEDURE — 99232 SBSQ HOSP IP/OBS MODERATE 35: CPT | Performed by: INTERNAL MEDICINE

## 2025-04-15 PROCEDURE — 99233 SBSQ HOSP IP/OBS HIGH 50: CPT | Performed by: HOSPITALIST

## 2025-04-15 PROCEDURE — 99232 SBSQ HOSP IP/OBS MODERATE 35: CPT | Performed by: NURSE PRACTITIONER

## 2025-04-15 RX ORDER — TAMSULOSIN HYDROCHLORIDE 0.4 MG/1
0.4 CAPSULE ORAL NIGHTLY
Qty: 30 CAPSULE | Refills: 0 | Status: SHIPPED | OUTPATIENT
Start: 2025-04-15 | End: 2025-05-15

## 2025-04-15 RX ORDER — MAGNESIUM OXIDE 400 MG/1
800 TABLET ORAL ONCE
Status: COMPLETED | OUTPATIENT
Start: 2025-04-15 | End: 2025-04-15

## 2025-04-15 NOTE — CM/SW NOTE
04/15/25 1101   Discharge disposition   Expected discharge disposition subacute   Post Acute Care Provider Wendy Reynoso  ([facility])   Discharge transportation [facility]     Cost of transport is $40- sister is aware.  Leaving at 2pm.  Report is [phone].    Evette Gardiner MBA BSN RN CRRN   RN Case Manager  [phone]

## 2025-04-15 NOTE — PROGRESS NOTES
[facility]  part of [facility]    Progress Note    Justyn Hall Patient Status:  Inpatient    1940 MRN [id number]   Location [facility]5W Attending Denise Huertas MD   Hosp Day # 5 PCP Chong Quintero MD     Chief Complaint: JORDON    SUBJECTIVE:    Patient feeling well.  Tired today.  Seems did not sleep great last night. Tolerating diet.  No n/v/abd pain.  No sob.     10 ROS completed and otherwise negative.    OBJECTIVE:  Vital signs in last 24 hours:  /73 (BP Location: Right arm)   Pulse 78   Temp 98.2 °F (36.8 °C) (Oral)   Resp 20   Ht 6' (1.829 m)   Wt 175 lb 4.3 oz (79.5 kg)   SpO2 100%   BMI 23.77 kg/m²     Intake/Output:    Intake/Output Summary (Last 24 hours) at 4/15/2025 1212  Last data filed at 4/15/2025 1110  Gross per 24 hour   Intake 1316 ml   Output 2900 ml   Net -1584 ml       Wt Readings from Last 3 Encounters:   04/10/25 175 lb 4.3 oz (79.5 kg)   25 168 lb 3.2 oz (76.3 kg)   10/10/19 182 lb (82.6 kg)       Exam     Gen: No acute distress  Pulm: coarse breath sounds improving  CV: Heart with regular rate and rhythm  Abd: Abdomen soft, nontender, bowel sounds present  Neuro: some dysarthria  MSK: moves extremities  Skin: Warm and dry  Psych: Normal affect  Ext: no c/c/e    Data Review:     Labs:   Lab Results   Component Value Date    WBC 11.2 04/15/2025    HGB 9.5 04/15/2025    HCT 28.9 04/15/2025    .0 04/15/2025    CREATSERUM 1.53 04/15/2025    BUN 31 04/15/2025     04/15/2025    K 3.9 04/15/2025    K 3.9 04/15/2025     04/15/2025    CO2 26.0 04/15/2025     04/15/2025    CA 7.3 04/15/2025    MG 1.5 04/15/2025       Imaging: Reviewed    No results found.      Meds:   Current Hospital Medications[1]      Assessment  Problem List[2]    Plan:     Obstructive uropathy with JORDON  BPH  Hydronephrosis  - seen by urology and nephrology  - started IVFs  - santiago placed   - cont flomax   - cr improving  -will d/w urology  timing to repeat ultrasound  -decath trial in 7-10 days per urology  -santiago placed on 4/10 in ED    Aspiration pneumonia  Hemoptysis  - failed swallow eval initially but now passed and will start modified diet  - at this time place dobhoff - hold tube feeds now and see how he does with diet - tolerating  - no further hemoptysis noted  - cont IV abx - stop on dc  - pulm following for pneumonia    Constipation  Abnormal CT chest  Anemia - CHRISTINA  - gi following  - cont ppi bid  - place DHT 4/11 - now removed  - miralax bid  - IV iron given    HTN  - bp running low at this time, hold bp meds for now    Parkinsons disease  - cont home meds with DHT placed    Global A/P  - reviewed labs and vitals from today  - reviewed notes of the day  - cbc, bmp, mag ordered for tomorrow  - discussed need to stay in the hospital today due to above  - cont IV abx  - discussed with patient/RN and care team  - dispo pending clinical course    Addendum: Patient got up and felt dizzy - found to be hypotensive, will cancel dc and give IVFs.       Supplementary Documentation:   DVT Mechanical Prophylaxis:        DVT Pharmacologic Prophylaxis   Medication    heparin (Porcine) 5000 UNIT/ML injection 5,000 Units                Code Status: DNAR/Selective Treatment  Santiago: Santiago catheter in place  Santiago Duration (in days): 2  Central line:    ELISE: 4/15/2025        **Certification      PHYSICIAN Certification of Need for Inpatient Hospitalization - Initial Certification    Patient will require inpatient services that will reasonably be expected to span two midnight's based on the clinical documentation in H+P.   Based on patients current state of illness, I anticipate that, after discharge, patient will require TBD.                   MDM: High complexity- acute illness posing a threat to life. IV meds requiring close inpatient monitoring. I personally spent time on chart/note review, review of labs/imaging, discussion with patient, physical exam,  discussion with staff, writing progress note, and discussion of plan of care.             [1]   Current Facility-Administered Medications   Medication Dose Route Frequency    ampicillin-sulbactam (Unasyn) 3 g in sodium chloride 0.9% 100mL IVPB-ADD  3 g Intravenous q6h    heparin (Porcine) 5000 UNIT/ML injection 5,000 Units  5,000 Units Subcutaneous 2 times per day    pantoprazole (Protonix) DR tab 40 mg  40 mg Oral QAM AC    ipratropium-albuterol (Duoneb) 0.5-2.5 (3) MG/3ML inhalation solution 3 mL  3 mL Nebulization QID    acetaminophen (Tylenol Extra Strength) tab 500 mg  500 mg Oral Q4H PRN    ondansetron (Zofran) 4 MG/2ML injection 4 mg  4 mg Intravenous Q6H PRN    carbidopa-levodopa (SINEMET)  MG per tab 0.5 tablet  0.5 tablet Oral TID    carvedilol (Coreg) tab 6.25 mg  6.25 mg Oral BID with meals    metoclopramide (Reglan) 5 mg/mL injection 10 mg  10 mg Intravenous Daily PRN    tamsulosin (Flomax) cap 0.4 mg  0.4 mg Oral Nightly    polyethylene glycol (PEG 3350) (Miralax) 17 g oral packet 17 g  17 g Oral BID   [2]   Patient Active Problem List  Diagnosis    Elevated PSA    Hypercholesterolemia    Contusion of left hip, initial encounter    Contusion of left shoulder, initial encounter    Left hip pain    Parkinsonism (HCC)    Urinary retention    Abdominal pain, acute    Acute renal failure, unspecified acute renal failure type    Community acquired pneumonia, unspecified laterality    Hyperkalemia    Anemia, unspecified type    Obstructed, uropathy    Dysphagia    Palliative care by specialist    Constipation    Leukocytosis    History of recent fall

## 2025-04-15 NOTE — PLAN OF CARE
Problem: Patient Centered Care  Goal: Patient preferences are identified and integrated in the patient's plan of care  Description: Interventions:- What would you like us to know as we care for you? From home with sister - Provide timely, complete, and accurate information to patient/family- Incorporate patient and family knowledge, values, beliefs, and cultural backgrounds into the planning and delivery of care- Encourage patient/family to participate in care and decision-making at the level they choose- Honor patient and family perspectives and choices  Outcome: Progressing     Problem: Patient/Family Goals  Goal: Patient/Family Long Term Goal  Description: Patient's Long Term Goal: Discharge home   Interventions:  - IV abx   - O2 as needed  - Monitor VS  - Follow MD orders  - See additional Care Plan goals for specific interventions  Outcome: Progressing  Goal: Patient/Family Short Term Goal  Description: Patient's Short Term Goal: Relief of abdominal pain  Interventions: -   - IV abx   - Pain meds as needed  - Monitor VS  - Follow MD orders  - See additional Care Plan goals for specific interventions  Outcome: Progressing     Problem: RESPIRATORY - ADULT  Goal: Achieves optimal ventilation and oxygenation  Description: INTERVENTIONS:- Assess for changes in respiratory status- Assess for changes in mentation and behavior- Position to facilitate oxygenation and minimize respiratory effort- Oxygen supplementation based on oxygen saturation or ABGs- Provide Smoking Cessation handout, if applicable- Encourage broncho-pulmonary hygiene including cough, deep breathe, Incentive Spirometry- Assess the need for suctioning and perform as needed- Assess and instruct to report SOB or any respiratory difficulty- Respiratory Therapy support as indicated- Manage/alleviate anxiety- Monitor for signs/symptoms of CO2 retention  Outcome: Progressing     Problem: GASTROINTESTINAL - ADULT  Goal: Minimal or absence of nausea and  vomiting  Description: INTERVENTIONS:- Maintain adequate hydration with IV or PO as ordered and tolerated- Nasogastric tube to low intermittent suction as ordered- Evaluate effectiveness of ordered antiemetic medications- Provide nonpharmacologic comfort measures as appropriate- Advance diet as tolerated, if ordered- Obtain nutritional consult as needed- Evaluate fluid balance  Outcome: Progressing  Goal: Maintains or returns to baseline bowel function  Description: INTERVENTIONS:- Assess bowel function- Maintain adequate hydration with IV or PO as ordered and tolerated- Evaluate effectiveness of GI medications- Encourage mobilization and activity- Obtain nutritional consult as needed- Establish a toileting routine/schedule- Consider collaborating with pharmacy to review patient's medication profile  Outcome: Progressing  Goal: Maintains adequate nutritional intake (undernourished)  Description: INTERVENTIONS:- Monitor percentage of each meal consumed- Identify factors contributing to decreased intake, treat as appropriate- Assist with meals as needed- Monitor I&O, WT and lab values- Obtain nutritional consult as needed- Optimize oral hygiene and moisture- Encourage food from home; allow for food preferences- Enhance eating environment  Outcome: Progressing  Goal: Achieves appropriate nutritional intake (bariatric)  Description: INTERVENTIONS:- Monitor for over-consumption- Identify factors contributing to increased intake, treat as appropriate- Monitor I&O, WT and lab values- Obtain nutritional consult as needed- Evaluate psychosocial factors contributing to over-consumption  Outcome: Progressing     Problem: GENITOURINARY - ADULT  Goal: Absence of urinary retention  Description: INTERVENTIONS:- Assess patient’s ability to void and empty bladder- Monitor intake/output and perform bladder scan as needed- Follow urinary retention protocol/standard of care- Consider collaborating with pharmacy to review patient's  medication profile- Implement strategies to promote bladder emptying  Outcome: Progressing     Problem: PAIN - ADULT  Goal: Verbalizes/displays adequate comfort level or patient's stated pain goal  Description: INTERVENTIONS:- Encourage pt to monitor pain and request assistance- Assess pain using appropriate pain scale- Administer analgesics based on type and severity of pain and evaluate response- Implement non-pharmacological measures as appropriate and evaluate response- Consider cultural and social influences on pain and pain management- Manage/alleviate anxiety- Utilize distraction and/or relaxation techniques- Monitor for opioid side effects- Notify MD/LIP if interventions unsuccessful or patient reports new pain- Anticipate increased pain with activity and pre-medicate as appropriate  Outcome: Progressing     Problem: SAFETY ADULT - FALL  Goal: Free from fall injury  Description: INTERVENTIONS:- Assess pt frequently for physical needs- Identify cognitive and physical deficits and behaviors that affect risk of falls.- [place] fall precautions as indicated by assessment.- Educate pt/family on patient safety including physical limitations- Instruct pt to call for assistance with activity based on assessment- Modify environment to reduce risk of injury- Provide assistive devices as appropriate- Consider OT/PT consult to assist with strengthening/mobility- Encourage toileting schedule  Outcome: Progressing     Problem: DISCHARGE PLANNING  Goal: Discharge to home or other facility with appropriate resources  Description: INTERVENTIONS:- Identify barriers to discharge w/pt and caregiver- Include patient/family/discharge partner in discharge planning- Arrange for needed discharge resources and transportation as appropriate- Identify discharge learning needs (meds, wound care, etc)- Arrange for interpreters to assist at discharge as needed- Consider post-discharge preferences of patient/family/discharge partner-  Complete POLST form as appropriate- Assess patient's ability to be responsible for managing their own health- Refer to Case Management Department for coordinating discharge planning if the patient needs post-hospital services based on physician/LIP order or complex needs related to functional status, cognitive ability or social support system  Outcome: Progressing
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
## 2025-04-15 NOTE — PROGRESS NOTES
[facility]  part of [facility]    Progress Note    Justyn Hall Patient Status:  Inpatient    1940 MRN [id number]   Location [facility]5W Attending Denise Huertas MD   Hosp Day # 5 PCP Chong Quintero MD       Subjective:   Justyn Hall is a(n) 84 year old male who I am seeing for JORDON    No new issues        Objective:   /73 (BP Location: Right arm)   Pulse 78   Temp 98.2 °F (36.8 °C) (Oral)   Resp 20   Ht 6' (1.829 m)   Wt 175 lb 4.3 oz (79.5 kg)   SpO2 100%   BMI 23.77 kg/m²      Intake/Output Summary (Last 24 hours) at 4/15/2025 1031  Last data filed at 4/15/2025 1020  Gross per 24 hour   Intake 1298 ml   Output 2900 ml   Net -1602 ml     Wt Readings from Last 1 Encounters:   04/10/25 175 lb 4.3 oz (79.5 kg)       Exam  Vital signs: Blood pressure 154/73, pulse 78, temperature 98.2 °F (36.8 °C), temperature source Oral, resp. rate 20, height 6' (1.829 m), weight 175 lb 4.3 oz (79.5 kg), SpO2 100%.    General: No acute distress. Alert and oriented x 3.  HEENT: Moist mucous membranes. EOMI. PERRLA  Neck:  No JVD.   Respiratory: Clear to auscultation bilaterally.    Cardiovascular: S1, S2.  Regular rate and rhythm.   Abdomen: Soft, nontender, nondistended.    Neurologic: No focal neurological deficits.  Musculoskeletal: Full range of motion of all extremities.  No swelling noted.  Access:  Has a Espinal catheter    Results:     Recent Labs   Lab 25  0428 25  0431 25  0500 04/15/25  0414   RBC 3.88 3.48* 3.25* 3.50*   HGB 10.5* 9.9* 8.8* 9.5*   HCT 32.0* 29.2* 28.0* 28.9*   MCV 82.5 83.9 86.2 82.6   MCH 27.1 28.4 27.1 27.1   MCHC 32.8 33.9 31.4 32.9   RDW 15.6* 15.3* 14.8 14.7   NEPRELIM 8.92* 9.07* 6.27  --    WBC 11.6* 12.8* 10.6 11.2*   .0 341.0 350.0 364.0         Recent Labs   Lab 25  0431 25  0500 04/15/25  0414   * 115* 102*   BUN 61* 38* 31*   CREATSERUM 2.15* 1.74* 1.53*   CA 7.3* 7.2* 7.3*   * 140  143   K 4.0 3.4* 3.9  3.9   * 107 108   CO2 24.0 25.0 26.0          No results found.    Assessment and Plan:     Impression:    JORDON secondary to obstructive uropathy.  Has a Espinal with improving creatinine  Obstructive uropathy/ BPH , urology was consulted.  Has a Espinal.   Hypernatremia secondary to post ATN diuresis and decreased p.o. intake.  Stop D5W and will check labs in a.m. taking p.o. now  Parkinson's disease, continue medication      Plan:    Cr continues to get better. Sodium is stable without d5w  Ok to dc from renal perspective  Encourage inc PO intake      Thank you very much for allowing me to participate in the care of your patient.  If you have any questions, please do not hesitate to contact me.     Becky Duke MD

## 2025-04-15 NOTE — PLAN OF CARE
Problem: Patient Centered Care  Goal: Patient preferences are identified and integrated in the patient's plan of care  Description: Interventions:- What would you like us to know as we care for you? From home with sister - Provide timely, complete, and accurate information to patient/family- Incorporate patient and family knowledge, values, beliefs, and cultural backgrounds into the planning and delivery of care- Encourage patient/family to participate in care and decision-making at the level they choose- Honor patient and family perspectives and choices  Outcome: Progressing     Problem: Patient/Family Goals  Goal: Patient/Family Long Term Goal  Description: Patient's Long Term Goal: Discharge home   Interventions:  - IV abx   - O2 as needed  - Monitor VS  - Follow MD orders  - See additional Care Plan goals for specific interventions  Outcome: Progressing  Goal: Patient/Family Short Term Goal  Description: Patient's Short Term Goal: Relief of abdominal pain  Interventions: -   - IV abx   - Pain meds as needed  - Monitor VS  - Follow MD orders  - See additional Care Plan goals for specific interventions  Outcome: Progressing     Problem: RESPIRATORY - ADULT  Goal: Achieves optimal ventilation and oxygenation  Description: INTERVENTIONS:- Assess for changes in respiratory status- Assess for changes in mentation and behavior- Position to facilitate oxygenation and minimize respiratory effort- Oxygen supplementation based on oxygen saturation or ABGs- Provide Smoking Cessation handout, if applicable- Encourage broncho-pulmonary hygiene including cough, deep breathe, Incentive Spirometry- Assess the need for suctioning and perform as needed- Assess and instruct to report SOB or any respiratory difficulty- Respiratory Therapy support as indicated- Manage/alleviate anxiety- Monitor for signs/symptoms of CO2 retention  Outcome: Progressing     Problem: GASTROINTESTINAL - ADULT  Goal: Minimal or absence of nausea and  vomiting  Description: INTERVENTIONS:- Maintain adequate hydration with IV or PO as ordered and tolerated- Nasogastric tube to low intermittent suction as ordered- Evaluate effectiveness of ordered antiemetic medications- Provide nonpharmacologic comfort measures as appropriate- Advance diet as tolerated, if ordered- Obtain nutritional consult as needed- Evaluate fluid balance  Outcome: Progressing  Goal: Maintains or returns to baseline bowel function  Description: INTERVENTIONS:- Assess bowel function- Maintain adequate hydration with IV or PO as ordered and tolerated- Evaluate effectiveness of GI medications- Encourage mobilization and activity- Obtain nutritional consult as needed- Establish a toileting routine/schedule- Consider collaborating with pharmacy to review patient's medication profile  Outcome: Progressing  Goal: Maintains adequate nutritional intake (undernourished)  Description: INTERVENTIONS:- Monitor percentage of each meal consumed- Identify factors contributing to decreased intake, treat as appropriate- Assist with meals as needed- Monitor I&O, WT and lab values- Obtain nutritional consult as needed- Optimize oral hygiene and moisture- Encourage food from home; allow for food preferences- Enhance eating environment  Outcome: Progressing  Goal: Achieves appropriate nutritional intake (bariatric)  Description: INTERVENTIONS:- Monitor for over-consumption- Identify factors contributing to increased intake, treat as appropriate- Monitor I&O, WT and lab values- Obtain nutritional consult as needed- Evaluate psychosocial factors contributing to over-consumption  Outcome: Progressing     Problem: GENITOURINARY - ADULT  Goal: Absence of urinary retention  Description: INTERVENTIONS:- Assess patient’s ability to void and empty bladder- Monitor intake/output and perform bladder scan as needed- Follow urinary retention protocol/standard of care- Consider collaborating with pharmacy to review patient's  medication profile- Implement strategies to promote bladder emptying  Outcome: Progressing     Problem: PAIN - ADULT  Goal: Verbalizes/displays adequate comfort level or patient's stated pain goal  Description: INTERVENTIONS:- Encourage pt to monitor pain and request assistance- Assess pain using appropriate pain scale- Administer analgesics based on type and severity of pain and evaluate response- Implement non-pharmacological measures as appropriate and evaluate response- Consider cultural and social influences on pain and pain management- Manage/alleviate anxiety- Utilize distraction and/or relaxation techniques- Monitor for opioid side effects- Notify MD/LIP if interventions unsuccessful or patient reports new pain- Anticipate increased pain with activity and pre-medicate as appropriate  Outcome: Progressing     Problem: SAFETY ADULT - FALL  Goal: Free from fall injury  Description: INTERVENTIONS:- Assess pt frequently for physical needs- Identify cognitive and physical deficits and behaviors that affect risk of falls.- Beacon fall precautions as indicated by assessment.- Educate pt/family on patient safety including physical limitations- Instruct pt to call for assistance with activity based on assessment- Modify environment to reduce risk of injury- Provide assistive devices as appropriate- Consider OT/PT consult to assist with strengthening/mobility- Encourage toileting schedule  Outcome: Progressing     Problem: DISCHARGE PLANNING  Goal: Discharge to home or other facility with appropriate resources  Description: INTERVENTIONS:- Identify barriers to discharge w/pt and caregiver- Include patient/family/discharge partner in discharge planning- Arrange for needed discharge resources and transportation as appropriate- Identify discharge learning needs (meds, wound care, etc)- Arrange for interpreters to assist at discharge as needed- Consider post-discharge preferences of patient/family/discharge partner-  Complete POLST form as appropriate- Assess patient's ability to be responsible for managing their own health- Refer to Case Management Department for coordinating discharge planning if the patient needs post-hospital services based on physician/LIP order or complex needs related to functional status, cognitive ability or social support system  Outcome: Progressing

## 2025-04-15 NOTE — PROGRESS NOTES
[facility]  part of [facility]     Progress Note    Justyn Hall Patient Status:  Inpatient    1940 MRN [id number]   Location [facility]5W Attending Denise Huertas MD   Hosp Day # 5 PCP Chong Quintero MD       Subjective:   Patient seen and examined.  Occasional cough.  Denies significant dyspnea    Objective:   Blood pressure 92/56, pulse 81, temperature 97.3 °F (36.3 °C), temperature source Oral, resp. rate 20, height 6' (1.829 m), weight 175 lb 4.3 oz (79.5 kg), SpO2 97%.  Intake/Output:   Last 3 shifts: I/O last 3 completed shifts:  In: 1736 [P.O.:1116; I.V.:20; IV PIGGYBACK:600]  Out: 4825 [Urine:4825]   This shift: I/O this shift:  In: 236 [P.O.:236]  Out: 700 [Urine:700]     Vent Settings:      Hemodynamic parameters (last 24 hours):      Scheduled Meds: Current Hospital Medications[1]    Continuous Infusions: Medication Infusions[2]    Physical Exam  Constitutional: no acute distress  Eyes: PERRL  ENT: nares pateint  Neck: supple, no JVD  Cardio: RRR, S1 S2  Respiratory: Basilar crackles  GI: abdomen soft, non tender, active bowel sounds, no organomegaly  Extremities: no clubbing, cyanosis, edema  Neurologic: no gross motor deficits  Skin: warm, dry      Results:     Lab Results   Component Value Date    WBC 11.2 04/15/2025    HGB 9.5 04/15/2025    HCT 28.9 04/15/2025    .0 04/15/2025    CREATSERUM 1.53 04/15/2025    BUN 31 04/15/2025     04/15/2025    K 3.9 04/15/2025    K 3.9 04/15/2025     04/15/2025    CO2 26.0 04/15/2025     04/15/2025    CA 7.3 04/15/2025    MG 1.5 04/15/2025       No results found.              Assessment   1.  Acute kidney injury  2.  Hyperkalemia, resolved  3.  Hemoptysis  4.  Hypertension  5.  Recent fall with left hip and shoulder contusion  6.  Leukocytosis  7.  Left lower lobe pneumonia  8.  Esophagitis  9.  Dysphagia  10.  Parkinson's     Plan   -Patient presents with evidence of lower quadrant abdominal  discomfort with decreased urination and hemoptysis after discharge on 4/6/2025.  Renal function improving.  - Chest x-ray with bibasilar opacities seen.  -CT chest on 4/10/2025 with wall thickening and stranding seen around upper esophagus.  Mucous plugging seen in basilar airways most pronounced left lower lobe.  - No additional hemoptysis since arrival to hospital.  Closely monitor  - Further recommendations per nephrology for management of acute kidney injury and hyperkalemia.  Significant output after Espinal catheter placed.  Concern for urinary obstruction.  - Completed course of antibiotic therapy  - Further recommendations per GI.   - DVT prophylaxis: Heparin    Tony Chauhan,   Pulmonary Critical Care Medicine  [facility]          [1]   Current Facility-Administered Medications   Medication Dose Route Frequency    sodium chloride 0.9 % IV bolus 500 mL  500 mL Intravenous Once    ampicillin-sulbactam (Unasyn) 3 g in sodium chloride 0.9% 100mL IVPB-ADD  3 g Intravenous q6h    heparin (Porcine) 5000 UNIT/ML injection 5,000 Units  5,000 Units Subcutaneous 2 times per day    pantoprazole (Protonix) DR tab 40 mg  40 mg Oral QAM AC    ipratropium-albuterol (Duoneb) 0.5-2.5 (3) MG/3ML inhalation solution 3 mL  3 mL Nebulization QID    acetaminophen (Tylenol Extra Strength) tab 500 mg  500 mg Oral Q4H PRN    ondansetron (Zofran) 4 MG/2ML injection 4 mg  4 mg Intravenous Q6H PRN    carbidopa-levodopa (SINEMET)  MG per tab 0.5 tablet  0.5 tablet Oral TID    carvedilol (Coreg) tab 6.25 mg  6.25 mg Oral BID with meals    metoclopramide (Reglan) 5 mg/mL injection 10 mg  10 mg Intravenous Daily PRN    tamsulosin (Flomax) cap 0.4 mg  0.4 mg Oral Nightly    polyethylene glycol (PEG 3350) (Miralax) 17 g oral packet 17 g  17 g Oral BID   [2]

## 2025-04-15 NOTE — SLP NOTE
SPEECH DAILY NOTE - INPATIENT    ASSESSMENT & PLAN   ASSESSMENT      Proper PPE worn. Hands sanitized upon entrance/exit Pt room.         Pt alert, afebrile and on room air. Pt seen for swallow analysis per ongoing swallow recommendations (after consulting with RN). Pt agreed to participate. Pt's preferred method of learning verbal.  Pt upright in bed; observed with current diet of soft ETC (Pt's preference)/thin liquids for monitoring diet tolerance. Swallowing precautions/strategies discussed; mild cues needed for execution. Functional bite reflex/mastication/lingual skills on soft ETC food. No significant oral retention. Pharyngeal response appeared to trigger within 1-2 sec per hyolaryngeal elevation to completion (functional rise/strength per palpation). No immediate overt CSA on soft ETC/thin liquids. One episode delayed cough end of session. Collaborated with RN regarding Pt's swallowing plan of care. Per RN, Pt with tolerance of current diet. No report of difficulty taking meds. No new CXR completed since 4/11/25. Sp02 ~99% during this session. Call light within Pt's reach upon SLP discharge from room.      CXR 4/11/25:  Findings and impression:  Enteric tube is in the mid stomach directed distally     PLAN:    To f/u x1 session to ensure safe intake of diet and reinforce swallowing/aspiration precautions. To monitor for new CXR results. Family education as available.        Diet Recommendations - Solids: Soft/ Easy to chew  Diet Recommendations - Liquids: Thin Liquids    Compensatory Strategies Recommended: Alternate consistencies, Multiple swallows  Aspiration Precautions: Upright position, Slow rate, No straw  Medication Administration Recommendations: Whole in puree    Patient Experiencing Pain: No  Treatment Plan  Treatment Plan/Recommendations: Aspiration precautions (Diet check)  Interdisciplinary Communication: Discussed with RN  Plan posted at bedside      GOALS  Goal #1 The patient will tolerate  puree consistency and moderately-thick liquids without overt signs or symptoms of aspiration with 100 % accuracy over 2 session(s).   Met   Goal #2 The patient/family/caregiver will demonstrate understanding and implementation of aspiration precautions and swallow strategies independently over 2 session(s).    Swallowing precautions posted in room.      In Progress   Goal #3 The patient will tolerate trial upgrade of minced/moist consistency and mildly-thick liquids without overt signs or symptoms of aspiration with 100 % accuracy over 2 session(s).    Met   Goal #4 The patient will utilize compensatory strategies as outlined by  BSSE (clinical evaluation) including Slow rate, Small bites, Small sips, Multiple swallows, Alternate liquids/solids, No straws, Upright 90 degrees, Upright 90 degrees 30 mins after meal with minimal assistance 100 % of the time across 2 sessions.     Mild cues for multiple swallows.       In Progress   Goal #5 The patient will soft/easy to chew consistency and thin liquids without overt signs or symptoms of aspiration with 100 % accuracy over 2 session(s).    Delayed cough end of session.    In progress     FOLLOW UP  Follow Up Needed (Documentation Required): Yes  SLP Follow-up Date: 04/16/25  Duration: 1 week    Session: 3 S/P RE-BSe    If you have any questions, please contact   Ibis Guillen M.S. [facility]/SLP  Speech-Language Pathologist  [phone]

## 2025-04-16 ENCOUNTER — APPOINTMENT (OUTPATIENT)
Dept: ULTRASOUND IMAGING | Facility: HOSPITAL | Age: 85
End: 2025-04-16
Attending: PHYSICIAN ASSISTANT
Payer: MEDICARE

## 2025-04-16 LAB
ANION GAP SERPL CALC-SCNC: 11 MMOL/L (ref 0–18)
BUN BLD-MCNC: 32 MG/DL (ref 9–23)
BUN/CREAT SERPL: 21.8 (ref 10–20)
CALCIUM BLD-MCNC: 7.3 MG/DL (ref 8.7–10.4)
CHLORIDE SERPL-SCNC: 110 MMOL/L (ref 98–112)
CO2 SERPL-SCNC: 24 MMOL/L (ref 21–32)
CREAT BLD-MCNC: 1.47 MG/DL (ref 0.7–1.3)
DEPRECATED RDW RBC AUTO: 42.2 FL (ref 35.1–46.3)
EGFRCR SERPLBLD CKD-EPI 2021: 46 ML/MIN/1.73M2 (ref 60–?)
ERYTHROCYTE [DISTWIDTH] IN BLOOD BY AUTOMATED COUNT: 14.5 % (ref 11–15)
GLUCOSE BLD-MCNC: 99 MG/DL (ref 70–99)
HCT VFR BLD AUTO: 29.1 % (ref 39–53)
HGB BLD-MCNC: 9.5 G/DL (ref 13–17.5)
MAGNESIUM SERPL-MCNC: 1.5 MG/DL (ref 1.6–2.6)
MCH RBC QN AUTO: 26.8 PG (ref 26–34)
MCHC RBC AUTO-ENTMCNC: 32.6 G/DL (ref 31–37)
MCV RBC AUTO: 82.2 FL (ref 80–100)
OSMOLALITY SERPL CALC.SUM OF ELEC: 307 MOSM/KG (ref 275–295)
PLATELET # BLD AUTO: 375 10(3)UL (ref 150–450)
POTASSIUM SERPL-SCNC: 3.7 MMOL/L (ref 3.5–5.1)
RBC # BLD AUTO: 3.54 X10(6)UL (ref 3.8–5.8)
SODIUM SERPL-SCNC: 145 MMOL/L (ref 136–145)
WBC # BLD AUTO: 12.3 X10(3) UL (ref 4–11)

## 2025-04-16 PROCEDURE — 76770 US EXAM ABDO BACK WALL COMP: CPT | Performed by: PHYSICIAN ASSISTANT

## 2025-04-16 PROCEDURE — 99232 SBSQ HOSP IP/OBS MODERATE 35: CPT | Performed by: INTERNAL MEDICINE

## 2025-04-16 PROCEDURE — 99233 SBSQ HOSP IP/OBS HIGH 50: CPT | Performed by: HOSPITALIST

## 2025-04-16 RX ORDER — MAGNESIUM OXIDE 400 MG/1
800 TABLET ORAL ONCE
Status: COMPLETED | OUTPATIENT
Start: 2025-04-16 | End: 2025-04-16

## 2025-04-16 RX ORDER — MIDODRINE HYDROCHLORIDE 2.5 MG/1
2.5 TABLET ORAL 3 TIMES DAILY
Status: DISCONTINUED | OUTPATIENT
Start: 2025-04-16 | End: 2025-04-17

## 2025-04-16 RX ORDER — METOCLOPRAMIDE HYDROCHLORIDE 5 MG/ML
5 INJECTION INTRAMUSCULAR; INTRAVENOUS DAILY PRN
Status: DISCONTINUED | OUTPATIENT
Start: 2025-04-16 | End: 2025-04-17

## 2025-04-16 NOTE — PROGRESS NOTES
Update:    I was called to the room with lightheadedness and hypotension, BP 62/50. Pt assisted back to bed. Placed in [place], after 10 minutes and 250 mL fluid bolus BP improved to 127/74. Pt symptomatically improved. Held PO coreg. Abd binder order.   Discussed with Dr. Nair. Add midodrine 2.5 mg TID.   Discussed with urology, ok to stop flomax if orthostatic hypotension continues can dc.

## 2025-04-16 NOTE — SLP NOTE
SPEECH DAILY NOTE - INPATIENT    ASSESSMENT & PLAN   ASSESSMENT  PPE REQUIRED. THIS THERAPIST WORE GLOVES FOR DURATION OF SESSION. HANDS WASHED UPON ENTRANCE/EXIT.    SLP f/u for ongoing dysphagia tx/meal assessment per recommendations of soft easy to chew/thin liquids per swallow f/u. RN reports pt tolerates diet and medication well with no overt clinical s/s aspiration. Pt denies any swallowing challenges.     Pt positioned upright in bedside chair, alert/cooperative/sister present. Pt afebrile, tolerating room air with oxygen status 96% prior to the start of oral trials. SLP reviewed aspiration precautions and safe swallowing compensatory strategies with the patient. Safe swallow guidelines remain written on the white board in purple. Patient and family v/u. Provided no assistance, pt tolerates soft easy to chew consistency and thin liquids via cup with no overt clinical signs/symptoms of aspiration. Pt presented with trials of hard solids. Pt with adequate oral acceptance and bilabial seal. Pt with intact bite, prolonged mastication, and delayed A/P transfer. Pharyngeal swallow response appears delayed with reduced laryngeal elevation/excursion. No clinical signs of aspiration (e.g., immediate/delayed throat clear, immediate/delayed cough, wet vocal quality, increased O2 effort) observed across all trials. Oxygen status remained stable t/o the entire session. Recommend upgrade to regular consistency and remain on thin liquids with adherence to aspiration precautions and swallow strategies. No further swallow services warranted at this time. Please re consult if needed. RN alerted with results and recommendations.     MOST RECENT CXR 4/11   Findings and impression:  Enteric tube is in the mid stomach directed distally       Diet Recommendations - Solids: Regular  Diet Recommendations - Liquids: Thin Liquids    Compensatory Strategies Recommended: Alternate consistencies, Multiple swallows  Aspiration Precautions:  Upright position, Slow rate, No straw  Medication Administration Recommendations: Whole in puree    Patient Experiencing Pain: No              Treatment Plan  Treatment Plan/Recommendations: Aspiration precautions, No further inpatient SLP service warranted    Interdisciplinary Communication: Plan posted at bedside          GOALS  Goal #1 The patient will tolerate regular consistency and thin liquids without overt signs or symptoms of aspiration with 100 % accuracy over 2 session(s).   Revised/met    Goal #2 The patient/family/caregiver will demonstrate understanding and implementation of aspiration precautions and swallow strategies independently over 2 session(s).     Met 4/16   Goal #3 The patient will tolerate trial upgrade of minced/moist consistency and mildly-thick liquids without overt signs or symptoms of aspiration with 100 % accuracy over 2 session(s).    Met   Goal #4 The patient will utilize compensatory strategies as outlined by  BSSE (clinical evaluation) including Slow rate, Small bites, Small sips, Multiple swallows, Alternate liquids/solids, No straws, Upright 90 degrees, Upright 90 degrees 30 mins after meal with minimal assistance 100 % of the time across 2 sessions.     In Progress   Goal #5 The patient will soft/easy to chew consistency and thin liquids without overt signs or symptoms of aspiration with 100 % accuracy over 2 session(s).   Met      FOLLOW UP  Follow Up Needed (Documentation Required): No  SLP Follow-up Date: 04/16/25  Duration: 1 week    Session: 4    If you have any questions, please contact RAMON Rutherford M.S. CCC-SLP  Speech Language Pathologist  Phone Number Ext. [phone]

## 2025-04-16 NOTE — PROGRESS NOTES
[facility]  part of [facility]    Progress Note    Justyn Hall Patient Status:  Inpatient    1940 MRN [id number]   Location [facility]5W Attending Denise Huertas MD   Hosp Day # 6 PCP Chong Quintero MD     Chief Complaint: JORDON    SUBJECTIVE:    Patient feeling well.  Sitting in chair for 2 hrs became hypotensive  Tolerating diet.  No n/v/abd pain.  No sob.     10 ROS completed and otherwise negative.    OBJECTIVE:  Vital signs in last 24 hours:  /65 (BP Location: Right arm)   Pulse 78   Temp 98.4 °F (36.9 °C) (Oral)   Resp 18   Ht 6' (1.829 m)   Wt 175 lb 4.3 oz (79.5 kg)   SpO2 99%   BMI 23.77 kg/m²     Intake/Output:    Intake/Output Summary (Last 24 hours) at 2025 1802  Last data filed at 2025 1740  Gross per 24 hour   Intake 1247 ml   Output 2001 ml   Net -754 ml       Wt Readings from Last 3 Encounters:   04/10/25 175 lb 4.3 oz (79.5 kg)   25 168 lb 3.2 oz (76.3 kg)   10/10/19 182 lb (82.6 kg)       Exam     Gen: No acute distress  Pulm: coarse breath sounds improving  CV: Heart with regular rate and rhythm  Abd: Abdomen soft, nontender, bowel sounds present  Neuro: some dysarthria  MSK: moves extremities  Skin: Warm and dry  Psych: Normal affect  Ext: no c/c/e    Data Review:     Labs:   Lab Results   Component Value Date    WBC 12.3 2025    HGB 9.5 2025    HCT 29.1 2025    .0 2025    CREATSERUM 1.47 2025    BUN 32 2025     2025    K 3.7 2025     2025    CO2 24.0 2025    GLU 99 2025    CA 7.3 2025    MG 1.5 2025       Imaging: Reviewed    No results found.      Meds:   Current Hospital Medications[1]      Assessment  Problem List[2]    Plan:     Obstructive uropathy with JORDON  BPH  Hydronephrosis  - seen by urology and nephrology  - started IVFs  - santiago placed   - cont flomax   - cr improving  -will d/w urology timing to repeat  ultrasound  -decath trial in 7-10 days per urology  -santiago placed on 4/10 in ED    Aspiration pneumonia  Hemoptysis  - failed swallow eval initially but now passed and will start modified diet  - at this time place dobhoff - hold tube feeds now and see how he does with diet - tolerating  - no further hemoptysis noted  - cont IV abx - stop on dc  - pulm following for pneumonia    Constipation  Abnormal CT chest  Anemia - CHRISTINA  - gi following  - cont ppi bid  - place DHT 4/11 - now removed  - miralax bid  - IV iron given    Orthostatic hypotension  Underlying autonomic dysfunction  -Given IV fluid bolus  -Held p.o. Coreg  -Added midodrine 2.5 3 times daily  -Consideration to stop Flomax if orthostatic hypotension continues  -Place abdominal binder    Parkinsons disease  - cont home meds     Global A/P  - reviewed labs and vitals from today  - reviewed notes of the day  - cbc, bmp, mag ordered for tomorrow  - discussed need to stay in the hospital today due to above  - cont IV abx  - discussed with patient/RN and care team  - dispo pending clinical course          Supplementary Documentation:   DVT Mechanical Prophylaxis:        DVT Pharmacologic Prophylaxis   Medication    heparin (Porcine) 5000 UNIT/ML injection 5,000 Units                Code Status: DNAR/Selective Treatment  Santiago: Santiago catheter in place  Santiago Duration (in days): 2  Central line:    ELISE: 4/17/2025        **Certification      PHYSICIAN Certification of Need for Inpatient Hospitalization - Initial Certification    Patient will require inpatient services that will reasonably be expected to span two midnight's based on the clinical documentation in H+P.   Based on patients current state of illness, I anticipate that, after discharge, patient will require TBD.                   MDM: High complexity- acute illness posing a threat to life. IV meds requiring close inpatient monitoring. I personally spent time on chart/note review, review of labs/imaging,  discussion with patient, physical exam, discussion with staff, writing progress note, and discussion of plan of care.             [1]   Current Facility-Administered Medications   Medication Dose Route Frequency    midodrine (ProAmatine) tab 2.5 mg  2.5 mg Oral TID    metoclopramide (Reglan) 5 mg/mL injection 5 mg  5 mg Intravenous Daily PRN    heparin (Porcine) 5000 UNIT/ML injection 5,000 Units  5,000 Units Subcutaneous 2 times per day    pantoprazole (Protonix) DR tab 40 mg  40 mg Oral QAM AC    ipratropium-albuterol (Duoneb) 0.5-2.5 (3) MG/3ML inhalation solution 3 mL  3 mL Nebulization QID    acetaminophen (Tylenol Extra Strength) tab 500 mg  500 mg Oral Q4H PRN    ondansetron (Zofran) 4 MG/2ML injection 4 mg  4 mg Intravenous Q6H PRN    carbidopa-levodopa (SINEMET)  MG per tab 0.5 tablet  0.5 tablet Oral TID    tamsulosin (Flomax) cap 0.4 mg  0.4 mg Oral Nightly    polyethylene glycol (PEG 3350) (Miralax) 17 g oral packet 17 g  17 g Oral BID   [2]   Patient Active Problem List  Diagnosis    Elevated PSA    Hypercholesterolemia    Contusion of left hip, initial encounter    Contusion of left shoulder, initial encounter    Left hip pain    Parkinsonism (HCC)    Urinary retention    Abdominal pain, acute    Acute renal failure, unspecified acute renal failure type    Community acquired pneumonia, unspecified laterality    Hyperkalemia    Anemia, unspecified type    Obstructed, uropathy    Dysphagia    Palliative care by specialist    Constipation    Leukocytosis    History of recent fall    Counseling regarding advance care planning and goals of care

## 2025-04-16 NOTE — PROGRESS NOTES
[facility]  part of [facility]    Progress Note    Justyn Hall Patient Status:  Inpatient    1940 MRN [id number]   Location [facility]5W Attending Denise Huertas MD   Hosp Day # 6 PCP Chong Quintero MD       Subjective:   Justyn Hall is a(n) 84 year old male who I am seeing for JORDON    No new issues  In bed when I saw him      Objective:   BP (!) 143/101 (BP Location: Right arm)   Pulse 83   Temp 97.9 °F (36.6 °C) (Oral)   Resp 18   Ht 6' (1.829 m)   Wt 175 lb 4.3 oz (79.5 kg)   SpO2 96%   BMI 23.77 kg/m²      Intake/Output Summary (Last 24 hours) at 2025 1422  Last data filed at 2025 0900  Gross per 24 hour   Intake 1600 ml   Output 1151 ml   Net 449 ml     Wt Readings from Last 1 Encounters:   04/10/25 175 lb 4.3 oz (79.5 kg)       Exam  Vital signs: Blood pressure (!) 143/101, pulse 83, temperature 97.9 °F (36.6 °C), temperature source Oral, resp. rate 18, height 6' (1.829 m), weight 175 lb 4.3 oz (79.5 kg), SpO2 96%.    General: No acute distress. Alert and oriented x 3.  HEENT: Moist mucous membranes. EOMI. PERRLA  Neck:  No JVD.   Respiratory: Clear to auscultation bilaterally.    Cardiovascular: S1, S2.  Regular rate and rhythm.   Abdomen: Soft, nontender, nondistended.    Neurologic: No focal neurological deficits.  Musculoskeletal: Full range of motion of all extremities.  No swelling noted.  Access:  Has a Espinal catheter    Results:     Recent Labs   Lab 25  0428 25  0431 25  0500 04/15/25  0414 25  0459   RBC 3.88 3.48* 3.25* 3.50* 3.54*   HGB 10.5* 9.9* 8.8* 9.5* 9.5*   HCT 32.0* 29.2* 28.0* 28.9* 29.1*   MCV 82.5 83.9 86.2 82.6 82.2   MCH 27.1 28.4 27.1 27.1 26.8   MCHC 32.8 33.9 31.4 32.9 32.6   RDW 15.6* 15.3* 14.8 14.7 14.5   NEPRELIM 8.92* 9.07* 6.27  --   --    WBC 11.6* 12.8* 10.6 11.2* 12.3*   .0 341.0 350.0 364.0 375.0         Recent Labs   Lab 25  0500 04/15/25  0414 25  0459   *  102* 99   BUN 38* 31* 32*   CREATSERUM 1.74* 1.53* 1.47*   CA 7.2* 7.3* 7.3*    143 145   K 3.4* 3.9  3.9 3.7    108 110   CO2 25.0 26.0 24.0          No results found.    Assessment and Plan:     Impression:    JORDON secondary to obstructive uropathy.  Has a Espinal with improving creatinine  Obstructive uropathy/ BPH , urology was consulted.  Has a Espinal.   Hypernatremia secondary to post ATN diuresis and decreased p.o. intake.  Stop D5W and will check labs in a.m. taking p.o. now  Parkinson's disease, continue medication      Plan:    Cr continues to get better. Sodium is stable without d5w  Ok to dc from renal perspective  Encourage inc PO intake/ inc po fluids      Thank you very much for allowing me to participate in the care of your patient.  If you have any questions, please do not hesitate to contact me.     Becky Duke MD

## 2025-04-16 NOTE — DISCHARGE INSTRUCTIONS
Follow-up with PCP in 1-2 weeks  Follow-up with urology - Dr. Vila in 1-2 weeks    Nonpharmacological treatment for orthostatic hypotension:  Liberalization of water intake  Physical activity with recumbent exercises for instance a stationary bike, rowing machine, or in a swimming pool.  Sleeping with the head of the bed raised to 30 to 45 degrees  Abdominal binder as tolerated  Can also consider a waist high compression stocking that produces at least 15mmHg to 20 mmHg of pressure.   [place] trial on 4/21 at rehab  Follow-up with urology after dc from rehab

## 2025-04-16 NOTE — PLAN OF CARE
Problem: Patient Centered Care  Goal: Patient preferences are identified and integrated in the patient's plan of care  Description: Interventions:- What would you like us to know as we care for you? From home with sister - Provide timely, complete, and accurate information to patient/family- Incorporate patient and family knowledge, values, beliefs, and cultural backgrounds into the planning and delivery of care- Encourage patient/family to participate in care and decision-making at the level they choose- Honor patient and family perspectives and choices  Outcome: Progressing     Problem: Patient/Family Goals  Goal: Patient/Family Long Term Goal  Description: Patient's Long Term Goal: Discharge home   Interventions:  - IV abx   - O2 as needed  - Monitor VS  - Follow MD orders  - See additional Care Plan goals for specific interventions  Outcome: Progressing  Goal: Patient/Family Short Term Goal  Description: Patient's Short Term Goal: Relief of abdominal pain  Interventions: -   - IV abx   - Pain meds as needed  - Monitor VS  - Follow MD orders  - See additional Care Plan goals for specific interventions  Outcome: Progressing     Problem: RESPIRATORY - ADULT  Goal: Achieves optimal ventilation and oxygenation  Description: INTERVENTIONS:- Assess for changes in respiratory status- Assess for changes in mentation and behavior- Position to facilitate oxygenation and minimize respiratory effort- Oxygen supplementation based on oxygen saturation or ABGs- Provide Smoking Cessation handout, if applicable- Encourage broncho-pulmonary hygiene including cough, deep breathe, Incentive Spirometry- Assess the need for suctioning and perform as needed- Assess and instruct to report SOB or any respiratory difficulty- Respiratory Therapy support as indicated- Manage/alleviate anxiety- Monitor for signs/symptoms of CO2 retention  Outcome: Progressing     Problem: GASTROINTESTINAL - ADULT  Goal: Minimal or absence of nausea and  vomiting  Description: INTERVENTIONS:- Maintain adequate hydration with IV or PO as ordered and tolerated- Nasogastric tube to low intermittent suction as ordered- Evaluate effectiveness of ordered antiemetic medications- Provide nonpharmacologic comfort measures as appropriate- Advance diet as tolerated, if ordered- Obtain nutritional consult as needed- Evaluate fluid balance  Outcome: Progressing  Goal: Maintains or returns to baseline bowel function  Description: INTERVENTIONS:- Assess bowel function- Maintain adequate hydration with IV or PO as ordered and tolerated- Evaluate effectiveness of GI medications- Encourage mobilization and activity- Obtain nutritional consult as needed- Establish a toileting routine/schedule- Consider collaborating with pharmacy to review patient's medication profile  Outcome: Progressing  Goal: Maintains adequate nutritional intake (undernourished)  Description: INTERVENTIONS:- Monitor percentage of each meal consumed- Identify factors contributing to decreased intake, treat as appropriate- Assist with meals as needed- Monitor I&O, WT and lab values- Obtain nutritional consult as needed- Optimize oral hygiene and moisture- Encourage food from home; allow for food preferences- Enhance eating environment  Outcome: Progressing  Goal: Achieves appropriate nutritional intake (bariatric)  Description: INTERVENTIONS:- Monitor for over-consumption- Identify factors contributing to increased intake, treat as appropriate- Monitor I&O, WT and lab values- Obtain nutritional consult as needed- Evaluate psychosocial factors contributing to over-consumption  Outcome: Progressing     Problem: GENITOURINARY - ADULT  Goal: Absence of urinary retention  Description: INTERVENTIONS:- Assess patient’s ability to void and empty bladder- Monitor intake/output and perform bladder scan as needed- Follow urinary retention protocol/standard of care- Consider collaborating with pharmacy to review patient's  medication profile- Implement strategies to promote bladder emptying  Outcome: Progressing     Problem: PAIN - ADULT  Goal: Verbalizes/displays adequate comfort level or patient's stated pain goal  Description: INTERVENTIONS:- Encourage pt to monitor pain and request assistance- Assess pain using appropriate pain scale- Administer analgesics based on type and severity of pain and evaluate response- Implement non-pharmacological measures as appropriate and evaluate response- Consider cultural and social influences on pain and pain management- Manage/alleviate anxiety- Utilize distraction and/or relaxation techniques- Monitor for opioid side effects- Notify MD/LIP if interventions unsuccessful or patient reports new pain- Anticipate increased pain with activity and pre-medicate as appropriate  Outcome: Progressing     Problem: SAFETY ADULT - FALL  Goal: Free from fall injury  Description: INTERVENTIONS:- Assess pt frequently for physical needs- Identify cognitive and physical deficits and behaviors that affect risk of falls.- [place] fall precautions as indicated by assessment.- Educate pt/family on patient safety including physical limitations- Instruct pt to call for assistance with activity based on assessment- Modify environment to reduce risk of injury- Provide assistive devices as appropriate- Consider OT/PT consult to assist with strengthening/mobility- Encourage toileting schedule  Outcome: Progressing     Problem: DISCHARGE PLANNING  Goal: Discharge to home or other facility with appropriate resources  Description: INTERVENTIONS:- Identify barriers to discharge w/pt and caregiver- Include patient/family/discharge partner in discharge planning- Arrange for needed discharge resources and transportation as appropriate- Identify discharge learning needs (meds, wound care, etc)- Arrange for interpreters to assist at discharge as needed- Consider post-discharge preferences of patient/family/discharge partner-  Complete POLST form as appropriate- Assess patient's ability to be responsible for managing their own health- Refer to Case Management Department for coordinating discharge planning if the patient needs post-hospital services based on physician/LIP order or complex needs related to functional status, cognitive ability or social support system  Outcome: Progressing    Monitoring vital signs, stable at this time. No acute changes at this moment.  Pain medication provided as needed. Fall precautions in place- bed alarm on, bed locked in lowest position, call light within reach. Frequent rounding by nursing staff.

## 2025-04-16 NOTE — PROGRESS NOTES
[facility]  part of [facility]     Progress Note    Justyn Hall Patient Status:  Inpatient    1940 MRN [id number]   Location [facility]5W Attending Denise Huertas MD   Hosp Day # 6 PCP Chong Quintero MD       Subjective:   Patient seen and examined.  Occasional cough.  Denies significant dyspnea.  Appears comfortable    Objective:   Blood pressure (!) 143/101, pulse 83, temperature 97.9 °F (36.6 °C), temperature source Oral, resp. rate 18, height 6' (1.829 m), weight 175 lb 4.3 oz (79.5 kg), SpO2 96%.  Intake/Output:   Last 3 shifts: I/O last 3 completed shifts:  In: 2282 [P.O.:1382; I.V.:500; IV PIGGYBACK:400]  Out: 4226 [Urine:4226]   This shift: I/O this shift:  In: 390 [P.O.:140; I.V.:250]  Out: -      Vent Settings:      Hemodynamic parameters (last 24 hours):      Scheduled Meds: Current Hospital Medications[1]    Continuous Infusions: Medication Infusions[2]    Physical Exam  Constitutional: no acute distress  Eyes: PERRL  ENT: nares pateint  Neck: supple, no JVD  Cardio: RRR, S1 S2  Respiratory: Basilar crackles  GI: abdomen soft, non tender, active bowel sounds, no organomegaly  Extremities: no clubbing, cyanosis, edema  Neurologic: no gross motor deficits  Skin: warm, dry      Results:     Lab Results   Component Value Date    WBC 12.3 2025    HGB 9.5 2025    HCT 29.1 2025    .0 2025    CREATSERUM 1.47 2025    BUN 32 2025     2025    K 3.7 2025     2025    CO2 24.0 2025    GLU 99 2025    CA 7.3 2025    MG 1.5 2025       No results found.              Assessment   1.  Acute kidney injury  2.  Hyperkalemia, resolved  3.  Hemoptysis  4.  Hypertension  5.  Recent fall with left hip and shoulder contusion  6.  Leukocytosis  7.  Left lower lobe pneumonia  8.  Esophagitis  9.  Dysphagia  10.  Parkinson's     Plan   -Patient presents with evidence of lower quadrant  abdominal discomfort with decreased urination and hemoptysis after discharge on 4/6/2025.  Renal function improving.  - Chest x-ray with bibasilar opacities seen.  -CT chest on 4/10/2025 with wall thickening and stranding seen around upper esophagus.  Mucous plugging seen in basilar airways most pronounced left lower lobe.  - No additional hemoptysis since arrival to hospital.  Closely monitor  - Completed course of antibiotic therapy  - Further recommendations per GI.   - DVT prophylaxis: Heparin  - Okay for discharge from pulmonary perspective    Tony Chauhan DO  Pulmonary Critical Care Medicine  [facility]          [1]   Current Facility-Administered Medications   Medication Dose Route Frequency    midodrine (ProAmatine) tab 2.5 mg  2.5 mg Oral TID    metoclopramide (Reglan) 5 mg/mL injection 5 mg  5 mg Intravenous Daily PRN    heparin (Porcine) 5000 UNIT/ML injection 5,000 Units  5,000 Units Subcutaneous 2 times per day    pantoprazole (Protonix) DR tab 40 mg  40 mg Oral QAM AC    ipratropium-albuterol (Duoneb) 0.5-2.5 (3) MG/3ML inhalation solution 3 mL  3 mL Nebulization QID    acetaminophen (Tylenol Extra Strength) tab 500 mg  500 mg Oral Q4H PRN    ondansetron (Zofran) 4 MG/2ML injection 4 mg  4 mg Intravenous Q6H PRN    carbidopa-levodopa (SINEMET)  MG per tab 0.5 tablet  0.5 tablet Oral TID    tamsulosin (Flomax) cap 0.4 mg  0.4 mg Oral Nightly    polyethylene glycol (PEG 3350) (Miralax) 17 g oral packet 17 g  17 g Oral BID   [2]

## 2025-04-16 NOTE — DISCHARGE SUMMARY
[facility]  part of [facility]    Discharge Summary    Justyn Hall Patient Status:  Inpatient    1940 MRN [id number]   Location [facility]5W Attending Corrine Nair MD   Hosp Day # 7 PCP Chong Quintero MD     Date of Admission: 4/10/2025 Disposition: SNF     Date of Discharge: 25      Admitting Diagnosis: Hyperkalemia [E87.5]  Urinary retention [R33.9]  Abdominal pain, acute [R10.9]  Acute renal failure, unspecified acute renal failure type [N17.9]  Anemia, unspecified type [D64.9]  Community acquired pneumonia, unspecified laterality [J18.9]    Hospital Discharge Diagnoses:  Pneumonia    Lace+ Score: 64  59-90 High Risk  29-58 Medium Risk  0-28   Low Risk.    TCM Follow-Up Recommendation:  LACE > 58: High Risk of readmission after discharge from the hospital.        Reason for Admission:   Obstructive uropathy with JORDON  BPH  Hydronephrosis  Aspiration pneumonia  Hemoptysis  Constipation  Abnormal CT chest  Anemia - CHRISTINA  Orthostatic hypotension  Underlying autonomic dysfunction  Parkinsons disease    Physical Exam:   General appearance: alert, appears stated age and cooperative  Pulmonary:  clear to auscultation bilaterally  Cardiovascular: S1, S2 normal, no murmur, click, rub or gallop, regular rate and rhythm  Abdominal: soft, non-tender; bowel sounds normal; no masses,  no organomegaly  Extremities: extremities normal, atraumatic, no cyanosis or edema  Psychiatric: calm      History of Present Illness:   Per Dr. Clarke  Patient is an 84-year-old  male who was hospitalized earlier this month for hip pain after a fall. Imaging studies showed degenerative joint disease of cervical, thoracic, and lumbar spine; no hip fracture. Also found to have chronic infrarenal abdominal aortic aneurysm. Started on low-dose aspirin. Evaluated by Neurology and diagnosed with possible Parkinson disease and started on carbidopa/levodopa. Discharged home in a stable condition.  Family brought him in today for inability to urinate for the last few days and abdominal discomfort. Also has been spitting up blood per his wife. CBC today showed hemoglobin 11.6, which is way below his baseline; white blood cell count of 21.8 with left shift. Urinalysis showed no evidence of urinary tract infection. Chemistry showed GFR of 7, BUN and creatinine of 163 and 7.54, potassium 6.5. He received hyperkalemia medications including dextrose D50, calcium gluconate, and sodium bicarb. He was given IV fluids and Santiago catheter inserted, around 1.5 L released from his bladder. Chest x-ray showed bibasilar infiltrates, and CT scan of the abdomen showed no acute findings. He does have large hiatal hernia.     Hospital Course:   Obstructive uropathy with JORDON  BPH  Hydronephrosis  - seen by urology and nephrology  -- santiago placed   - cont flomax        - cr improved  -will d/w urology timing to repeat ultrasound  -decath trial on 4/21 at rehab  -santiago placed on 4/10 in ED     Aspiration pneumonia  Hemoptysis  - failed swallow eval initially but now passed and will start modified diet  - at this time place dobhoff - hold tube feeds now and see how he does with diet - tolerating  - no further hemoptysis noted  - cont IV abx - stop on dc  - pulm following for pneumonia     Constipation  Abnormal CT chest  Anemia - CHRISTINA  - gi following  - cont ppi bid  - place DHT 4/11 - now removed  - miralax bid  - IV iron given     Orthostatic hypotension  Underlying autonomic dysfunction  -Given IV fluid bolus on 4/15  -stopped home bp meds  -Added midodrine 2.5 3 times daily  -Consideration to stop Flomax if orthostatic hypotension continues  -Place abdominal binder     Parkinsons disease  - cont home meds     Consultations:   Pulmonology  Urology  Nephrology    Procedures: n/a    Complications: n/a    Discharge Condition: Good    Discharge Medications:      Discharge Medications        START taking these medications         Instructions Prescription details   midodrine 2.5 MG Tabs  Commonly known as: ProAmatine      Take 1 tablet (2.5 mg total) by mouth in the morning and 1 tablet (2.5 mg total) at noon and 1 tablet (2.5 mg total) in the evening.   Refills: 0     tamsulosin 0.4 MG Caps  Commonly known as: Flomax      Take 1 capsule (0.4 mg total) by mouth at bedtime.   Stop taking on: May 15, 2025  Quantity: 30 capsule  Refills: 0            CONTINUE taking these medications        Instructions Prescription details   aspirin 81 MG Tbec      Take 1 tablet (81 mg total) by mouth daily.   Quantity: 30 tablet  Refills: 0     carbidopa-levodopa  MG Tabs  Commonly known as: SINEMET  Start taking on: April 6, 2025      Take 0.5 tablets by mouth 3 (three) times daily for 14 days, THEN 1 tablet 3 (three) times daily. Give half a tab 3 times a day, 5 hours between each dose for 3 days.  Then full tab 3 times a day 5 hours between each dose.  Give ideally on empty stomach.  Give 30 to 45 minutes before a meal or 45 to 60 minutes after a meal..   Stop taking on: July 5, 2025  Quantity: 249 tablet  Refills: 0            STOP taking these medications      carvedilol 6.25 MG Tabs  Commonly known as: Coreg        HYDROcodone-acetaminophen 5-325 MG Tabs  Commonly known as: Norco        lisinopril 20 MG Tabs  Commonly known as: Prinivil; Zestril                  Where to Get Your Medications        These medications were sent to [facility] [address] [place], [place] - [address] [address] [phone], [phone]  [address] [address]      Phone: [phone]   tamsulosin 0.4 MG Caps         Follow up Visits: Follow-up with pcp in 1 week    Follow up Labs: n/a     Other Discharge Instructions: follow-up with op neurologist per routine  [facility]    YOHANNES HOPKINS MD  4/17/2025  12:07 PM    > 35 min

## 2025-04-17 ENCOUNTER — TELEPHONE (OUTPATIENT)
Dept: SURGERY | Facility: CLINIC | Age: 85
End: 2025-04-17

## 2025-04-17 VITALS
HEART RATE: 96 BPM | DIASTOLIC BLOOD PRESSURE: 61 MMHG | RESPIRATION RATE: 18 BRPM | HEIGHT: 72 IN | WEIGHT: 175.25 LBS | TEMPERATURE: 98 F | BODY MASS INDEX: 23.74 KG/M2 | SYSTOLIC BLOOD PRESSURE: 95 MMHG | OXYGEN SATURATION: 100 %

## 2025-04-17 LAB
ALBUMIN SERPL-MCNC: 3.4 G/DL (ref 3.2–4.8)
ANION GAP SERPL CALC-SCNC: 9 MMOL/L (ref 0–18)
BASOPHILS # BLD: 0.18 X10(3) UL (ref 0–0.2)
BASOPHILS NFR BLD: 1 %
BUN BLD-MCNC: 26 MG/DL (ref 9–23)
BUN/CREAT SERPL: 19.3 (ref 10–20)
CALCIUM BLD-MCNC: 7.2 MG/DL (ref 8.7–10.4)
CHLORIDE SERPL-SCNC: 106 MMOL/L (ref 98–112)
CO2 SERPL-SCNC: 25 MMOL/L (ref 21–32)
CREAT BLD-MCNC: 1.35 MG/DL (ref 0.7–1.3)
DEPRECATED RDW RBC AUTO: 44.9 FL (ref 35.1–46.3)
EGFRCR SERPLBLD CKD-EPI 2021: 51 ML/MIN/1.73M2 (ref 60–?)
EOSINOPHIL # BLD: 1.25 X10(3) UL (ref 0–0.7)
EOSINOPHIL NFR BLD: 7 %
ERYTHROCYTE [DISTWIDTH] IN BLOOD BY AUTOMATED COUNT: 14.6 % (ref 11–15)
GLUCOSE BLD-MCNC: 95 MG/DL (ref 70–99)
HCT VFR BLD AUTO: 28.7 % (ref 39–53)
HGB BLD-MCNC: 9.4 G/DL (ref 13–17.5)
LYMPHOCYTES NFR BLD: 16 %
LYMPHOCYTES NFR BLD: 2.86 X10(3) UL (ref 1–4)
MAGNESIUM SERPL-MCNC: 1.4 MG/DL (ref 1.6–2.6)
MCH RBC QN AUTO: 27.8 PG (ref 26–34)
MCHC RBC AUTO-ENTMCNC: 32.8 G/DL (ref 31–37)
MCV RBC AUTO: 84.9 FL (ref 80–100)
MONOCYTES # BLD: 0.54 X10(3) UL (ref 0.1–1)
MONOCYTES NFR BLD: 3 %
MORPHOLOGY: NORMAL
NEUTROPHILS # BLD AUTO: 11.9 X10 (3) UL (ref 1.5–7.7)
NEUTROPHILS NFR BLD: 72 %
NEUTS BAND NFR BLD: 1 %
NEUTS HYPERSEG # BLD: 13.07 X10(3) UL (ref 1.5–7.7)
OSMOLALITY SERPL CALC.SUM OF ELEC: 295 MOSM/KG (ref 275–295)
PHOSPHATE SERPL-MCNC: 2.6 MG/DL (ref 2.4–5.1)
PLATELET # BLD AUTO: 356 10(3)UL (ref 150–450)
PLATELET MORPHOLOGY: NORMAL
POTASSIUM SERPL-SCNC: 4.1 MMOL/L (ref 3.5–5.1)
RBC # BLD AUTO: 3.38 X10(6)UL (ref 3.8–5.8)
SODIUM SERPL-SCNC: 140 MMOL/L (ref 136–145)
TOTAL CELLS COUNTED BLD: 100
WBC # BLD AUTO: 17.9 X10(3) UL (ref 4–11)

## 2025-04-17 PROCEDURE — 99239 HOSP IP/OBS DSCHRG MGMT >30: CPT | Performed by: HOSPITALIST

## 2025-04-17 PROCEDURE — 99232 SBSQ HOSP IP/OBS MODERATE 35: CPT | Performed by: INTERNAL MEDICINE

## 2025-04-17 RX ORDER — MAGNESIUM OXIDE 400 MG/1
800 TABLET ORAL ONCE
Status: COMPLETED | OUTPATIENT
Start: 2025-04-17 | End: 2025-04-17

## 2025-04-17 RX ORDER — MIDODRINE HYDROCHLORIDE 2.5 MG/1
2.5 TABLET ORAL 3 TIMES DAILY
Status: SHIPPED | COMMUNITY
Start: 2025-04-17

## 2025-04-17 NOTE — PLAN OF CARE
Problem: Patient Centered Care  Goal: Patient preferences are identified and integrated in the patient's plan of care  Description: Interventions:- What would you like us to know as we care for you? From home with sister - Provide timely, complete, and accurate information to patient/family- Incorporate patient and family knowledge, values, beliefs, and cultural backgrounds into the planning and delivery of care- Encourage patient/family to participate in care and decision-making at the level they choose- Honor patient and family perspectives and choices  Outcome: Progressing     Problem: Patient/Family Goals  Goal: Patient/Family Long Term Goal  Description: Patient's Long Term Goal: Discharge home   Interventions:  - IV abx   - O2 as needed  - Monitor VS  - Follow MD orders  - See additional Care Plan goals for specific interventions  Outcome: Progressing  Goal: Patient/Family Short Term Goal  Description: Patient's Short Term Goal: Relief of abdominal pain  Interventions: -   - IV abx   - Pain meds as needed  - Monitor VS  - Follow MD orders  - See additional Care Plan goals for specific interventions  Outcome: Progressing     Problem: RESPIRATORY - ADULT  Goal: Achieves optimal ventilation and oxygenation  Description: INTERVENTIONS:- Assess for changes in respiratory status- Assess for changes in mentation and behavior- Position to facilitate oxygenation and minimize respiratory effort- Oxygen supplementation based on oxygen saturation or ABGs- Provide Smoking Cessation handout, if applicable- Encourage broncho-pulmonary hygiene including cough, deep breathe, Incentive Spirometry- Assess the need for suctioning and perform as needed- Assess and instruct to report SOB or any respiratory difficulty- Respiratory Therapy support as indicated- Manage/alleviate anxiety- Monitor for signs/symptoms of CO2 retention  Outcome: Progressing     Problem: GASTROINTESTINAL - ADULT  Goal: Minimal or absence of nausea and  vomiting  Description: INTERVENTIONS:- Maintain adequate hydration with IV or PO as ordered and tolerated- Nasogastric tube to low intermittent suction as ordered- Evaluate effectiveness of ordered antiemetic medications- Provide nonpharmacologic comfort measures as appropriate- Advance diet as tolerated, if ordered- Obtain nutritional consult as needed- Evaluate fluid balance  Outcome: Progressing  Goal: Maintains or returns to baseline bowel function  Description: INTERVENTIONS:- Assess bowel function- Maintain adequate hydration with IV or PO as ordered and tolerated- Evaluate effectiveness of GI medications- Encourage mobilization and activity- Obtain nutritional consult as needed- Establish a toileting routine/schedule- Consider collaborating with pharmacy to review patient's medication profile  Outcome: Progressing  Goal: Maintains adequate nutritional intake (undernourished)  Description: INTERVENTIONS:- Monitor percentage of each meal consumed- Identify factors contributing to decreased intake, treat as appropriate- Assist with meals as needed- Monitor I&O, WT and lab values- Obtain nutritional consult as needed- Optimize oral hygiene and moisture- Encourage food from home; allow for food preferences- Enhance eating environment  Outcome: Progressing  Goal: Achieves appropriate nutritional intake (bariatric)  Description: INTERVENTIONS:- Monitor for over-consumption- Identify factors contributing to increased intake, treat as appropriate- Monitor I&O, WT and lab values- Obtain nutritional consult as needed- Evaluate psychosocial factors contributing to over-consumption  Outcome: Progressing     Problem: GENITOURINARY - ADULT  Goal: Absence of urinary retention  Description: INTERVENTIONS:- Assess patient’s ability to void and empty bladder- Monitor intake/output and perform bladder scan as needed- Follow urinary retention protocol/standard of care- Consider collaborating with pharmacy to review patient's  medication profile- Implement strategies to promote bladder emptying  Outcome: Progressing     Problem: PAIN - ADULT  Goal: Verbalizes/displays adequate comfort level or patient's stated pain goal  Description: INTERVENTIONS:- Encourage pt to monitor pain and request assistance- Assess pain using appropriate pain scale- Administer analgesics based on type and severity of pain and evaluate response- Implement non-pharmacological measures as appropriate and evaluate response- Consider cultural and social influences on pain and pain management- Manage/alleviate anxiety- Utilize distraction and/or relaxation techniques- Monitor for opioid side effects- Notify MD/LIP if interventions unsuccessful or patient reports new pain- Anticipate increased pain with activity and pre-medicate as appropriate  Outcome: Progressing     Problem: SAFETY ADULT - FALL  Goal: Free from fall injury  Description: INTERVENTIONS:- Assess pt frequently for physical needs- Identify cognitive and physical deficits and behaviors that affect risk of falls.- [place] fall precautions as indicated by assessment.- Educate pt/family on patient safety including physical limitations- Instruct pt to call for assistance with activity based on assessment- Modify environment to reduce risk of injury- Provide assistive devices as appropriate- Consider OT/PT consult to assist with strengthening/mobility- Encourage toileting schedule  Outcome: Progressing     Problem: DISCHARGE PLANNING  Goal: Discharge to home or other facility with appropriate resources  Description: INTERVENTIONS:- Identify barriers to discharge w/pt and caregiver- Include patient/family/discharge partner in discharge planning- Arrange for needed discharge resources and transportation as appropriate- Identify discharge learning needs (meds, wound care, etc)- Arrange for interpreters to assist at discharge as needed- Consider post-discharge preferences of patient/family/discharge partner-  Complete POLST form as appropriate- Assess patient's ability to be responsible for managing their own health- Refer to Case Management Department for coordinating discharge planning if the patient needs post-hospital services based on physician/LIP order or complex needs related to functional status, cognitive ability or social support system  Outcome: Progressing

## 2025-04-17 NOTE — DISCHARGE PLANNING
Discharge order in per MD. Discharges instructions placed in discharge packet and given to sister . PIV removed. Patient and sister  educated on important follow ups and medication regimen. Patient discharged to [facility]  via family and private vehicle . RN to RN report given to marty

## 2025-04-17 NOTE — CM/SW NOTE
04/15/25 1101   Discharge disposition   Expected discharge disposition subacute   Post Acute Care Provider Wendy Reynoso  (Grecia Ayala [facility])   Discharge transportation Private car     Sister planning on driving Justyn.  Leaving at 3:30pm.    Evette Gardiner MBA BSN RN CRRN   RN Case Manager  [phone]

## 2025-04-17 NOTE — PLAN OF CARE
Problem: Patient Centered Care  Goal: Patient preferences are identified and integrated in the patient's plan of care  Description: Interventions:- What would you like us to know as we care for you? From home with sister - Provide timely, complete, and accurate information to patient/family- Incorporate patient and family knowledge, values, beliefs, and cultural backgrounds into the planning and delivery of care- Encourage patient/family to participate in care and decision-making at the level they choose- Honor patient and family perspectives and choices  Outcome: Adequate for Discharge     Problem: Patient/Family Goals  Goal: Patient/Family Long Term Goal  Description: Patient's Long Term Goal: Discharge home   Interventions:  - IV abx   - O2 as needed  - Monitor VS  - Follow MD orders  - See additional Care Plan goals for specific interventions  Outcome: Adequate for Discharge  Goal: Patient/Family Short Term Goal  Description: Patient's Short Term Goal: Relief of abdominal pain  Interventions: -   - IV abx   - Pain meds as needed  - Monitor VS  - Follow MD orders  - See additional Care Plan goals for specific interventions  Outcome: Adequate for Discharge     Problem: RESPIRATORY - ADULT  Goal: Achieves optimal ventilation and oxygenation  Description: INTERVENTIONS:- Assess for changes in respiratory status- Assess for changes in mentation and behavior- Position to facilitate oxygenation and minimize respiratory effort- Oxygen supplementation based on oxygen saturation or ABGs- Provide Smoking Cessation handout, if applicable- Encourage broncho-pulmonary hygiene including cough, deep breathe, Incentive Spirometry- Assess the need for suctioning and perform as needed- Assess and instruct to report SOB or any respiratory difficulty- Respiratory Therapy support as indicated- Manage/alleviate anxiety- Monitor for signs/symptoms of CO2 retention  Outcome: Adequate for Discharge     Problem: GASTROINTESTINAL -  ADULT  Goal: Minimal or absence of nausea and vomiting  Description: INTERVENTIONS:- Maintain adequate hydration with IV or PO as ordered and tolerated- Nasogastric tube to low intermittent suction as ordered- Evaluate effectiveness of ordered antiemetic medications- Provide nonpharmacologic comfort measures as appropriate- Advance diet as tolerated, if ordered- Obtain nutritional consult as needed- Evaluate fluid balance  Outcome: Adequate for Discharge  Goal: Maintains or returns to baseline bowel function  Description: INTERVENTIONS:- Assess bowel function- Maintain adequate hydration with IV or PO as ordered and tolerated- Evaluate effectiveness of GI medications- Encourage mobilization and activity- Obtain nutritional consult as needed- Establish a toileting routine/schedule- Consider collaborating with pharmacy to review patient's medication profile  Outcome: Adequate for Discharge  Goal: Maintains adequate nutritional intake (undernourished)  Description: INTERVENTIONS:- Monitor percentage of each meal consumed- Identify factors contributing to decreased intake, treat as appropriate- Assist with meals as needed- Monitor I&O, WT and lab values- Obtain nutritional consult as needed- Optimize oral hygiene and moisture- Encourage food from home; allow for food preferences- Enhance eating environment  Outcome: Adequate for Discharge  Goal: Achieves appropriate nutritional intake (bariatric)  Description: INTERVENTIONS:- Monitor for over-consumption- Identify factors contributing to increased intake, treat as appropriate- Monitor I&O, WT and lab values- Obtain nutritional consult as needed- Evaluate psychosocial factors contributing to over-consumption  Outcome: Adequate for Discharge     Problem: GENITOURINARY - ADULT  Goal: Absence of urinary retention  Description: INTERVENTIONS:- Assess patient’s ability to void and empty bladder- Monitor intake/output and perform bladder scan as needed- Follow urinary  retention protocol/standard of care- Consider collaborating with pharmacy to review patient's medication profile- Implement strategies to promote bladder emptying  Outcome: Adequate for Discharge     Problem: PAIN - ADULT  Goal: Verbalizes/displays adequate comfort level or patient's stated pain goal  Description: INTERVENTIONS:- Encourage pt to monitor pain and request assistance- Assess pain using appropriate pain scale- Administer analgesics based on type and severity of pain and evaluate response- Implement non-pharmacological measures as appropriate and evaluate response- Consider cultural and social influences on pain and pain management- Manage/alleviate anxiety- Utilize distraction and/or relaxation techniques- Monitor for opioid side effects- Notify MD/LIP if interventions unsuccessful or patient reports new pain- Anticipate increased pain with activity and pre-medicate as appropriate  Outcome: Adequate for Discharge     Problem: SAFETY ADULT - FALL  Goal: Free from fall injury  Description: INTERVENTIONS:- Assess pt frequently for physical needs- Identify cognitive and physical deficits and behaviors that affect risk of falls.- [place] fall precautions as indicated by assessment.- Educate pt/family on patient safety including physical limitations- Instruct pt to call for assistance with activity based on assessment- Modify environment to reduce risk of injury- Provide assistive devices as appropriate- Consider OT/PT consult to assist with strengthening/mobility- Encourage toileting schedule  Outcome: Adequate for Discharge     Problem: DISCHARGE PLANNING  Goal: Discharge to home or other facility with appropriate resources  Description: INTERVENTIONS:- Identify barriers to discharge w/pt and caregiver- Include patient/family/discharge partner in discharge planning- Arrange for needed discharge resources and transportation as appropriate- Identify discharge learning needs (meds, wound care, etc)- Arrange  for interpreters to assist at discharge as needed- Consider post-discharge preferences of patient/family/discharge partner- Complete POLST form as appropriate- Assess patient's ability to be responsible for managing their own health- Refer to Case Management Department for coordinating discharge planning if the patient needs post-hospital services based on physician/LIP order or complex needs related to functional status, cognitive ability or social support system  Outcome: Adequate for Discharge

## 2025-04-17 NOTE — PROGRESS NOTES
[facility]  part of [facility]    Progress Note    Justyn Hall Patient Status:  Inpatient    1940 MRN [id number]   Location [facility]5W Attending Denise Huertas MD   Hosp Day # 7 PCP Chong Quintero MD       Subjective:   Justyn Hall is a(n) 84 year old male who I am seeing for JORDON    No new issues  Any chest pain or shortness of breath  No dizziness    Objective:   BP (!) 82/40 (BP Location: Right arm)   Pulse 96   Temp 97.8 °F (36.6 °C) (Oral)   Resp 18   Ht 6' (1.829 m)   Wt 175 lb 4.3 oz (79.5 kg)   SpO2 100%   BMI 23.77 kg/m²      Intake/Output Summary (Last 24 hours) at 2025 1320  Last data filed at 2025 1140  Gross per 24 hour   Intake 840 ml   Output 2075 ml   Net -1235 ml     Wt Readings from Last 1 Encounters:   04/10/25 175 lb 4.3 oz (79.5 kg)       Exam  Vital signs: Blood pressure (!) 82/40, pulse 96, temperature 97.8 °F (36.6 °C), temperature source Oral, resp. rate 18, height 6' (1.829 m), weight 175 lb 4.3 oz (79.5 kg), SpO2 100%.    General: No acute distress. Alert and oriented x 3.  HEENT: Moist mucous membranes. EOMI. PERRLA  Neck:  No JVD.   Respiratory: Clear to auscultation bilaterally.    Cardiovascular: S1, S2.  Regular rate and rhythm.   Abdomen: Soft, nontender, nondistended.    Neurologic: No focal neurological deficits.  Musculoskeletal: Full range of motion of all extremities.  No swelling noted.  Access:  Has a Espinal catheter    Results:     Recent Labs   Lab 25  0431 25  0500 04/15/25  0414 25  0459 25  0542   RBC 3.48* 3.25* 3.50* 3.54* 3.38*   HGB 9.9* 8.8* 9.5* 9.5* 9.4*   HCT 29.2* 28.0* 28.9* 29.1* 28.7*   MCV 83.9 86.2 82.6 82.2 84.9   MCH 28.4 27.1 27.1 26.8 27.8   MCHC 33.9 31.4 32.9 32.6 32.8   RDW 15.3* 14.8 14.7 14.5 14.6   NEPRELIM 9.07* 6.27  --   --  11.90*   WBC 12.8* 10.6 11.2* 12.3* 17.9*   .0 350.0 364.0 375.0 356.0         Recent Labs   Lab 04/15/25  0413  04/16/25  0459 04/17/25  0542   * 99 95   BUN 31* 32* 26*   CREATSERUM 1.53* 1.47* 1.35*   CA 7.3* 7.3* 7.2*    145 140   K 3.9  3.9 3.7 4.1    110 106   CO2 26.0 24.0 25.0          No results found.    Assessment and Plan:     Impression:    JORDON secondary to obstructive uropathy.  Has a Espinal with improving creatinine  Obstructive uropathy/ BPH , urology was consulted.  Has a Espinal.   Hypernatremia secondary to post ATN diuresis and decreased p.o. intake.  Stop D5W and will check labs in a.m. taking p.o. now  Parkinson's disease, continue medication      Plan:    Cr continues to get better. Sodium is stable without d5w.  Renal function is back to its baseline.  Ok to dc from renal perspective  Encourage inc PO intake/ inc po fluids    Will sign off please call with question    Thank you very much for allowing me to participate in the care of your patient.  If you have any questions, please do not hesitate to contact me.     Becky Duke MD

## 2025-04-17 NOTE — TELEPHONE ENCOUNTER
Can we please set this patient up for follow up in approximately one week or whenever he begins to mobilize better? Nurse visit for void trial beforehand and then provider visit.    If they want to wait until he's more mobile, they need to, at a minimum, exchange the santiago catheter every 4 weeks , pericare BID, irrigation as needed.

## 2025-04-17 NOTE — PROGRESS NOTES
[facility]  part of [facility]     Progress Note    Justyn Hall Patient Status:  Inpatient    1940 MRN [id number]   Location [facility]5W Attending Denise Huertas MD   Hosp Day # 7 PCP Chong Quintero MD       Subjective:   Patient seen and examined.  Occasional cough but denies any hemoptysis or dyspnea.  Appears comfortable    Objective:   Blood pressure 131/73, pulse 78, temperature 97.9 °F (36.6 °C), temperature source Oral, resp. rate 16, height 6' (1.829 m), weight 175 lb 4.3 oz (79.5 kg), SpO2 96%.  Intake/Output:   Last 3 shifts: I/O last 3 completed shifts:  In: 1017 [P.O.:747; I.V.:270]  Out: 3151 [Urine:3151]   This shift: I/O this shift:  In: 310 [P.O.:300; I.V.:10]  Out: -      Vent Settings:      Hemodynamic parameters (last 24 hours):      Scheduled Meds: Current Hospital Medications[1]    Continuous Infusions: Medication Infusions[2]    Physical Exam  Constitutional: no acute distress  Eyes: PERRL  ENT: nares pateint  Neck: supple, no JVD  Cardio: RRR, S1 S2  Respiratory: Basilar crackles  GI: abdomen soft, non tender, active bowel sounds, no organomegaly  Extremities: no clubbing, cyanosis, edema  Neurologic: no gross motor deficits  Skin: warm, dry      Results:     Lab Results   Component Value Date    WBC 17.9 2025    HGB 9.4 2025    HCT 28.7 2025    .0 2025    CREATSERUM 1.35 2025    BUN 26 2025     2025    K 4.1 2025     2025    CO2 25.0 2025    GLU 95 2025    CA 7.2 2025    ALB 3.4 2025    MG 1.4 2025    PHOS 2.6 2025       No results found.              Assessment   1.  Acute kidney injury  2.  Hyperkalemia, resolved  3.  Hemoptysis  4.  Hypertension  5.  Recent fall with left hip and shoulder contusion  6.  Leukocytosis  7.  Left lower lobe pneumonia  8.  Esophagitis  9.  Dysphagia  10.  Parkinson's     Plan   -Patient presents with  evidence of lower quadrant abdominal discomfort with decreased urination and hemoptysis after discharge on 4/6/2025.  Renal function improving.  - Chest x-ray with bibasilar opacities seen.  -CT chest on 4/10/2025 with wall thickening and stranding seen around upper esophagus.  Mucous plugging seen in basilar airways most pronounced left lower lobe.  - No additional hemoptysis since arrival to hospital.  Closely monitor  - Completed course of antibiotic therapy  - Further recommendations per GI.   - DVT prophylaxis: Heparin  - Okay for discharge from pulmonary perspective    Tony Chauhan DO  Pulmonary Critical Care Medicine  [facility]          [1]   Current Facility-Administered Medications   Medication Dose Route Frequency    midodrine (ProAmatine) tab 2.5 mg  2.5 mg Oral TID    metoclopramide (Reglan) 5 mg/mL injection 5 mg  5 mg Intravenous Daily PRN    heparin (Porcine) 5000 UNIT/ML injection 5,000 Units  5,000 Units Subcutaneous 2 times per day    pantoprazole (Protonix) DR tab 40 mg  40 mg Oral QAM AC    ipratropium-albuterol (Duoneb) 0.5-2.5 (3) MG/3ML inhalation solution 3 mL  3 mL Nebulization QID    acetaminophen (Tylenol Extra Strength) tab 500 mg  500 mg Oral Q4H PRN    ondansetron (Zofran) 4 MG/2ML injection 4 mg  4 mg Intravenous Q6H PRN    carbidopa-levodopa (SINEMET)  MG per tab 0.5 tablet  0.5 tablet Oral TID    tamsulosin (Flomax) cap 0.4 mg  0.4 mg Oral Nightly    polyethylene glycol (PEG 3350) (Miralax) 17 g oral packet 17 g  17 g Oral BID   [2]

## 2025-04-17 NOTE — SPIRITUAL CARE NOTE
Spiritual Care Visit Note    Patient Name: Justyn Hall Date of Spiritual Care Visit: 25   : 1940 Primary Dx: Urinary retention       Referred By: Referral From:     Spiritual Care Taxonomy:    Intended Effects: Demonstrate caring and concern    Methods: Collaborate with care team member    Interventions: Silent prayer, Explain  role, Ask guided questions    Visit Type/Summary:     - Spiritual Care: Responded to a request for spiritual care and assessed Pt for spiritual care needs. Attempted visit. Pt is discharged.     Spiritual Care support can be requested via an Epic consult. For urgent/immediate needs, please contact the On Call  at: [place]: ext [phone]    Rev. Rashida Bahena MDiv

## 2025-04-17 NOTE — PLAN OF CARE
Patient alert and oriented. Tele. Call light within reach. Frequent rounding by staff.  Problem: Patient Centered Care  Goal: Patient preferences are identified and integrated in the patient's plan of care  Description: Interventions:- What would you like us to know as we care for you? From home with sister - Provide timely, complete, and accurate information to patient/family- Incorporate patient and family knowledge, values, beliefs, and cultural backgrounds into the planning and delivery of care- Encourage patient/family to participate in care and decision-making at the level they choose- Honor patient and family perspectives and choices  Outcome: Progressing     Problem: Patient/Family Goals  Goal: Patient/Family Long Term Goal  Description: Patient's Long Term Goal: Discharge home   Interventions:  - IV abx   - O2 as needed  - Monitor VS  - Follow MD orders  - See additional Care Plan goals for specific interventions  Outcome: Progressing  Goal: Patient/Family Short Term Goal  Description: Patient's Short Term Goal: Relief of abdominal pain  Interventions: -   - IV abx   - Pain meds as needed  - Monitor VS  - Follow MD orders  - See additional Care Plan goals for specific interventions  Outcome: Progressing     Problem: RESPIRATORY - ADULT  Goal: Achieves optimal ventilation and oxygenation  Description: INTERVENTIONS:- Assess for changes in respiratory status- Assess for changes in mentation and behavior- Position to facilitate oxygenation and minimize respiratory effort- Oxygen supplementation based on oxygen saturation or ABGs- Provide Smoking Cessation handout, if applicable- Encourage broncho-pulmonary hygiene including cough, deep breathe, Incentive Spirometry- Assess the need for suctioning and perform as needed- Assess and instruct to report SOB or any respiratory difficulty- Respiratory Therapy support as indicated- Manage/alleviate anxiety- Monitor for signs/symptoms of CO2 retention  Outcome:  Progressing     Problem: GASTROINTESTINAL - ADULT  Goal: Minimal or absence of nausea and vomiting  Description: INTERVENTIONS:- Maintain adequate hydration with IV or PO as ordered and tolerated- Nasogastric tube to low intermittent suction as ordered- Evaluate effectiveness of ordered antiemetic medications- Provide nonpharmacologic comfort measures as appropriate- Advance diet as tolerated, if ordered- Obtain nutritional consult as needed- Evaluate fluid balance  Outcome: Progressing  Goal: Maintains or returns to baseline bowel function  Description: INTERVENTIONS:- Assess bowel function- Maintain adequate hydration with IV or PO as ordered and tolerated- Evaluate effectiveness of GI medications- Encourage mobilization and activity- Obtain nutritional consult as needed- Establish a toileting routine/schedule- Consider collaborating with pharmacy to review patient's medication profile  Outcome: Progressing  Goal: Maintains adequate nutritional intake (undernourished)  Description: INTERVENTIONS:- Monitor percentage of each meal consumed- Identify factors contributing to decreased intake, treat as appropriate- Assist with meals as needed- Monitor I&O, WT and lab values- Obtain nutritional consult as needed- Optimize oral hygiene and moisture- Encourage food from home; allow for food preferences- Enhance eating environment  Outcome: Progressing  Goal: Achieves appropriate nutritional intake (bariatric)  Description: INTERVENTIONS:- Monitor for over-consumption- Identify factors contributing to increased intake, treat as appropriate- Monitor I&O, WT and lab values- Obtain nutritional consult as needed- Evaluate psychosocial factors contributing to over-consumption  Outcome: Progressing     Problem: GENITOURINARY - ADULT  Goal: Absence of urinary retention  Description: INTERVENTIONS:- Assess patient’s ability to void and empty bladder- Monitor intake/output and perform bladder scan as needed- Follow urinary  retention protocol/standard of care- Consider collaborating with pharmacy to review patient's medication profile- Implement strategies to promote bladder emptying  Outcome: Progressing     Problem: PAIN - ADULT  Goal: Verbalizes/displays adequate comfort level or patient's stated pain goal  Description: INTERVENTIONS:- Encourage pt to monitor pain and request assistance- Assess pain using appropriate pain scale- Administer analgesics based on type and severity of pain and evaluate response- Implement non-pharmacological measures as appropriate and evaluate response- Consider cultural and social influences on pain and pain management- Manage/alleviate anxiety- Utilize distraction and/or relaxation techniques- Monitor for opioid side effects- Notify MD/LIP if interventions unsuccessful or patient reports new pain- Anticipate increased pain with activity and pre-medicate as appropriate  Outcome: Progressing     Problem: SAFETY ADULT - FALL  Goal: Free from fall injury  Description: INTERVENTIONS:- Assess pt frequently for physical needs- Identify cognitive and physical deficits and behaviors that affect risk of falls.- [place] fall precautions as indicated by assessment.- Educate pt/family on patient safety including physical limitations- Instruct pt to call for assistance with activity based on assessment- Modify environment to reduce risk of injury- Provide assistive devices as appropriate- Consider OT/PT consult to assist with strengthening/mobility- Encourage toileting schedule  Outcome: Progressing     Problem: DISCHARGE PLANNING  Goal: Discharge to home or other facility with appropriate resources  Description: INTERVENTIONS:- Identify barriers to discharge w/pt and caregiver- Include patient/family/discharge partner in discharge planning- Arrange for needed discharge resources and transportation as appropriate- Identify discharge learning needs (meds, wound care, etc)- Arrange for interpreters to assist at  discharge as needed- Consider post-discharge preferences of patient/family/discharge partner- Complete POLST form as appropriate- Assess patient's ability to be responsible for managing their own health- Refer to Case Management Department for coordinating discharge planning if the patient needs post-hospital services based on physician/LIP order or complex needs related to functional status, cognitive ability or social support system  Outcome: Progressing

## 2025-04-18 ENCOUNTER — TELEPHONE (OUTPATIENT)
Dept: SURGERY | Facility: CLINIC | Age: 85
End: 2025-04-18

## 2025-04-18 NOTE — PROGRESS NOTES
Agree with Brooks. Thanks KK.    See below    Can we please set this patient up for follow up in approximately one week or whenever he begins to mobilize better? Nurse visit for void trial beforehand and then provider visit.     If they want to wait until he's more mobile, they need to, at a minimum, exchange the santiago catheter every 4 weeks , pericare BID, irrigation as needed.

## 2025-04-18 NOTE — TELEPHONE ENCOUNTER
----- Message from Kayce Vila sent at 4/17/2025  9:45 PM CDT -----  Agree with Brooks. Thanks KK.    See below    Can we please set this patient up for follow up in approximately one week or whenever he begins to mobilize better? Nurse visit for void trial beforehand and then provider visit.     If they want to wait until he's more mobile, they need to, at a minimum, exchange the santiago catheter every 4 weeks , pericare BID, irrigation as needed.  ----- Message -----  From: Arturo Watt In  Sent: 4/11/2025   6:17 PM CDT  To: Kayce Vila MD

## 2025-04-18 NOTE — TELEPHONE ENCOUNTER
I s/w Yefri the nurse caring for pt at [facility] in [place] and determined that pt just came to them yesterday and he has started PT and is a little weak still and having some lightheadedness. I mentioned that this may be from Tamsulosin and asked if he is still taking it daily. He stated he was not at his computer and will have to call me back. I informed that we will also need to arrange a N/V for a void trial at our office and also a visit with the PA afterward.

## 2025-04-25 ENCOUNTER — TELEPHONE (OUTPATIENT)
Dept: SURGERY | Facility: CLINIC | Age: 85
End: 2025-04-25

## 2025-04-25 NOTE — TELEPHONE ENCOUNTER
[facility] calling for patient that is currently in a rehab facility. Please advise if appointment on 4/28 is only for voiding trail. If so it can be done at rehab facility due to transportation being difficult. Please call at [phone],thanks.

## 2025-04-28 NOTE — TELEPHONE ENCOUNTER
-S/w Jose Luis/ [facility]; pt identity verified with name & .  -She reports pt's Sister would be transporting pt to OV; however, pt has limitations.  -The Rehab facility is familiar with Voiding Trials with there's taking X3-days. Jose Luis will CB on 25 to report results; then we can schedule an OV as necessary.  -Encounter complete.

## 2025-05-01 ENCOUNTER — TELEPHONE (OUTPATIENT)
Dept: SURGERY | Facility: CLINIC | Age: 85
End: 2025-05-01

## 2025-05-01 NOTE — TELEPHONE ENCOUNTER
-Jose Luis/  Rehab/ [phone] called with pt's Voiding Trial update; pt identity verified w/ name & .  -On 25, Voiding trial initiated.    -On 25, with 900ml residual, santiago catheter reinserted.  -Jose Luis will contact Urology clinic when pt is scheduled for discharge from their facility.  -Encounter complete.

## 2025-05-07 NOTE — PROGRESS NOTES
[facility]  Part of [facility]  Urology Clinic Note    Today I had the pleasure of seeing Justyn Hall in my clinic. Mr. Hall is a pleasant 84 year old male who I am seeing for urinary retention follow up. Patient was originally seen in the hospital on 4/10/2025 by me for urinary retention.    Patient was previously seen by Dr. Osorio in 2019 for BPH and elevated PSA, however he was lost to follow-up.  At that time he was on tamsulosin and finasteride.    Patient had originally presented to the emergency department on 4/10/2025 with complaint of constipation, abdominal pain, hemoptysis x 1 week.  Patient was noted to be in urinary retention in the emergency room and a Santiago was placed with an output of 1.6 L and tea colored urine.  Was also noted to have significantly elevated creatinine and potassium.  Was admitted for JORDON, hyperkalemia, hemoptysis, hypotension, leukocytosis. Ultimately stabilized and discharged with santiago in place.    Per chart review, it appeared that the patient stopped his BPH medications many years prior (Tamsulosin and finasteride).    Patient underwent voiding trial at [facility] on 4/28/2025. Unfortunately, it appears that on the following day, patient found to have 900mL on PVR and so santiago catheter was inserted.    He was restarted on tamsulosin and has been taking the medication. He has also been working with PT and feels his mobility has increased.     PAST MEDICAL HISTORY:  Past Medical History[1]     PAST SURGICAL HISTORY:  Past Surgical History[2]    ALLERGIES:  Allergies[3]      MEDICATIONS:  Current Outpatient Medications   Medication Instructions    aspirin 81 mg, Oral, Daily    carbidopa-levodopa  MG Oral Tab Take 0.5 tablets by mouth 3 (three) times daily for 14 days, THEN 1 tablet 3 (three) times daily. Give half a tab 3 times a day, 5 hours between each dose for 3 days.  Then full tab 3 times a day 5 hours between each dose.  Give ideally on  empty stomach.  Give 30 to 45 minutes before a meal or 45 to 60 minutes after a meal..    midodrine (PROAMATINE) 2.5 mg, 3 times daily    tamsulosin (FLOMAX) 0.4 mg, Oral, Nightly        FAMILY HISTORY:  Family History[4]     SOCIAL HISTORY:  Short Social Hx on File[5]       PHYSICAL EXAMINATION:  General: No acute distress, cooperative and communicative, alert and oriented  HEENT: Normocephalic, atraumatic, moist mucous membranes, no discharge, no gross neck abnormalities  Respiratory: No respiratory distress. Chest expansion equal bilaterally.  MUSCULOSKELETAL: In wheelchair    REVIEW OF SYSTEMS:  A comprehensive 10-point review of systems was completed.  Pertinent positives and negatives are noted in the the HPI.       LABORATORY DATA:  Urine Culture Results (last three years):  No results found for: \"URINECUL\"    Component  Ref Range & Units (hover) 4/17/25  5:42 AM   Glucose 95   Sodium 140   Potassium 4.1   Chloride 106   CO2 25.0   Anion Gap 9   BUN 26 High    Creatinine 1.35 High         Component  Ref Range & Units (hover) 4/17/25  5:42 AM   WBC 17.9 High    RBC 3.38 Low    HGB 9.4 Low    HCT 28.7 Low    MCV 84.9   MCH 27.8   MCHC 32.8   RDW-SD 44.9   RDW 14.6   .0        IMAGING REVIEW:  4/16/2025 renal bladder ultrasound:  CONCLUSION:   1. Mild residual dilatation of right upper and lower pole renal collecting system without dilatation of renal pelvis or detectable hydroureter.   2. No left hydronephrosis.   3. Marked prostate enlargement.     4/11/2025 renal bladder ultrasound:  COMPARISON: [facility], CT ABDOMEN+PELVIS (CPT=74176), 4/10/2025, 9:12 AM.      INDICATIONS: Hydronephrosis      TECHNIQUE: Ultrasound examination was performed to visualize the kidneys and bladder.       Findings and impression:      CORRELATED WITH CT SCAN OF THE KIDNEYS FROM YESTERDAY      KIDNEYS MEASURE 9-10 CENTIMETER IN LENGTH.  MILD BILATERAL HYDRONEPHROSIS IS STILL PRESENT      RENAL PARENCHYMA IS  POORLY VISUALIZED DUE TO BOWEL GAS AND BODY HABITUS.  NO OBVIOUS CALCULUS      BLADDER IS COMPLETELY DECOMPRESSED AROUND A SANTIAGO   FINALIZED BY (CST): CYRIL THORPE MD ON 4/11/2025 AT 6:16 PM       4/10/2025 CT abdomen and pelvis:  CONCLUSION:   1.  Urinary bladder is collapsed around a Santiago catheter.  Mild bilateral hydroureteronephrosis which may be related to recent bladder outlet obstruction.  However correlate for signs of upper tract infection.   2.  Granulomatous calcifications within the liver and spleen.   3.  High density material within the gallbladder suggesting vicarious excretion of IV contrast, stones, or sludge.   4.  Coarse calcifications within the pancreatic head which may represent sequela of previous pancreatitis.   5.  Small bilateral pleural effusions.   6.  Moderate-sized hiatal hernia.   7.  Coronary atherosclerosis.   8.  Post left hip arthroplasty.  The hardware causes beam hardening artifact limiting assessment of adjacent structures.   9.  Prostate enlargement.       IMPRESSION:   #Urinary retention  We discussed behavioral management with timed voiding, double voiding, avoiding bladder irritants (coffee, tea, soda/pop, alcohol), voiding prior to bedtime, avoiding fluids 2-4 hours prior to bedtime, compression stockings and elevation of feet, regular bowel movements (avoiding constipation). He should continue on the tamsulosin and restart finasteride.  Given his history of parkinsonism, there could be a component of neurogenic bladder and as such if he is unable to pass trial of void cystoscopy with urodynamic study should be considered.     PLAN:  - Bladder behaviors as above  - Continue tamsulosin 0.4mg nightly   - Trial of void unsuccessful x 1. He should have repeat trial of void whether at facility or in clinic early next week.  - If PVR >300 he should have santiago reinserted and follow up in 2 weeks for repeat trial of void vs cystoscopy with urodynamics. If <300, patient should  return to clinic in 1 week for repeat PVR and visit.  -Given his history of parkinsonism, should he have issues in the future, a cystoscopy with urodynamic studies should be considered      DEEPAK Matos  05/09/2025      The 21st Century Cures Act makes medical notes available to patients in the interest of transparency.  However, please be advised that this is a medical document.  It is intended as a peer to peer communication.  It is written in medical language and may contain abbreviations or verbiage that are technical and unfamiliar.  It may appear blunt or direct.  Medical documents are intended to carry relevant information, facts as evident, and the clinical opinion of the practitioner.         [1]   Past Medical History:   Fracture of arm    fracture of left arm - cast   [2]   Past Surgical History:  Procedure Laterality Date    Hip surgery      Other surgical history  2014     prostate biopsy    Spinal fusion     [3] No Known Allergies  [4] No family history on file.  [5]   Social History  Socioeconomic History    Marital status: Single   Tobacco Use    Smoking status: Never    Smokeless tobacco: Never   Vaping Use    Vaping status: Never Used   Substance and Sexual Activity    Alcohol use: No     Alcohol/week: 0.0 standard drinks of alcohol    Drug use: No   Other Topics Concern    Caffeine Concern Yes     Comment: coffee, occasionally    Reaction to local anesthetic No    Right Handed Yes     Social Drivers of Health     Food Insecurity: No Food Insecurity (4/10/2025)    NCSS - Food Insecurity     Worried About Running Out of Food in the Last Year: No     Ran Out of Food in the Last Year: No   Transportation Needs: No Transportation Needs (4/10/2025)    NCSS - Transportation     Lack of Transportation: No   Housing Stability: Not At Risk (4/10/2025)    NCSS - Housing/Utilities     Has Housing: Yes     Worried About Losing Housing: No     Unable to Get Utilities: No

## 2025-05-09 ENCOUNTER — OFFICE VISIT (OUTPATIENT)
Dept: SURGERY | Facility: CLINIC | Age: 85
End: 2025-05-09

## 2025-05-09 ENCOUNTER — TELEPHONE (OUTPATIENT)
Dept: SURGERY | Facility: CLINIC | Age: 85
End: 2025-05-09

## 2025-05-09 DIAGNOSIS — R33.9 URINARY RETENTION: Primary | ICD-10-CM

## 2025-05-09 DIAGNOSIS — N13.8 BPH WITH OBSTRUCTION/LOWER URINARY TRACT SYMPTOMS: ICD-10-CM

## 2025-05-09 DIAGNOSIS — N40.1 BPH WITH OBSTRUCTION/LOWER URINARY TRACT SYMPTOMS: ICD-10-CM

## 2025-05-09 DIAGNOSIS — N13.9 OBSTRUCTED, UROPATHY: ICD-10-CM

## 2025-05-09 PROCEDURE — 99214 OFFICE O/P EST MOD 30 MIN: CPT

## 2025-05-09 RX ORDER — ASPIRIN 81 MG/1
81 TABLET ORAL DAILY
COMMUNITY

## 2025-05-09 RX ORDER — POLYETHYLENE GLYCOL 3350 17 G/17G
17 POWDER, FOR SOLUTION ORAL DAILY
COMMUNITY

## 2025-05-09 RX ORDER — TAMSULOSIN HYDROCHLORIDE 0.4 MG/1
0.4 CAPSULE ORAL DAILY
COMMUNITY

## 2025-05-09 RX ORDER — ACETAMINOPHEN 325 MG/1
325 TABLET ORAL EVERY 6 HOURS PRN
COMMUNITY

## 2025-05-12 ENCOUNTER — TELEPHONE (OUTPATIENT)
Dept: INTERNAL MEDICINE CLINIC | Facility: CLINIC | Age: 85
End: 2025-05-12

## 2025-05-12 ENCOUNTER — TELEPHONE (OUTPATIENT)
Dept: SURGERY | Facility: CLINIC | Age: 85
End: 2025-05-12

## 2025-05-12 NOTE — TELEPHONE ENCOUNTER
The patient should keep the santiago until his next appointment on 05/30. He should also continue taking Tamsulosin 0.4 mg daily, make sure his bowel movements are regular, and work with PT to become as mobile as possible until that appointment. They can discuss further options at that appointment.    Future Appointments   Date Time Provider Department Center   5/30/2025  1:00 PM Brooks Mart PA-C [facility]

## 2025-05-12 NOTE — TELEPHONE ENCOUNTER
Pt has a cath and had Nursing home take out to see if he could urinate. Pt was not able to urinate so the home put it back in. Sister came to  asking what next step is... Please advise

## 2025-05-14 ENCOUNTER — APPOINTMENT (OUTPATIENT)
Dept: GENERAL RADIOLOGY | Facility: HOSPITAL | Age: 85
End: 2025-05-14
Payer: MEDICARE

## 2025-05-14 ENCOUNTER — HOSPITAL ENCOUNTER (INPATIENT)
Facility: HOSPITAL | Age: 85
LOS: 18 days | Discharge: NURSING, REHAB OR LONG-TERM CARE | End: 2025-06-01
Attending: EMERGENCY MEDICINE | Admitting: HOSPITALIST
Payer: MEDICARE

## 2025-05-14 ENCOUNTER — TELEPHONE (OUTPATIENT)
Dept: SURGERY | Facility: CLINIC | Age: 85
End: 2025-05-14

## 2025-05-14 DIAGNOSIS — N17.9 AKI (ACUTE KIDNEY INJURY): ICD-10-CM

## 2025-05-14 DIAGNOSIS — N39.0 URINARY TRACT INFECTION WITH HEMATURIA, SITE UNSPECIFIED: Primary | ICD-10-CM

## 2025-05-14 DIAGNOSIS — R31.9 URINARY TRACT INFECTION WITH HEMATURIA, SITE UNSPECIFIED: Primary | ICD-10-CM

## 2025-05-14 PROBLEM — R79.89 AZOTEMIA: Status: ACTIVE | Noted: 2025-05-14

## 2025-05-14 PROBLEM — D64.9 ANEMIA: Status: ACTIVE | Noted: 2025-05-14

## 2025-05-14 PROBLEM — E87.1 HYPONATREMIA: Status: ACTIVE | Noted: 2025-05-14

## 2025-05-14 LAB
ALBUMIN SERPL-MCNC: 3.9 G/DL (ref 3.2–4.8)
ALBUMIN/GLOB SERPL: 1.3 {RATIO} (ref 1–2)
ALP LIVER SERPL-CCNC: 113 U/L (ref 45–117)
ALT SERPL-CCNC: <7 U/L (ref 10–49)
ANION GAP SERPL CALC-SCNC: 11 MMOL/L (ref 0–18)
AST SERPL-CCNC: 20 U/L (ref ?–34)
BASOPHILS # BLD AUTO: 0.08 X10(3) UL (ref 0–0.2)
BASOPHILS NFR BLD AUTO: 0.5 %
BILIRUB SERPL-MCNC: 1.6 MG/DL (ref 0.2–1.1)
BILIRUB UR QL: NEGATIVE
BUN BLD-MCNC: 53 MG/DL (ref 9–23)
BUN/CREAT SERPL: 20.2 (ref 10–20)
CALCIUM BLD-MCNC: 7.8 MG/DL (ref 8.7–10.4)
CHLORIDE SERPL-SCNC: 96 MMOL/L (ref 98–112)
CO2 SERPL-SCNC: 25 MMOL/L (ref 21–32)
CREAT BLD-MCNC: 2.62 MG/DL (ref 0.7–1.3)
DEPRECATED RDW RBC AUTO: 47.3 FL (ref 35.1–46.3)
EGFRCR SERPLBLD CKD-EPI 2021: 23 ML/MIN/1.73M2 (ref 60–?)
EOSINOPHIL # BLD AUTO: 0.01 X10(3) UL (ref 0–0.7)
EOSINOPHIL NFR BLD AUTO: 0.1 %
ERYTHROCYTE [DISTWIDTH] IN BLOOD BY AUTOMATED COUNT: 14.9 % (ref 11–15)
FLUAV + FLUBV RNA SPEC NAA+PROBE: NEGATIVE
FLUAV + FLUBV RNA SPEC NAA+PROBE: NEGATIVE
GLOBULIN PLAS-MCNC: 3 G/DL (ref 2–3.5)
GLUCOSE BLD-MCNC: 129 MG/DL (ref 70–99)
GLUCOSE UR-MCNC: NORMAL MG/DL
GRAN CASTS #/AREA URNS LPF: PRESENT /LPF
HCT VFR BLD AUTO: 33.7 % (ref 39–53)
HGB BLD-MCNC: 10.6 G/DL (ref 13–17.5)
HYALINE CASTS #/AREA URNS AUTO: PRESENT /LPF
IMM GRANULOCYTES # BLD AUTO: 0.13 X10(3) UL (ref 0–1)
IMM GRANULOCYTES NFR BLD: 0.8 %
KETONES UR-MCNC: NEGATIVE MG/DL
LACTATE SERPL-SCNC: 1.7 MMOL/L (ref 0.5–2)
LEUKOCYTE ESTERASE UR QL STRIP.AUTO: 500
LYMPHOCYTES # BLD AUTO: 0.41 X10(3) UL (ref 1–4)
LYMPHOCYTES NFR BLD AUTO: 2.6 %
MCH RBC QN AUTO: 27 PG (ref 26–34)
MCHC RBC AUTO-ENTMCNC: 31.5 G/DL (ref 31–37)
MCV RBC AUTO: 85.8 FL (ref 80–100)
MONOCYTES # BLD AUTO: 0.67 X10(3) UL (ref 0.1–1)
MONOCYTES NFR BLD AUTO: 4.3 %
NEUTROPHILS # BLD AUTO: 14.42 X10 (3) UL (ref 1.5–7.7)
NEUTROPHILS # BLD AUTO: 14.42 X10(3) UL (ref 1.5–7.7)
NEUTROPHILS NFR BLD AUTO: 91.7 %
NITRITE UR QL STRIP.AUTO: NEGATIVE
OSMOLALITY SERPL CALC.SUM OF ELEC: 290 MOSM/KG (ref 275–295)
PH UR: 5.5 [PH] (ref 5–8)
PLATELET # BLD AUTO: 347 10(3)UL (ref 150–450)
POTASSIUM SERPL-SCNC: 4 MMOL/L (ref 3.5–5.1)
PROT SERPL-MCNC: 6.9 G/DL (ref 5.7–8.2)
PROT UR-MCNC: 30 MG/DL
RBC # BLD AUTO: 3.93 X10(6)UL (ref 3.8–5.8)
RSV RNA SPEC NAA+PROBE: NEGATIVE
SARS-COV-2 RNA RESP QL NAA+PROBE: NOT DETECTED
SODIUM SERPL-SCNC: 132 MMOL/L (ref 136–145)
SP GR UR STRIP: 1.01 (ref 1–1.03)
UROBILINOGEN UR STRIP-ACNC: NORMAL
WBC # BLD AUTO: 15.7 X10(3) UL (ref 4–11)
WBC #/AREA URNS AUTO: >50 /HPF
WBC CLUMPS UR QL AUTO: PRESENT /HPF

## 2025-05-14 PROCEDURE — 71045 X-RAY EXAM CHEST 1 VIEW: CPT

## 2025-05-14 PROCEDURE — 99223 1ST HOSP IP/OBS HIGH 75: CPT | Performed by: HOSPITALIST

## 2025-05-14 RX ORDER — ONDANSETRON 2 MG/ML
4 INJECTION INTRAMUSCULAR; INTRAVENOUS EVERY 6 HOURS PRN
Status: DISCONTINUED | OUTPATIENT
Start: 2025-05-14 | End: 2025-06-01

## 2025-05-14 RX ORDER — METOCLOPRAMIDE HYDROCHLORIDE 5 MG/ML
5 INJECTION INTRAMUSCULAR; INTRAVENOUS EVERY 8 HOURS PRN
Status: DISCONTINUED | OUTPATIENT
Start: 2025-05-14 | End: 2025-06-01

## 2025-05-14 RX ORDER — ACETAMINOPHEN 10 MG/ML
1000 INJECTION, SOLUTION INTRAVENOUS EVERY 6 HOURS PRN
Status: DISCONTINUED | OUTPATIENT
Start: 2025-05-14 | End: 2025-05-27

## 2025-05-14 RX ORDER — SODIUM CHLORIDE 9 MG/ML
INJECTION, SOLUTION INTRAVENOUS CONTINUOUS
Status: DISCONTINUED | OUTPATIENT
Start: 2025-05-14 | End: 2025-05-21

## 2025-05-14 RX ORDER — LABETALOL HYDROCHLORIDE 5 MG/ML
10 INJECTION, SOLUTION INTRAVENOUS EVERY 4 HOURS PRN
Status: DISCONTINUED | OUTPATIENT
Start: 2025-05-14 | End: 2025-06-01

## 2025-05-14 RX ORDER — HEPARIN SODIUM 5000 [USP'U]/ML
5000 INJECTION, SOLUTION INTRAVENOUS; SUBCUTANEOUS EVERY 12 HOURS SCHEDULED
Status: DISCONTINUED | OUTPATIENT
Start: 2025-05-14 | End: 2025-05-16

## 2025-05-14 RX ORDER — ACETAMINOPHEN 500 MG
500 TABLET ORAL EVERY 4 HOURS PRN
Status: DISCONTINUED | OUTPATIENT
Start: 2025-05-14 | End: 2025-06-01

## 2025-05-14 NOTE — ED INITIAL ASSESSMENT (HPI)
Pt A/Ox3 presented to ED via EMS c/o a fever. EMS reported the pt had urinary retention yesterday and [facility] and today began to have a fever of according to EMS of \"99.8\". Santiago catheter in place prior to arrival from the facility.    Pt had BM x2.  Reports feeling better and pain has lessened.  Parent and pt advised on including more fiber in diet and increasing water intake.  All questions answered.  Verbalized understanding.

## 2025-05-14 NOTE — TELEPHONE ENCOUNTER
I s/w the nurse at [facility] calling about pt having to have his santiago cath replaced and that the urine was very cloudy and full of mucous and some blood clots. She stated that they took a sample for culture but pt is now beings sent to the ER because he vomited and does not want to eat.

## 2025-05-14 NOTE — TELEPHONE ENCOUNTER
Patient sister requesting to speak to JOSEPH Crocker ,patient doesn't want to eat and might have another uti .Please advise

## 2025-05-14 NOTE — ED PROVIDER NOTES
Patient Seen in: [facility] Emergency Department      History     Chief Complaint   Patient presents with    Cath Tube Problem     Stated Complaint: Catheter issue    Subjective:   HPI  History of Present Illness            84-year-old male history of BPH, urinary retention with Espinal, autonomic dysfunction, Parkinson's disease, who presents for evaluation of urinary retention for 1 day, Espinal was exchanged yesterday, and possible fever at nursing facility, was sent here for further evaluation of fever.  Patient currently denies any symptoms, afebrile in the ER currently, denies any cough chest pain abdominal pain nausea vomiting or other.  Espinal appears to be draining well at bedside at this time.  Per nursing facility and EMS handoff, patient had Espinal exchange yesterday.          Objective:     Past Medical History:    Fracture of arm    fracture of left arm - cast              Past Surgical History:   Procedure Laterality Date    Hip surgery      Other surgical history  2014     prostate biopsy    Spinal fusion                  Social History     Socioeconomic History    Marital status: Single   Tobacco Use    Smoking status: Never    Smokeless tobacco: Never   Vaping Use    Vaping status: Never Used   Substance and Sexual Activity    Alcohol use: No     Alcohol/week: 0.0 standard drinks of alcohol    Drug use: No   Other Topics Concern    Caffeine Concern Yes     Comment: coffee, occasionally    Reaction to local anesthetic No    Right Handed Yes     Social Drivers of Health     Food Insecurity: No Food Insecurity (4/10/2025)    NCSS - Food Insecurity     Worried About Running Out of Food in the Last Year: No     Ran Out of Food in the Last Year: No   Transportation Needs: No Transportation Needs (4/10/2025)    NCSS - Transportation     Lack of Transportation: No   Housing Stability: Not At Risk (4/10/2025)    NCSS - Housing/Utilities     Has Housing: Yes     Worried About Losing Housing: No     Unable  to Get Utilities: No                                Physical Exam     ED Triage Vitals [05/14/25 4763]   /71   Pulse 71   Resp 17   Temp 97.8 °F (36.6 °C)   Temp src Oral   SpO2 100 %   O2 Device None (Room air)       Current Vitals:   Vital Signs  BP: 127/61  Pulse: 66  Resp: 21  Temp: 97.8 °F (36.6 °C)  Temp src: Oral  MAP (mmHg): 77    Oxygen Therapy  SpO2: 100 %  O2 Device: None (Room air)          Physical Exam  Vitals reviewed.   Constitutional:       Appearance: Normal appearance.   HENT:      Head: Normocephalic.      Mouth/Throat:      Mouth: Mucous membranes are moist.      Pharynx: Oropharynx is clear.   Cardiovascular:      Rate and Rhythm: Normal rate and regular rhythm.   Pulmonary:      Effort: Pulmonary effort is normal.      Breath sounds: Normal breath sounds.   Abdominal:      Palpations: Abdomen is soft.      Tenderness: There is no abdominal tenderness.   Genitourinary:     Comments: Espinal in place  Skin:     General: Skin is warm.      Capillary Refill: Capillary refill takes less than 2 seconds.   Neurological:      General: No focal deficit present.      Mental Status: He is alert.   Psychiatric:         Mood and Affect: Mood normal.         Behavior: Behavior normal.                   ED Course     Labs Reviewed   CBC WITH DIFFERENTIAL WITH PLATELET - Abnormal; Notable for the following components:       Result Value    WBC 15.7 (*)     HGB 10.6 (*)     HCT 33.7 (*)     RDW-SD 47.3 (*)     Neutrophil Absolute Prelim 14.42 (*)     Neutrophil Absolute 14.42 (*)     Lymphocyte Absolute 0.41 (*)     All other components within normal limits   COMP METABOLIC PANEL (14) - Abnormal; Notable for the following components:    Glucose 129 (*)     Sodium 132 (*)     Chloride 96 (*)     BUN 53 (*)     Creatinine 2.62 (*)     BUN/CREA Ratio 20.2 (*)     Calcium, Total 7.8 (*)     eGFR-Cr 23 (*)     ALT <7 (*)     Bilirubin, Total 1.6 (*)     All other components within normal limits   URINALYSIS  WITH CULTURE REFLEX - Abnormal; Notable for the following components:    Clarity Urine Turbid (*)     Blood Urine 3+ (*)     Protein Urine 30 (*)     Leukocyte Esterase Urine 500 (*)     WBC Urine >50 (*)     RBC Urine 6-10 (*)     Squamous Epi. Cells Few (*)     Hyaline Casts Present (*)     Granular Casts Present (*)     WBC Clump Present (*)     All other components within normal limits   LACTIC ACID, PLASMA - Normal   SARS-COV-2/FLU A AND B/RSV BY PCR (GENEXPERT) - Normal    Narrative:     This test is intended for the qualitative detection and differentiation of SARS-CoV-2, influenza A, influenza B, and respiratory syncytial virus (RSV) viral RNA in nasopharyngeal or nares swabs from individuals suspected of respiratory viral infection consistent with COVID-19 by their healthcare provider. Signs and symptoms of respiratory viral infection due to SARS-CoV-2, influenza, and RSV can be similar.    Test performed using the Xpert Xpress SARS-CoV-2/FLU/RSV (real time RT-PCR)  assay on the [employer] instrument, [employer], [place] [address].   This test is being used under the Food and Drug Administration's Emergency Use Authorization.    The authorized Fact Sheet for Healthcare Providers for this assay is available upon request from the laboratory.   RAINBOW DRAW BLUE   URINE CULTURE, ROUTINE   BLOOD CULTURE   BLOOD CULTURE       ED Course as of 05/14/25 1922  ------------------------------------------------------------  Time: 05/14 1853  Comment: INDICATIONS: Fever today.      Findings and impression:      Stable heart size.  No edema      Small left basilar opacity could be atelectasis or infection      No pneumothorax   Finalized by (CST): Jerome Alfonso MD on 5/14/2025 at 6:40 PM       ------------------------------------------------------------  Time: 05/14 1853  Value: Urinalysis with Culture Reflex(!)  Comment: Consistent with UTI  ------------------------------------------------------------  Time: 05/14  1854  Value: Lactic Acid, Plasma  Comment: (Reviewed)  ------------------------------------------------------------  Time: 05/14 1854  Value: LACTIC ACID: 1.7  Comment: Lactic negative  ------------------------------------------------------------  Time: 05/14 1854  Value: Comp Metabolic Panel (14)(!)  Comment: Mild hyponatremia hypochloremia, JORDON, BUN 53, creatinine 2.62 with BUN of 20, possibly prerenal versus mixed obstructive uropathy given recent Espinal obstruction.  ------------------------------------------------------------  Time: 05/14 1854  Value: WBC(!): 15.7  Comment: Leukocytosis noted.  ------------------------------------------------------------  Time: 05/14 1859  Value: Glucose(!): 129  Comment: (Reviewed)     Results                                MDM      84-year-old history as above who presents for evaluation of possible fever nursing home possibly retaining yesterday with Espinal, no focal symptoms at this time afebrile at this time however unsure if patient received Tylenol or ibuprofen prior to arrival.  Will proceed with basic infectious workup, does not appear septic at this time.    Admission disposition: 5/14/2025  6:59 PM       Urine consistent with infection, labs show evidence of leukocytosis without lactic elevation, given 2 g of ceftriaxone, admitted.  Blood cultures obtained    Admitted to hospitalist team, they requested for urology to be be made aware, given no acute indication for acute surgical or urology intervention, routine consult placed.    Medical Decision Making      Disposition and Plan     Clinical Impression:  1. Urinary tract infection with hematuria, site unspecified    2. JORDON (acute kidney injury)         Disposition:  Admit  5/14/2025  6:59 pm    Follow-up:  No follow-up provider specified.  We recommend that you schedule follow up care with a primary care provider within the next three months to obtain basic health screening including reassessment of your blood  pressure.      Medications Prescribed:  Current Discharge Medication List                Supplementary Documentation:         Hospital Problems       Present on Admission  Date Reviewed: 5/9/2025          ICD-10-CM Noted POA    Acute kidney injury (HCC) N17.9 5/14/2025 Yes    Anemia D64.9 5/14/2025 Yes    Azotemia R79.89 5/14/2025 Yes    Hyponatremia E87.1 5/14/2025 Yes    UTI (urinary tract infection) N39.0 5/14/2025 Unknown

## 2025-05-15 ENCOUNTER — APPOINTMENT (OUTPATIENT)
Dept: ULTRASOUND IMAGING | Facility: HOSPITAL | Age: 85
End: 2025-05-15
Attending: HOSPITALIST
Payer: MEDICARE

## 2025-05-15 LAB
ANION GAP SERPL CALC-SCNC: 8 MMOL/L (ref 0–18)
BASOPHILS # BLD AUTO: 0.04 X10(3) UL (ref 0–0.2)
BASOPHILS NFR BLD AUTO: 0.4 %
BUN BLD-MCNC: 42 MG/DL (ref 9–23)
BUN/CREAT SERPL: 20.2 (ref 10–20)
CALCIUM BLD-MCNC: 7.1 MG/DL (ref 8.7–10.4)
CHLORIDE SERPL-SCNC: 107 MMOL/L (ref 98–112)
CO2 SERPL-SCNC: 20 MMOL/L (ref 21–32)
CREAT BLD-MCNC: 2.08 MG/DL (ref 0.7–1.3)
DEPRECATED RDW RBC AUTO: 45.9 FL (ref 35.1–46.3)
EGFRCR SERPLBLD CKD-EPI 2021: 31 ML/MIN/1.73M2 (ref 60–?)
EOSINOPHIL # BLD AUTO: 0.03 X10(3) UL (ref 0–0.7)
EOSINOPHIL NFR BLD AUTO: 0.3 %
ERYTHROCYTE [DISTWIDTH] IN BLOOD BY AUTOMATED COUNT: 14.9 % (ref 11–15)
GLUCOSE BLD-MCNC: 115 MG/DL (ref 70–99)
HCT VFR BLD AUTO: 30.3 % (ref 39–53)
HGB BLD-MCNC: 9.4 G/DL (ref 13–17.5)
IMM GRANULOCYTES # BLD AUTO: 0.09 X10(3) UL (ref 0–1)
IMM GRANULOCYTES NFR BLD: 1 %
LYMPHOCYTES # BLD AUTO: 0.51 X10(3) UL (ref 1–4)
LYMPHOCYTES NFR BLD AUTO: 5.7 %
MCH RBC QN AUTO: 26.3 PG (ref 26–34)
MCHC RBC AUTO-ENTMCNC: 31 G/DL (ref 31–37)
MCV RBC AUTO: 84.9 FL (ref 80–100)
MONOCYTES # BLD AUTO: 0.33 X10(3) UL (ref 0.1–1)
MONOCYTES NFR BLD AUTO: 3.7 %
NEUTROPHILS # BLD AUTO: 7.89 X10 (3) UL (ref 1.5–7.7)
NEUTROPHILS # BLD AUTO: 7.89 X10(3) UL (ref 1.5–7.7)
NEUTROPHILS NFR BLD AUTO: 88.9 %
OSMOLALITY SERPL CALC.SUM OF ELEC: 291 MOSM/KG (ref 275–295)
PLATELET # BLD AUTO: 318 10(3)UL (ref 150–450)
POTASSIUM SERPL-SCNC: 3.7 MMOL/L (ref 3.5–5.1)
RBC # BLD AUTO: 3.57 X10(6)UL (ref 3.8–5.8)
SODIUM SERPL-SCNC: 135 MMOL/L (ref 136–145)
WBC # BLD AUTO: 8.9 X10(3) UL (ref 4–11)

## 2025-05-15 PROCEDURE — 99222 1ST HOSP IP/OBS MODERATE 55: CPT

## 2025-05-15 PROCEDURE — 99233 SBSQ HOSP IP/OBS HIGH 50: CPT | Performed by: INTERNAL MEDICINE

## 2025-05-15 PROCEDURE — 76775 US EXAM ABDO BACK WALL LIM: CPT | Performed by: HOSPITALIST

## 2025-05-15 RX ORDER — CARBIDOPA AND LEVODOPA 25; 100 MG/1; MG/1
1 TABLET ORAL 3 TIMES DAILY
Status: DISCONTINUED | OUTPATIENT
Start: 2025-05-15 | End: 2025-06-01

## 2025-05-15 RX ORDER — MIDODRINE HYDROCHLORIDE 2.5 MG/1
2.5 TABLET ORAL 3 TIMES DAILY
Status: DISCONTINUED | OUTPATIENT
Start: 2025-05-15 | End: 2025-06-01

## 2025-05-15 RX ORDER — FINASTERIDE 5 MG/1
5 TABLET, FILM COATED ORAL DAILY
Status: DISCONTINUED | OUTPATIENT
Start: 2025-05-15 | End: 2025-06-01

## 2025-05-15 RX ORDER — TAMSULOSIN HYDROCHLORIDE 0.4 MG/1
0.8 CAPSULE ORAL NIGHTLY
Status: DISCONTINUED | OUTPATIENT
Start: 2025-05-15 | End: 2025-06-01

## 2025-05-15 RX ORDER — TAMSULOSIN HYDROCHLORIDE 0.4 MG/1
0.4 CAPSULE ORAL NIGHTLY
Status: DISCONTINUED | OUTPATIENT
Start: 2025-05-15 | End: 2025-05-15

## 2025-05-15 RX ORDER — ASPIRIN 81 MG/1
81 TABLET ORAL DAILY
Status: DISCONTINUED | OUTPATIENT
Start: 2025-05-15 | End: 2025-05-27

## 2025-05-15 NOTE — ED QUICK NOTES
Orders for admission, patient is aware of plan and ready to go upstairs. Any questions, please call ED RN Mann at extension [phone].     Patient Covid vaccination status: Fully vaccinated     COVID Test Ordered in ED: SARS-CoV-2/Flu A and B/RSV by PCR (GeneXpert)    COVID Suspicion at Admission: N/A    Running Infusions: Medication Infusions[1]     Mental Status/LOC at time of transport: aox3    Other pertinent information:   CIWA score: N/A   NIH score:  N/A             [1]

## 2025-05-15 NOTE — PROGRESS NOTES
Progress Note     Justyn Hall Patient Status:  Inpatient    1940 MRN [id number]   Location [facility] 5SW/SE Attending Alyx Melendez MD   Hosp Day # 1 PCP Chong Quintero MD     Chief Complaint: CAUTI    Subjective:   S: Patient shivering in bed today.  \"I feel the same\".  No other acute complaints at this time no nausea vomiting abdominal pain chest pain or shortness of breath.    Feels fatigued and weak otherwise.      Review of Systems:   10 point ROS completed and was negative, except for pertinent positive and negatives stated in subjective.    Objective:   Vital signs:  Temp:  [97.8 °F (36.6 °C)-102.6 °F (39.2 °C)] 98.3 °F (36.8 °C)  Pulse:  [] 62  Resp:  [17-25] 18  BP: (121-188)/(61-88) 139/73  SpO2:  [94 %-100 %] 94 %    Wt Readings from Last 6 Encounters:   25 150 lb 6.4 oz (68.2 kg)   04/10/25 175 lb 4.3 oz (79.5 kg)   25 168 lb 3.2 oz (76.3 kg)   10/10/19 182 lb (82.6 kg)   06/10/19 182 lb (82.6 kg)   19 181 lb (82.1 kg)         Physical Exam:    General: No acute distress. Alert ,         Respiratory: Clear to auscultation bilaterally. No wheezes. No rhonchi.  Cardiovascular: S1, S2. Regular rate and rhythm. No murmurs, rubs or gallops.   Abdomen: Soft, nontender, nondistended.  Positive bowel sounds. No rebound or guarding.  Neurologic: No focal neurological deficits.   Musculoskeletal: Moves all extremities.  Extremities: No edema.    Results:   Diagnostic Data:      Labs:    Labs Last 24 Hours:   BMP     CBC    Other     Na 135 Cl 107 BUN 42 Glu 115   Hb 9.4   PTT - Procal -   K 3.7 CO2 20.0 Cr 2.08   WBC 8.9 >< .0  INR - CRP -   Renal Lytes Endo    Hct 30.3   Trop - D dim -   eGFR - Ca 7.1 POC Gluc  -    LFT   pBNP - Lactic 1.7   eGFR AA - PO4 - A1c -   AST 20 APk 113 Prot 6.9  LDL -     Mg - TSH -   ALT <7 T faby 1.6 Alb 3.9        COVID-19 Lab Results    COVID-19  Lab Results   Component Value Date    COVID19 Not Detected 2025    COVID19  Not Detected 04/10/2025       Pro-Calcitonin  No results for input(s): \"PCT\" in the last 168 hours.    Cardiac  No results for input(s): \"TROP\", \"PBNP\" in the last 168 hours.    Creatinine Kinase  No results for input(s): \"CK\" in the last 168 hours.    Inflammatory Markers  No results for input(s): \"CRP\", \"TARIK\", \"LDH\", \"DDIMER\" in the last 168 hours.    Imaging: Imaging data reviewed in Epic.    Medications: Scheduled Medications[1]    Assessment & Plan:   ASSESSMENT / PLAN:     1.       Urinary tract infection.  2.       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  Resume Flomax    Continue IV fluids IV Rocephin  Follow up on blood and urine cultures.  Monitor hemodynamic status and temperature curve.           3.       Acute on chronic kidney injury.:  Improving creatinine down from 2.6-2.0  Baseline creatinine 1.5  Continue IV fluids  Avoid nephrotoxic agents  Monitor renal function daily      4.       Parkinson disease.  Resume Sinemet  Fall and aspiration precaution  PT OT when able             Quality:  DVT Prophylaxis: Heparin subcu  CODE status: DNAR select  Espinal: Espinal catheter in place        Coordinated care with providers and counseling re: treatment plan and workup     Alyx Melendez MD    Supplementary Documentation:         **Certification      PHYSICIAN Certification of Need for Inpatient Hospitalization - Initial Certification    Patient will require inpatient services that will reasonably be expected to span two midnight's based on the clinical documentation in H+P.   Based on patients current state of illness, I anticipate that, after discharge, patient will require TBD.                  [1]    cefTRIAXone  2 g Intravenous Q24H    heparin  5,000 Units Subcutaneous 2 times per day

## 2025-05-15 NOTE — PLAN OF CARE
Problem: Patient Centered Care  Goal: Patient preferences are identified and integrated in the patient's plan of care  Description: Interventions:  - Provide timely, complete, and accurate information to patient/family  - Incorporate patient and family knowledge, values, beliefs, and cultural backgrounds into the planning and delivery of care  - Encourage patient/family to participate in care and decision-making at the level they choose  - Honor patient and family perspectives and choices  Outcome: Progressing     Problem: SAFETY ADULT - FALL  Goal: Free from fall injury  Description: INTERVENTIONS:  - Assess pt frequently for physical needs  - Identify cognitive and physical deficits and behaviors that affect risk of falls.  - [place] fall precautions as indicated by assessment.  - Educate pt/family on patient safety including physical limitations  - Instruct pt to call for assistance with activity based on assessment  - Modify environment to reduce risk of injury  - Provide assistive devices as appropriate  - Consider OT/PT consult to assist with strengthening/mobility  - Encourage toileting schedule  Outcome: Progressing     Problem: GASTROINTESTINAL - ADULT  Goal: Minimal or absence of nausea and vomiting  Description: INTERVENTIONS:  - Maintain adequate hydration with IV or PO as ordered and tolerated  - Nasogastric tube to low intermittent suction as ordered  - Evaluate effectiveness of ordered antiemetic medications  - Provide nonpharmacologic comfort measures as appropriate  - Advance diet as tolerated, if ordered  - Obtain nutritional consult as needed  - Evaluate fluid balance  Outcome: Progressing  Goal: Maintains or returns to baseline bowel function  Description: INTERVENTIONS:  - Assess bowel function  - Maintain adequate hydration with IV or PO as ordered and tolerated  - Evaluate effectiveness of GI medications  - Encourage mobilization and activity  - Obtain nutritional consult as needed  -  Establish a toileting routine/schedule  - Consider collaborating with pharmacy to review patient's medication profile  Outcome: Progressing     Problem: METABOLIC/FLUID AND ELECTROLYTES - ADULT  Goal: Electrolytes maintained within normal limits  Description: INTERVENTIONS:  - Monitor labs and rhythm and assess patient for signs and symptoms of electrolyte imbalances  - Administer electrolyte replacement as ordered  - Monitor response to electrolyte replacements, including rhythm and repeat lab results as appropriate  - Fluid restriction as ordered  - Instruct patient on fluid and nutrition restrictions as appropriate  Outcome: Progressing     Problem: SKIN/TISSUE INTEGRITY - ADULT  Goal: Skin integrity remains intact  Description: INTERVENTIONS  - Assess and document risk factors for pressure ulcer development  - Assess and document skin integrity  - Monitor for areas of redness and/or skin breakdown  - Initiate interventions, skin care algorithm/standards of care as needed  Outcome: Progressing

## 2025-05-15 NOTE — PHYSICAL THERAPY NOTE
PHYSICAL THERAPY EVALUATION - INPATIENT     Room Number: 533/533-A  Evaluation Date: 5/15/2025  Type of Evaluation: Initial   Physician Order: PT Eval and Treat    Presenting Problem: Low blood pressure, urinary tract infection, acute on chronic kidney injury  Co-Morbidities : Benign prostate hypertrophy, lower urinary tract obstructive symptoms with indwelling Espinal catheter.  Follows up with Urology.  Had failed voiding trial recently.  Parkinson disease, degenerative joint disease of lumbar spine, generalized osteoarthritis, and chronic kidney disease stage 3.  Reason for Therapy: Mobility Dysfunction and Discharge Planning    PHYSICAL THERAPY ASSESSMENT   Patient is a 84 year old male admitted 5/14/2025 for UTI.  Prior to admission, patient's baseline is dependent.  Patient is currently functioning below baseline with bed mobility, transfers, gait, stair negotiation, maintaining seated position, standing prolonged periods, and performing household tasks.  Patient is requiring dependent as a result of the following impairments: decreased functional strength, decreased endurance/aerobic capacity, pain, impaired sitting/standing balance, decreased muscular endurance, and medical status.  Physical Therapy will continue to follow for duration of hospitalization.    Patient will benefit from continued skilled PT Services to promote return to prior level of function and safety with continuous assistance and gradual rehabilitative therapy .    PLAN DURING HOSPITALIZATION  Nursing Mobility Recommendation :  (2 person assist)     PT Treatment Plan: Bed mobility, Coordination, Body mechanics, Endurance, Energy conservation, Strengthening, Transfer training, Balance training  Rehab Potential : Fair  Frequency (Obs): 3-5x/week     PHYSICAL THERAPY MEDICAL/SOCIAL HISTORY   History related to current admission: The patient is an 84-year-old  male who lives in a nursing facility with Parkinson disease and benign  prostate hypertrophy and indwelling Espinal catheter. Wife brought him in today for low blood pressure and seemingly being confused and more fatigued than usual. In the emergency room, chemistry showed sodium 132, chloride 96, BUN 53 and creatinine 2.62. GFR is 23 which is below his baseline, usually runs in the mid 50s. Urinalysis obtained from Espinal catheter showed gross urinary tract infection. Urine cultures and blood cultures were obtained. CBC showed white blood cell count of 15.7 with left shift. Chest x-ray showed no acute findings.      Problem List  Principal Problem:    Urinary tract infection with hematuria, site unspecified  Active Problems:    Hyponatremia    Anemia    Azotemia    Acute kidney injury (HCC)    UTI (urinary tract infection)    JORDON (acute kidney injury)    HOME SITUATION  Type of Home: Skilled nursing facility   Lives With: Staff 24 hours    Patient Regularly Uses: Rolling walker     Prior Level of [place]: Patient was requiring assist for all functional mobility/ADL's.    SUBJECTIVE  \"What kind of exercises do you do for hips?\"    PHYSICAL THERAPY EXAMINATION   OBJECTIVE  Precautions: Bed/chair alarm  Fall Risk: High fall risk    PAIN ASSESSMENT  Rating: Unable to rate    COGNITION  Overall Cognitive Status:  WFL - within functional limits  Arousal/Alertness:  appropriate responses to stimuli  Orientation Level:  oriented x4  Safety Judgement:  decreased awareness of need for assistance and decreased awareness of need for safety    RANGE OF MOTION AND STRENGTH ASSESSMENT  Upper extremity ROM and strength are within functional limits BUE.  Lower extremity ROM is within functional limits BLE.  Lower extremity strength is within functional limits BLE.    BALANCE  Static Sitting: Fair  Dynamic Sitting: Poor  Static Standing: Dependent  Dynamic Standing: Dependent    ACTIVITY TOLERANCE  BP: (!) 164/84  BP Location: Left arm  BP Method: Automatic  Patient Position: Sitting    AM-PAC  '6-Clicks' INPATIENT SHORT FORM - BASIC MOBILITY  How much difficulty does the patient currently have...  Patient Difficulty: Turning over in bed (including adjusting bedclothes, sheets and blankets)?: A Lot   Patient Difficulty: Sitting down on and standing up from a chair with arms (e.g., wheelchair, bedside commode, etc.): Unable   Patient Difficulty: Moving from lying on back to sitting on the side of the bed?: Unable   How much help from another person does the patient currently need...   Help from Another: Moving to and from a bed to a chair (including a wheelchair)?: Total   Help from Another: Need to walk in hospital room?: Total   Help from Another: Climbing 3-5 steps with a railing?: Total     AM-PAC Score:  Raw Score: 7   Approx Degree of Impairment: 92.36%   Standardized Score (AM-PAC Scale): 26.42   CMS Modifier (G-Code): CM    FUNCTIONAL ABILITY STATUS  Functional Mobility/Gait Assessment  Gait Assistance: Not tested  Rolling: maximum assist  Supine to Sit: maximum assist  Sit to Supine: not tested  Sit to Stand: dependent (moderate assistance x 2)  Bed to Chair: dependent (moderate assistance x 2)    Exercise/Education Provided:  Education Provided To: Patient, Family/Caregiver Patient Education: Role of Physical Therapy, Plan of Care, Discharge Recommendations, DME Recommendations, Functional Transfer Techniques, Energy Conservation, Posture/Positioning, Gait Training Patient's Response to Education: Verbalized Understanding, Requires Further Education     Skilled Therapy Provided: Patient received supine in bed at initiation of session agreeable to participation in PT. Patient tolerates treatment fairly, demonstrates improvement in activity tolerance in comparison to previous session, however continues to require extensive 1-2 person assistance for all out of bed mobility. Patient requires maximal assistance x 1 to perform supine to sit transfer. Tolerates edge of bed sitting for ~6 minutes,  fluctuating between contact guard and standby assistance to remain upright. Patient requires moderate assistance x 2 for sit to stand and bed to chair transfer. Patient left in bedside chair. lines intact, alarm engaged, needs in reach and handoff to RN.    The patient's Approx Degree of Impairment: 92.36% has been calculated based on documentation in the [facility] '6 clicks' Inpatient Basic Mobility Short Form.  Research supports that patients with this level of impairment may benefit from LTAC, however given patient's previous level of function will recommend subacute rehab.  Final disposition will be made by interdisciplinary medical team.    Patient End of Session: Up in chair, Call light within reach, Needs met, RN aware of session/findings, All patient questions and concerns addressed, Hospital anti-slip socks, Alarm set, Family present    CURRENT GOALS  Goals to be met by: 5/29/25  Patient Goal Patient's self-stated goal is: to return to PLOF   Goal #1 Patient is able to demonstrate supine - sit EOB @ level: minimum assistance     Goal #1   Current Status    Goal #2 Patient is able to demonstrate transfers EOB to/from Chair/Wheelchair at assistance level: minimum assistance with least restrictive device     Goal #2  Current Status    Goal #3 Patient is able to ambulate >15 feet with assist device: least restrictive device at assistance level: moderate assistance   Goal #3   Current Status    Goal #4    Goal #4   Current Status    Goal #5 Patient to demonstrate independence with home activity/exercise instructions provided to patient in preparation for discharge.   Goal #5   Current Status    Goal #6    Goal #6  Current Status      Patient Evaluation Complexity Level:  History High - 3 or more personal factors and/or co-morbidities   Examination of body systems Moderate - addressing a total of 3 or more elements   Clinical Presentation  Moderate - Evolving   Clinical Decision Making  Moderate Complexity      Therapeutic Activity:  15 minutes    Jing Abdalla, PT, DPT

## 2025-05-15 NOTE — PROGRESS NOTES
[facility]  part of [facility]    Progress Note    Justyn Hall Patient Status:  Inpatient    1940 MRN [id number]   Location [facility] 5SW/SE Attending Tg Clarke MD   Hosp Day # 1 PCP Chong Quintero MD        Subjective:   Pt is an 84 year old male with a PMH of BPH, urinary retention with santiago, CKD, and  Parkinson's disease who presented to the emergency room yesterday with suspected fever at 99.8. Urine culture is pending, UA concerning for infection and pt has elevated Cr.  Admitted for UTI and JORDON. Was recently admitted on 4/10/2025 with pneumonia and urinary retention of 1.6L. He was discharged with a santiago catheter and restarted on Tamsulosin. Patient has failed two voiding trials, most recent on . Urology was consulted for UTI with chronic santiago.    The patient is currently resting in bed. Afebrile, VSS. States he doesn't feel well. Santiago is draining clear yellow urine. No c/o chest pain or difficulty breathing.     Cr: 2.62 -> 2.08  WBC: 15.7 ->8.9  LA 1.7      UA showed 6-10 RBC, >50 WBC, 500 Leukocytes, and WBC clumps   Ucx pending          Objective:   Vital Signs:  Blood pressure 157/67, pulse 84, temperature 98.8 °F (37.1 °C), temperature source Oral, resp. rate 18, height 6' (1.829 m), weight 150 lb 6.4 oz (68.2 kg), SpO2 94%.     General: Alert and oriented. No acute distress.  Eyes: Sclera non-icteric.  Neck: No obvious masses or goiter.  CV/Resp: nonlabored breathing  Abdomen: Soft, NTND  : Santiago is draining clear yellow urine  Extremities: warm, well perfused, no clubbing cyanosis or edema          Results:      Latest Reference Range & Units 25 18:20   Color Urine Yellow  Light-Yellow   Clarity Urine Clear  Turbid !   Spec Gravity 1.005 - 1.030  1.014   Glucose Urine Normal mg/dL Normal   Bilirubin Urine Negative  Negative   Ketones, UA Negative mg/dL Negative   Blood Urine Negative  3+ !   PH Urine 5.0 - 8.0  5.5   Protein Urine  Negative mg/dL 30 !   Urobilinogen Urine Normal  Normal   Nitrite Urine Negative  Negative   Leukocyte Esterase  Negative  500 !   WBC Urine 0 - 5 /HPF >50 !   RBC Urine 0 - 2 /HPF 6-10 !   Bacteria Urine None Seen /HPF None Seen   SQUAM EPI CELLS UR None Seen /HPF Few !   RENAL TUBULAR EPITHELIAL CELLS None Seen /HPF None Seen   TRANSITIONAL EPI CELLS None Seen /HPF None Seen   HYALINE CASTS None Seen /LPF Present !   GRANULAR CASTS None Seen /LPF Present !   YEAST URINE None Seen /HPF None Seen   White Blood Cell Clump None Seen /HPF Present !   !: Data is abnormal       Latest Reference Range & Units 05/15/25 05:43   Glucose 70 - 99 mg/dL 115 (H)   Sodium 136 - 145 mmol/L 135 (L)   Potassium 3.5 - 5.1 mmol/L 3.7   Chloride 98 - 112 mmol/L 107   Carbon Dioxide, Total 21.0 - 32.0 mmol/L 20.0 (L)   BUN 9 - 23 mg/dL 42 (H)   CREATININE 0.70 - 1.30 mg/dL 2.08 (H)   CALCIUM 8.7 - 10.4 mg/dL 7.1 (L)   BUN/CREATININE RATIO 10.0 - 20.0  20.2 (H)   EGFR >=60 mL/min/1.73m2 31 (L)   ANION GAP 0 - 18 mmol/L 8   CALCULATED OSMOLALITY 275 - 295 mOsm/kg 291   WBC 4.0 - 11.0 x10(3) uL 8.9   Hemoglobin 13.0 - 17.5 g/dL 9.4 (L)   Hematocrit 39.0 - 53.0 % 30.3 (L)   Platelet Count 150.0 - 450.0 10(3)uL 318.0   RBC 3.80 - 5.80 x10(6)uL 3.57 (L)   MCH 26.0 - 34.0 pg 26.3   MCHC 31.0 - 37.0 g/dL 31.0   MCV 80.0 - 100.0 fL 84.9   RDW 11.0 - 15.0 % 14.9   RDW-SD 35.1 - 46.3 fL 45.9   Prelim Neutrophil Abs 1.50 - 7.70 x10 (3) uL 7.89 (H)   Neutrophils Absolute 1.50 - 7.70 x10(3) uL 7.89 (H)   Lymphocytes Absolute 1.00 - 4.00 x10(3) uL 0.51 (L)   Monocytes Absolute 0.10 - 1.00 x10(3) uL 0.33   Eosinophils Absolute 0.00 - 0.70 x10(3) uL 0.03   Basophils Absolute 0.00 - 0.20 x10(3) uL 0.04   Immature Granulocyte Absolute 0.00 - 1.00 x10(3) uL 0.09   Neutrophils % % 88.9   Lymphocytes % % 5.7   Monocytes % % 3.7   Eosinophils % % 0.3   Basophils % % 0.4   Immature Granulocyte % % 1.0   (H): Data is abnormally high  (L): Data is abnormally  low           Impression:      Patient is an 84 year old male with history of BPH with urinary retention and Parkinsons who was admitted for UTI and JORDON. Cr is trending down 2.60 ->2.08, UA concerning for infection and urine culture is pending. Santiago is draining clear yellow urine.       Recommendations:   - Continue IV fluids and antibiotics per IM. Awaiting cultures  - Increase Tamsulosin 0.8 mg at bedtime and start Finasteride 5 mg  - Maintain santiago  - Follow-up as planned on 05/30      All questions answered. Patient verbalizes understanding and agrees with plan. Urology will sign off.    DEEPAK Matos  Urology Department  [facility]  Office: [phone]

## 2025-05-15 NOTE — OCCUPATIONAL THERAPY NOTE
OCCUPATIONAL THERAPY EVALUATION - INPATIENT     Room Number: 533/533-A  Evaluation Date: 5/15/2025  Type of Evaluation: Initial   Presenting problem: UTI with hematuria   Co-morbidities: Benign prostate hypertrophy, lower urinary tract obstructive symptoms with indwelling Espinal catheter. Follows up with Urology. Had failed voiding trial recently. Parkinson disease, degenerative joint disease of lumbar spine, generalized osteoarthritis, and chronic kidney disease stage 3     Physician Order: IP Consult to Occupational Therapy  Reason for Therapy: ADL/IADL Dysfunction and Discharge Planning    OCCUPATIONAL THERAPY ASSESSMENT   Patient is a 84 year old male admitted 5/14/2025 for UTI with hematuria.  Prior to admission, patient's baseline is from a gradual rehab where he was receiving assist from staff for all ADLs, mobility, and functional transfers.  Patient is currently functioning below baseline with toileting, lower body dressing, bed mobility, transfers, static standing balance, dynamic standing balance, and functional standing tolerance.  Patient is requiring maximum assist and mod assist x2 as a result of the following impairments: decreased functional strength, decreased endurance, impaired standing balance, decreased muscular endurance, and medical status. Occupational Therapy will continue to follow for duration of hospitalization.    Patient will benefit from continued skilled OT Services to promote return to prior level of function and safety with continuous assistance and gradual rehabilitative therapy.    PLAN DURING HOSPITALIZATION  OT Device Recommendations: None  OT Treatment Plan: Balance activities, Energy conservation/work simplification techniques, ADL training, Functional transfer training, Endurance training, Patient/Family education, Patient/Family training, Compensatory technique education     OCCUPATIONAL THERAPY MEDICAL/SOCIAL HISTORY   Problem List  Principal Problem:    Urinary tract  infection with hematuria, site unspecified  Active Problems:    Hyponatremia    Anemia    Azotemia    Acute kidney injury (HCC)    UTI (urinary tract infection)    JORDON (acute kidney injury)    HOME SITUATION  Type of Home: Skilled nursing facility  Lives With: Staff 24 hours  Patient Regularly Uses: Rolling walker; Wheelchair    Prior Level of [place]: Prior to admission pt was at gradual rehab where he was receiving assist for all ADLs and functional transfers. Pt reports walking short distances with RW support and staff assist.     SUBJECTIVE  \"I would like to sit up.\"     OCCUPATIONAL THERAPY EXAMINATION      OBJECTIVE  Precautions: Bed/chair alarm      PAIN ASSESSMENT  Ratin      ACTIVITY TOLERANCE           BP: (!) 164/84  BP Location: Left arm  BP Method: Automatic  Patient Position: Sitting (after initial STS from EOB)    COGNITION  Overall Cognitive Status:  WFL - within functional limits    RANGE OF MOTION   Upper extremity ROM is within functional limits     STRENGTH ASSESSMENT  Upper extremity strength is within functional limits     COORDINATION  Gross Motor: WFL   Fine Motor: WFL     ACTIVITIES OF DAILY LIVING ASSESSMENT  AM-PAC ‘6-Clicks’ Inpatient Daily Activity Short Form  How much help from another person does the patient currently need…  -   Putting on and taking off regular lower body clothing?: A Lot  -   Bathing (including washing, rinsing, drying)?: A Lot  -   Toileting, which includes using toilet, bedpan or urinal? : A Lot  -   Putting on and taking off regular upper body clothing?: A Little  -   Taking care of personal grooming such as brushing teeth?: A Little  -   Eating meals?: A Little    AM-PAC Score:  Score: 15  Approx Degree of Impairment: 56.46%  Standardized Score (AM-PAC Scale): 34.69  CMS Modifier (G-Code): CK    FUNCTIONAL TRANSFER ASSESSMENT  Sit to Stand: Edge of Bed  Edge of Bed: Moderate Assist (x2)  Simulated commode transfer: mod assist x2 stand-pivot     BED  MOBILITY  Supine to Sit : Maximum Assist    BALANCE ASSESSMENT  Static Sitting: Stand-by Assist  Static Standing: Moderate Assist (x2)  Dynamic sitting: CGA  Dynamic Standing: mod assist x 2    FUNCTIONAL ADL ASSESSMENT  LB Dressing Seated: Maximum Assist    Skilled Therapy Provided:   RN cleared pt for participation. Session coordinated with PT. Pt received in bed with sister present for session. Pt agreeable to participation in therapy. To assess pt's participation during tasks while also providing intermittent education to maximize participation, OT facilitated the following: bed mobility, functional transfers, and ADL tasks. Pt tolerated treatment fairly well and completed all tasks with assist levels listed above. Pt required mod assist x2 to complete STS from EOB with hand-over-hand assist from therapists in prep for functional transfers; pt demo slight posterior lean requiring verbal cues to correct. Pt reporting dizziness with stand; vitals monitored and reported above. With seated rest break pt reporting improvement of symptoms. Pt required mod assist x2 to complete a stand-pivot transfer from bed to chair in simulated commode transfer. Pt remained in recliner with all needs in reach and alarm on at end of session.     EDUCATION PROVIDED  Patient Education : Role of Occupational Therapy; Plan of Care  Patient's Response to Education: Verbalized Understanding; Returned Demonstration  Family/Caregiver Education : Role of Occupational Therapy; Plan of Care  Family/Caregiver's Response to Education: Verbalized Understanding    The patient's Approx Degree of Impairment: 56.46% has been calculated based on documentation in the [facility] '6 clicks' Inpatient Daily Activity Short Form.  Research supports that patients with this level of impairment may benefit from rehab.  Final disposition will be made by interdisciplinary medical team.     Patient End of Session: Up in chair, Needs met, Call light within reach, RN  aware of session/findings, All patient questions and concerns addressed, Hospital anti-slip socks, Alarm set, Family present    OT Goals  Patients self stated goal is: none stated      Patient will complete functional transfer with CGA  Comment:     Patient will complete toileting with CGA  Comment:     Patient will tolerate standing for 2 minutes in prep for adls with CGA   Comment:    Patient will complete item retrieval with CGA  Comment:          Goals  on: 25  Frequency: 3x/week    Patient Evaluation Complexity Level:   Occupational Profile/Medical History MODERATE - Expanded review of history including review of medical or therapy record   Specific performance deficits impacting engagement in ADL/IADL MODERATE  3 - 5 performance deficits   Client Assessment/Performance Deficits HIGH - Comorbidities and significant modifications of tasks    Clinical Decision Making MODERATE - Analysis of occupational profile, detailed assessments, several treatment options    Overall Complexity MODERATE     OT Session Time: 17 minutes  Self-Care Home Management: 17 minutes

## 2025-05-15 NOTE — H&P
[facility]    PATIENT'S NAME: EFRA FLORES   ATTENDING PHYSICIAN: Christian Vogel MD   PATIENT ACCOUNT#:   [id number]    LOCATION:  [address]  MEDICAL RECORD #:   [id number]       YOB: 1940  ADMISSION DATE:       05/14/2025    HISTORY AND PHYSICAL EXAMINATION    CHIEF COMPLAINT:  Low blood pressure, urinary tract infection, acute on chronic kidney injury.    HISTORY OF PRESENT ILLNESS:  The patient is an 84-year-old  male who lives in a nursing facility with Parkinson disease and benign prostate hypertrophy and indwelling Espinal catheter.  Wife brought him in today for low blood pressure and seemingly being confused and more fatigued than usual.  In the emergency room, chemistry showed sodium 132, chloride 96, BUN 53 and creatinine 2.62.  GFR is 23 which is below his baseline, usually runs in the mid 50s.  Urinalysis obtained from Espinal catheter showed gross urinary tract infection.  Urine cultures and blood cultures were obtained.  CBC showed white blood cell count of 15.7 with left shift.  Chest x-ray showed no acute findings.    PAST MEDICAL HISTORY:  Benign prostate hypertrophy, lower urinary tract obstructive symptoms with indwelling Espinal catheter.  Follows up with Urology.  Had failed voiding trial recently.  Parkinson disease, degenerative joint disease of lumbar spine, generalized osteoarthritis, and chronic kidney disease stage 3.    PAST SURGICAL HISTORY:  Lumbar spinal fusion, prostate biopsy, left total hip arthroplasty.    MEDICATIONS:  Please see medication reconciliation list.     ALLERGIES:  No known drug allergies.    FAMILY HISTORY:  Positive for hypertension.    SOCIAL HISTORY:  No tobacco, alcohol, or drug use.     REVIEW OF SYSTEMS:  Patient is a poor historian.  Wife reported that they might have been few hours to half a day of Espinal catheter being clogged yesterday.  Today, Espinal has been flowing normally but the urine seemed a bit more turbid than  usual and his blood pressure dropped.  The wife is not aware if the patient had fever.  Other 12-point review of systems is negative.       PHYSICAL EXAMINATION:    GENERAL:  Alert and oriented to time, place, and person.  Does not appear to be in distress.  VITAL SIGNS:  Temperature 97.8, pulse 66, respiratory rate 21, blood pressure 127/61, pulse ox 100% on room air.    HEENT:  Atraumatic.  Oropharynx clear.  Moist mucous membranes.  Ears, nose normal.  Eyes:  Anicteric sclerae.  NECK:  Supple.  No lymphadenopathy.  Trachea midline.   LUNGS:  Clear to auscultation bilaterally.  Normal respiratory effort.   HEART:  Regular rate, rhythm.  S1 and S2 auscultated.  No murmur.   ABDOMEN:  Soft, nondistended.  No tenderness.  Positive bowel sounds.    EXTREMITIES:  No peripheral edema, clubbing, or cyanosis.  Espinal catheter in place with slightly turbid urine.  NEUROLOGIC:  Cogwheel rigidity noted on exam.    ASSESSMENT:    1.   Urinary tract infection.  2.   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  3.   Acute on chronic kidney injury.  4.   Parkinson disease.    PLAN:  Patient will be admitted to general medical floor.  IV fluids.  IV Rocephin.  Follow up on blood and urine cultures.  Monitor hemodynamic status and temperature curve.  Fall and aspiration precautions.  Obtain urology consult.  Further recommendations to follow.     Dictated By Tg Clarke MD  d: 05/14/2025 19:17:48  t: 05/14/2025 19:20:25  Job [id number]  FB/    cc: Christian Vogel MD

## 2025-05-16 ENCOUNTER — PATIENT OUTREACH (OUTPATIENT)
Dept: CASE MANAGEMENT | Age: 85
End: 2025-05-16

## 2025-05-16 LAB
ANION GAP SERPL CALC-SCNC: 8 MMOL/L (ref 0–18)
ANTIBODY SCREEN: NEGATIVE
BASOPHILS # BLD AUTO: 0.05 X10(3) UL (ref 0–0.2)
BASOPHILS NFR BLD AUTO: 0.6 %
BUN BLD-MCNC: 38 MG/DL (ref 9–23)
BUN/CREAT SERPL: 23.9 (ref 10–20)
C DIFF TOX B STL QL: NEGATIVE
CALCIUM BLD-MCNC: 7 MG/DL (ref 8.7–10.4)
CHLORIDE SERPL-SCNC: 106 MMOL/L (ref 98–112)
CO2 SERPL-SCNC: 23 MMOL/L (ref 21–32)
CREAT BLD-MCNC: 1.59 MG/DL (ref 0.7–1.3)
DEPRECATED HBV CORE AB SER IA-ACNC: 518 NG/ML (ref 50–336)
DEPRECATED RDW RBC AUTO: 47.8 FL (ref 35.1–46.3)
EGFRCR SERPLBLD CKD-EPI 2021: 42 ML/MIN/1.73M2 (ref 60–?)
EOSINOPHIL # BLD AUTO: 0.02 X10(3) UL (ref 0–0.7)
EOSINOPHIL NFR BLD AUTO: 0.2 %
ERYTHROCYTE [DISTWIDTH] IN BLOOD BY AUTOMATED COUNT: 15.1 % (ref 11–15)
GLUCOSE BLD-MCNC: 120 MG/DL (ref 70–99)
HCT VFR BLD AUTO: 22 % (ref 39–53)
HCT VFR BLD AUTO: 22.5 % (ref 39–53)
HGB BLD-MCNC: 6.9 G/DL (ref 13–17.5)
HGB BLD-MCNC: 7.3 G/DL (ref 13–17.5)
HGB BLD-MCNC: 7.4 G/DL (ref 13–17.5)
IMM GRANULOCYTES # BLD AUTO: 0.08 X10(3) UL (ref 0–1)
IMM GRANULOCYTES NFR BLD: 1 %
LYMPHOCYTES # BLD AUTO: 1.03 X10(3) UL (ref 1–4)
LYMPHOCYTES NFR BLD AUTO: 12.4 %
MCH RBC QN AUTO: 27 PG (ref 26–34)
MCHC RBC AUTO-ENTMCNC: 31.4 G/DL (ref 31–37)
MCV RBC AUTO: 85.9 FL (ref 80–100)
MONOCYTES # BLD AUTO: 0.77 X10(3) UL (ref 0.1–1)
MONOCYTES NFR BLD AUTO: 9.2 %
NEUTROPHILS # BLD AUTO: 6.38 X10 (3) UL (ref 1.5–7.7)
NEUTROPHILS # BLD AUTO: 6.38 X10(3) UL (ref 1.5–7.7)
NEUTROPHILS NFR BLD AUTO: 76.6 %
OSMOLALITY SERPL CALC.SUM OF ELEC: 294 MOSM/KG (ref 275–295)
PLATELET # BLD AUTO: 263 10(3)UL (ref 150–450)
POTASSIUM SERPL-SCNC: 4.2 MMOL/L (ref 3.5–5.1)
RBC # BLD AUTO: 2.56 X10(6)UL (ref 3.8–5.8)
RH BLOOD TYPE: POSITIVE
SODIUM SERPL-SCNC: 137 MMOL/L (ref 136–145)
WBC # BLD AUTO: 8.3 X10(3) UL (ref 4–11)

## 2025-05-16 PROCEDURE — 30233N1 TRANSFUSION OF NONAUTOLOGOUS RED BLOOD CELLS INTO PERIPHERAL VEIN, PERCUTANEOUS APPROACH: ICD-10-PCS | Performed by: HOSPITALIST

## 2025-05-16 PROCEDURE — 99233 SBSQ HOSP IP/OBS HIGH 50: CPT | Performed by: INTERNAL MEDICINE

## 2025-05-16 PROCEDURE — 99223 1ST HOSP IP/OBS HIGH 75: CPT | Performed by: INTERNAL MEDICINE

## 2025-05-16 RX ORDER — SODIUM CHLORIDE 9 MG/ML
INJECTION, SOLUTION INTRAVENOUS ONCE
Status: COMPLETED | OUTPATIENT
Start: 2025-05-16 | End: 2025-05-16

## 2025-05-16 NOTE — PLAN OF CARE
Problem: SAFETY ADULT - FALL  Goal: Free from fall injury  Description: INTERVENTIONS:  - Assess pt frequently for physical needs  - Identify cognitive and physical deficits and behaviors that affect risk of falls.  - [place] fall precautions as indicated by assessment.  - Educate pt/family on patient safety including physical limitations  - Instruct pt to call for assistance with activity based on assessment  - Modify environment to reduce risk of injury  - Provide assistive devices as appropriate  - Consider OT/PT consult to assist with strengthening/mobility  - Encourage toileting schedule  Outcome: Progressing     Problem: GASTROINTESTINAL - ADULT  Goal: Maintains or returns to baseline bowel function  Description: INTERVENTIONS:  - Assess bowel function  - Maintain adequate hydration with IV or PO as ordered and tolerated  - Evaluate effectiveness of GI medications  - Encourage mobilization and activity  - Obtain nutritional consult as needed  - Establish a toileting routine/schedule  - Consider collaborating with pharmacy to review patient's medication profile  Outcome: Progressing     Problem: METABOLIC/FLUID AND ELECTROLYTES - ADULT  Goal: Electrolytes maintained within normal limits  Description: INTERVENTIONS:  - Monitor labs and rhythm and assess patient for signs and symptoms of electrolyte imbalances  - Administer electrolyte replacement as ordered  - Monitor response to electrolyte replacements, including rhythm and repeat lab results as appropriate  - Fluid restriction as ordered  - Instruct patient on fluid and nutrition restrictions as appropriate  Outcome: Progressing     Problem: SKIN/TISSUE INTEGRITY - ADULT  Goal: Skin integrity remains intact  Description: INTERVENTIONS  - Assess and document risk factors for pressure ulcer development  - Assess and document skin integrity  - Monitor for areas of redness and/or skin breakdown  - Initiate interventions, skin care algorithm/standards of care  as needed  Outcome: Progressing     Problem: GASTROINTESTINAL - ADULT  Goal: Minimal or absence of nausea and vomiting  Description: INTERVENTIONS:  - Maintain adequate hydration with IV or PO as ordered and tolerated  - Nasogastric tube to low intermittent suction as ordered  - Evaluate effectiveness of ordered antiemetic medications  - Provide nonpharmacologic comfort measures as appropriate  - Advance diet as tolerated, if ordered  - Obtain nutritional consult as needed  - Evaluate fluid balance  Outcome: Not Progressing

## 2025-05-16 NOTE — PROGRESS NOTES
Provider Clarification    Additional information on the cause and effect relationship between UTI and chronic santiago catheter.     due to or associated with each other     This note is part of the patient's medical record.

## 2025-05-16 NOTE — CONSULTS
Is this a shared or split note between Advanced Practice Provider and Physician? Yes      [facility]   Gastroenterology Consultation Note    Justyn Hall  Patient Status:    Inpatient  Date of Admission:         5/14/2025, Hospital day #2  Attending:   Alyx Melendez MD  PCP:     Chong Quintero MD    Reason for Consultation:  Bloody stools    History of Present Illness:  Justyn Hall is a 84 year old male w/ PMHx of Parkinson disease, BPH, Lower urinary tract obstructive symptom with chronic indwelling catheter, DJD of lumbar spine, Generalized OA, CKD3, who presented to the ED with low blood pressure, confusion and fatigue.    Pt's sister at bedside.    Pt denies ABD pain, N/V, dyspepsia, difficult/painful swallowing, diarrhea, fever, chills, chest pain, SOB.  He is uncertain of what color his stools have been.  He has chronic constipation. Denies NSAIDs.     Pertinent Family Hx:  - No known history of esophageal, gastric or colon cancers  - No known IBD  - No known liver pathologies    Endoscopy Hx:  - No prior EGD/Colonoscopy    Social Hx:  - No tobacco use  - No ETOH  - Denies cannabis/illicit drug use  - Denies NSAIDs  - Lives with sister  - Occupation: N/A     History:  Past Medical History[1]  Past Surgical History[2]  Family History[3]   reports that he has never smoked. He has never used smokeless tobacco. He reports that he does not drink alcohol and does not use drugs.    Allergies:  Allergies[4]    Medications:  Current Hospital Medications[5]    Review of Systems:   CONSTITUTIONAL:  negative for fevers, chills, unintentional weight loss   EYES:  negative for diplopia or change in vision   RESPIRATORY:  negative for severe shortness of breath  CARDIOVASCULAR:  negative for crushing sub-sternal chest pain  GASTROINTESTINAL:  see HPI  GENITOURINARY:  negative for dysuria or gross hematuria  INTEGUMENT/BREAST:  SKIN:  negative for jaundice or new rash   ALLERGIC/IMMUNOLOGIC:   negative for hay fever   ENDOCRINE:  negative for cold intolerance and heat intolerance  MUSCULOSKELETAL:  negative for joint effusion/severe erythema  NEURO: negative for new loss of consciousness or dizziness   BEHAVIOR/PSYCH:  negative for psychotic behavior    Physical Exam:    Blood pressure 127/89, pulse 88, temperature 98.7 °F (37.1 °C), temperature source Oral, resp. rate 17, height 6' (1.829 m), weight 150 lb 6.4 oz (68.2 kg), SpO2 98%. Body mass index is 20.4 kg/m².    Gen: awake, alert patient, NAD  HEENT: EOMI, the sclera appears anicteric, oropharynx clear, mucus membranes appear moist  CV: RRR  Lung: no conversational dyspnea   Abdomen: soft NTND abdomen with NABS appreciated   Back: No CVA tenderness   Skin: dry, warm, no jaundice  Ext: no LE edema is evident  Neuro: Alert and interactive  Psych: calm, cooperative  BARRY with RN at bedside, moderate rectal tone, no masses palpated, maroon stool in rectal vault    Laboratory Data:  Lab Results   Component Value Date    WBC 8.3 05/16/2025    HGB 6.9 05/16/2025    HCT 22.0 05/16/2025    .0 05/16/2025    CREATSERUM 1.59 05/16/2025    BUN 38 05/16/2025     05/16/2025    K 4.2 05/16/2025     05/16/2025    CO2 23.0 05/16/2025     05/16/2025    CA 7.0 05/16/2025       Imaging:  No results found.    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    #Maroon Stools  #Acute on chronic anemia  -Typically constipated 2/2 neurogenic gut with hx of Parkinson's  -Labs on admission with Hgb 10.6, which is his baseline, down trend to 6.9 requiring 1 unit of blood, awaiting post transfusion hgb  -Maroon stools with clots x3 overnight, none this shift  -BARRY with maroon stool   -No  prior EGD/ colonoscopy  -Renal function somewhat improved in setting of CKD but not baseline, hold on CTA  -Discussed possible stercoral colitis, diverticular bleed, bleeding polyp, neoplasm less likely brisk upper GIB given VSS  -Recommend repeat Hgb to assess response to transfusion.  Pt remains HDS with no further Bms since this am.  Plan for slow bowel preparation and possible colonoscopy tomorrow to assess for source of bleeding.    Recommend:  -Repeat Hgb  -NPO  -Empiric PPI BID  -FS today v colonoscopy tomorrow pending repeat Hgb, clinical course  -Monitor for overt bleeding  -Trend Hgb  -Iron studies    Thank you for the opportunity to participate in the care of this patient.    Case discussed with Jayden Vargas MD and Leyla MELCHOR.    Minerva Black DNP, FNP-BC  [facility]  5/16/2025          [1]   Past Medical History:   Fracture of arm    fracture of left arm - cast   [2]   Past Surgical History:  Procedure Laterality Date    Hip surgery      Other surgical history  2014     prostate biopsy    Spinal fusion     [3] No family history on file.  [4] No Known Allergies  [5]   Current Facility-Administered Medications:     pantoprazole (Protonix) 40 mg in sodium chloride 0.9% PF 10 mL IV push, 40 mg, Intravenous, Daily    aspirin DR tab 81 mg, 81 mg, Oral, Daily    carbidopa-levodopa (SINEMET)  MG per tab 1 tablet, 1 tablet, Oral, TID    [Held by provider] midodrine (ProAmatine) tab 2.5 mg, 2.5 mg, Oral, TID    tamsulosin (Flomax) cap 0.8 mg, 0.8 mg, Oral, Nightly    finasteride (Proscar) tab 5 mg, 5 mg, Oral, Daily    cefTRIAXone (Rocephin) 2 g in sodium chloride 0.9% 100 mL IVPB-ADDV, 2 g, Intravenous, Q24H    sodium chloride 0.9% infusion, , Intravenous, Continuous    acetaminophen (Tylenol Extra Strength) tab 500 mg, 500 mg, Oral, Q4H PRN    ondansetron (Zofran) 4 MG/2ML injection 4 mg, 4 mg, Intravenous, Q6H PRN    metoclopramide (Reglan) 5 mg/mL injection 5 mg, 5 mg,  Intravenous, Q8H PRN    labetalol (Trandate) 5 mg/mL injection 10 mg, 10 mg, Intravenous, Q4H PRN    acetaminophen (Ofirmev) 10 mg/mL infusion premix 1,000 mg, 1,000 mg, Intravenous, Q6H PRN

## 2025-05-16 NOTE — CM/SW NOTE
05/16/25 0900   CM/CANDACE Referral Data   Referral Source Social Work (self-referral)   Reason for Referral Discharge planning;Readmission   Informant EMR;Clinical Staff Member   Readmission Assessment   Factors that patient feels contributed to this readmission Acute/Chronic Clinical Presentation   Pt's living situation prior to admission? Subacute rehab   Pt's level of independence at discharge? Total assist (max)   Was full assessment completed by SW/CM on prior admission? Yes   Was the recommended discharge plan achieved? Yes   Medical Hx   Does patient have an established PCP? Yes  (Dr. Chong Quintero)   Patient Info   Advanced directives? Yes   Patient's Current Mental Status at Time of Assessment Alert;Oriented   Patient's Home Environment House   Number of Levels in Home 1   Number of Stair in Home 5 MIRTHA   Patient lives with Sibling   Patient Status Prior to Admission   Independent with ADLs and Mobility No   Pt. requires assistance with Ambulating   Services in place prior to admission DME/Supplies at home   Type of DME/Supplies Wheeled Walker   Discharge Needs   Anticipated D/C needs Subacute rehab   Services Requested   Submitted to [facility] Yes   PASRR Level 1 Submitted No - Current within 90 days     SW self-referred to this case for discharge planning/readmission.    Pt admitted from [facility] for UTI/low hgb.  GI following.    Per EMR, pt was hospitalized at [facility] 4/10-4/17 for urinary retention.  He was discharged with a santiago.    CANDACE confirmed with liaison Tracy from [place] that pt is from [facility] and they can accommodate him upon return.    Prior to [facility], pt was from home with his sister Geetha and was ambulatory with a RW.    Anticipated therapy need: Gradual Rehabilitative Therapy.    [facility] approved.    SW to confirm with pt/family if they are agreeable to return to [place] [facility].    PLAN: DC to [facility] pending med clear    / to remain available for support and/or discharge  planning.     Sarah BARRIENTOS, LSW  Discharge Planner

## 2025-05-16 NOTE — PLAN OF CARE
Problem: Patient Centered Care  Goal: Patient preferences are identified and integrated in the patient's plan of care  Description: Interventions:  - Provide timely, complete, and accurate information to patient/family  - Incorporate patient and family knowledge, values, beliefs, and cultural backgrounds into the planning and delivery of care  - Encourage patient/family to participate in care and decision-making at the level they choose  - Honor patient and family perspectives and choices  Outcome: Progressing     Problem: SAFETY ADULT - FALL  Goal: Free from fall injury  Description: INTERVENTIONS:  - Assess pt frequently for physical needs  - Identify cognitive and physical deficits and behaviors that affect risk of falls.  - [place] fall precautions as indicated by assessment.  - Educate pt/family on patient safety including physical limitations  - Instruct pt to call for assistance with activity based on assessment  - Modify environment to reduce risk of injury  - Provide assistive devices as appropriate  - Consider OT/PT consult to assist with strengthening/mobility  - Encourage toileting schedule  Outcome: Progressing     Problem: GASTROINTESTINAL - ADULT  Goal: Minimal or absence of nausea and vomiting  Description: INTERVENTIONS:  - Maintain adequate hydration with IV or PO as ordered and tolerated  - Nasogastric tube to low intermittent suction as ordered  - Evaluate effectiveness of ordered antiemetic medications  - Provide nonpharmacologic comfort measures as appropriate  - Advance diet as tolerated, if ordered  - Obtain nutritional consult as needed  - Evaluate fluid balance  Outcome: Progressing  Goal: Maintains or returns to baseline bowel function  Description: INTERVENTIONS:  - Assess bowel function  - Maintain adequate hydration with IV or PO as ordered and tolerated  - Evaluate effectiveness of GI medications  - Encourage mobilization and activity  - Obtain nutritional consult as needed  -  Establish a toileting routine/schedule  - Consider collaborating with pharmacy to review patient's medication profile  Outcome: Progressing     Problem: METABOLIC/FLUID AND ELECTROLYTES - ADULT  Goal: Electrolytes maintained within normal limits  Description: INTERVENTIONS:  - Monitor labs and rhythm and assess patient for signs and symptoms of electrolyte imbalances  - Administer electrolyte replacement as ordered  - Monitor response to electrolyte replacements, including rhythm and repeat lab results as appropriate  - Fluid restriction as ordered  - Instruct patient on fluid and nutrition restrictions as appropriate  Outcome: Progressing     Problem: SKIN/TISSUE INTEGRITY - ADULT  Goal: Skin integrity remains intact  Description: INTERVENTIONS  - Assess and document risk factors for pressure ulcer development  - Assess and document skin integrity  - Monitor for areas of redness and/or skin breakdown  - Initiate interventions, skin care algorithm/standards of care as needed  Outcome: Progressing

## 2025-05-16 NOTE — PROGRESS NOTES
Progress Note     Justyn Hall Patient Status:  Inpatient    1940 MRN [id number]   Location [facility] 5SW/SE Attending Alyx Melendez MD   Hosp Day # 2 PCP Chong Quintero MD     Chief Complaint: CAUTI    Subjective:   S: Patient feeling \"okay\" today.  He did have episode of melanotic cloudy stool overnight.  He states this is the first time he has had this.  He denies any abdominal pain.    Denies shortness of breath or chest pain.  Feels fatigued and weak otherwise.      Review of Systems:   10 point ROS completed and was negative, except for pertinent positive and negatives stated in subjective.    Objective:   Vital signs:  Temp:  [97.7 °F (36.5 °C)-99.2 °F (37.3 °C)] 97.7 °F (36.5 °C)  Pulse:  [69-90] 74  Resp:  [18-20] 18  BP: (109-164)/(62-89) 109/62  SpO2:  [94 %-97 %] 96 %    Wt Readings from Last 6 Encounters:   25 150 lb 6.4 oz (68.2 kg)   04/10/25 175 lb 4.3 oz (79.5 kg)   25 168 lb 3.2 oz (76.3 kg)   10/10/19 182 lb (82.6 kg)   06/10/19 182 lb (82.6 kg)   19 181 lb (82.1 kg)         Physical Exam:    General: No acute distress. Alert ,         Respiratory: Clear to auscultation bilaterally. No wheezes. No rhonchi.  Cardiovascular: S1, S2. Regular rate and rhythm. No murmurs, rubs or gallops.   Abdomen: Soft, nontender, nondistended.  Positive bowel sounds. No rebound or guarding.  Neurologic: No focal neurological deficits.   Musculoskeletal: Moves all extremities.  Extremities: No edema.    Results:   Diagnostic Data:      Labs:    Labs Last 24 Hours:   BMP     CBC    Other     Na - Cl - BUN - Glu -   Hb 6.9   PTT - Procal -   K - CO2 - Cr -   WBC 8.3 >< .0  INR - CRP -   Renal Lytes Endo    Hct 22.0   Trop - D dim -   eGFR - Ca - POC Gluc  -    LFT   pBNP - Lactic -   eGFR AA - PO4 - A1c -   AST - APk - Prot -  LDL -     Mg - TSH -   ALT - T faby - Alb -        COVID-19 Lab Results    COVID-19  Lab Results   Component Value Date    COVID19 Not Detected  05/14/2025    COVID19 Not Detected 04/10/2025       Pro-Calcitonin  No results for input(s): \"PCT\" in the last 168 hours.    Cardiac  No results for input(s): \"TROP\", \"PBNP\" in the last 168 hours.    Creatinine Kinase  No results for input(s): \"CK\" in the last 168 hours.    Inflammatory Markers  No results for input(s): \"CRP\", \"TARIK\", \"LDH\", \"DDIMER\" in the last 168 hours.    Imaging: Imaging data reviewed in Epic.    Medications: Scheduled Medications[1]    Assessment & Plan:   ASSESSMENT / PLAN:      CAUTI    Underlying 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  Resume Flomax  Continue IV fluids IV Rocephin  Follow up on blood and urine cultures.:  No growth to date  Monitor hemodynamic status and temperature curve.       Acute GI bleed: Likely upper in view of melanotic stools  First episode per patient, hemodynamically stable  5/16: Patient made n.p.o., IV PPI twice daily, 1 unit transfusion ordered, GI consulted for possible scoping    Acute on chronic kidney injury.:  Back to baseline  Baseline creatinine 1.5  Continue IV fluids  Avoid nephrotoxic agents  Monitor renal function daily      Parkinson's disease   Resume Sinemet  Fall and aspiration precaution  PT OT when able             Quality:  DVT Prophylaxis: Heparin subcu  CODE status: DNAR select  Espinal: Espinal catheter in place        Coordinated care with providers and counseling re: treatment plan and workup     Alyx Melendez MD    Supplementary Documentation:         **Certification      PHYSICIAN Certification of Need for Inpatient Hospitalization - Initial Certification    Patient will require inpatient services that will reasonably be expected to span two midnight's based on the clinical documentation in H+P.   Based on patients current state of illness, I anticipate that, after discharge, patient will require TBD.                  [1]    pantoprazole  40 mg Intravenous Daily    sodium chloride    Intravenous Once    aspirin  81 mg Oral Daily    carbidopa-levodopa  1 tablet Oral TID    [Held by provider] midodrine  2.5 mg Oral TID    tamsulosin  0.8 mg Oral Nightly    finasteride  5 mg Oral Daily    cefTRIAXone  2 g Intravenous Q24H

## 2025-05-16 NOTE — PROCEDURES
The office order for PCP removal request is Approved and finalized on May 16, 2025.    Removed Chong Quintero MD as the patient's Primary Care Physician

## 2025-05-16 NOTE — PLAN OF CARE
Problem: Patient Centered Care  Goal: Patient preferences are identified and integrated in the patient's plan of care  Description: Interventions:  - What would you like us to know as we care for you?   - Provide timely, complete, and accurate information to patient/family  - Incorporate patient and family knowledge, values, beliefs, and cultural backgrounds into the planning and delivery of care  - Encourage patient/family to participate in care and decision-making at the level they choose  - Honor patient and family perspectives and choices  Outcome: Progressing     Problem: Patient/Family Goals  Goal: Patient/Family Long Term Goal  Description: Patient's Long Term Goal:     Interventions:  -   - See additional Care Plan goals for specific interventions  Outcome: Progressing  Goal: Patient/Family Short Term Goal  Description: Patient's Short Term Goal:     Interventions:   -   - See additional Care Plan goals for specific interventions  Outcome: Progressing     Problem: SAFETY ADULT - FALL  Goal: Free from fall injury  Description: INTERVENTIONS:  - Assess pt frequently for physical needs  - Identify cognitive and physical deficits and behaviors that affect risk of falls.  - [place] fall precautions as indicated by assessment.  - Educate pt/family on patient safety including physical limitations  - Instruct pt to call for assistance with activity based on assessment  - Modify environment to reduce risk of injury  - Provide assistive devices as appropriate  - Consider OT/PT consult to assist with strengthening/mobility  - Encourage toileting schedule  Outcome: Progressing     Problem: GASTROINTESTINAL - ADULT  Goal: Minimal or absence of nausea and vomiting  Description: INTERVENTIONS:  - Maintain adequate hydration with IV or PO as ordered and tolerated  - Nasogastric tube to low intermittent suction as ordered  - Evaluate effectiveness of ordered antiemetic medications  - Provide nonpharmacologic comfort  measures as appropriate  - Advance diet as tolerated, if ordered  - Obtain nutritional consult as needed  - Evaluate fluid balance  Outcome: Progressing  Goal: Maintains or returns to baseline bowel function  Description: INTERVENTIONS:  - Assess bowel function  - Maintain adequate hydration with IV or PO as ordered and tolerated  - Evaluate effectiveness of GI medications  - Encourage mobilization and activity  - Obtain nutritional consult as needed  - Establish a toileting routine/schedule  - Consider collaborating with pharmacy to review patient's medication profile  Outcome: Progressing     Problem: METABOLIC/FLUID AND ELECTROLYTES - ADULT  Goal: Electrolytes maintained within normal limits  Description: INTERVENTIONS:  - Monitor labs and rhythm and assess patient for signs and symptoms of electrolyte imbalances  - Administer electrolyte replacement as ordered  - Monitor response to electrolyte replacements, including rhythm and repeat lab results as appropriate  - Fluid restriction as ordered  - Instruct patient on fluid and nutrition restrictions as appropriate  Outcome: Progressing     Problem: SKIN/TISSUE INTEGRITY - ADULT  Goal: Skin integrity remains intact  Description: INTERVENTIONS  - Assess and document risk factors for pressure ulcer development  - Assess and document skin integrity  - Monitor for areas of redness and/or skin breakdown  - Initiate interventions, skin care algorithm/standards of care as needed  Outcome: Progressing

## 2025-05-16 NOTE — H&P (VIEW-ONLY)
Is this a shared or split note between Advanced Practice Provider and Physician? 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    Pt's sister at bedside.    Pt denies ABD pain, N/V, dyspepsia, difficult/painful swallowing, diarrhea, fever, chills, chest pain, SOB.  He is uncertain of what color his stools have been.  He has chronic constipation. Denies NSAIDs.     Pertinent Family Hx:  - No known history of esophageal, gastric or colon cancers  - No known IBD  - No known liver pathologies    Endoscopy Hx:  - No prior EGD/Colonoscopy    Social Hx:  - No tobacco use  - No ETOH  - Denies cannabis/illicit drug use  - Denies NSAIDs  - Lives with sister  - Occupation: N/A     History:  Past Medical History[1]  Past Surgical History[2]  Family History[3]   reports that he has never smoked. He has never used smokeless tobacco. He reports that he does not drink alcohol and does not use drugs.    Allergies:  Allergies[4]    Medications:  Current Hospital Medications[5]    Review of Systems:   CONSTITUTIONAL:  negative for fevers, chills, unintentional weight loss   EYES:  negative for diplopia or change in vision   RESPIRATORY:  negative for severe shortness of breath  CARDIOVASCULAR:  negative for crushing sub-sternal chest pain  GASTROINTESTINAL:  see HPI  GENITOURINARY:  negative for dysuria or gross hematuria  INTEGUMENT/BREAST:  SKIN:  negative for jaundice or new rash   ALLERGIC/IMMUNOLOGIC:   negative for hay fever   ENDOCRINE:  negative for cold intolerance and heat intolerance  MUSCULOSKELETAL:  negative for joint effusion/severe erythema  NEURO: negative for new loss of consciousness or dizziness   BEHAVIOR/PSYCH:  negative for psychotic behavior    Physical Exam:    Blood pressure 127/89, pulse 88, temperature 98.7 °F (37.1 °C), temperature source Oral, resp. rate 17, height 6' (1.829 m), weight 150 lb 6.4 oz (68.2 kg), SpO2 98%. Body mass index is 20.4 kg/m².    Gen: awake, alert patient, NAD  HEENT: EOMI, the sclera appears anicteric, oropharynx clear, mucus membranes appear moist  CV: RRR  Lung: no conversational dyspnea   Abdomen: soft NTND abdomen with NABS appreciated   Back: No CVA tenderness   Skin: dry, warm, no jaundice  Ext: no LE edema is evident  Neuro: Alert and interactive  Psych: calm, cooperative  BARRY with RN at bedside, moderate rectal tone, no masses palpated, maroon stool in rectal vault    Laboratory Data:  Lab Results   Component Value Date    WBC 8.3 05/16/2025    HGB 6.9 05/16/2025    HCT 22.0 05/16/2025    .0 05/16/2025    CREATSERUM 1.59 05/16/2025    BUN 38 05/16/2025     05/16/2025    K 4.2 05/16/2025     05/16/2025    CO2 23.0 05/16/2025     05/16/2025    CA 7.0 05/16/2025       Imaging:  No results found.    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    #Maroon Stools  #Acute on chronic anemia  -Typically constipated 2/2 neurogenic gut with hx of Parkinson's  -Labs on admission with Hgb 10.6, which is his baseline, down trend to 6.9 requiring 1 unit of blood, awaiting post transfusion hgb  -Maroon stools with clots x3 overnight, none this shift  -BARRY with maroon stool   -No  prior EGD/ colonoscopy  -Renal function somewhat improved in setting of CKD but not baseline, hold on CTA  -Discussed possible stercoral colitis, diverticular bleed, bleeding polyp, neoplasm less likely brisk upper GIB given VSS  -Recommend repeat Hgb to assess response to transfusion.  Pt remains HDS with no further Bms since this am.  Plan for slow bowel preparation and possible colonoscopy tomorrow to assess for source of bleeding.    Recommend:  -Repeat Hgb  -NPO  -Empiric PPI BID  -FS today v colonoscopy tomorrow pending repeat Hgb, clinical course  -Monitor for overt bleeding  -Trend Hgb  -Iron studies    Thank you for the opportunity to participate in the care of this patient.    Case discussed with Jayden Vargas MD and Leyla MELCHOR.    Minerva Black DNP, FNP-BC  [facility]  5/16/2025          [1]   Past Medical History:   Fracture of arm    fracture of left arm - cast   [2]   Past Surgical History:  Procedure Laterality Date    Hip surgery      Other surgical history  2014     prostate biopsy    Spinal fusion     [3] No family history on file.  [4] No Known Allergies  [5]   Current Facility-Administered Medications:     pantoprazole (Protonix) 40 mg in sodium chloride 0.9% PF 10 mL IV push, 40 mg, Intravenous, Daily    aspirin DR tab 81 mg, 81 mg, Oral, Daily    carbidopa-levodopa (SINEMET)  MG per tab 1 tablet, 1 tablet, Oral, TID    [Held by provider] midodrine (ProAmatine) tab 2.5 mg, 2.5 mg, Oral, TID    tamsulosin (Flomax) cap 0.8 mg, 0.8 mg, Oral, Nightly    finasteride (Proscar) tab 5 mg, 5 mg, Oral, Daily    cefTRIAXone (Rocephin) 2 g in sodium chloride 0.9% 100 mL IVPB-ADDV, 2 g, Intravenous, Q24H    sodium chloride 0.9% infusion, , Intravenous, Continuous    acetaminophen (Tylenol Extra Strength) tab 500 mg, 500 mg, Oral, Q4H PRN    ondansetron (Zofran) 4 MG/2ML injection 4 mg, 4 mg, Intravenous, Q6H PRN    metoclopramide (Reglan) 5 mg/mL injection 5 mg, 5 mg,  Intravenous, Q8H PRN    labetalol (Trandate) 5 mg/mL injection 10 mg, 10 mg, Intravenous, Q4H PRN    acetaminophen (Ofirmev) 10 mg/mL infusion premix 1,000 mg, 1,000 mg, Intravenous, Q6H PRN

## 2025-05-16 NOTE — CONGREGATE LIVING REVIEW
Critical access hospital Living Authorization    The [facility] Review Committee has reviewed this case and the patient IS APPROVED for discharge to a facility for Short Term Skilled once the following procedure is followed:     - The physician discharge instructions (contained within the KAREN note for SNF) must inlcude the below appropriate and approved COVID instructions to the facility    For questions regarding CLRC approval process, please contact the CM assigned to the case.  For questions regarding RN discharge workflow, please contact the unit Clinical Leader.

## 2025-05-16 NOTE — PLAN OF CARE
Problem: SAFETY ADULT - FALL  Goal: Free from fall injury  Description: INTERVENTIONS:  - Assess pt frequently for physical needs  - Identify cognitive and physical deficits and behaviors that affect risk of falls.  - [place] fall precautions as indicated by assessment.  - Educate pt/family on patient safety including physical limitations  - Instruct pt to call for assistance with activity based on assessment  - Modify environment to reduce risk of injury  - Provide assistive devices as appropriate  - Consider OT/PT consult to assist with strengthening/mobility  - Encourage toileting schedule  5/16/2025 0231 by Addie Lagunas, RN  Outcome: Progressing       Problem: GASTROINTESTINAL - ADULT  Goal: Minimal or absence of nausea and vomiting  Description: INTERVENTIONS:  - Maintain adequate hydration with IV or PO as ordered and tolerated  - Nasogastric tube to low intermittent suction as ordered  - Evaluate effectiveness of ordered antiemetic medications  - Provide nonpharmacologic comfort measures as appropriate  - Advance diet as tolerated, if ordered  - Obtain nutritional consult as needed  - Evaluate fluid balance  5/16/2025 0231 by Addie Lagunas, RN  Outcome: Progressing    Goal: Maintains or returns to baseline bowel function  Description: INTERVENTIONS:  - Assess bowel function  - Maintain adequate hydration with IV or PO as ordered and tolerated  - Evaluate effectiveness of GI medications  - Encourage mobilization and activity  - Obtain nutritional consult as needed  - Establish a toileting routine/schedule  - Consider collaborating with pharmacy to review patient's medication profile  5/16/2025 0231 by Addie Lagunas, RN  Outcome: Progressing       Problem: METABOLIC/FLUID AND ELECTROLYTES - ADULT  Goal: Electrolytes maintained within normal limits  Description: INTERVENTIONS:  - Monitor labs and rhythm and assess patient for signs and symptoms of electrolyte imbalances  - Administer electrolyte  replacement as ordered  - Monitor response to electrolyte replacements, including rhythm and repeat lab results as appropriate  - Fluid restriction as ordered  - Instruct patient on fluid and nutrition restrictions as appropriate  5/16/2025 0231 by Addie Lagunas, RN  Outcome: Progressing       Problem: SKIN/TISSUE INTEGRITY - ADULT  Goal: Skin integrity remains intact  Description: INTERVENTIONS  - Assess and document risk factors for pressure ulcer development  - Assess and document skin integrity  - Monitor for areas of redness and/or skin breakdown  - Initiate interventions, skin care algorithm/standards of care as needed  5/16/2025 0231 by Addie Lagunas, RN  Outcome: Progressing

## 2025-05-17 ENCOUNTER — ANESTHESIA (OUTPATIENT)
Dept: ENDOSCOPY | Facility: HOSPITAL | Age: 85
End: 2025-05-17
Payer: MEDICARE

## 2025-05-17 ENCOUNTER — ANESTHESIA EVENT (OUTPATIENT)
Dept: ENDOSCOPY | Facility: HOSPITAL | Age: 85
End: 2025-05-17
Payer: MEDICARE

## 2025-05-17 LAB
BLOOD TYPE BARCODE: 6200
HCT VFR BLD AUTO: 22.5 % (ref 39–53)
HCT VFR BLD AUTO: 23 % (ref 39–53)
HCT VFR BLD AUTO: 24.8 % (ref 39–53)
HGB BLD-MCNC: 7 G/DL (ref 13–17.5)
HGB BLD-MCNC: 7.2 G/DL (ref 13–17.5)
HGB BLD-MCNC: 7.7 G/DL (ref 13–17.5)
IRON SATN MFR SERPL: 15 % (ref 20–50)
IRON SERPL-MCNC: 28 UG/DL (ref 65–175)
TOTAL IRON BINDING CAPACITY: 188 UG/DL (ref 250–425)
TRANSFERRIN SERPL-MCNC: 129 MG/DL (ref 215–365)
UNIT VOLUME: 350 ML

## 2025-05-17 PROCEDURE — 0W3P8ZZ CONTROL BLEEDING IN GASTROINTESTINAL TRACT, VIA NATURAL OR ARTIFICIAL OPENING ENDOSCOPIC: ICD-10-PCS | Performed by: INTERNAL MEDICINE

## 2025-05-17 PROCEDURE — 0DB58ZX EXCISION OF ESOPHAGUS, VIA NATURAL OR ARTIFICIAL OPENING ENDOSCOPIC, DIAGNOSTIC: ICD-10-PCS | Performed by: INTERNAL MEDICINE

## 2025-05-17 PROCEDURE — 43255 EGD CONTROL BLEEDING ANY: CPT | Performed by: INTERNAL MEDICINE

## 2025-05-17 PROCEDURE — 43239 EGD BIOPSY SINGLE/MULTIPLE: CPT | Performed by: INTERNAL MEDICINE

## 2025-05-17 PROCEDURE — 0DJD8ZZ INSPECTION OF LOWER INTESTINAL TRACT, VIA NATURAL OR ARTIFICIAL OPENING ENDOSCOPIC: ICD-10-PCS | Performed by: INTERNAL MEDICINE

## 2025-05-17 PROCEDURE — 0DB68ZX EXCISION OF STOMACH, VIA NATURAL OR ARTIFICIAL OPENING ENDOSCOPIC, DIAGNOSTIC: ICD-10-PCS | Performed by: INTERNAL MEDICINE

## 2025-05-17 PROCEDURE — 45378 DIAGNOSTIC COLONOSCOPY: CPT | Performed by: INTERNAL MEDICINE

## 2025-05-17 PROCEDURE — 99233 SBSQ HOSP IP/OBS HIGH 50: CPT | Performed by: INTERNAL MEDICINE

## 2025-05-17 RX ORDER — LIDOCAINE HYDROCHLORIDE 10 MG/ML
INJECTION, SOLUTION EPIDURAL; INFILTRATION; INTRACAUDAL; PERINEURAL AS NEEDED
Status: DISCONTINUED | OUTPATIENT
Start: 2025-05-17 | End: 2025-05-17 | Stop reason: SURG

## 2025-05-17 RX ORDER — SODIUM CHLORIDE, SODIUM LACTATE, POTASSIUM CHLORIDE, CALCIUM CHLORIDE 600; 310; 30; 20 MG/100ML; MG/100ML; MG/100ML; MG/100ML
INJECTION, SOLUTION INTRAVENOUS CONTINUOUS
Status: DISCONTINUED | OUTPATIENT
Start: 2025-05-17 | End: 2025-05-17

## 2025-05-17 RX ORDER — NALOXONE HYDROCHLORIDE 0.4 MG/ML
80 INJECTION, SOLUTION INTRAMUSCULAR; INTRAVENOUS; SUBCUTANEOUS AS NEEDED
Status: DISCONTINUED | OUTPATIENT
Start: 2025-05-17 | End: 2025-05-17 | Stop reason: HOSPADM

## 2025-05-17 RX ORDER — SODIUM CHLORIDE 9 MG/ML
INJECTION, SOLUTION INTRAVENOUS ONCE
Status: COMPLETED | OUTPATIENT
Start: 2025-05-17 | End: 2025-05-17

## 2025-05-17 RX ORDER — SODIUM CHLORIDE, SODIUM LACTATE, POTASSIUM CHLORIDE, CALCIUM CHLORIDE 600; 310; 30; 20 MG/100ML; MG/100ML; MG/100ML; MG/100ML
INJECTION, SOLUTION INTRAVENOUS CONTINUOUS PRN
Status: DISCONTINUED | OUTPATIENT
Start: 2025-05-17 | End: 2025-05-17 | Stop reason: SURG

## 2025-05-17 RX ADMIN — LIDOCAINE HYDROCHLORIDE 50 MG: 10 INJECTION, SOLUTION EPIDURAL; INFILTRATION; INTRACAUDAL; PERINEURAL at 10:11:00

## 2025-05-17 RX ADMIN — SODIUM CHLORIDE, SODIUM LACTATE, POTASSIUM CHLORIDE, CALCIUM CHLORIDE: 600; 310; 30; 20 INJECTION, SOLUTION INTRAVENOUS at 10:08:00

## 2025-05-17 RX ADMIN — SODIUM CHLORIDE, SODIUM LACTATE, POTASSIUM CHLORIDE, CALCIUM CHLORIDE: 600; 310; 30; 20 INJECTION, SOLUTION INTRAVENOUS at 11:27:00

## 2025-05-17 NOTE — PROGRESS NOTES
Progress Note     Justyn Hall Patient Status:  Inpatient    1940 MRN [id number]   Location [facility] 5SW/SE Attending Alyx Melendez MD   Hosp Day # 3 PCP No primary care provider on file.     Chief Complaint: CAUTI, GI bleed    Subjective:   S: Patient feeling \"okay\" today.  No further melanotic stools.  Patient tolerating prep well for endoscopic procedures today.  N.p.o. receiving second unit of PRBC for hemoglobin of 7.0 prior to procedure      Denies shortness of breath or chest pain.  Feels fatigued and weak otherwise.      Review of Systems:   10 point ROS completed and was negative, except for pertinent positive and negatives stated in subjective.    Objective:   Vital signs:  Temp:  [97.4 °F (36.3 °C)-98.1 °F (36.7 °C)] 98 °F (36.7 °C)  Pulse:  [61-94] 94  Resp:  [16-18] 18  BP: (108-148)/(53-89) 148/89  SpO2:  [94 %-99 %] 94 %    Wt Readings from Last 6 Encounters:   25 150 lb 6.4 oz (68.2 kg)   04/10/25 175 lb 4.3 oz (79.5 kg)   25 168 lb 3.2 oz (76.3 kg)   10/10/19 182 lb (82.6 kg)   06/10/19 182 lb (82.6 kg)   19 181 lb (82.1 kg)         Physical Exam:    General: No acute distress. Alert ,         Respiratory: Clear to auscultation bilaterally. No wheezes. No rhonchi.  Cardiovascular: S1, S2. Regular rate and rhythm. No murmurs, rubs or gallops.   Abdomen: Soft, nontender, nondistended.  Positive bowel sounds. No rebound or guarding.  Neurologic: No focal neurological deficits.   Musculoskeletal: Moves all extremities.  Extremities: No edema.    Results:   Diagnostic Data:      Labs:    Labs Last 24 Hours:   BMP     CBC    Other     Na - Cl - BUN - Glu -   Hb 7.0   PTT - Procal -   K - CO2 - Cr -   WBC - >< PLT -  INR - CRP -   Renal Lytes Endo    Hct 22.5   Trop - D dim -   eGFR - Ca - POC Gluc  -    LFT   pBNP - Lactic -   eGFR AA - PO4 - A1c -   AST - APk - Prot -  LDL -     Mg - TSH -   ALT - T faby - Alb -        COVID-19 Lab Results    COVID-19  Lab  Results   Component Value Date    COVID19 Not Detected 05/14/2025    COVID19 Not Detected 04/10/2025       Pro-Calcitonin  No results for input(s): \"PCT\" in the last 168 hours.    Cardiac  No results for input(s): \"TROP\", \"PBNP\" in the last 168 hours.    Creatinine Kinase  No results for input(s): \"CK\" in the last 168 hours.    Inflammatory Markers  Recent Labs   Lab 05/16/25  0705   TARIK 518*       Imaging: Imaging data reviewed in Epic.    Medications: Scheduled Medications[1]    Assessment & Plan:   ASSESSMENT / PLAN:      CAUTI    Underlying 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  Resume Flomax  Continue IV fluids IV Rocephin  Follow up on blood and urine cultures.:  No growth to date  Monitor hemodynamic status and temperature curve.       Acute GI bleed: Likely upper in view of melanotic stools  First episode per patient, hemodynamically stable  5/17: Receiving second unit of PRBC prior to procedure for hemoglobin 7.0, upper and lower endoscopy planned for the this afternoon.  IV PPI twice daily, awaiting further recommendations from GI post scoping    Acute on chronic kidney injury.:  Back to baseline  Baseline creatinine 1.5  Continue IV fluids  Avoid nephrotoxic agents  Monitor renal function daily      Parkinson's disease   Resume Sinemet  Fall and aspiration precaution  PT OT when able             Quality:  DVT Prophylaxis: Heparin subcu  CODE status: DNAR select  Espinal: Espinal catheter in place        Coordinated care with providers and counseling re: treatment plan and workup     Alyx Melendez MD    Supplementary Documentation:         **Certification      PHYSICIAN Certification of Need for Inpatient Hospitalization - Initial Certification    Patient will require inpatient services that will reasonably be expected to span two midnight's based on the clinical documentation in H+P.   Based on patients current state of illness, I anticipate that,  after discharge, patient will require TBD.                  [1]    pantoprazole  40 mg Intravenous Q12H    aspirin  81 mg Oral Daily    carbidopa-levodopa  1 tablet Oral TID    [Held by provider] midodrine  2.5 mg Oral TID    tamsulosin  0.8 mg Oral Nightly    finasteride  5 mg Oral Daily    cefTRIAXone  2 g Intravenous Q24H

## 2025-05-17 NOTE — ANESTHESIA POSTPROCEDURE EVALUATION
Patient: Justyn Hall    Procedure Summary       Date: 05/17/25 Room / Location: [facility] ENDOSCOPY 01 / [facility] ENDOSCOPY    Anesthesia Start: 1008 Anesthesia Stop:     Procedures:       COLONOSCOPY      ESOPHAGOGASTRODUODENOSCOPY (EGD) Diagnosis: (polyps, hemorrhoids, duodenal ulcer, esophagitis)    Surgeons: Jayden Vargas MD Anesthesiologist: Renee Salguero MD    Anesthesia Type: MAC ASA Status: 3 - Emergent            Anesthesia Type: MAC    Vitals Value Taken Time   /85 05/17/25 11:27   Temp  05/17/25 11:28   Pulse 82 05/17/25 11:27   Resp 22 05/17/25 11:27   SpO2 97 % 05/17/25 11:27       [facility] AN Post Evaluation:   Patient Evaluated in PACU  Patient Participation: complete - patient participated  Level of Consciousness: awake  Pain Management: adequate  Airway Patency:patent  Dental exam unchanged from preop  Yes    Cardiovascular Status: acceptable  Respiratory Status: acceptable  Postoperative Hydration acceptable      RENEE SALGUERO MD  5/17/2025 11:28 AM

## 2025-05-17 NOTE — OPERATIVE REPORT
[facility] Endoscopy Report      Date of Procedure:  05/17/25      Preoperative Diagnosis:  1.  Gastrointestinal bleeding  2.  Acute blood loss anemia      Postoperative Diagnosis:  1.  Diminutive colon polyps  2.  Internal hemorrhoids  3.  Juno 1b+ duodenal ulceration  4.  LA grade D esophagitis  5.  Hiatal hernia      Procedure:    Colonoscopy   Esophagogastroduodenoscopy with control of hemorrhage from duodenal ulceration and biopsies of the antrum and esophagus      Surgeon:  Jayden Vargas M.D.      Anesthesia:  Monitored anesthesia care  Cecal withdrawal time: 37 minutes  EBL:  Insignificant      Brief History:  This is a 84 year old male who presents with painless hematochezia and acute blood loss anemia.  Endoscopy is being performed to evaluate.      Technique:  After informed consent, the patient was placed in the left lateral recumbent position.  Digital rectal examination revealed no palpable intraluminal abnormalities.  An Olympus variable stiffness 190 series HD colonoscope was inserted into the rectum and advanced under direct vision by following the lumen to the terminal ileum.  The colon was examined upon withdrawal in the left lateral recumbent position.    Following colonoscopy, an Olympus adult HD gastroscope was inserted into the hypopharynx and advanced under direct vision into the esophagus, stomach and duodenum.  The endoscope was withdrawn to the stomach where retroflexion of the angulus, body, cardia and fundus was performed.  The instrument was straightened, insufflated air and fluid were suctioned and the endoscope was withdrawn.  The procedures were well tolerated without immediate complication.      Findings:  The preparation of the colon was good.  There was melenic fluid in the rectum, however, bile-stained material present in the remainder of the colon.  The ileum contained brackish bile, however, no bleeding from above was visualized despite prolonged  observation.  The terminal ileum was examined for at least 10-15 cm and visually normal.  The ileocecal valve was well preserved. The visualized colonic mucosa from the cecum to the anal verge was normal with an intact vascular pattern.  There were #2 subcentimeter polyps in the region of the distal transverse/proximal descending and rectosigmoid measuring 3 and 6 mm in size.  These were left undisturbed based on the indication for the procedure.  There were no other colonic polyps, definite diverticula, mass lesions, vascular anomalies or signs of inflammation seen.  Retroflexion in the rectum and pull-through revealed internal hemorrhoids without definitive stigmata of bleeding.    The proximal esophagus was normal.  Ulcerations were seen extending from 27 cm to the gastroesophageal junction at 37 cm which became confluent distally.  There were no stigmata of bleeding.  A few gentle biopsies were obtained proximally at 27-28 cm.  The GE junction and diaphragmatic impression were at 37/42 cm with a 5 cm hiatal hernia.  The stomach distended appropriately with insufflated air.  There was clear bile present within the stomach.  The mucosa of the stomach including cardia, fundus, gastric body and antrum was normal.  Biopsies from the antrum were obtained.  There was fresh blood present in the duodenal bulb and proximal second portion with a 1.5 cm superficial ulceration at the junction of the apex/proximal 2nd portion of the duodenum with weakly pulsatile active bleeding from a visible vessel.  2 cc of 1-10,000 epinephrine was injected into the area with slowing of the bleeding.  Application of bipolar cautery resulted in attenuation of bleeding but lack of complete hemostasis.  #2 hemostatic clips were applied to the vessel with cessation of bleeding.      Impression:  1.  Diminutive colon polyps left undisturbed  2.  Internal hemorrhoids  3.  Juno class Ib+ duodenal ulceration post endoscopic therapy  4.  LA  grade D esophagitis  5.  Hiatal hernia    Recommendations:  1.  IV proton pump inhibitor infusion.  2.  Clear liquids for now.  3.  If the patient rebleeds options include repeat endoscopic therapy versus angiography/embolization.          Jayden Vargas MD  5/17/2025  10:59 AM

## 2025-05-17 NOTE — INTERVAL H&P NOTE
Pre-op Diagnosis: anemia    The above referenced H&P was reviewed by Jayden Vargas MD on 5/17/2025, the patient was examined and no significant changes have occurred in the patient's condition since the H&P was performed.  I discussed with the patient and/or legal representative the potential benefits, risks and side effects of this procedure; the likelihood of the patient achieving goals; and potential problems that might occur during recuperation.  I discussed reasonable alternatives to the procedure, including risks, benefits and side effects related to the alternatives and risks related to not receiving this procedure.  We will proceed with procedure as planned.

## 2025-05-17 NOTE — PLAN OF CARE
Problem: Patient Centered Care  Goal: Patient preferences are identified and integrated in the patient's plan of care  Description: Interventions:  - What would you like us to know as we care for you? \"I am from [facility].\"  - Provide timely, complete, and accurate information to patient/family  - Incorporate patient and family knowledge, values, beliefs, and cultural backgrounds into the planning and delivery of care  - Encourage patient/family to participate in care and decision-making at the level they choose  - Honor patient and family perspectives and choices  5/17/2025 0334 by Mayra Simms RN  Outcome: Progressing  5/17/2025 0332 by Mayra Simms RN  Outcome: Progressing     Problem: Patient/Family Goals  Goal: Patient/Family Long Term Goal  Description: Patient's Long Term Goal: \"to be discharged\"    Interventions:  -Follow up MD appt  -Take meds as prescribed  -Use of assistive device if needed  - See additional Care Plan goals for specific interventions  5/17/2025 0334 by Mayra Simms RN  Outcome: Progressing  5/17/2025 0332 by Mayra Simms RN  Outcome: Progressing  Goal: Patient/Family Short Term Goal  Description: Patient's Short Term Goal: \"to prevent pain and infection\"    Interventions:   -Monitor VS  -Check labs results  -Administer IVF and IV ABT as ordered  -Provide pain meds as prescribed  - See additional Care Plan goals for specific interventions  5/17/2025 0334 by Mayra Simms, RN  Outcome: Progressing  5/17/2025 0332 by Mayra Simms RN  Outcome: Progressing     Problem: SAFETY ADULT - FALL  Goal: Free from fall injury  Description: INTERVENTIONS:  - Assess pt frequently for physical needs  - Identify cognitive and physical deficits and behaviors that affect risk of falls.  - [place] fall precautions as indicated by assessment.  - Educate pt/family on patient safety including physical limitations  - Instruct pt to call for  assistance with activity based on assessment  - Modify environment to reduce risk of injury  - Provide assistive devices as appropriate  - Consider OT/PT consult to assist with strengthening/mobility  - Encourage toileting schedule  5/17/2025 0334 by Mayra Simms RN  Outcome: Progressing  5/17/2025 0332 by Mayra Simms RN  Outcome: Progressing     Problem: GASTROINTESTINAL - ADULT  Goal: Minimal or absence of nausea and vomiting  Description: INTERVENTIONS:  - Maintain adequate hydration with IV or PO as ordered and tolerated  - Nasogastric tube to low intermittent suction as ordered  - Evaluate effectiveness of ordered antiemetic medications  - Provide nonpharmacologic comfort measures as appropriate  - Advance diet as tolerated, if ordered  - Obtain nutritional consult as needed  - Evaluate fluid balance  5/17/2025 0334 by Mayra Simms RN  Outcome: Progressing  5/17/2025 0332 by Mayra Simms RN  Outcome: Progressing  Goal: Maintains or returns to baseline bowel function  Description: INTERVENTIONS:  - Assess bowel function  - Maintain adequate hydration with IV or PO as ordered and tolerated  - Evaluate effectiveness of GI medications  - Encourage mobilization and activity  - Obtain nutritional consult as needed  - Establish a toileting routine/schedule  - Consider collaborating with pharmacy to review patient's medication profile  5/17/2025 0334 by Mayra Simms RN  Outcome: Progressing  5/17/2025 0332 by Mayra Simms RN  Outcome: Progressing     Problem: METABOLIC/FLUID AND ELECTROLYTES - ADULT  Goal: Electrolytes maintained within normal limits  Description: INTERVENTIONS:  - Monitor labs and rhythm and assess patient for signs and symptoms of electrolyte imbalances  - Administer electrolyte replacement as ordered  - Monitor response to electrolyte replacements, including rhythm and repeat lab results as appropriate  - Fluid restriction as ordered  - Instruct  patient on fluid and nutrition restrictions as appropriate  5/17/2025 0334 by Mayra Simms, RN  Outcome: Progressing  5/17/2025 0332 by Mayra Simms RN  Outcome: Progressing     Problem: SKIN/TISSUE INTEGRITY - ADULT  Goal: Skin integrity remains intact  Description: INTERVENTIONS  - Assess and document risk factors for pressure ulcer development  - Assess and document skin integrity  - Monitor for areas of redness and/or skin breakdown  - Initiate interventions, skin care algorithm/standards of care as needed  5/17/2025 0334 by Mayra Simms, RN  Outcome: Progressing  5/17/2025 0332 by Mayra Simms, RN  Outcome: Progressing

## 2025-05-17 NOTE — PLAN OF CARE
Problem: Patient Centered Care  Goal: Patient preferences are identified and integrated in the patient's plan of care  Description: Interventions:  - What would you like us to know as we care for you? \"I am from [facility].\"  - Provide timely, complete, and accurate information to patient/family  - Incorporate patient and family knowledge, values, beliefs, and cultural backgrounds into the planning and delivery of care  - Encourage patient/family to participate in care and decision-making at the level they choose  - Honor patient and family perspectives and choices  Outcome: Progressing     Problem: Patient/Family Goals  Goal: Patient/Family Long Term Goal  Description: Patient's Long Term Goal: \"to be discharged\"    Interventions:  -Follow up MD appt  -Take meds as prescribed  -Use of assistive device if needed  - See additional Care Plan goals for specific interventions  Outcome: Progressing  Goal: Patient/Family Short Term Goal  Description: Patient's Short Term Goal: \"to prevent pain and infection\"    Interventions:   -Monitor VS  -Check labs results  -Administer IVF and IV ABT as ordered  -Provide pain meds as prescribed  - See additional Care Plan goals for specific interventions  Outcome: Progressing     Problem: SAFETY ADULT - FALL  Goal: Free from fall injury  Description: INTERVENTIONS:  - Assess pt frequently for physical needs  - Identify cognitive and physical deficits and behaviors that affect risk of falls.  - [place] fall precautions as indicated by assessment.  - Educate pt/family on patient safety including physical limitations  - Instruct pt to call for assistance with activity based on assessment  - Modify environment to reduce risk of injury  - Provide assistive devices as appropriate  - Consider OT/PT consult to assist with strengthening/mobility  - Encourage toileting schedule  Outcome: Progressing     Problem: GASTROINTESTINAL - ADULT  Goal: Minimal or absence of nausea and  vomiting  Description: INTERVENTIONS:  - Maintain adequate hydration with IV or PO as ordered and tolerated  - Nasogastric tube to low intermittent suction as ordered  - Evaluate effectiveness of ordered antiemetic medications  - Provide nonpharmacologic comfort measures as appropriate  - Advance diet as tolerated, if ordered  - Obtain nutritional consult as needed  - Evaluate fluid balance  Outcome: Progressing  Goal: Maintains or returns to baseline bowel function  Description: INTERVENTIONS:  - Assess bowel function  - Maintain adequate hydration with IV or PO as ordered and tolerated  - Evaluate effectiveness of GI medications  - Encourage mobilization and activity  - Obtain nutritional consult as needed  - Establish a toileting routine/schedule  - Consider collaborating with pharmacy to review patient's medication profile  Outcome: Not Progressing     Problem: METABOLIC/FLUID AND ELECTROLYTES - ADULT  Goal: Electrolytes maintained within normal limits  Description: INTERVENTIONS:  - Monitor labs and rhythm and assess patient for signs and symptoms of electrolyte imbalances  - Administer electrolyte replacement as ordered  - Monitor response to electrolyte replacements, including rhythm and repeat lab results as appropriate  - Fluid restriction as ordered  - Instruct patient on fluid and nutrition restrictions as appropriate  Outcome: Progressing     Problem: SKIN/TISSUE INTEGRITY - ADULT  Goal: Skin integrity remains intact  Description: INTERVENTIONS  - Assess and document risk factors for pressure ulcer development  - Assess and document skin integrity  - Monitor for areas of redness and/or skin breakdown  - Initiate interventions, skin care algorithm/standards of care as needed  Outcome: Progressing

## 2025-05-17 NOTE — ANESTHESIA PREPROCEDURE EVALUATION
Anesthesia PreOp Note    HPI:     Justyn Hall is a 84 year old male who presents for preoperative consultation requested by: Jayden Vargas MD    Date of Surgery: 5/17/2025    Procedure(s):  COLONOSCOPY  with possible ESOPHAGOGASTRODUODENOSCOPY (EGD)  Indication: anemia    Relevant Problems   No relevant active problems       NPO:  Last Liquid Consumption Date: 05/17/25  Last Liquid Consumption Time: 0000  Last Solid Consumption Date: 05/17/25  Last Solid Consumption Time: 0000  Last Liquid Consumption Date: 05/17/25          History Review:  Patient Active Problem List    Diagnosis Date Noted    Hyponatremia 05/14/2025    Anemia 05/14/2025    Azotemia 05/14/2025    Acute kidney injury (HCC) 05/14/2025    UTI (urinary tract infection) 05/14/2025    Urinary tract infection with hematuria, site unspecified 05/14/2025    JORDON (acute kidney injury) 05/14/2025    Counseling regarding advance care planning and goals of care 04/15/2025    Dysphagia 04/11/2025    Palliative care by specialist 04/11/2025    Constipation 04/11/2025    Leukocytosis 04/11/2025    History of recent fall 04/11/2025    Urinary retention 04/10/2025    Abdominal pain, acute 04/10/2025    Acute renal failure, unspecified acute renal failure type 04/10/2025    Community acquired pneumonia, unspecified laterality 04/10/2025    Hyperkalemia 04/10/2025    Anemia, unspecified type 04/10/2025    Obstructed, uropathy 04/10/2025    Parkinsonism (HCC) 04/05/2025    Contusion of left hip, initial encounter 04/04/2025    Contusion of left shoulder, initial encounter 04/04/2025    Left hip pain 04/04/2025    Hypercholesterolemia 06/20/2017    Elevated PSA 10/01/2014       Past Medical History[1]    Past Surgical History[2]    Prescriptions Prior to Admission[3]  Current Medications and Prescriptions Ordered in Epic[4]    Allergies[5]    Family History[6]  Social Hx on file[7]    Available pre-op labs reviewed.  Lab Results   Component Value Date     WBC 8.3 05/16/2025    RBC 2.56 (L) 05/16/2025    HGB 7.0 (L) 05/17/2025    HCT 22.5 (L) 05/17/2025    MCV 85.9 05/16/2025    MCH 27.0 05/16/2025    MCHC 31.4 05/16/2025    RDW 15.1 (H) 05/16/2025    .0 05/16/2025     Lab Results   Component Value Date     05/16/2025    K 4.2 05/16/2025     05/16/2025    CO2 23.0 05/16/2025    BUN 38 (H) 05/16/2025    CREATSERUM 1.59 (H) 05/16/2025     (H) 05/16/2025    PGLU 127 (H) 04/10/2025    CA 7.0 (L) 05/16/2025          Vital Signs:  Body mass index is 20.4 kg/m².   height is 1.829 m (6') and weight is 68.2 kg (150 lb 6.4 oz). His oral temperature is 98.6 °F (37 °C). His blood pressure is 154/77 and his pulse is 88. His respiration is 20 and oxygen saturation is 100%.   Vitals:    05/17/25 0418 05/17/25 0754 05/17/25 0817 05/17/25 0937   BP: 130/65 148/85 148/89 154/77   Pulse: 74 91 94 88   Resp: 16 17 18 20   Temp: 97.9 °F (36.6 °C) 98 °F (36.7 °C) 98 °F (36.7 °C) 98.6 °F (37 °C)   TempSrc: Oral Oral Oral Oral   SpO2: 97% 99% 94% 100%   Weight:       Height:            Anesthesia Evaluation      Airway   Mallampati: I  TM distance: >3 FB  Neck ROM: full  Dental      Pulmonary - normal exam   Cardiovascular - normal exam    Neuro/Psych    (+)  neuromuscular disease,        GI/Hepatic/Renal    (+) chronic renal disease ARF    Endo/Other    (+) blood dyscrasia  Abdominal  - normal exam                 Anesthesia Plan:   ASA:  3  Emergent    Plan:   MAC  Informed Consent Plan and Risks Discussed With:  Patient      I have informed Justyn Hall and/or legal guardian or family member of the nature of the anesthetic plan, benefits, risks including possible dental damage if relevant, major complications, and any alternative forms of anesthetic management.   All of the patient's questions were answered to the best of my ability. The patient desires the anesthetic management as planned.  RENEE MONTALVO MD  5/17/2025 9:51 AM  Present on Admission:    Hyponatremia   Anemia   Azotemia   Acute kidney injury (HCC)           [1]   Past Medical History:   Fracture of arm    fracture of left arm - cast   [2]   Past Surgical History:  Procedure Laterality Date    Hip surgery      Other surgical history  2014     prostate biopsy    Spinal fusion     [3]   Medications Prior to Admission   Medication Sig Dispense Refill Last Dose/Taking    acetaminophen 325 MG Oral Tab Take 1 tablet (325 mg total) by mouth every 6 (six) hours as needed for Pain.   Taking As Needed    polyethylene glycol, PEG 3350, 17 g Oral Powd Pack Take 17 g by mouth daily.   Taking    midodrine 2.5 MG Oral Tab Take 1 tablet (2.5 mg total) by mouth in the morning and 1 tablet (2.5 mg total) at noon and 1 tablet (2.5 mg total) in the evening.   Taking    [] tamsulosin 0.4 MG Oral Cap Take 1 capsule (0.4 mg total) by mouth at bedtime. 30 capsule 0 Taking    carbidopa-levodopa  MG Oral Tab Take 0.5 tablets by mouth 3 (three) times daily for 14 days, THEN 1 tablet 3 (three) times daily. Give half a tab 3 times a day, 5 hours between each dose for 3 days.  Then full tab 3 times a day 5 hours between each dose.  Give ideally on empty stomach.  Give 30 to 45 minutes before a meal or 45 to 60 minutes after a meal.. 249 tablet 0 Taking    aspirin 81 MG Oral Tab EC Take 1 tablet (81 mg total) by mouth daily. 30 tablet 0 Taking   [4]   Current Facility-Administered Medications Ordered in Epic   Medication Dose Route Frequency Provider Last Rate Last Admin    pantoprazole (Protonix) 40 mg in sodium chloride 0.9% PF 10 mL IV push  40 mg Intravenous Q12H Minerva Black APRN   40 mg at 25    aspirin DR tab 81 mg  81 mg Oral Daily Alyx Melendez MD   81 mg at 25 0849    carbidopa-levodopa (SINEMET)  MG per tab 1 tablet  1 tablet Oral TID Alyx Melendez MD   1 tablet at 25    [Held by provider] midodrine (ProAmatine) tab 2.5 mg  2.5 mg Oral TID Alyx Melendez MD         tamsulosin (Flomax) cap 0.8 mg  0.8 mg Oral Nightly Mary Crocker, APRN   0.8 mg at 05/16/25 2021    finasteride (Proscar) tab 5 mg  5 mg Oral Daily Mary Crocker, APRN   5 mg at 05/16/25 0850    cefTRIAXone (Rocephin) 2 g in sodium chloride 0.9% 100 mL IVPB-ADDV  2 g Intravenous Q24H Tg Clarke  mL/hr at 05/16/25 2020 2 g at 05/16/25 2020    sodium chloride 0.9% infusion   Intravenous Continuous Tg Clarke  mL/hr at 05/17/25 0019 New Bag at 05/17/25 0019    acetaminophen (Tylenol Extra Strength) tab 500 mg  500 mg Oral Q4H PRN Tg Clarke MD   500 mg at 05/14/25 2331    ondansetron (Zofran) 4 MG/2ML injection 4 mg  4 mg Intravenous Q6H PRN Tg Clarke MD   4 mg at 05/15/25 2248    metoclopramide (Reglan) 5 mg/mL injection 5 mg  5 mg Intravenous Q8H PRN Tg Clarke MD        labetalol (Trandate) 5 mg/mL injection 10 mg  10 mg Intravenous Q4H PRN Andria Galvan MD   10 mg at 05/15/25 0001    acetaminophen (Ofirmev) 10 mg/mL infusion premix 1,000 mg  1,000 mg Intravenous Q6H PRN Andria Galvan  mL/hr at 05/15/25 0004 1,000 mg at 05/15/25 0004     No current [facility]-ordered outpatient medications on file.   [5] No Known Allergies  [6] No family history on file.  [7]   Social History  Socioeconomic History    Marital status: Single   Tobacco Use    Smoking status: Never    Smokeless tobacco: Never   Vaping Use    Vaping status: Never Used   Substance and Sexual Activity    Alcohol use: No     Alcohol/week: 0.0 standard drinks of alcohol    Drug use: No   Other Topics Concern    Caffeine Concern Yes     Comment: coffee, occasionally    Reaction to local anesthetic No    Right Handed Yes

## 2025-05-18 LAB
BASOPHILS # BLD AUTO: 0.06 X10(3) UL (ref 0–0.2)
BASOPHILS NFR BLD AUTO: 0.6 %
BLOOD TYPE BARCODE: 6200
DEPRECATED RDW RBC AUTO: 45.7 FL (ref 35.1–46.3)
EOSINOPHIL # BLD AUTO: 0.22 X10(3) UL (ref 0–0.7)
EOSINOPHIL NFR BLD AUTO: 2.4 %
ERYTHROCYTE [DISTWIDTH] IN BLOOD BY AUTOMATED COUNT: 14.6 % (ref 11–15)
HCT VFR BLD AUTO: 22.8 % (ref 39–53)
HGB BLD-MCNC: 7.3 G/DL (ref 13–17.5)
IMM GRANULOCYTES # BLD AUTO: 0.18 X10(3) UL (ref 0–1)
IMM GRANULOCYTES NFR BLD: 1.9 %
LYMPHOCYTES # BLD AUTO: 1.55 X10(3) UL (ref 1–4)
LYMPHOCYTES NFR BLD AUTO: 16.6 %
MCH RBC QN AUTO: 27.2 PG (ref 26–34)
MCHC RBC AUTO-ENTMCNC: 32 G/DL (ref 31–37)
MCV RBC AUTO: 85.1 FL (ref 80–100)
MONOCYTES # BLD AUTO: 0.77 X10(3) UL (ref 0.1–1)
MONOCYTES NFR BLD AUTO: 8.2 %
NEUTROPHILS # BLD AUTO: 6.56 X10 (3) UL (ref 1.5–7.7)
NEUTROPHILS # BLD AUTO: 6.56 X10(3) UL (ref 1.5–7.7)
NEUTROPHILS NFR BLD AUTO: 70.3 %
PLATELET # BLD AUTO: 282 10(3)UL (ref 150–450)
RBC # BLD AUTO: 2.68 X10(6)UL (ref 3.8–5.8)
UNIT VOLUME: 350 ML
WBC # BLD AUTO: 9.3 X10(3) UL (ref 4–11)

## 2025-05-18 PROCEDURE — 99233 SBSQ HOSP IP/OBS HIGH 50: CPT | Performed by: INTERNAL MEDICINE

## 2025-05-18 NOTE — PROGRESS NOTES
Progress Note     Justyn Hall Patient Status:  Inpatient    1940 MRN [id number]   Location [facility] 5SW/SE Attending Alyx Melendez MD   Hosp Day # 4 PCP No primary care provider on file.     Chief Complaint: CAUTI, GI bleed    Subjective:   S: Patient feeling well today.  No further melanotic stools.    EGD results discussed with patient hemoglobin this morning 7.3.  Patient has no acute complaints         Denies shortness of breath or chest pain.  Feels fatigued and weak otherwise.      Review of Systems:   10 point ROS completed and was negative, except for pertinent positive and negatives stated in subjective.    Objective:   Vital signs:  Temp:  [97.3 °F (36.3 °C)-98.6 °F (37 °C)] 98 °F (36.7 °C)  Pulse:  [69-94] 69  Resp:  [15-23] 18  BP: (142-172)/(64-92) 142/64  SpO2:  [94 %-100 %] 98 %    Wt Readings from Last 6 Encounters:   25 150 lb 6.4 oz (68.2 kg)   04/10/25 175 lb 4.3 oz (79.5 kg)   25 168 lb 3.2 oz (76.3 kg)   10/10/19 182 lb (82.6 kg)   06/10/19 182 lb (82.6 kg)   19 181 lb (82.1 kg)         Physical Exam:    General: No acute distress. Alert ,         Respiratory: Clear to auscultation bilaterally. No wheezes. No rhonchi.  Cardiovascular: S1, S2. Regular rate and rhythm. No murmurs, rubs or gallops.   Abdomen: Soft, nontender, nondistended.  Positive bowel sounds. No rebound or guarding.  Neurologic: No focal neurological deficits.   Musculoskeletal: Moves all extremities.  Extremities: No edema.    Results:   Diagnostic Data:      Labs:    Labs Last 24 Hours:   BMP     CBC    Other     Na - Cl - BUN - Glu -   Hb 7.3   PTT - Procal -   K - CO2 - Cr -   WBC 9.3 >< .0  INR - CRP -   Renal Lytes Endo    Hct 22.8   Trop - D dim -   eGFR - Ca - POC Gluc  -    LFT   pBNP - Lactic -   eGFR AA - PO4 - A1c -   AST - APk - Prot -  LDL -     Mg - TSH -   ALT - T faby - Alb -        COVID-19 Lab Results    COVID-19  Lab Results   Component Value Date     COVID19 Not Detected 05/14/2025    COVID19 Not Detected 04/10/2025       Pro-Calcitonin  No results for input(s): \"PCT\" in the last 168 hours.    Cardiac  No results for input(s): \"TROP\", \"PBNP\" in the last 168 hours.    Creatinine Kinase  No results for input(s): \"CK\" in the last 168 hours.    Inflammatory Markers  Recent Labs   Lab 05/16/25  0705   TARIK 518*       Imaging: Imaging data reviewed in Epic.    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  Resume Flomax  Continue IV fluids IV Rocephin  - blood and urine cultures.:  No growth to date  Monitor hemodynamic status and temperature curve.   5/18: dc IV ceftriaxone based on neg cultures    Acute GI bleed: Likely upper in view of melanotic stools  First episode per patient, hemodynamically stable  Required 2 units PRBC transfusion during admission.  1.  Diminutive colon polyps  2.  Internal hemorrhoids  3.  Juno 1b+ duodenal ulceration  4.  LA grade D esophagitis  5.  Hiatal hernia  5/18: Appreciate GI input, continue IV PPI advance diet as tolerated,  Plan is to monitor 72 hours postprocedure if no rebleed okay for discharge at that time.  Okay to continue aspirin and SCDs for DVT prophylaxis    If the patient rebleeds options include repeat endoscopic therapy versus angiography/embolization.           Acute on chronic kidney injury.:  Back to baseline  Baseline creatinine 1.5  Continue IV fluids  Avoid nephrotoxic agents  Monitor renal function daily      Parkinson's disease   Resume Sinemet  Fall and aspiration precaution  PT OT when able             Quality:  DVT Prophylaxis: Heparin subcu  CODE status: DNAR select  Espinal: Espinal catheter in place        Coordinated care with providers and counseling re: treatment plan and workup     Alyx Melendez MD    Supplementary  Documentation:         **Certification      PHYSICIAN Certification of Need for Inpatient Hospitalization - Initial Certification    Patient will require inpatient services that will reasonably be expected to span two midnight's based on the clinical documentation in H+P.   Based on patients current state of illness, I anticipate that, after discharge, patient will require TBD.                  [1]    aspirin  81 mg Oral Daily    carbidopa-levodopa  1 tablet Oral TID    [Held by provider] midodrine  2.5 mg Oral TID    tamsulosin  0.8 mg Oral Nightly    finasteride  5 mg Oral Daily    cefTRIAXone  2 g Intravenous Q24H

## 2025-05-18 NOTE — PLAN OF CARE
Problem: Patient Centered Care  Goal: Patient preferences are identified and integrated in the patient's plan of care  Description: Interventions:  - What would you like us to know as we care for you? \"I am from [facility].\"  - Provide timely, complete, and accurate information to patient/family  - Incorporate patient and family knowledge, values, beliefs, and cultural backgrounds into the planning and delivery of care  - Encourage patient/family to participate in care and decision-making at the level they choose  - Honor patient and family perspectives and choices  Outcome: Progressing     Problem: Patient/Family Goals  Goal: Patient/Family Long Term Goal  Description: Patient's Long Term Goal: \"to be discharged\"    Interventions:  -Follow up MD appt  -Take meds as prescribed  -Use of assistive device if needed  - See additional Care Plan goals for specific interventions  Outcome: Progressing  Goal: Patient/Family Short Term Goal  Description: Patient's Short Term Goal: \"to prevent pain and infection\"    Interventions:   -Monitor VS  -Check labs results  -Administer IVF and IV ABT as ordered  -Provide pain meds as prescribed  - See additional Care Plan goals for specific interventions  Outcome: Progressing     Problem: SAFETY ADULT - FALL  Goal: Free from fall injury  Description: INTERVENTIONS:  - Assess pt frequently for physical needs  - Identify cognitive and physical deficits and behaviors that affect risk of falls.  - [place] fall precautions as indicated by assessment.  - Educate pt/family on patient safety including physical limitations  - Instruct pt to call for assistance with activity based on assessment  - Modify environment to reduce risk of injury  - Provide assistive devices as appropriate  - Consider OT/PT consult to assist with strengthening/mobility  - Encourage toileting schedule  Outcome: Progressing     Problem: GASTROINTESTINAL - ADULT  Goal: Minimal or absence of nausea and  vomiting  Description: INTERVENTIONS:  - Maintain adequate hydration with IV or PO as ordered and tolerated  - Nasogastric tube to low intermittent suction as ordered  - Evaluate effectiveness of ordered antiemetic medications  - Provide nonpharmacologic comfort measures as appropriate  - Advance diet as tolerated, if ordered  - Obtain nutritional consult as needed  - Evaluate fluid balance  Outcome: Progressing  Goal: Maintains or returns to baseline bowel function  Description: INTERVENTIONS:  - Assess bowel function  - Maintain adequate hydration with IV or PO as ordered and tolerated  - Evaluate effectiveness of GI medications  - Encourage mobilization and activity  - Obtain nutritional consult as needed  - Establish a toileting routine/schedule  - Consider collaborating with pharmacy to review patient's medication profile  Outcome: Progressing     Problem: METABOLIC/FLUID AND ELECTROLYTES - ADULT  Goal: Electrolytes maintained within normal limits  Description: INTERVENTIONS:  - Monitor labs and rhythm and assess patient for signs and symptoms of electrolyte imbalances  - Administer electrolyte replacement as ordered  - Monitor response to electrolyte replacements, including rhythm and repeat lab results as appropriate  - Fluid restriction as ordered  - Instruct patient on fluid and nutrition restrictions as appropriate  Outcome: Progressing     Problem: SKIN/TISSUE INTEGRITY - ADULT  Goal: Skin integrity remains intact  Description: INTERVENTIONS  - Assess and document risk factors for pressure ulcer development  - Assess and document skin integrity  - Monitor for areas of redness and/or skin breakdown  - Initiate interventions, skin care algorithm/standards of care as needed  Outcome: Progressing

## 2025-05-18 NOTE — PROGRESS NOTES
[facility]     Gastroenterology Progress Note    Justyn Hall Patient Status:  Inpatient    1940 MRN [id number]   Location [facility] 5SW/SE Attending Alyx Melendez MD   Hosp Day # 4 PCP No primary care provider on file.       Assessment and Plan:   1.  Juno 1b+ duodenal ulceration post endoscopic therapy  Hemodynamically stable.  No stools today.  Hemoglobin stabilizing.  All point to continued hemostasis.  May advance diet.  I would continue the pantoprazole fusion today.  May transition to oral therapy tomorrow.  I would continue mechanical DVT prophylaxis today as opposed to SQ anticoagulation.  Can revisit tomorrow.  Would monitor in the hospital for 72 hours after endoscopy before discharge to confirm stability.  Should be on a PPI indefinitely.  Aspirin/NSAIDs should be avoided if possible.  Await gastric biopsy results.  Treat H. pylori if positive.    2.  LA grade D esophagitis  Acid peptic in nature.  Await biopsy results.  Continue proton pump inhibitor therapy indefinitely.  Although a surveillance endoscopy to evaluate for Zeng's esophagus would normally be considered, observation on PPI therapy without repeat endoscopy would not be unreasonable in light of age and comorbidities.    3.  Diminutive colon polyps  These were subcentimeter and benign in appearance.  I would not proceed with a repeat colonoscopy and polypectomy in light of age and comorbidities.    Recommend:  1.  Continue to observe for recurrent bleeding.  2.  May advance diet.  3.  Continue proton pump inhibitor infusion for today.  May transition to oral therapy tomorrow.  4.  Mechanical DVT prophylaxis today.  5.  Monitor in the hospital for 72 hours post endoscopy.  6.  Follow-up biopsy results.  Treat H. pylori if positive.  7.  Would not proceed with repeat colonoscopy and polypectomy.        Subjective:   Appears comfortable.  No complaints.  No stools.  Tolerating  liquids.    Objective:     Patient Vitals for the past 24 hrs:   BP Temp Temp src Pulse Resp SpO2   05/18/25 0822 140/74 98.9 °F (37.2 °C) Oral 67 16 99 %   05/18/25 0530 142/64 98 °F (36.7 °C) Oral 69 18 98 %   05/17/25 1956 151/85 98.2 °F (36.8 °C) Oral 79 18 98 %   05/17/25 1250 157/75 97.3 °F (36.3 °C) Oral 69 20 100 %   05/17/25 1215 154/68 -- -- 71 22 100 %   05/17/25 1155 158/79 -- -- 71 17 100 %   05/17/25 1150 156/76 -- -- 74 16 99 %   05/17/25 1145 (!) 166/88 -- -- 77 15 99 %   05/17/25 1140 (!) 172/92 -- -- 77 16 100 %   05/17/25 1135 157/85 -- -- 76 23 97 %   05/17/25 1127 149/85 -- -- 82 22 97 %     Body mass index is 20.4 kg/m².    General: Patient appears comfortable and in no acute distress  HEENT:Negative for scleral icterus.  Mucous membranes are moist.  Cardiovascular: Regular rate and rhythm   Lung: Clear to auscultation bilaterally  Abdomen:Non-distended.  Bowel sounds are present.  There is no tenderness to palpation.  There are no masses appreciated.  Liver and spleen are not palpable.  Skin: No jaundice.  Warm and dry.  Ext: No cyanosis, clubbing or edema is evident.   Neuro- Parkinsonian features without focality  Affect:Normal, appropriate      Results:     Recent Labs   Lab 05/15/25  0543 05/16/25  0041 05/16/25  0705 05/16/25  1246 05/17/25  1300 05/17/25  1900 05/18/25  0545   RBC 3.57*  --  2.56*  --   --   --  2.68*   HGB 9.4*   < > 6.9*   < > 7.7* 7.2* 7.3*   HCT 30.3*  --  22.0*   < > 24.8* 23.0* 22.8*   MCV 84.9  --  85.9  --   --   --  85.1   MCH 26.3  --  27.0  --   --   --  27.2   MCHC 31.0  --  31.4  --   --   --  32.0   RDW 14.9  --  15.1*  --   --   --  14.6   NEPRELIM 7.89*  --  6.38  --   --   --  6.56   WBC 8.9  --  8.3  --   --   --  9.3   .0  --  263.0  --   --   --  282.0    < > = values in this interval not displayed.         Recent Labs   Lab 05/14/25  1805 05/15/25  0543 05/16/25  0705   * 115* 120*   BUN 53* 42* 38*   CREATSERUM 2.62* 2.08* 1.59*   CA  7.8* 7.1* 7.0*   ALB 3.9  --   --    * 135* 137   K 4.0 3.7 4.2   CL 96* 107 106   CO2 25.0 20.0* 23.0   ALKPHO 113  --   --    AST 20  --   --    ALT <7*  --   --    BILT 1.6*  --   --    TP 6.9  --   --        Lab Results   Component Value Date    INR 1.11 04/10/2025         Jayden Vargas MD  5/18/2025

## 2025-05-18 NOTE — PLAN OF CARE
Problem: Patient Centered Care  Goal: Patient preferences are identified and integrated in the patient's plan of care  Description: Interventions:  - What would you like us to know as we care for you? \"I am from [facility].\"  - Provide timely, complete, and accurate information to patient/family  - Incorporate patient and family knowledge, values, beliefs, and cultural backgrounds into the planning and delivery of care  - Encourage patient/family to participate in care and decision-making at the level they choose  - Honor patient and family perspectives and choices  Outcome: Progressing     Problem: Patient/Family Goals  Goal: Patient/Family Long Term Goal  Description: Patient's Long Term Goal: \"to be discharged\"    Interventions:  -Follow up MD appt  -Take meds as prescribed  -Use of assistive device if needed  - See additional Care Plan goals for specific interventions  Outcome: Progressing  Goal: Patient/Family Short Term Goal  Description: Patient's Short Term Goal: \"to prevent pain and infection\"    Interventions:   -Monitor VS  -Check labs results  -Administer IVF and IV ABT as ordered  -Provide pain meds as prescribed  - See additional Care Plan goals for specific interventions  Outcome: Progressing     Problem: SAFETY ADULT - FALL  Goal: Free from fall injury  Description: INTERVENTIONS:  - Assess pt frequently for physical needs  - Identify cognitive and physical deficits and behaviors that affect risk of falls.  - [place] fall precautions as indicated by assessment.  - Educate pt/family on patient safety including physical limitations  - Instruct pt to call for assistance with activity based on assessment  - Modify environment to reduce risk of injury  - Provide assistive devices as appropriate  - Consider OT/PT consult to assist with strengthening/mobility  - Encourage toileting schedule  Outcome: Progressing     Problem: GASTROINTESTINAL - ADULT  Goal: Minimal or absence of nausea and  vomiting  Description: INTERVENTIONS:  - Maintain adequate hydration with IV or PO as ordered and tolerated  - Nasogastric tube to low intermittent suction as ordered  - Evaluate effectiveness of ordered antiemetic medications  - Provide nonpharmacologic comfort measures as appropriate  - Advance diet as tolerated, if ordered  - Obtain nutritional consult as needed  - Evaluate fluid balance  Outcome: Progressing  Goal: Maintains or returns to baseline bowel function  Description: INTERVENTIONS:  - Assess bowel function  - Maintain adequate hydration with IV or PO as ordered and tolerated  - Evaluate effectiveness of GI medications  - Encourage mobilization and activity  - Obtain nutritional consult as needed  - Establish a toileting routine/schedule  - Consider collaborating with pharmacy to review patient's medication profile  Outcome: Progressing     Problem: METABOLIC/FLUID AND ELECTROLYTES - ADULT  Goal: Electrolytes maintained within normal limits  Description: INTERVENTIONS:  - Monitor labs and rhythm and assess patient for signs and symptoms of electrolyte imbalances  - Administer electrolyte replacement as ordered  - Monitor response to electrolyte replacements, including rhythm and repeat lab results as appropriate  - Fluid restriction as ordered  - Instruct patient on fluid and nutrition restrictions as appropriate  Outcome: Progressing     Problem: SKIN/TISSUE INTEGRITY - ADULT  Goal: Skin integrity remains intact  Description: INTERVENTIONS  - Assess and document risk factors for pressure ulcer development  - Assess and document skin integrity  - Monitor for areas of redness and/or skin breakdown  - Initiate interventions, skin care algorithm/standards of care as needed  Outcome: Progressing

## 2025-05-19 LAB
ANION GAP SERPL CALC-SCNC: 9 MMOL/L (ref 0–18)
BASOPHILS # BLD AUTO: 0.1 X10(3) UL (ref 0–0.2)
BASOPHILS NFR BLD AUTO: 1 %
BUN BLD-MCNC: 12 MG/DL (ref 9–23)
BUN/CREAT SERPL: 9.6 (ref 10–20)
CALCIUM BLD-MCNC: 6.7 MG/DL (ref 8.7–10.4)
CHLORIDE SERPL-SCNC: 107 MMOL/L (ref 98–112)
CO2 SERPL-SCNC: 25 MMOL/L (ref 21–32)
CREAT BLD-MCNC: 1.25 MG/DL (ref 0.7–1.3)
DEPRECATED RDW RBC AUTO: 45.2 FL (ref 35.1–46.3)
EGFRCR SERPLBLD CKD-EPI 2021: 56 ML/MIN/1.73M2 (ref 60–?)
EOSINOPHIL # BLD AUTO: 0.35 X10(3) UL (ref 0–0.7)
EOSINOPHIL NFR BLD AUTO: 3.5 %
ERYTHROCYTE [DISTWIDTH] IN BLOOD BY AUTOMATED COUNT: 14.4 % (ref 11–15)
GLUCOSE BLD-MCNC: 89 MG/DL (ref 70–99)
HCT VFR BLD AUTO: 22.3 % (ref 39–53)
HGB BLD-MCNC: 7.1 G/DL (ref 13–17.5)
IMM GRANULOCYTES # BLD AUTO: 0.41 X10(3) UL (ref 0–1)
IMM GRANULOCYTES NFR BLD: 4.1 %
LYMPHOCYTES # BLD AUTO: 1.86 X10(3) UL (ref 1–4)
LYMPHOCYTES NFR BLD AUTO: 18.6 %
MAGNESIUM SERPL-MCNC: 1.1 MG/DL (ref 1.6–2.6)
MCH RBC QN AUTO: 27.6 PG (ref 26–34)
MCHC RBC AUTO-ENTMCNC: 31.8 G/DL (ref 31–37)
MCV RBC AUTO: 86.8 FL (ref 80–100)
MONOCYTES # BLD AUTO: 0.69 X10(3) UL (ref 0.1–1)
MONOCYTES NFR BLD AUTO: 6.9 %
NEUTROPHILS # BLD AUTO: 6.61 X10 (3) UL (ref 1.5–7.7)
NEUTROPHILS # BLD AUTO: 6.61 X10(3) UL (ref 1.5–7.7)
NEUTROPHILS NFR BLD AUTO: 65.9 %
OSMOLALITY SERPL CALC.SUM OF ELEC: 291 MOSM/KG (ref 275–295)
PHOSPHATE SERPL-MCNC: 2.7 MG/DL (ref 2.4–5.1)
PLATELET # BLD AUTO: 312 10(3)UL (ref 150–450)
POTASSIUM SERPL-SCNC: 2.9 MMOL/L (ref 3.5–5.1)
POTASSIUM SERPL-SCNC: 3.6 MMOL/L (ref 3.5–5.1)
RBC # BLD AUTO: 2.57 X10(6)UL (ref 3.8–5.8)
SODIUM SERPL-SCNC: 141 MMOL/L (ref 136–145)
WBC # BLD AUTO: 10 X10(3) UL (ref 4–11)

## 2025-05-19 PROCEDURE — 99233 SBSQ HOSP IP/OBS HIGH 50: CPT | Performed by: INTERNAL MEDICINE

## 2025-05-19 PROCEDURE — 99233 SBSQ HOSP IP/OBS HIGH 50: CPT | Performed by: STUDENT IN AN ORGANIZED HEALTH CARE EDUCATION/TRAINING PROGRAM

## 2025-05-19 RX ORDER — PANTOPRAZOLE SODIUM 40 MG/1
40 TABLET, DELAYED RELEASE ORAL
Status: DISCONTINUED | OUTPATIENT
Start: 2025-05-19 | End: 2025-05-20

## 2025-05-19 RX ORDER — MAGNESIUM OXIDE 400 MG/1
800 TABLET ORAL ONCE
Status: COMPLETED | OUTPATIENT
Start: 2025-05-19 | End: 2025-05-19

## 2025-05-19 RX ORDER — POTASSIUM CHLORIDE 14.9 MG/ML
20 INJECTION INTRAVENOUS ONCE
Status: COMPLETED | OUTPATIENT
Start: 2025-05-19 | End: 2025-05-19

## 2025-05-19 NOTE — PLAN OF CARE
Problem: Patient Centered Care  Goal: Patient preferences are identified and integrated in the patient's plan of care  Description: Interventions:  - What would you like us to know as we care for you? \"I am from [facility].\"  - Provide timely, complete, and accurate information to patient/family  - Incorporate patient and family knowledge, values, beliefs, and cultural backgrounds into the planning and delivery of care  - Encourage patient/family to participate in care and decision-making at the level they choose  - Honor patient and family perspectives and choices  Outcome: Progressing     Problem: SAFETY ADULT - FALL  Goal: Free from fall injury  Description: INTERVENTIONS:  - Assess pt frequently for physical needs  - Identify cognitive and physical deficits and behaviors that affect risk of falls.  - [place] fall precautions as indicated by assessment.  - Educate pt/family on patient safety including physical limitations  - Instruct pt to call for assistance with activity based on assessment  - Modify environment to reduce risk of injury  - Provide assistive devices as appropriate  - Consider OT/PT consult to assist with strengthening/mobility  - Encourage toileting schedule  Outcome: Progressing     Problem: GASTROINTESTINAL - ADULT  Goal: Minimal or absence of nausea and vomiting  Description: INTERVENTIONS:  - Maintain adequate hydration with IV or PO as ordered and tolerated  - Nasogastric tube to low intermittent suction as ordered  - Evaluate effectiveness of ordered antiemetic medications  - Provide nonpharmacologic comfort measures as appropriate  - Advance diet as tolerated, if ordered  - Obtain nutritional consult as needed  - Evaluate fluid balance  Outcome: Progressing  Goal: Maintains or returns to baseline bowel function  Description: INTERVENTIONS:  - Assess bowel function  - Maintain adequate hydration with IV or PO as ordered and tolerated  - Evaluate effectiveness of GI  medications  - Encourage mobilization and activity  - Obtain nutritional consult as needed  - Establish a toileting routine/schedule  - Consider collaborating with pharmacy to review patient's medication profile  Outcome: Progressing     Problem: SKIN/TISSUE INTEGRITY - ADULT  Goal: Skin integrity remains intact  Description: INTERVENTIONS  - Assess and document risk factors for pressure ulcer development  - Assess and document skin integrity  - Monitor for areas of redness and/or skin breakdown  - Initiate interventions, skin care algorithm/standards of care as needed  Outcome: Progressing     Problem: METABOLIC/FLUID AND ELECTROLYTES - ADULT  Goal: Electrolytes maintained within normal limits  Description: INTERVENTIONS:  - Monitor labs and rhythm and assess patient for signs and symptoms of electrolyte imbalances  - Administer electrolyte replacement as ordered  - Monitor response to electrolyte replacements, including rhythm and repeat lab results as appropriate  - Fluid restriction as ordered  - Instruct patient on fluid and nutrition restrictions as appropriate  Outcome: Not Progressing

## 2025-05-19 NOTE — PROGRESS NOTES
[facility]     Gastroenterology Progress Note    Justyn Hall Patient Status:  Inpatient    1940 MRN [id number]   Location [facility] 5SW/SE Attending Alyx Melendez MD   Hosp Day # 5 PCP No primary care provider on file.       Assessment and Plan:   1.  Juno 1b+ duodenal ulceration post endoscopic therapy  Hemodynamically stable.  No stools today.  Hemoglobin stabilizing.  All point to continued hemostasis.  Tolerating solids diet.  Continue PPI BID, he should be on a PPI indefinitely.  Aspirin/NSAIDs should be avoided if possible.  Await gastric biopsy results.  Treat H. pylori if positive.    2.  LA grade D esophagitis  Acid peptic in nature.  Await biopsy results.  Continue proton pump inhibitor therapy indefinitely.  Although a surveillance endoscopy to evaluate for Zeng's esophagus would normally be considered, observation on PPI therapy without repeat endoscopy would not be unreasonable in light of age and comorbidities.    3.  Diminutive colon polyps  These were subcentimeter and benign in appearance.  I would not proceed with a repeat colonoscopy and polypectomy in light of age and comorbidities.    Recommend:  1.  Continue to observe for recurrent bleeding.  2. Diet as tolerated  3.  PPI PO BID  4.  DVT ppx  5.  Monitor in the hospital for 72 hours post endoscopy.  6.  Follow-up biopsy results.  Treat H. pylori if positive.  7.  Would not proceed with repeat colonoscopy and polypectomy.        Subjective:   Appears comfortable.  No complaints.  No stools.  Tolerating solids.    Objective:     Patient Vitals for the past 24 hrs:   BP Temp Temp src Pulse Resp SpO2   25 0822 140/74 98.9 °F (37.2 °C) Oral 67 16 99 %   25 0530 142/64 98 °F (36.7 °C) Oral 69 18 98 %   25 1956 151/85 98.2 °F (36.8 °C) Oral 79 18 98 %   25 1250 157/75 97.3 °F (36.3 °C) Oral 69 20 100 %   25 1215 154/68 -- -- 71 22 100 %   25 1155 158/79 -- --  71 17 100 %   05/17/25 1150 156/76 -- -- 74 16 99 %   05/17/25 1145 (!) 166/88 -- -- 77 15 99 %   05/17/25 1140 (!) 172/92 -- -- 77 16 100 %   05/17/25 1135 157/85 -- -- 76 23 97 %   05/17/25 1127 149/85 -- -- 82 22 97 %     Body mass index is 20.4 kg/m².    General: Patient appears comfortable and in no acute distress  HEENT:Negative for scleral icterus.  Mucous membranes are moist.  Cardiovascular: Regular rate and rhythm   Lung: Clear to auscultation bilaterally  Abdomen:Non-distended.  Bowel sounds are present.  There is no tenderness to palpation.  There are no masses appreciated.  Liver and spleen are not palpable.  Skin: No jaundice.  Warm and dry.  Ext: No cyanosis, clubbing or edema is evident.   Neuro- Parkinsonian features without focality  Affect:Normal, appropriate      Results:     Recent Labs   Lab 05/16/25  0705 05/16/25  1246 05/17/25  1900 05/18/25  0545 05/19/25  0541   RBC 2.56*  --   --  2.68* 2.57*   HGB 6.9*   < > 7.2* 7.3* 7.1*   HCT 22.0*   < > 23.0* 22.8* 22.3*   MCV 85.9  --   --  85.1 86.8   MCH 27.0  --   --  27.2 27.6   MCHC 31.4  --   --  32.0 31.8   RDW 15.1*  --   --  14.6 14.4   NEPRELIM 6.38  --   --  6.56 6.61   WBC 8.3  --   --  9.3 10.0   .0  --   --  282.0 312.0    < > = values in this interval not displayed.         Recent Labs   Lab 05/14/25  1805 05/15/25  0543 05/16/25  0705 05/19/25  0541   * 115* 120* 89   BUN 53* 42* 38* 12   CREATSERUM 2.62* 2.08* 1.59* 1.25   CA 7.8* 7.1* 7.0* 6.7*   ALB 3.9  --   --   --    * 135* 137 141   K 4.0 3.7 4.2 2.9*   CL 96* 107 106 107   CO2 25.0 20.0* 23.0 25.0   ALKPHO 113  --   --   --    AST 20  --   --   --    ALT <7*  --   --   --    BILT 1.6*  --   --   --    TP 6.9  --   --   --        Lab Results   Component Value Date    INR 1.11 04/10/2025

## 2025-05-19 NOTE — PLAN OF CARE
No signs or symptoms of bleeding. Espinal in place. Fall precautions in place. Call light in reach.     Problem: Patient Centered Care  Goal: Patient preferences are identified and integrated in the patient's plan of care  Description: Interventions:  - What would you like us to know as we care for you? \"I am from [facility].\"  - Provide timely, complete, and accurate information to patient/family  - Incorporate patient and family knowledge, values, beliefs, and cultural backgrounds into the planning and delivery of care  - Encourage patient/family to participate in care and decision-making at the level they choose  - Honor patient and family perspectives and choices  Outcome: Progressing     Problem: Patient/Family Goals  Goal: Patient/Family Long Term Goal  Description: Patient's Long Term Goal: \"to be discharged\"    Interventions:  -Follow up MD appt  -Take meds as prescribed  -Use of assistive device if needed  - See additional Care Plan goals for specific interventions  Outcome: Progressing  Goal: Patient/Family Short Term Goal  Description: Patient's Short Term Goal: \"to prevent pain and infection\"    Interventions:   -Monitor VS  -Check labs results  -Administer IVF and IV ABT as ordered  -Provide pain meds as prescribed  - See additional Care Plan goals for specific interventions  Outcome: Progressing     Problem: SAFETY ADULT - FALL  Goal: Free from fall injury  Description: INTERVENTIONS:  - Assess pt frequently for physical needs  - Identify cognitive and physical deficits and behaviors that affect risk of falls.  - [place] fall precautions as indicated by assessment.  - Educate pt/family on patient safety including physical limitations  - Instruct pt to call for assistance with activity based on assessment  - Modify environment to reduce risk of injury  - Provide assistive devices as appropriate  - Consider OT/PT consult to assist with strengthening/mobility  - Encourage toileting  schedule  Outcome: Progressing     Problem: GASTROINTESTINAL - ADULT  Goal: Minimal or absence of nausea and vomiting  Description: INTERVENTIONS:  - Maintain adequate hydration with IV or PO as ordered and tolerated  - Nasogastric tube to low intermittent suction as ordered  - Evaluate effectiveness of ordered antiemetic medications  - Provide nonpharmacologic comfort measures as appropriate  - Advance diet as tolerated, if ordered  - Obtain nutritional consult as needed  - Evaluate fluid balance  Outcome: Progressing  Goal: Maintains or returns to baseline bowel function  Description: INTERVENTIONS:  - Assess bowel function  - Maintain adequate hydration with IV or PO as ordered and tolerated  - Evaluate effectiveness of GI medications  - Encourage mobilization and activity  - Obtain nutritional consult as needed  - Establish a toileting routine/schedule  - Consider collaborating with pharmacy to review patient's medication profile  Outcome: Progressing     Problem: METABOLIC/FLUID AND ELECTROLYTES - ADULT  Goal: Electrolytes maintained within normal limits  Description: INTERVENTIONS:  - Monitor labs and rhythm and assess patient for signs and symptoms of electrolyte imbalances  - Administer electrolyte replacement as ordered  - Monitor response to electrolyte replacements, including rhythm and repeat lab results as appropriate  - Fluid restriction as ordered  - Instruct patient on fluid and nutrition restrictions as appropriate  Outcome: Progressing     Problem: SKIN/TISSUE INTEGRITY - ADULT  Goal: Skin integrity remains intact  Description: INTERVENTIONS  - Assess and document risk factors for pressure ulcer development  - Assess and document skin integrity  - Monitor for areas of redness and/or skin breakdown  - Initiate interventions, skin care algorithm/standards of care as needed  Outcome: Progressing

## 2025-05-19 NOTE — PROGRESS NOTES
Progress Note     Justyn Hall Patient Status:  Inpatient    1940 MRN [id number]   Location [facility] 5SW/SE Attending Mariza Ruiz MD   Hosp Day # 5 PCP No primary care provider on file.     Subjective:   S: Patient this morning, tolerated advance diet.  Asking about discharge.    Review of Systems:   10 point ROS completed and was negative, except for pertinent positive and negatives stated in subjective.    Objective:   Vital signs:  Temp:  [97.8 °F (36.6 °C)-98.5 °F (36.9 °C)] 97.8 °F (36.6 °C)  Pulse:  [50-68] 53  Resp:  [14-18] 18  BP: (141-150)/(66-74) 146/74  SpO2:  [94 %-99 %] 97 %    Wt Readings from Last 6 Encounters:   25 150 lb 6.4 oz (68.2 kg)   04/10/25 175 lb 4.3 oz (79.5 kg)   25 168 lb 3.2 oz (76.3 kg)   10/10/19 182 lb (82.6 kg)   06/10/19 182 lb (82.6 kg)   19 181 lb (82.1 kg)         Physical Exam:    General: No acute distress. Alert ,         Respiratory: Clear to auscultation bilaterally. No wheezes. No rhonchi.  Cardiovascular: S1, S2. Regular rate and rhythm. No murmurs, rubs or gallops.   Abdomen: Soft, nontender, nondistended.  Positive bowel sounds. No rebound or guarding.  Neurologic: No focal neurological deficits.   Musculoskeletal: Moves all extremities.  Extremities: No edema.    Results:   Diagnostic Data:      Labs:    Labs Last 24 Hours:   BMP     CBC    Other     Na 141 Cl 107 BUN 12 Glu 89   Hb 7.1   PTT - Procal -   K 2.9 CO2 25.0 Cr 1.25   WBC 10.0 >< .0  INR - CRP -   Renal Lytes Endo    Hct 22.3   Trop - D dim -   eGFR - Ca 6.7 POC Gluc  -    LFT   pBNP - Lactic -   eGFR AA - PO4 2.7 A1c -   AST - APk - Prot -  LDL -     Mg 1.1 TSH -   ALT - T faby - Alb -        COVID-19 Lab Results    COVID-19  Lab Results   Component Value Date    COVID19 Not Detected 2025    COVID19 Not Detected 04/10/2025       Pro-Calcitonin  No results for input(s): \"PCT\" in the last 168 hours.    Cardiac  No results for input(s): \"TROP\", \"PBNP\"  in the last 168 hours.    Creatinine Kinase  No results for input(s): \"CK\" in the last 168 hours.    Inflammatory Markers  Recent Labs   Lab 05/16/25  0705   TARIK 518*       Imaging: Imaging data reviewed in Epic.    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  Resume Flomax  Blood and urine cultures.:  No growth to date  IV Rocephin stopped based on neg cultures  Monitor hemodynamic status and temperature curve.      Acute GI bleed: Likely upper in view of melanotic stools  - Hemodynamically stable  - Required 2 units PRBC transfusion during admission.  - GI consulted, patient underwent endoscopy with findings of:   1.  Diminutive colon polyps  2.  Internal hemorrhoids  3.  Juno 1b+ duodenal ulceration  4.  LA grade D esophagitis  5.  Hiatal hernia    - advance diet as tolerated. GI recommending PPI BID oral indefinitely for duodenal ulcer   - Cont to monitor H/H, today hgb 7.1  - Okay to continue aspirin and SCDs for DVT prophylaxis   - Await gastric biopsy results.  Treat H. pylori if positive.          Acute on chronic kidney injury.:  Back to baseline  Baseline creatinine 1.5  Continue IV fluids  Avoid nephrotoxic agents  Monitor renal function daily     Parkinson's disease  Cont Sinemet  Fall and aspiration precaution  PT OT when able     Hypokalemia, Hypomagnesemia  - replete per protocol            CODE status: DNAR select  Espinal: Espinal catheter in place  Dispo: home with sister once medically optimized        MDM: High   I personally spent time on chart/note review, review of labs/imaging, discussion with patient, physical exam, discussion with staff, consultants, coordinating care, writing progress note, and discussion of plan of care.     Mariza Ruiz MD    Supplementary Documentation:                       [1]    potassium  chloride  20 mEq Intravenous Once    pantoprazole  40 mg Oral BID AC    aspirin  81 mg Oral Daily    carbidopa-levodopa  1 tablet Oral TID    [Held by provider] midodrine  2.5 mg Oral TID    tamsulosin  0.8 mg Oral Nightly    finasteride  5 mg Oral Daily

## 2025-05-20 LAB
ANION GAP SERPL CALC-SCNC: 11 MMOL/L (ref 0–18)
ANTIBODY SCREEN: NEGATIVE
BASOPHILS # BLD: 0.13 X10(3) UL (ref 0–0.2)
BASOPHILS NFR BLD: 1 %
BUN BLD-MCNC: 13 MG/DL (ref 9–23)
BUN/CREAT SERPL: 10.7 (ref 10–20)
CALCIUM BLD-MCNC: 6.5 MG/DL (ref 8.7–10.4)
CHLORIDE SERPL-SCNC: 104 MMOL/L (ref 98–112)
CO2 SERPL-SCNC: 25 MMOL/L (ref 21–32)
CREAT BLD-MCNC: 1.22 MG/DL (ref 0.7–1.3)
DEPRECATED RDW RBC AUTO: 44.9 FL (ref 35.1–46.3)
EGFRCR SERPLBLD CKD-EPI 2021: 58 ML/MIN/1.73M2 (ref 60–?)
EOSINOPHIL # BLD: 0.38 X10(3) UL (ref 0–0.7)
EOSINOPHIL NFR BLD: 3 %
ERYTHROCYTE [DISTWIDTH] IN BLOOD BY AUTOMATED COUNT: 14.7 % (ref 11–15)
GLUCOSE BLD-MCNC: 100 MG/DL (ref 70–99)
HCT VFR BLD AUTO: 21 % (ref 39–53)
HGB BLD-MCNC: 6.9 G/DL (ref 13–17.5)
HGB BLD-MCNC: 7.9 G/DL (ref 13–17.5)
LYMPHOCYTES NFR BLD: 1.64 X10(3) UL (ref 1–4)
LYMPHOCYTES NFR BLD: 13 %
MAGNESIUM SERPL-MCNC: 1.1 MG/DL (ref 1.6–2.6)
MCH RBC QN AUTO: 27.6 PG (ref 26–34)
MCHC RBC AUTO-ENTMCNC: 32.9 G/DL (ref 31–37)
MCV RBC AUTO: 84 FL (ref 80–100)
METAMYELOCYTES # BLD: 0.25 X10(3) UL (ref ?–0.01)
METAMYELOCYTES NFR BLD: 2 %
MONOCYTES # BLD: 0.25 X10(3) UL (ref 0.1–1)
MONOCYTES NFR BLD: 2 %
MORPHOLOGY: NORMAL
MYELOCYTES # BLD: 0.13 X10(3) UL (ref ?–0.01)
MYELOCYTES NFR BLD: 1 %
NEUTROPHILS # BLD AUTO: 8.36 X10 (3) UL (ref 1.5–7.7)
NEUTROPHILS NFR BLD: 77 %
NEUTS BAND NFR BLD: 1 %
NEUTS HYPERSEG # BLD: 9.83 X10(3) UL (ref 1.5–7.7)
OSMOLALITY SERPL CALC.SUM OF ELEC: 290 MOSM/KG (ref 275–295)
PLATELET # BLD AUTO: 398 10(3)UL (ref 150–450)
PLATELET MORPHOLOGY: NORMAL
POTASSIUM SERPL-SCNC: 3.1 MMOL/L (ref 3.5–5.1)
RBC # BLD AUTO: 2.5 X10(6)UL (ref 3.8–5.8)
RH BLOOD TYPE: POSITIVE
SODIUM SERPL-SCNC: 140 MMOL/L (ref 136–145)
TOTAL CELLS COUNTED BLD: 100
WBC # BLD AUTO: 12.6 X10(3) UL (ref 4–11)

## 2025-05-20 PROCEDURE — 99233 SBSQ HOSP IP/OBS HIGH 50: CPT | Performed by: INTERNAL MEDICINE

## 2025-05-20 PROCEDURE — 99233 SBSQ HOSP IP/OBS HIGH 50: CPT | Performed by: STUDENT IN AN ORGANIZED HEALTH CARE EDUCATION/TRAINING PROGRAM

## 2025-05-20 RX ORDER — MAGNESIUM OXIDE 400 MG/1
800 TABLET ORAL ONCE
Status: COMPLETED | OUTPATIENT
Start: 2025-05-20 | End: 2025-05-20

## 2025-05-20 RX ORDER — SODIUM CHLORIDE 9 MG/ML
INJECTION, SOLUTION INTRAVENOUS ONCE
Status: COMPLETED | OUTPATIENT
Start: 2025-05-20 | End: 2025-05-20

## 2025-05-20 NOTE — PROGRESS NOTES
[facility]     Gastroenterology Progress Note    Justyn Hall Patient Status:  Inpatient    1940 MRN [id number]   Location [facility] 5SW/SE Attending Alyx Melendez MD   Hosp Day # 6 PCP No primary care provider on file.       Assessment and Plan:   1.  Juno 1b+ duodenal ulceration post endoscopic therapy  Hemodynamically stable.  Hgb drift down to 6.9 this morning, no overt gi bleeding -- suspect hemoequilibration.   Continue PPI BID, he should be on a PPI indefinitely.  Aspirin/NSAIDs should be avoided if possible.  Gastric path negative for H pylori.    2.  LA grade D esophagitis  Acid peptic in nature. Esophageal bx consistent with erosive esophagitis.  Continue proton pump inhibitor therapy indefinitely.  Although a surveillance endoscopy to evaluate for Zeng's esophagus would normally be considered, observation on PPI therapy without repeat endoscopy would not be unreasonable in light of age and comorbidities.    3.  Diminutive colon polyps  These were subcentimeter and benign in appearance.  I would not proceed with a repeat colonoscopy and polypectomy in light of age and comorbidities.    Recommend:  1.  Transfuse 1 unit prbc and check hgb response  2.  Monitor for overt bleeding  3.  PPI BID  4.  DVT ppx     D/w RN and hospitalist    Hattie Alex MD      Subjective:   Feels lightheaded today.   No abdominal pain.  Bm was brown recorded yesterday evening.  No nausea or emesis.     Objective:     Patient Vitals for the past 24 hrs:   BP Temp Temp src Pulse Resp SpO2   25 0822 140/74 98.9 °F (37.2 °C) Oral 67 16 99 %   25 0530 142/64 98 °F (36.7 °C) Oral 69 18 98 %   25 1956 151/85 98.2 °F (36.8 °C) Oral 79 18 98 %   25 1250 157/75 97.3 °F (36.3 °C) Oral 69 20 100 %   25 1215 154/68 -- -- 71 22 100 %   25 1155 158/79 -- -- 71 17 100 %   25 1150 156/76 -- -- 74 16 99 %   25 1145 (!) 166/88 -- -- 77 15 99 %    05/17/25 1140 (!) 172/92 -- -- 77 16 100 %   05/17/25 1135 157/85 -- -- 76 23 97 %   05/17/25 1127 149/85 -- -- 82 22 97 %     Body mass index is 20.4 kg/m².    General: Patient appears comfortable and in no acute distress  HEENT:Negative for scleral icterus.  Mucous membranes are moist.  Cardiovascular: Regular rate and rhythm   Lung: Clear to auscultation bilaterally  Abdomen:Non-distended.  Bowel sounds are present.  There is no tenderness to palpation.  There are no masses appreciated.  Liver and spleen are not palpable.  Skin: No jaundice.  Warm and dry.  Ext: No cyanosis, clubbing or edema is evident.   Neuro- Parkinsonian features without focality  Affect:Normal, appropriate      Results:     Recent Labs   Lab 05/18/25  0545 05/19/25  0541 05/20/25  0613   RBC 2.68* 2.57* 2.50*   HGB 7.3* 7.1* 6.9*   HCT 22.8* 22.3* 21.0*   MCV 85.1 86.8 84.0   MCH 27.2 27.6 27.6   MCHC 32.0 31.8 32.9   RDW 14.6 14.4 14.7   NEPRELIM 6.56 6.61 8.36*   WBC 9.3 10.0 12.6*   .0 312.0 398.0         Recent Labs   Lab 05/14/25  1805 05/15/25  0543 05/16/25  0705 05/19/25  0541 05/19/25  1820 05/20/25  0542   *   < > 120* 89  --  100*   BUN 53*   < > 38* 12  --  13   CREATSERUM 2.62*   < > 1.59* 1.25  --  1.22   CA 7.8*   < > 7.0* 6.7*  --  6.5*   ALB 3.9  --   --   --   --   --    *   < > 137 141  --  140   K 4.0   < > 4.2 2.9* 3.6 3.1*   CL 96*   < > 106 107  --  104   CO2 25.0   < > 23.0 25.0  --  25.0   ALKPHO 113  --   --   --   --   --    AST 20  --   --   --   --   --    ALT <7*  --   --   --   --   --    BILT 1.6*  --   --   --   --   --    TP 6.9  --   --   --   --   --     < > = values in this interval not displayed.       Lab Results   Component Value Date    INR 1.11 04/10/2025

## 2025-05-20 NOTE — PLAN OF CARE
Problem: SAFETY ADULT - FALL  Goal: Free from fall injury  Description: INTERVENTIONS:  - Assess pt frequently for physical needs  - Identify cognitive and physical deficits and behaviors that affect risk of falls.  - [place] fall precautions as indicated by assessment.  - Educate pt/family on patient safety including physical limitations  - Instruct pt to call for assistance with activity based on assessment  - Modify environment to reduce risk of injury  - Provide assistive devices as appropriate  - Consider OT/PT consult to assist with strengthening/mobility  - Encourage toileting schedule  Outcome: Progressing     Problem: GASTROINTESTINAL - ADULT  Goal: Minimal or absence of nausea and vomiting  Description: INTERVENTIONS:  - Maintain adequate hydration with IV or PO as ordered and tolerated  - Nasogastric tube to low intermittent suction as ordered  - Evaluate effectiveness of ordered antiemetic medications  - Provide nonpharmacologic comfort measures as appropriate  - Advance diet as tolerated, if ordered  - Obtain nutritional consult as needed  - Evaluate fluid balance  Outcome: Progressing     Problem: METABOLIC/FLUID AND ELECTROLYTES - ADULT  Goal: Electrolytes maintained within normal limits  Description: INTERVENTIONS:  - Monitor labs and rhythm and assess patient for signs and symptoms of electrolyte imbalances  - Administer electrolyte replacement as ordered  - Monitor response to electrolyte replacements, including rhythm and repeat lab results as appropriate  - Fluid restriction as ordered  - Instruct patient on fluid and nutrition restrictions as appropriate  Outcome: Progressing     Problem: SKIN/TISSUE INTEGRITY - ADULT  Goal: Skin integrity remains intact  Description: INTERVENTIONS  - Assess and document risk factors for pressure ulcer development  - Assess and document skin integrity  - Monitor for areas of redness and/or skin breakdown  - Initiate interventions, skin care  algorithm/standards of care as needed  Outcome: Progressing

## 2025-05-20 NOTE — PLAN OF CARE
Call light within reach. Safety measures in place.     Problem: Patient Centered Care  Goal: Patient preferences are identified and integrated in the patient's plan of care  Description: Interventions:  - What would you like us to know as we care for you? \"I am from [facility].\"  - Provide timely, complete, and accurate information to patient/family  - Incorporate patient and family knowledge, values, beliefs, and cultural backgrounds into the planning and delivery of care  - Encourage patient/family to participate in care and decision-making at the level they choose  - Honor patient and family perspectives and choices  Outcome: Progressing     Problem: Patient/Family Goals  Goal: Patient/Family Long Term Goal  Description: Patient's Long Term Goal: \"to be discharged\"    Interventions:  -Follow up MD appt  -Take meds as prescribed  -Use of assistive device if needed  - See additional Care Plan goals for specific interventions  Outcome: Progressing  Goal: Patient/Family Short Term Goal  Description: Patient's Short Term Goal: \"to prevent pain and infection\"    Interventions:   -Monitor VS  -Check labs results  -Administer IVF and IV ABT as ordered  -Provide pain meds as prescribed  - See additional Care Plan goals for specific interventions  Outcome: Progressing     Problem: SAFETY ADULT - FALL  Goal: Free from fall injury  Description: INTERVENTIONS:  - Assess pt frequently for physical needs  - Identify cognitive and physical deficits and behaviors that affect risk of falls.  - [place] fall precautions as indicated by assessment.  - Educate pt/family on patient safety including physical limitations  - Instruct pt to call for assistance with activity based on assessment  - Modify environment to reduce risk of injury  - Provide assistive devices as appropriate  - Consider OT/PT consult to assist with strengthening/mobility  - Encourage toileting schedule  Outcome: Progressing     Problem: GASTROINTESTINAL  - ADULT  Goal: Minimal or absence of nausea and vomiting  Description: INTERVENTIONS:  - Maintain adequate hydration with IV or PO as ordered and tolerated  - Nasogastric tube to low intermittent suction as ordered  - Evaluate effectiveness of ordered antiemetic medications  - Provide nonpharmacologic comfort measures as appropriate  - Advance diet as tolerated, if ordered  - Obtain nutritional consult as needed  - Evaluate fluid balance  Outcome: Progressing  Goal: Maintains or returns to baseline bowel function  Description: INTERVENTIONS:  - Assess bowel function  - Maintain adequate hydration with IV or PO as ordered and tolerated  - Evaluate effectiveness of GI medications  - Encourage mobilization and activity  - Obtain nutritional consult as needed  - Establish a toileting routine/schedule  - Consider collaborating with pharmacy to review patient's medication profile  Outcome: Progressing     Problem: METABOLIC/FLUID AND ELECTROLYTES - ADULT  Goal: Electrolytes maintained within normal limits  Description: INTERVENTIONS:  - Monitor labs and rhythm and assess patient for signs and symptoms of electrolyte imbalances  - Administer electrolyte replacement as ordered  - Monitor response to electrolyte replacements, including rhythm and repeat lab results as appropriate  - Fluid restriction as ordered  - Instruct patient on fluid and nutrition restrictions as appropriate  Outcome: Progressing     Problem: SKIN/TISSUE INTEGRITY - ADULT  Goal: Skin integrity remains intact  Description: INTERVENTIONS  - Assess and document risk factors for pressure ulcer development  - Assess and document skin integrity  - Monitor for areas of redness and/or skin breakdown  - Initiate interventions, skin care algorithm/standards of care as needed  Outcome: Progressing

## 2025-05-21 LAB
ANION GAP SERPL CALC-SCNC: 9 MMOL/L (ref 0–18)
BASOPHILS # BLD: 0 X10(3) UL (ref 0–0.2)
BASOPHILS NFR BLD: 0 %
BLOOD TYPE BARCODE: 6200
BUN BLD-MCNC: 14 MG/DL (ref 9–23)
BUN/CREAT SERPL: 11.8 (ref 10–20)
CALCIUM BLD-MCNC: 6.9 MG/DL (ref 8.7–10.4)
CHLORIDE SERPL-SCNC: 103 MMOL/L (ref 98–112)
CO2 SERPL-SCNC: 28 MMOL/L (ref 21–32)
CREAT BLD-MCNC: 1.19 MG/DL (ref 0.7–1.3)
DEPRECATED RDW RBC AUTO: 46.1 FL (ref 35.1–46.3)
EGFRCR SERPLBLD CKD-EPI 2021: 60 ML/MIN/1.73M2 (ref 60–?)
EOSINOPHIL # BLD: 0.76 X10(3) UL (ref 0–0.7)
EOSINOPHIL NFR BLD: 6 %
ERYTHROCYTE [DISTWIDTH] IN BLOOD BY AUTOMATED COUNT: 15.6 % (ref 11–15)
GLUCOSE BLD-MCNC: 93 MG/DL (ref 70–99)
HCT VFR BLD AUTO: 24.6 % (ref 39–53)
HGB BLD-MCNC: 7.9 G/DL (ref 13–17.5)
LYMPHOCYTES NFR BLD: 2.52 X10(3) UL (ref 1–4)
LYMPHOCYTES NFR BLD: 20 %
MAGNESIUM SERPL-MCNC: 1.2 MG/DL (ref 1.6–2.6)
MCH RBC QN AUTO: 26.7 PG (ref 26–34)
MCHC RBC AUTO-ENTMCNC: 32.1 G/DL (ref 31–37)
MCV RBC AUTO: 83.1 FL (ref 80–100)
METAMYELOCYTES # BLD: 0.25 X10(3) UL (ref ?–0.01)
METAMYELOCYTES NFR BLD: 2 %
MONOCYTES # BLD: 0.76 X10(3) UL (ref 0.1–1)
MONOCYTES NFR BLD: 6 %
NEUTROPHILS # BLD AUTO: 7.88 X10 (3) UL (ref 1.5–7.7)
NEUTROPHILS NFR BLD: 65 %
NEUTS BAND NFR BLD: 1 %
NEUTS HYPERSEG # BLD: 8.32 X10(3) UL (ref 1.5–7.7)
OSMOLALITY SERPL CALC.SUM OF ELEC: 290 MOSM/KG (ref 275–295)
PLATELET # BLD AUTO: 435 10(3)UL (ref 150–450)
PLATELET MORPHOLOGY: NORMAL
POTASSIUM SERPL-SCNC: 3.8 MMOL/L (ref 3.5–5.1)
POTASSIUM SERPL-SCNC: 3.8 MMOL/L (ref 3.5–5.1)
RBC # BLD AUTO: 2.96 X10(6)UL (ref 3.8–5.8)
SODIUM SERPL-SCNC: 140 MMOL/L (ref 136–145)
TOTAL CELLS COUNTED BLD: 100
UNIT VOLUME: 350 ML
WBC # BLD AUTO: 12.6 X10(3) UL (ref 4–11)

## 2025-05-21 PROCEDURE — 99233 SBSQ HOSP IP/OBS HIGH 50: CPT | Performed by: STUDENT IN AN ORGANIZED HEALTH CARE EDUCATION/TRAINING PROGRAM

## 2025-05-21 PROCEDURE — 99233 SBSQ HOSP IP/OBS HIGH 50: CPT | Performed by: INTERNAL MEDICINE

## 2025-05-21 NOTE — PROGRESS NOTES
Progress Note     Justyn Hall Patient Status:  Inpatient    1940 MRN [id number]   Location [facility] 5SW/SE Attending Mariza Ruiz MD   Hosp Day # 7 PCP No primary care provider on file.     Subjective:   S: Patient this morning, sitting up in chair, feeling better, not lightheaded.  No bleeding but no BM yet.     Review of Systems:   10 point ROS completed and was negative, except for pertinent positive and negatives stated in subjective.    Objective:   Vital signs:  Temp:  [97.7 °F (36.5 °C)-98.3 °F (36.8 °C)] 97.7 °F (36.5 °C)  Pulse:  [65-97] 65  Resp:  [16-18] 18  BP: (133-183)/(74-86) 133/74  SpO2:  [98 %-99 %] 98 %    Wt Readings from Last 6 Encounters:   25 174 lb 3.2 oz (79 kg)   04/10/25 175 lb 4.3 oz (79.5 kg)   25 168 lb 3.2 oz (76.3 kg)   10/10/19 182 lb (82.6 kg)   06/10/19 182 lb (82.6 kg)   19 181 lb (82.1 kg)         Physical Exam:    General: No acute distress. Alert ,         Respiratory: Clear to auscultation bilaterally. No wheezes. No rhonchi.  Cardiovascular: S1, S2. Regular rate and rhythm. No murmurs, rubs or gallops.   Abdomen: Soft, nontender, nondistended.  Positive bowel sounds. No rebound or guarding.  Neurologic: No focal neurological deficits.   Musculoskeletal: Moves all extremities.  Extremities: No edema.    Results:   Diagnostic Data:      Labs:    Labs Last 24 Hours:   BMP     CBC    Other     Na 140 Cl 103 BUN 14 Glu 93   Hb 7.9   PTT - Procal -   K 3.8; 3.8 CO2 28.0 Cr 1.19   WBC 12.6 >< .0  INR - CRP -   Renal Lytes Endo    Hct 24.6   Trop - D dim -   eGFR - Ca 6.9 POC Gluc  -    LFT   pBNP - Lactic -   eGFR AA - PO4 - A1c -   AST - APk - Prot -  LDL -     Mg 1.2 TSH -   ALT - T faby - Alb -        COVID-19 Lab Results    COVID-19  Lab Results   Component Value Date    COVID19 Not Detected 2025    COVID19 Not Detected 04/10/2025       Pro-Calcitonin  No results for input(s): \"PCT\" in the last 168 hours.    Cardiac  No  results for input(s): \"TROP\", \"PBNP\" in the last 168 hours.    Creatinine Kinase  No results for input(s): \"CK\" in the last 168 hours.    Inflammatory Markers  Recent Labs   Lab 05/16/25  0705   TARIK 518*       Imaging: Imaging data reviewed in Epic.    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  Resume Flomax  Blood and urine cultures.:  No growth to date  IV Rocephin stopped based on neg cultures  Monitor hemodynamic status and temperature curve.      Acute GI bleed: Likely upper in view of melanotic stools  - Hemodynamically stable  - Required 2 units PRBC transfusion during admission.  - GI consulted, patient underwent endoscopy with findings of:   1.  Diminutive colon polyps  2.  Internal hemorrhoids  3.  Juno 1b+ duodenal ulceration  4.  LA grade D esophagitis  5.  Hiatal hernia    - advance diet as tolerated. GI recommending PPI BID oral indefinitely for duodenal ulcer   - Cont to monitor H/H, today hgb 6.9 --> 1U prbc ordered.   - Aspirin/NSAIDs should be avoided if possible. Gastric path negative for H pylori.          Acute on chronic kidney injury.:  Back to baseline  Baseline creatinine 1.5  Continue IV fluids  Avoid nephrotoxic agents  Monitor renal function daily     Parkinson's disease  Cont Sinemet  Fall and aspiration precaution  PT OT when able     Chronic infrarenal abdominal aortic aneurysm  - per chart search patient was started on low-dose aspirin. In setting of above ulcer, will hold further asa doses, needs OP follow up with PCP to discuss further        CODE status: DNAR select  Espinal: Espinal catheter in place  Dispo: [facility] Sal, tomorrow morning        MDM: High   I personally spent time on chart/note review, review of labs/imaging, discussion with patient, physical exam, discussion with staff, consultants,  coordinating care, writing progress note, and discussion of plan of care.     Mariza Ruiz MD    Supplementary Documentation:                         [1]    pantoprazole  40 mg Intravenous Q12H    aspirin  81 mg Oral Daily    carbidopa-levodopa  1 tablet Oral TID    [Held by provider] midodrine  2.5 mg Oral TID    tamsulosin  0.8 mg Oral Nightly    finasteride  5 mg Oral Daily

## 2025-05-21 NOTE — PROGRESS NOTES
[facility]     Gastroenterology Progress Note    Justyn Hall Patient Status:  Inpatient    1940 MRN [id number]   Location [facility] 5SW/SE Attending Alyx Melendez MD   Hosp Day # 7 PCP No primary care provider on file.       Assessment and Plan:   1.  Juno 1b+ duodenal ulceration post endoscopic therapy  Hemodynamically stable.  Status post 1 unit PRBC to hemoglobin 7.9 yesterday and sustained as of this morning.  Continue PPI BID, he should be on a PPI indefinitely.  Aspirin/NSAIDs should be avoided if possible.  Gastric path negative for H pylori.    2.  LA grade D esophagitis  Acid peptic in nature. Esophageal bx consistent with erosive esophagitis.  Continue proton pump inhibitor therapy indefinitely.  Although a surveillance endoscopy to evaluate for Zeng's esophagus would normally be considered, observation on PPI therapy without repeat endoscopy would not be unreasonable in light of age and comorbidities.    3.  Diminutive colon polyps  These were subcentimeter and benign in appearance.  I would not proceed with a repeat colonoscopy and polypectomy in light of age and comorbidities.    -Continue PPI p.o. twice daily indefinitely  - Okay to discharge from my stance  GI will sign off, please call questions    Hattie Alex MD    Subjective:   Feels much better today, no further lightheadedness.  No abdominal pain, nausea, emesis.  Tolerating diet.  No bowel movement.    Objective:     Patient Vitals for the past 24 hrs:   BP Temp Temp src Pulse Resp SpO2   25 0822 140/74 98.9 °F (37.2 °C) Oral 67 16 99 %   25 0530 142/64 98 °F (36.7 °C) Oral 69 18 98 %   25 1956 151/85 98.2 °F (36.8 °C) Oral 79 18 98 %   25 1250 157/75 97.3 °F (36.3 °C) Oral 69 20 100 %   25 1215 154/68 -- -- 71 22 100 %   25 1155 158/79 -- -- 71 17 100 %   25 1150 156/76 -- -- 74 16 99 %   25 1145 (!) 166/88 -- -- 77 15 99 %   25 1140 (!)  172/92 -- -- 77 16 100 %   05/17/25 1135 157/85 -- -- 76 23 97 %   05/17/25 1127 149/85 -- -- 82 22 97 %     Body mass index is 23.63 kg/m².    General: Patient appears comfortable and in no acute distress  HEENT:Negative for scleral icterus.  Mucous membranes are moist.  Cardiovascular: Regular rate and rhythm   Lung: Clear to auscultation bilaterally  Abdomen:Non-distended.  Bowel sounds are present.  There is no tenderness to palpation.  There are no masses appreciated.  Liver and spleen are not palpable.  Skin: No jaundice.  Warm and dry.  Ext: No cyanosis, clubbing or edema is evident.   Neuro- Parkinsonian features without focality  Affect:Normal, appropriate      Results:     Recent Labs   Lab 05/19/25  0541 05/20/25  0613 05/20/25  1727 05/21/25  0526   RBC 2.57* 2.50*  --  2.96*   HGB 7.1* 6.9* 7.9* 7.9*   HCT 22.3* 21.0*  --  24.6*   MCV 86.8 84.0  --  83.1   MCH 27.6 27.6  --  26.7   MCHC 31.8 32.9  --  32.1   RDW 14.4 14.7  --  15.6*   NEPRELIM 6.61 8.36*  --  7.88*   WBC 10.0 12.6*  --  12.6*   .0 398.0  --  435.0         Recent Labs   Lab 05/14/25  1805 05/15/25  0543 05/19/25  0541 05/19/25  1820 05/20/25  0542 05/21/25  0526   *   < > 89  --  100* 93   BUN 53*   < > 12  --  13 14   CREATSERUM 2.62*   < > 1.25  --  1.22 1.19   CA 7.8*   < > 6.7*  --  6.5* 6.9*   ALB 3.9  --   --   --   --   --    *   < > 141  --  140 140   K 4.0   < > 2.9* 3.6 3.1* 3.8  3.8   CL 96*   < > 107  --  104 103   CO2 25.0   < > 25.0  --  25.0 28.0   ALKPHO 113  --   --   --   --   --    AST 20  --   --   --   --   --    ALT <7*  --   --   --   --   --    BILT 1.6*  --   --   --   --   --    TP 6.9  --   --   --   --   --     < > = values in this interval not displayed.       Lab Results   Component Value Date    INR 1.11 04/10/2025

## 2025-05-21 NOTE — SPIRITUAL CARE NOTE
Spiritual Care Visit Note    Patient Name: Justyn Hall Date of Spiritual Care Visit: 25   : 1940 Primary Dx: Urinary tract infection with hematuria, site unspecified       Referred By: Referral From: KVNG Valero    Spiritual Care Taxonomy:    Intended Effects: Promote a sense of peace    Methods: Offer support       Visit Type/Summary:     - Spiritual Care: Patient declined a  visit at this time.  remains available as needed for follow up.    Spiritual Care support can be requested via an Epic consult.   For urgent/immediate needs, please contact the On Call  at: [place]: ext [phone]    -Chaplain Ksenia.

## 2025-05-21 NOTE — PLAN OF CARE
Problem: Patient Centered Care  Goal: Patient preferences are identified and integrated in the patient's plan of care  Description: Interventions:  - What would you like us to know as we care for you? \"I am from [facility].\"  - Provide timely, complete, and accurate information to patient/family  - Incorporate patient and family knowledge, values, beliefs, and cultural backgrounds into the planning and delivery of care  - Encourage patient/family to participate in care and decision-making at the level they choose  - Honor patient and family perspectives and choices  Outcome: Progressing     Problem: Patient/Family Goals  Goal: Patient/Family Long Term Goal  Description: Patient's Long Term Goal: \"to be discharged\"    Interventions:  -Follow up MD appt  -Take meds as prescribed  -Use of assistive device if needed  - See additional Care Plan goals for specific interventions  Outcome: Progressing  Goal: Patient/Family Short Term Goal  Description: Patient's Short Term Goal: \"to prevent pain and infection\"    Interventions:   -Monitor VS  -Check labs results  -Administer IVF and IV ABT as ordered  -Provide pain meds as prescribed  - See additional Care Plan goals for specific interventions  Outcome: Progressing     Problem: SAFETY ADULT - FALL  Goal: Free from fall injury  Description: INTERVENTIONS:  - Assess pt frequently for physical needs  - Identify cognitive and physical deficits and behaviors that affect risk of falls.  - [place] fall precautions as indicated by assessment.  - Educate pt/family on patient safety including physical limitations  - Instruct pt to call for assistance with activity based on assessment  - Modify environment to reduce risk of injury  - Provide assistive devices as appropriate  - Consider OT/PT consult to assist with strengthening/mobility  - Encourage toileting schedule  Outcome: Progressing     Problem: GASTROINTESTINAL - ADULT  Goal: Minimal or absence of nausea and  vomiting  Description: INTERVENTIONS:  - Maintain adequate hydration with IV or PO as ordered and tolerated  - Nasogastric tube to low intermittent suction as ordered  - Evaluate effectiveness of ordered antiemetic medications  - Provide nonpharmacologic comfort measures as appropriate  - Advance diet as tolerated, if ordered  - Obtain nutritional consult as needed  - Evaluate fluid balance  Outcome: Progressing  Goal: Maintains or returns to baseline bowel function  Description: INTERVENTIONS:  - Assess bowel function  - Maintain adequate hydration with IV or PO as ordered and tolerated  - Evaluate effectiveness of GI medications  - Encourage mobilization and activity  - Obtain nutritional consult as needed  - Establish a toileting routine/schedule  - Consider collaborating with pharmacy to review patient's medication profile  Outcome: Progressing     Problem: METABOLIC/FLUID AND ELECTROLYTES - ADULT  Goal: Electrolytes maintained within normal limits  Description: INTERVENTIONS:  - Monitor labs and rhythm and assess patient for signs and symptoms of electrolyte imbalances  - Administer electrolyte replacement as ordered  - Monitor response to electrolyte replacements, including rhythm and repeat lab results as appropriate  - Fluid restriction as ordered  - Instruct patient on fluid and nutrition restrictions as appropriate  Outcome: Progressing     Problem: SKIN/TISSUE INTEGRITY - ADULT  Goal: Skin integrity remains intact  Description: INTERVENTIONS  - Assess and document risk factors for pressure ulcer development  - Assess and document skin integrity  - Monitor for areas of redness and/or skin breakdown  - Initiate interventions, skin care algorithm/standards of care as needed  Outcome: Progressing

## 2025-05-21 NOTE — CM/SW NOTE
CANDACE received MD order for discharge planning.    CANDACE met with pt and sister Geetha bedside.    SW answered all questions regarding [facility].    Pt/family requesting private room.    Pt wanting to know if he can have void trial- per MD pt will have to follow-up with urology as an OP.    Geetha prefers to drive pt back to [facility].    PLAN: DC to [facility] pending med clear /bed availability    / to remain available for support and/or discharge planning.     Sarah BARRIENTOS, LSW  Discharge Planner

## 2025-05-21 NOTE — PROGRESS NOTES
Progress Note     Justyn Hall Patient Status:  Inpatient    1940 MRN [id number]   Location [facility] 5SW/SE Attending Mariza Ruiz MD   Hosp Day # 6 PCP No primary care provider on file.     Subjective:   S: Patient this morning, sitting up in chair, feeling a little lightheaded.  Hgb 6.9, no overt bleeding, 1U prbc ordered.      Review of Systems:   10 point ROS completed and was negative, except for pertinent positive and negatives stated in subjective.    Objective:   Vital signs:  Temp:  [97.7 °F (36.5 °C)-98.4 °F (36.9 °C)] 98.1 °F (36.7 °C)  Pulse:  [70-84] 82  Resp:  [16-18] 18  BP: (134-176)/(74-98) 152/81  SpO2:  [96 %-100 %] 100 %    Wt Readings from Last 6 Encounters:   25 150 lb 6.4 oz (68.2 kg)   04/10/25 175 lb 4.3 oz (79.5 kg)   25 168 lb 3.2 oz (76.3 kg)   10/10/19 182 lb (82.6 kg)   06/10/19 182 lb (82.6 kg)   19 181 lb (82.1 kg)         Physical Exam:    General: No acute distress. Alert ,         Respiratory: Clear to auscultation bilaterally. No wheezes. No rhonchi.  Cardiovascular: S1, S2. Regular rate and rhythm. No murmurs, rubs or gallops.   Abdomen: Soft, nontender, nondistended.  Positive bowel sounds. No rebound or guarding.  Neurologic: No focal neurological deficits.   Musculoskeletal: Moves all extremities.  Extremities: No edema.    Results:   Diagnostic Data:      Labs:    Labs Last 24 Hours:   BMP     CBC    Other     Na 140 Cl 104 BUN 13 Glu 100   Hb 7.9   PTT - Procal -   K 3.1 CO2 25.0 Cr 1.22   WBC 12.6 >< .0  INR - CRP -   Renal Lytes Endo    Hct 21.0   Trop - D dim -   eGFR - Ca 6.5 POC Gluc  -    LFT   pBNP - Lactic -   eGFR AA - PO4 - A1c -   AST - APk - Prot -  LDL -     Mg 1.1 TSH -   ALT - T faby - Alb -        COVID-19 Lab Results    COVID-19  Lab Results   Component Value Date    COVID19 Not Detected 2025    COVID19 Not Detected 04/10/2025       Pro-Calcitonin  No results for input(s): \"PCT\" in the last 168  hours.    Cardiac  No results for input(s): \"TROP\", \"PBNP\" in the last 168 hours.    Creatinine Kinase  No results for input(s): \"CK\" in the last 168 hours.    Inflammatory Markers  Recent Labs   Lab 05/16/25  0705   TARIK 518*       Imaging: Imaging data reviewed in Epic.    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  Resume Flomax  Blood and urine cultures.:  No growth to date  IV Rocephin stopped based on neg cultures  Monitor hemodynamic status and temperature curve.      Acute GI bleed: Likely upper in view of melanotic stools  - Hemodynamically stable  - Required 2 units PRBC transfusion during admission.  - GI consulted, patient underwent endoscopy with findings of:   1.  Diminutive colon polyps  2.  Internal hemorrhoids  3.  Juno 1b+ duodenal ulceration  4.  LA grade D esophagitis  5.  Hiatal hernia    - advance diet as tolerated. GI recommending PPI BID oral indefinitely for duodenal ulcer   - Cont to monitor H/H, today hgb 6.9 --> 1U prbc ordered  - Okay to continue aspirin and SCDs for DVT prophylaxis   - Await gastric biopsy results.  Treat H. pylori if positive.         Acute on chronic kidney injury.:  Back to baseline  Baseline creatinine 1.5  Continue IV fluids  Avoid nephrotoxic agents  Monitor renal function daily     Parkinson's disease  Cont Sinemet  Fall and aspiration precaution  PT OT when able     Hypokalemia, Hypomagnesemia  - replete per protocol            CODE status: DNAR select  Espinal: Espinal catheter in place  Dispo: home with sister once medically optimized        MDM: High   I personally spent time on chart/note review, review of labs/imaging, discussion with patient, physical exam, discussion with staff, consultants, coordinating care, writing progress note, and discussion of plan of care.     Mariza Ruiz  MD    Supplementary Documentation:                         [1]    pantoprazole  40 mg Intravenous Q12H    aspirin  81 mg Oral Daily    carbidopa-levodopa  1 tablet Oral TID    [Held by provider] midodrine  2.5 mg Oral TID    tamsulosin  0.8 mg Oral Nightly    finasteride  5 mg Oral Daily

## 2025-05-22 ENCOUNTER — APPOINTMENT (OUTPATIENT)
Dept: CT IMAGING | Facility: HOSPITAL | Age: 85
End: 2025-05-22
Attending: PHYSICIAN ASSISTANT
Payer: MEDICARE

## 2025-05-22 ENCOUNTER — APPOINTMENT (OUTPATIENT)
Dept: GENERAL RADIOLOGY | Facility: HOSPITAL | Age: 85
End: 2025-05-22
Attending: STUDENT IN AN ORGANIZED HEALTH CARE EDUCATION/TRAINING PROGRAM
Payer: MEDICARE

## 2025-05-22 ENCOUNTER — ANESTHESIA EVENT (OUTPATIENT)
Dept: ENDOSCOPY | Facility: HOSPITAL | Age: 85
End: 2025-05-22
Payer: MEDICARE

## 2025-05-22 ENCOUNTER — ANESTHESIA (OUTPATIENT)
Dept: ENDOSCOPY | Facility: HOSPITAL | Age: 85
End: 2025-05-22
Payer: MEDICARE

## 2025-05-22 LAB
ANION GAP SERPL CALC-SCNC: 8 MMOL/L (ref 0–18)
BASOPHILS # BLD: 0 X10(3) UL (ref 0–0.2)
BASOPHILS NFR BLD: 0 %
BUN BLD-MCNC: 21 MG/DL (ref 9–23)
BUN/CREAT SERPL: 17.4 (ref 10–20)
CALCIUM BLD-MCNC: 6.8 MG/DL (ref 8.7–10.4)
CHLORIDE SERPL-SCNC: 104 MMOL/L (ref 98–112)
CO2 SERPL-SCNC: 26 MMOL/L (ref 21–32)
CREAT BLD-MCNC: 1.21 MG/DL (ref 0.7–1.3)
DEPRECATED RDW RBC AUTO: 49.2 FL (ref 35.1–46.3)
EGFRCR SERPLBLD CKD-EPI 2021: 59 ML/MIN/1.73M2 (ref 60–?)
EOSINOPHIL # BLD: 0.38 X10(3) UL (ref 0–0.7)
EOSINOPHIL NFR BLD: 3 %
ERYTHROCYTE [DISTWIDTH] IN BLOOD BY AUTOMATED COUNT: 15.9 % (ref 11–15)
GLUCOSE BLD-MCNC: 99 MG/DL (ref 70–99)
GLUCOSE BLDC GLUCOMTR-MCNC: 151 MG/DL (ref 70–99)
HCT VFR BLD AUTO: 22.3 % (ref 39–53)
HCT VFR BLD AUTO: 22.6 % (ref 39–53)
HCT VFR BLD AUTO: 22.8 % (ref 39–53)
HGB BLD-MCNC: 7 G/DL (ref 13–17.5)
HGB BLD-MCNC: 7 G/DL (ref 13–17.5)
HGB BLD-MCNC: 7.5 G/DL (ref 13–17.5)
LYMPHOCYTES NFR BLD: 2.56 X10(3) UL (ref 1–4)
LYMPHOCYTES NFR BLD: 20 %
MAGNESIUM SERPL-MCNC: 1.8 MG/DL (ref 1.6–2.6)
MCH RBC QN AUTO: 26.4 PG (ref 26–34)
MCHC RBC AUTO-ENTMCNC: 30.7 G/DL (ref 31–37)
MCV RBC AUTO: 86 FL (ref 80–100)
METAMYELOCYTES # BLD: 0.38 X10(3) UL (ref ?–0.01)
METAMYELOCYTES NFR BLD: 3 %
MONOCYTES # BLD: 0.51 X10(3) UL (ref 0.1–1)
MONOCYTES NFR BLD: 4 %
MYELOCYTES # BLD: 0.26 X10(3) UL (ref ?–0.01)
MYELOCYTES NFR BLD: 2 %
NEUTROPHILS # BLD AUTO: 8.04 X10 (3) UL (ref 1.5–7.7)
NEUTROPHILS NFR BLD: 67 %
NEUTS BAND NFR BLD: 1 %
NEUTS HYPERSEG # BLD: 8.7 X10(3) UL (ref 1.5–7.7)
OSMOLALITY SERPL CALC.SUM OF ELEC: 289 MOSM/KG (ref 275–295)
PLATELET # BLD AUTO: 490 10(3)UL (ref 150–450)
PLATELET MORPHOLOGY: NORMAL
POTASSIUM SERPL-SCNC: 3.8 MMOL/L (ref 3.5–5.1)
RBC # BLD AUTO: 2.65 X10(6)UL (ref 3.8–5.8)
SODIUM SERPL-SCNC: 138 MMOL/L (ref 136–145)
TOTAL CELLS COUNTED BLD: 100
WBC # BLD AUTO: 12.8 X10(3) UL (ref 4–11)

## 2025-05-22 PROCEDURE — 74174 CTA ABD&PLVS W/CONTRAST: CPT | Performed by: PHYSICIAN ASSISTANT

## 2025-05-22 PROCEDURE — 0W3P8ZZ CONTROL BLEEDING IN GASTROINTESTINAL TRACT, VIA NATURAL OR ARTIFICIAL OPENING ENDOSCOPIC: ICD-10-PCS | Performed by: INTERNAL MEDICINE

## 2025-05-22 PROCEDURE — 71045 X-RAY EXAM CHEST 1 VIEW: CPT | Performed by: STUDENT IN AN ORGANIZED HEALTH CARE EDUCATION/TRAINING PROGRAM

## 2025-05-22 PROCEDURE — 99233 SBSQ HOSP IP/OBS HIGH 50: CPT | Performed by: FAMILY MEDICINE

## 2025-05-22 PROCEDURE — 43255 EGD CONTROL BLEEDING ANY: CPT | Performed by: INTERNAL MEDICINE

## 2025-05-22 RX ORDER — SODIUM CHLORIDE, SODIUM LACTATE, POTASSIUM CHLORIDE, CALCIUM CHLORIDE 600; 310; 30; 20 MG/100ML; MG/100ML; MG/100ML; MG/100ML
INJECTION, SOLUTION INTRAVENOUS CONTINUOUS
Status: DISCONTINUED | OUTPATIENT
Start: 2025-05-22 | End: 2025-05-22

## 2025-05-22 RX ORDER — SODIUM CHLORIDE, SODIUM LACTATE, POTASSIUM CHLORIDE, CALCIUM CHLORIDE 600; 310; 30; 20 MG/100ML; MG/100ML; MG/100ML; MG/100ML
INJECTION, SOLUTION INTRAVENOUS CONTINUOUS PRN
Status: DISCONTINUED | OUTPATIENT
Start: 2025-05-22 | End: 2025-05-22 | Stop reason: SURG

## 2025-05-22 RX ORDER — SODIUM CHLORIDE 9 MG/ML
INJECTION, SOLUTION INTRAVENOUS CONTINUOUS
Status: DISCONTINUED | OUTPATIENT
Start: 2025-05-22 | End: 2025-05-28

## 2025-05-22 RX ORDER — SODIUM CHLORIDE 9 MG/ML
INJECTION, SOLUTION INTRAVENOUS ONCE
Status: COMPLETED | OUTPATIENT
Start: 2025-05-22 | End: 2025-05-22

## 2025-05-22 RX ORDER — LIDOCAINE HYDROCHLORIDE 10 MG/ML
INJECTION, SOLUTION EPIDURAL; INFILTRATION; INTRACAUDAL; PERINEURAL AS NEEDED
Status: DISCONTINUED | OUTPATIENT
Start: 2025-05-22 | End: 2025-05-22 | Stop reason: SURG

## 2025-05-22 RX ORDER — NALOXONE HYDROCHLORIDE 0.4 MG/ML
0.08 INJECTION, SOLUTION INTRAMUSCULAR; INTRAVENOUS; SUBCUTANEOUS ONCE AS NEEDED
Status: DISCONTINUED | OUTPATIENT
Start: 2025-05-22 | End: 2025-05-22 | Stop reason: HOSPADM

## 2025-05-22 RX ORDER — MAGNESIUM OXIDE 400 MG/1
400 TABLET ORAL ONCE
Status: DISCONTINUED | OUTPATIENT
Start: 2025-05-22 | End: 2025-05-29

## 2025-05-22 RX ADMIN — SODIUM CHLORIDE, SODIUM LACTATE, POTASSIUM CHLORIDE, CALCIUM CHLORIDE: 600; 310; 30; 20 INJECTION, SOLUTION INTRAVENOUS at 15:55:00

## 2025-05-22 RX ADMIN — LIDOCAINE HYDROCHLORIDE 80 MG: 10 INJECTION, SOLUTION EPIDURAL; INFILTRATION; INTRACAUDAL; PERINEURAL at 15:39:00

## 2025-05-22 RX ADMIN — SODIUM CHLORIDE, SODIUM LACTATE, POTASSIUM CHLORIDE, CALCIUM CHLORIDE: 600; 310; 30; 20 INJECTION, SOLUTION INTRAVENOUS at 15:33:00

## 2025-05-22 NOTE — PROGRESS NOTES
Progress Note     Justyn Hall Patient Status:  Inpatient    1940 MRN [id number]   Location [facility] 5SW/SE Attending Mariza Ruiz MD   Hosp Day # 8 PCP No primary care provider on file.     Subjective:   S: Patient had Rapid response this am, He was going to bathroom with RN and felt dizzy and passed out, had Hematemesis, about a cup , blood clots per RN, SBP in 90s, he had an episode of vomiting. Concern fro possible aspiration, Oxygen is on 15 Litres, pt feels very tired and not good. AAOX 3, not in distress , pt had some blood in vomitus. Now , improved     Review of Systems:   10 point ROS completed and was negative, except for pertinent positive and negatives stated in subjective.    Objective:   Vital signs:  Temp:  [97.7 °F (36.5 °C)-98.2 °F (36.8 °C)] 98 °F (36.7 °C)  Pulse:  [] 107  Resp:  [18] 18  BP: (103-156)/(74-81) 123/75  SpO2:  [96 %-100 %] 100 %    Wt Readings from Last 6 Encounters:   25 174 lb (78.9 kg)   04/10/25 175 lb 4.3 oz (79.5 kg)   25 168 lb 3.2 oz (76.3 kg)   10/10/19 182 lb (82.6 kg)   06/10/19 182 lb (82.6 kg)   19 181 lb (82.1 kg)         Physical Exam:    General: No acute distress. Alert ,         Respiratory: Clear to auscultation bilaterally. No wheezes. No rhonchi.  Cardiovascular: S1, S2. Regular rate and rhythm. No murmurs, rubs or gallops.   Abdomen: Soft, nontender, nondistended.  Positive bowel sounds. No rebound or guarding.  Neurologic: No focal neurological deficits.   Musculoskeletal: Moves all extremities.  Extremities: No edema.    Results:   Diagnostic Data:      Labs:    Labs Last 24 Hours:   BMP     CBC    Other     Na - Cl - BUN - Glu -   Hb 7.0   PTT - Procal -   K - CO2 - Cr -   WBC 12.8 >< .0  INR - CRP -   Renal Lytes Endo    Hct 22.8   Trop - D dim -   eGFR - Ca - POC Gluc  151    LFT   pBNP - Lactic -   eGFR AA - PO4 - A1c -   AST - APk - Prot -  LDL -     Mg 1.8 TSH -   ALT - T faby - Alb -         COVID-19 Lab Results    COVID-19  Lab Results   Component Value Date    COVID19 Not Detected 05/14/2025    COVID19 Not Detected 04/10/2025       Pro-Calcitonin  No results for input(s): \"PCT\" in the last 168 hours.    Cardiac  No results for input(s): \"TROP\", \"PBNP\" in the last 168 hours.    Creatinine Kinase  No results for input(s): \"CK\" in the last 168 hours.    Inflammatory Markers  Recent Labs   Lab 05/16/25  0705   TARIK 518*       Imaging: Imaging data reviewed in Epic.    Medications: Scheduled Medications[1]    Assessment & Plan:   ASSESSMENT / PLAN:     Syncope   Hematemesis  Hypotension   Vomiting  -RRT  -Now BP improved  -NS bolus  -Check Hb And BMP   -GI Notified,   -1 unit PRBC  -CTA stat    Acute Hypoxic respiratory failure   CXR stat   Concern for Aspiration  Oxygen protocol  CXR wuth possible aspiration, Empiric IV zosyn     Presumed CAUTI-  r/o UCX neg  Underlying 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  Resume Flomax  Blood and urine cultures.:  No growth to date  IV Rocephin stopped based on neg cultures  Monitor hemodynamic status and temperature curve.      Acute GI bleed: Likely upper in view of melanotic stools  - Hemodynamically stable  - Required 2 units PRBC transfusion during admission.  - GI consulted, patient underwent endoscopy with findings of:   1.  Diminutive colon polyps  2.  Internal hemorrhoids  3.  Juno 1b+ duodenal ulceration  4.  LA grade D esophagitis  5.  Hiatal hernia    - advance diet as tolerated. GI recommending PPI BID oral indefinitely for duodenal ulcer   - Cont to monitor H/H, today hgb 7 , recheck now   - Aspirin/NSAIDs should be avoided if possible. Gastric path negative for H pylori.          Acute on chronic kidney injury.:  Back to baseline  Baseline creatinine 1.5  Continue IV fluids  Avoid nephrotoxic agents  Monitor renal function daily     Parkinson's disease  Cont Sinemet  Fall and aspiration  precaution  PT OT when able     Chronic infrarenal abdominal aortic aneurysm  - per chart search patient was started on low-dose aspirin. In setting of above ulcer, will hold further asa doses, needs OP follow up with PCP to discuss further        CODE status: DNAR select  Espinal: Espinal catheter in place  Dispo: BRIANNA Sal, once stable         MDM: High   I personally spent time on chart/note review, review of labs/imaging, discussion with patient, physical exam, discussion with staff, consultants, coordinating care, writing progress note, and discussion of plan of care.       Paola Lo MD, FAAFP      Supplementary Documentation:                         [1]    magnesium oxide  400 mg Oral Once    sodium chloride  250 mL Intravenous Once    sodium chloride  250 mL Intravenous Once    pantoprazole  40 mg Intravenous Q12H    aspirin  81 mg Oral Daily    carbidopa-levodopa  1 tablet Oral TID    [Held by provider] midodrine  2.5 mg Oral TID    tamsulosin  0.8 mg Oral Nightly    finasteride  5 mg Oral Daily

## 2025-05-22 NOTE — SPIRITUAL CARE NOTE
Spiritual Care Visit Note    Patient Name: Justyn Hall Date of Spiritual Care Visit: 25   : 1940 Primary Dx: Urinary tract infection with hematuria, site unspecified       Referred By: Referral From: Care Coordination    Spiritual Care Taxonomy:    Intended Effects: Convey a calming presence    Methods: Offer support, Collaborate with care team member    Interventions: Silent prayer    Visit Type/Summary:     - Spiritual Care: Responded to RRT request via the on call phone Attempted visit. Patient is unavailable.  remains available as needed for follow up.     Benji Rush, [facility]   [id number]     Spiritual Care support can be requested via an [employer] consult. For urgent/immediate needs, please contact the On Call  at: [place]: ext [phone]

## 2025-05-22 NOTE — PROGRESS NOTES
[place] [facility]PITAL[facility]  RAPID RESPONSE NOTE     Justyn Hall Patient Status:  Inpatient    1940 MRN [id number]   Location [facility] 5SW/SE Attending Paola Lo MD   Hosp Day # 8 PCP No primary care provider on file.     Reason for RRT:  Hematemesis-Per RN 4 large blood clots and total of 1 cup of blood   Syncope   Hypotensive       Physical Exam:    BP (!) 79/51 (BP Location: Left arm)   Pulse 107   Temp 98.5 °F (36.9 °C) (Oral)   Resp 20   Ht 6' (1.829 m)   Wt 174 lb (78.9 kg)   SpO2 99%   BMI 23.60 kg/m²   General: apears lethargic, AAOx3, eyes closed , on 15L of Non rebreathers   Respiratory: CTAB  Cardiovascular: RRR, no MCG  Abdomen: S/NTND/NABS   Neurologic: Intact  Extremities: Warm. 2+DP    Diagnostic Data:      Labs: Hb 7    Imaging: None    ASSESSMENT / PLAN:     Syncope   Hematemesis   Hypotension   Vomiting  RRT  -Now BP improved  -NS bolus  -Check Hb And BMP   -GI notified   -1 unit PRBC       Acute Hypoxic respiratory failure   CXR stat   Concern for Aspiration  Oxygen protocol    Critical care time: 35 min     Paola Lo MD  2025

## 2025-05-22 NOTE — INTERVAL H&P NOTE
Pre-op Diagnosis: hematemesis    The above referenced H&P was reviewed by Hattie Maldonado Ma, MD on 5/22/2025, the patient was examined and no significant changes have occurred in the patient's condition since the H&P was performed.  I discussed with the patient and/or legal representative the potential benefits, risks and side effects of this procedure; the likelihood of the patient achieving goals; and potential problems that might occur during recuperation.  I discussed reasonable alternatives to the procedure, including risks, benefits and side effects related to the alternatives and risks related to not receiving this procedure.  We will proceed with procedure as planned.

## 2025-05-22 NOTE — PROGRESS NOTES
[facility]     Gastroenterology Progress Note    Justyn Hall Patient Status:  Inpatient    1940 MRN [id number]   Location [facility] 5SW/SE Attending Paola Lo MD   Hosp Day # 8 PCP No primary care provider on file.       Subjective:   RRT this am with episode of hematemesis     Feeling extreme fatigue, shortness of breath improving  Having nausea   Denies abdominal pain     Objective:   Blood pressure 141/83, pulse 83, temperature 98.2 °F (36.8 °C), temperature source Axillary, resp. rate 18, height 6' (1.829 m), weight 174 lb (78.9 kg), SpO2 99%. Body mass index is 23.6 kg/m².    Gen: awake, alert patient, NAD  HEENT: EOMI, the sclera appears anicteric, oropharynx clear, mucus membranes appear moist  CV: RRR  Lung: no conversational dyspnea   Abdomen: soft NTND abdomen with NABS appreciated   Skin: dry, warm, no jaundice  Ext: no LE edema is evident  Neuro: Alert and interactive  Psych: calm, cooperative    Assessment and Plan:   1.  Juno 1b+ duodenal ulceration post endoscopic therapy  Today had episode of large hematemesis, had RRT. Plan for CTA and repeat EGD. Given another unit of blood.    Continue PPI BID, he should be on a PPI indefinitely.  Aspirin/NSAIDs should be avoided if possible.  Gastric path negative for H pylori.     2.  LA grade D esophagitis  Acid peptic in nature. Esophageal bx consistent with erosive esophagitis.  Continue proton pump inhibitor therapy indefinitely.  Although a surveillance endoscopy to evaluate for Zeng's esophagus would normally be considered, observation on PPI therapy without repeat endoscopy would not be unreasonable in light of age and comorbidities.     3.  Diminutive colon polyps  These were subcentimeter and benign in appearance.  I would not proceed with a repeat colonoscopy and polypectomy in light of age and comorbidities.    EGD consent: I have discussed the risks, benefits, and alternatives to upper  endoscopy/enteroscopy with the patient/primary decision maker [who demonstrated understanding], including but not limited to the risks of bleeding, infection, pain, death, as well as the risks of anesthesia and perforation all leading to prolonged hospitalization, surgical intervention, or even death. I also specifically mentioned the miss rate of upper endoscopy of 5-10% in the best of all circumstances.  The patient has agreed to sign an informed consent and elected to proceed with procedure with possible intervention [i.e. polypectomy, stent placement, etc.] as indicated.         Case discussed with Hattie MCWILLIAMS MD and Vickie MELCHOR.    Katt Davis PA-C  [facility] Gastroenterology  5/22/2025      Results:     Lab Results   Component Value Date    WBC 12.8 (H) 05/22/2025    HGB 7.0 (L) 05/22/2025    HCT 22.3 (L) 05/22/2025    .0 (H) 05/22/2025    CREATSERUM 1.21 05/22/2025    BUN 21 05/22/2025     05/22/2025    K 3.8 05/22/2025     05/22/2025    CO2 26.0 05/22/2025    GLU 99 05/22/2025    CA 6.8 (L) 05/22/2025    ALB 3.9 05/14/2025    ALKPHO 113 05/14/2025    BILT 1.6 (H) 05/14/2025    TP 6.9 05/14/2025    AST 20 05/14/2025    ALT <7 (L) 05/14/2025    PTT 27.8 04/10/2025    INR 1.11 04/10/2025    T4F 1.10 07/09/2015    TSH 2.507 04/05/2025    LIP 63 (H) 04/10/2025    PSA 2.46 10/10/2019    MG 1.8 05/22/2025    PHOS 2.7 05/19/2025     04/05/2025    B12 640 04/05/2025       XR CHEST AP PORTABLE  (CPT=71045)  Result Date: 5/22/2025  CONCLUSION:   Mildly increased left lung base atelectasis, which is nonspecific, though small volume aspiration may be considered in the appropriate clinical setting.    Dictated by (CST): Allen Singh MD on 5/22/2025 at 9:38 AM     Finalized by (CST): Allen Singh MD on 5/22/2025 at 9:40 AM

## 2025-05-22 NOTE — OPERATIVE REPORT
ESOPHAGOGASTRODUODENOSCOPY (EGD) REPORT    Justyn Hall     1940 Age 84 year old   PCP No primary care provider on file. Endoscopist Hattie Alex MD     Date of procedure: 25    Procedure: EGD w/control of bleeding    Pre-operative diagnosis: hematemesis    Post-operative diagnosis: see impression    Medications: MAC    Complications: none    Procedure:  Informed consent was obtained from the patient after the risks of the procedure were discussed, including but not limited to bleeding, perforation, aspiration, infection, or possibility of a missed lesion. After discussions of the risks/benefits and alternatives to this procedure, as well as the planned sedation, the patient was placed in the left lateral decubitus position and begun on continuous blood pressure pulse oximetry and EKG monitoring and this was maintained throughout the procedure. Once an adequate level of sedation was obtained a bite block was placed. Then the lubricated tip of the [id number] diagnostic video upper endoscope was inserted and advanced using direct visualization into the posterior pharynx and ultimately into the esophagus, stomach, and duodenum.    Complications: None    Findings:      1. Esophagus: LA grade D erosive esophagitis in the distal esophagus.  No stigmata of recent bleeding.  5 cm hiatal hernia present    2. Stomach: We then entered the stomach. Gastric mucosa appeared normal in the forward view with no evidence of erythema, erosions, or ulcerations. There was no evidence of any luminal or submucosal masses. A retroflexed view allowed examination of the angularis, cardia and fundus and these areas also appeared normal with a patulous cardia.    3. Duodenum: Small amount of blood streak in the duodenal bulb.  Previously identified duodenal ulcer seen at the apex of the bulb.  The ulcer was at least 1.5 cm in size, mostly clean-based however there was 1 small vessel adjacent to previously placed hemoclip.   The vessel was treated with 3 ml of epinephrine 1:10,000 submucosal injection followed by bipolar cautery.  After the first pulse of bipolar cautery there was mild oozing which ceased after subsequent pulses with resultant flattening.    Endoscopic scopic hemostatic powder was then applied generously to the ulcer.    We then withdrew the instrument from the patient who tolerated the procedure well.     Impression:   1. Juno class II a duodenal bulb ulcer s/p endoscopic therapy    Recommend:  1. Continue ppi bid   2. NPO for today  3. Monitor hgb     >>>If tissue was sampled/removed and you have not received your pathology results either by phone or letter within 2 weeks, please call our office at [phone].    Specimens:  none    Blood loss: <1 ml      Hattie Alex MD  [facility] Gastroenterology

## 2025-05-22 NOTE — ANESTHESIA POSTPROCEDURE EVALUATION
Patient: Justyn Hall    Procedure Summary       Date: 05/22/25 Room / Location: [facility] ENDOSCOPY 05 / [facility] ENDOSCOPY    Anesthesia Start: 1533 Anesthesia Stop: 1613    Procedure: ESOPHAGOGASTRODUODENOSCOPY (EGD) Diagnosis: (duodenal ulcer, esophagitis)    Surgeons: Hattie Alex MD Anesthesiologist: Sha Mackay CRNA    Anesthesia Type: MAC ASA Status: 3            Anesthesia Type: MAC    Vitals Value Taken Time   BP 92/68 05/22/25 16:10   Temp - 05/22/25 16:13   Pulse 85 05/22/25 16:12   Resp 18 05/22/25 16:12   SpO2 98 % 05/22/25 16:12   Vitals shown include unfiled device data.    [facility] AN Post Evaluation:   Patient Evaluated in PACU  Patient Participation: complete - patient participated  Level of Consciousness: awake and awake and alert  Pain Score: 0  Pain Management: satisfactory to patient  Airway Patency:patent  Yes    Cardiovascular Status: acceptable  Respiratory Status: acceptable  Postoperative Hydration acceptable      Sha Mackay CRNA  5/22/2025 4:13 PM

## 2025-05-22 NOTE — PLAN OF CARE
Problem: Patient Centered Care  Goal: Patient preferences are identified and integrated in the patient's plan of care  Description: Interventions:  - What would you like us to know as we care for you? \"I am from [facility].\"  - Provide timely, complete, and accurate information to patient/family  - Incorporate patient and family knowledge, values, beliefs, and cultural backgrounds into the planning and delivery of care  - Encourage patient/family to participate in care and decision-making at the level they choose  - Honor patient and family perspectives and choices  Outcome: Progressing     Problem: Patient/Family Goals  Goal: Patient/Family Long Term Goal  Description: Patient's Long Term Goal: \"to be discharged\"    Interventions:  -Follow up MD appt  -Take meds as prescribed  -Use of assistive device if needed  - See additional Care Plan goals for specific interventions  Outcome: Progressing  Goal: Patient/Family Short Term Goal  Description: Patient's Short Term Goal: \"to prevent pain and infection\"    Interventions:   -Monitor VS  -Check labs results  -Administer IVF and IV ABT as ordered  -Provide pain meds as prescribed  - See additional Care Plan goals for specific interventions  Outcome: Progressing     Problem: SAFETY ADULT - FALL  Goal: Free from fall injury  Description: INTERVENTIONS:  - Assess pt frequently for physical needs  - Identify cognitive and physical deficits and behaviors that affect risk of falls.  - [place] fall precautions as indicated by assessment.  - Educate pt/family on patient safety including physical limitations  - Instruct pt to call for assistance with activity based on assessment  - Modify environment to reduce risk of injury  - Provide assistive devices as appropriate  - Consider OT/PT consult to assist with strengthening/mobility  - Encourage toileting schedule  Outcome: Progressing     Problem: GASTROINTESTINAL - ADULT  Goal: Minimal or absence of nausea and  vomiting  Description: INTERVENTIONS:  - Maintain adequate hydration with IV or PO as ordered and tolerated  - Nasogastric tube to low intermittent suction as ordered  - Evaluate effectiveness of ordered antiemetic medications  - Provide nonpharmacologic comfort measures as appropriate  - Advance diet as tolerated, if ordered  - Obtain nutritional consult as needed  - Evaluate fluid balance  Outcome: Progressing  Goal: Maintains or returns to baseline bowel function  Description: INTERVENTIONS:  - Assess bowel function  - Maintain adequate hydration with IV or PO as ordered and tolerated  - Evaluate effectiveness of GI medications  - Encourage mobilization and activity  - Obtain nutritional consult as needed  - Establish a toileting routine/schedule  - Consider collaborating with pharmacy to review patient's medication profile  Outcome: Progressing     Problem: METABOLIC/FLUID AND ELECTROLYTES - ADULT  Goal: Electrolytes maintained within normal limits  Description: INTERVENTIONS:  - Monitor labs and rhythm and assess patient for signs and symptoms of electrolyte imbalances  - Administer electrolyte replacement as ordered  - Monitor response to electrolyte replacements, including rhythm and repeat lab results as appropriate  - Fluid restriction as ordered  - Instruct patient on fluid and nutrition restrictions as appropriate  Outcome: Progressing     Problem: SKIN/TISSUE INTEGRITY - ADULT  Goal: Skin integrity remains intact  Description: INTERVENTIONS  - Assess and document risk factors for pressure ulcer development  - Assess and document skin integrity  - Monitor for areas of redness and/or skin breakdown  - Initiate interventions, skin care algorithm/standards of care as needed  Outcome: Progressing

## 2025-05-22 NOTE — PROGRESS NOTES
RRT    *See RRT Documentation Record*    Reason the RRT was called: Change in level of orientation and hematemesis  Assessment of patient leading up to RRT: A0X4. BP 95/77 sitting, standing 111/96 standing; Hematemesis with large clots  Interventions/Testing: Chest Xray, H&H, 250ml Fluid Bolus  Patient Outcome/Disposition: CTA of abdomen and pelvis, Zosyn after chest Xray, 1 Unit of Blood, Repeat EGD, remain on unit for now   Family Notified: yes  Name of family notified: Kika (sister)

## 2025-05-22 NOTE — ANESTHESIA PREPROCEDURE EVALUATION
Anesthesia PreOp Note    HPI:     Justyn Hall is a 84 year old male who presents for preoperative consultation requested by: Hattie Alex MD    Date of Surgery: 5/14/2025 - 5/22/2025    Procedure(s):  ESOPHAGOGASTRODUODENOSCOPY (EGD)  Indication: hematemesis    Relevant Problems   No relevant active problems       NPO:  Last Liquid Consumption Date: 05/21/25  Last Liquid Consumption Time: 0000  Last Solid Consumption Date: 05/21/25  Last Solid Consumption Time: 0000  Last Liquid Consumption Date: 05/21/25          History Review:  Patient Active Problem List    Diagnosis Date Noted    Hyponatremia 05/14/2025    Anemia 05/14/2025    Azotemia 05/14/2025    Acute kidney injury (HCC) 05/14/2025    UTI (urinary tract infection) 05/14/2025    Urinary tract infection with hematuria, site unspecified 05/14/2025    JORDON (acute kidney injury) 05/14/2025    Counseling regarding advance care planning and goals of care 04/15/2025    Dysphagia 04/11/2025    Palliative care by specialist 04/11/2025    Constipation 04/11/2025    Leukocytosis 04/11/2025    History of recent fall 04/11/2025    Urinary retention 04/10/2025    Abdominal pain, acute 04/10/2025    Acute renal failure, unspecified acute renal failure type 04/10/2025    Community acquired pneumonia, unspecified laterality 04/10/2025    Hyperkalemia 04/10/2025    Anemia, unspecified type 04/10/2025    Obstructed, uropathy 04/10/2025    Parkinsonism (HCC) 04/05/2025    Contusion of left hip, initial encounter 04/04/2025    Contusion of left shoulder, initial encounter 04/04/2025    Left hip pain 04/04/2025    Hypercholesterolemia 06/20/2017    Elevated PSA 10/01/2014       Past Medical History[1]    Past Surgical History[2]    Prescriptions Prior to Admission[3]  Current Medications and Prescriptions Ordered in Epic[4]    Allergies[5]    Family History[6]  Social Hx on file[7]    Available pre-op labs reviewed.  Lab Results   Component Value Date    WBC 12.8 (H)  05/22/2025    RBC 2.65 (L) 05/22/2025    HGB 7.5 (L) 05/22/2025    HCT 22.6 (L) 05/22/2025    MCV 86.0 05/22/2025    MCH 26.4 05/22/2025    MCHC 30.7 (L) 05/22/2025    RDW 15.9 (H) 05/22/2025    .0 (H) 05/22/2025     Lab Results   Component Value Date     05/22/2025    K 3.8 05/22/2025     05/22/2025    CO2 26.0 05/22/2025    BUN 21 05/22/2025    CREATSERUM 1.21 05/22/2025    GLU 99 05/22/2025    PGLU 151 (H) 05/22/2025    CA 6.8 (L) 05/22/2025          Vital Signs:  Body mass index is 23.6 kg/m².   height is 1.829 m (6') and weight is 78.9 kg (174 lb). His axillary temperature is 98.2 °F (36.8 °C). His blood pressure is 149/85 and his pulse is 82. His respiration is 19 and oxygen saturation is 97%.   Vitals:    05/22/25 1200 05/22/25 1315 05/22/25 1342 05/22/25 1520   BP: 141/83 (!) 135/99 141/86 149/85   Pulse: 83 94  82   Resp: 18 18 18 19   Temp: 98.2 °F (36.8 °C) 98 °F (36.7 °C) 98.2 °F (36.8 °C)    TempSrc: Axillary Axillary Axillary    SpO2: 99% 95% 98% 97%   Weight:       Height:            Anesthesia Evaluation     Patient summary reviewed    No history of anesthetic complications   Airway   Mallampati: II  TM distance: <3 FB  Neck ROM: limited  Dental - Dentition appears grossly intact     Pulmonary - negative ROS and normal exam   Cardiovascular - negative ROS and normal exam    Neuro/Psych - negative ROS     GI/Hepatic/Renal - negative ROS     Endo/Other - negative ROS   Abdominal                  Anesthesia Plan:   ASA:  3  Plan:   MAC  Informed Consent Plan and Risks Discussed With:  Patient      I have informed Justyn Hall and/or legal guardian or family member of the nature of the anesthetic plan, benefits, risks including possible dental damage if relevant, major complications, and any alternative forms of anesthetic management.   All of the patient's questions were answered to the best of my ability. The patient desires the anesthetic management as planned.  Sha Mackay,  CRNA  2025 3:32 PM  Present on Admission:   Hyponatremia   Anemia   Azotemia   Acute kidney injury (HCC)           [1]   Past Medical History:   Fracture of arm    fracture of left arm - cast   [2]   Past Surgical History:  Procedure Laterality Date    Colonoscopy N/A 2025    Procedure: COLONOSCOPY;  Surgeon: Jayden Vargas MD;  Location: [facility] ENDOSCOPY    Hip surgery      Other surgical history  2014     prostate biopsy    Spinal fusion     [3]   Medications Prior to Admission   Medication Sig Dispense Refill Last Dose/Taking    acetaminophen 325 MG Oral Tab Take 1 tablet (325 mg total) by mouth every 6 (six) hours as needed for Pain.   Taking As Needed    polyethylene glycol, PEG 3350, 17 g Oral Powd Pack Take 17 g by mouth daily.   Taking    midodrine 2.5 MG Oral Tab Take 1 tablet (2.5 mg total) by mouth in the morning and 1 tablet (2.5 mg total) at noon and 1 tablet (2.5 mg total) in the evening.   Taking    [] tamsulosin 0.4 MG Oral Cap Take 1 capsule (0.4 mg total) by mouth at bedtime. 30 capsule 0 Taking    carbidopa-levodopa  MG Oral Tab Take 0.5 tablets by mouth 3 (three) times daily for 14 days, THEN 1 tablet 3 (three) times daily. Give half a tab 3 times a day, 5 hours between each dose for 3 days.  Then full tab 3 times a day 5 hours between each dose.  Give ideally on empty stomach.  Give 30 to 45 minutes before a meal or 45 to 60 minutes after a meal.. 249 tablet 0 Taking    aspirin 81 MG Oral Tab EC Take 1 tablet (81 mg total) by mouth daily. 30 tablet 0 Taking   [4]   Current Facility-Administered Medications Ordered in Epic   Medication Dose Route Frequency Provider Last Rate Last Admin    magnesium oxide (Mag-Ox) tab 400 mg  400 mg Oral Once Mariza Ruiz MD        sodium chloride 0.9 % IV bolus 250 mL  250 mL Intravenous Once Mariza Ruiz MD        piperacillin-tazobactam (Zosyn) 3.375 g in dextrose 5% 100 mL IVPB-ADDV  3.375 g Intravenous Q8H Paola Lo,  MD 25 mL/hr at 05/22/25 1330 3.375 g at 05/22/25 1330    pantoprazole (Protonix) 40 mg in sodium chloride 0.9% PF 10 mL IV push  40 mg Intravenous Q12H Hattie Alex MD   40 mg at 05/22/25 0655    aspirin DR tab 81 mg  81 mg Oral Daily Alyx Melendez MD   81 mg at 05/21/25 0852    carbidopa-levodopa (SINEMET)  MG per tab 1 tablet  1 tablet Oral TID Alyx Melendez MD   1 tablet at 05/21/25 2112    [Held by provider] midodrine (ProAmatine) tab 2.5 mg  2.5 mg Oral TID Alyx Melendez MD        tamsulosin (Flomax) cap 0.8 mg  0.8 mg Oral Nightly Mary Crocker APRN   0.8 mg at 05/21/25 2112    finasteride (Proscar) tab 5 mg  5 mg Oral Daily Mary Crocker APRN   5 mg at 05/21/25 0852    acetaminophen (Tylenol Extra Strength) tab 500 mg  500 mg Oral Q4H PRN Tg Clarke MD   500 mg at 05/14/25 2331    ondansetron (Zofran) 4 MG/2ML injection 4 mg  4 mg Intravenous Q6H PRN Tg Clarke MD   4 mg at 05/15/25 2248    metoclopramide (Reglan) 5 mg/mL injection 5 mg  5 mg Intravenous Q8H PRN Tg Clarke MD        labetalol (Trandate) 5 mg/mL injection 10 mg  10 mg Intravenous Q4H PRN Andira Galvan MD   10 mg at 05/21/25 0519    acetaminophen (Ofirmev) 10 mg/mL infusion premix 1,000 mg  1,000 mg Intravenous Q6H PRN Andria Galvan  mL/hr at 05/15/25 0004 1,000 mg at 05/15/25 0004     No current Epic-ordered outpatient medications on file.   [5] No Known Allergies  [6] No family history on file.  [7]   Social History  Socioeconomic History    Marital status: Single   Tobacco Use    Smoking status: Never    Smokeless tobacco: Never   Vaping Use    Vaping status: Never Used   Substance and Sexual Activity    Alcohol use: No     Alcohol/week: 0.0 standard drinks of alcohol    Drug use: No   Other Topics Concern    Caffeine Concern Yes     Comment: coffee, occasionally    Reaction to local anesthetic No    Right Handed Yes

## 2025-05-23 LAB
ANION GAP SERPL CALC-SCNC: 9 MMOL/L (ref 0–18)
BASOPHILS # BLD: 0 X10(3) UL (ref 0–0.2)
BASOPHILS NFR BLD: 0 %
BLOOD TYPE BARCODE: 6200
BUN BLD-MCNC: 25 MG/DL (ref 9–23)
BUN/CREAT SERPL: 19.2 (ref 10–20)
CALCIUM BLD-MCNC: 7 MG/DL (ref 8.7–10.4)
CHLORIDE SERPL-SCNC: 107 MMOL/L (ref 98–112)
CO2 SERPL-SCNC: 25 MMOL/L (ref 21–32)
CREAT BLD-MCNC: 1.3 MG/DL (ref 0.7–1.3)
DEPRECATED RDW RBC AUTO: 49.6 FL (ref 35.1–46.3)
EGFRCR SERPLBLD CKD-EPI 2021: 54 ML/MIN/1.73M2 (ref 60–?)
EOSINOPHIL # BLD: 0.14 X10(3) UL (ref 0–0.7)
EOSINOPHIL NFR BLD: 1 %
ERYTHROCYTE [DISTWIDTH] IN BLOOD BY AUTOMATED COUNT: 15.6 % (ref 11–15)
GLUCOSE BLD-MCNC: 97 MG/DL (ref 70–99)
HCT VFR BLD AUTO: 20.5 % (ref 39–53)
HGB BLD-MCNC: 6.6 G/DL (ref 13–17.5)
HGB BLD-MCNC: 7.6 G/DL (ref 13–17.5)
HGB BLD-MCNC: 8.1 G/DL (ref 13–17.5)
LYMPHOCYTES NFR BLD: 1.49 X10(3) UL (ref 1–4)
LYMPHOCYTES NFR BLD: 11 %
MAGNESIUM SERPL-MCNC: 1.6 MG/DL (ref 1.6–2.6)
MCH RBC QN AUTO: 27.7 PG (ref 26–34)
MCHC RBC AUTO-ENTMCNC: 32.2 G/DL (ref 31–37)
MCV RBC AUTO: 86.1 FL (ref 80–100)
MONOCYTES # BLD: 0.41 X10(3) UL (ref 0.1–1)
MONOCYTES NFR BLD: 3 %
MYELOCYTES # BLD: 0.14 X10(3) UL (ref ?–0.01)
MYELOCYTES NFR BLD: 1 %
NEUTROPHILS # BLD AUTO: 9.7 X10 (3) UL (ref 1.5–7.7)
NEUTROPHILS NFR BLD: 84 %
NEUTS HYPERSEG # BLD: 11.34 X10(3) UL (ref 1.5–7.7)
OSMOLALITY SERPL CALC.SUM OF ELEC: 296 MOSM/KG (ref 275–295)
PLATELET # BLD AUTO: 477 10(3)UL (ref 150–450)
PLATELET MORPHOLOGY: NORMAL
POTASSIUM SERPL-SCNC: 3.8 MMOL/L (ref 3.5–5.1)
RBC # BLD AUTO: 2.38 X10(6)UL (ref 3.8–5.8)
SODIUM SERPL-SCNC: 141 MMOL/L (ref 136–145)
TOTAL CELLS COUNTED BLD: 100
UNIT VOLUME: 350 ML
WBC # BLD AUTO: 13.5 X10(3) UL (ref 4–11)

## 2025-05-23 PROCEDURE — 99232 SBSQ HOSP IP/OBS MODERATE 35: CPT | Performed by: PHYSICIAN ASSISTANT

## 2025-05-23 PROCEDURE — 99233 SBSQ HOSP IP/OBS HIGH 50: CPT | Performed by: FAMILY MEDICINE

## 2025-05-23 RX ORDER — SODIUM CHLORIDE 9 MG/ML
INJECTION, SOLUTION INTRAVENOUS ONCE
Status: COMPLETED | OUTPATIENT
Start: 2025-05-23 | End: 2025-05-23

## 2025-05-23 NOTE — PHYSICAL THERAPY NOTE
Physical Therapy Contact Note    Orders received and chart reviewed. Attempted to see patient for Physical Therapy services at 0840. Patient not available secondary to per chart review, patient with subtherapeutic HgB (6.6) indicating low activity tolerance, blood transfusion ordered, EGD yesterday. Will re-attempt pending patient availability/appropriateness as schedule allows, otherwise will re-schedule visit.    Kary Franco, PT, DPT  [facility] - Physical Therapy  [id number]

## 2025-05-23 NOTE — PLAN OF CARE
Safety precautions in place. Call light within reach.  Problem: Patient Centered Care  Goal: Patient preferences are identified and integrated in the patient's plan of care  Description: Interventions:  - What would you like us to know as we care for you? \"I am from [facility].\"  - Provide timely, complete, and accurate information to patient/family  - Incorporate patient and family knowledge, values, beliefs, and cultural backgrounds into the planning and delivery of care  - Encourage patient/family to participate in care and decision-making at the level they choose  - Honor patient and family perspectives and choices  Outcome: Progressing     Problem: Patient/Family Goals  Goal: Patient/Family Long Term Goal  Description: Patient's Long Term Goal: \"to be discharged\"    Interventions:  -Follow up MD appt  -Take meds as prescribed  -Use of assistive device if needed  - See additional Care Plan goals for specific interventions  Outcome: Progressing  Goal: Patient/Family Short Term Goal  Description: Patient's Short Term Goal: \"to prevent pain and infection\"    Interventions:   -Monitor VS  -Check labs results  -Administer IVF and IV ABT as ordered  -Provide pain meds as prescribed  - See additional Care Plan goals for specific interventions  Outcome: Progressing     Problem: SAFETY ADULT - FALL  Goal: Free from fall injury  Description: INTERVENTIONS:  - Assess pt frequently for physical needs  - Identify cognitive and physical deficits and behaviors that affect risk of falls.  - [place] fall precautions as indicated by assessment.  - Educate pt/family on patient safety including physical limitations  - Instruct pt to call for assistance with activity based on assessment  - Modify environment to reduce risk of injury  - Provide assistive devices as appropriate  - Consider OT/PT consult to assist with strengthening/mobility  - Encourage toileting schedule  Outcome: Progressing     Problem: GASTROINTESTINAL  - ADULT  Goal: Minimal or absence of nausea and vomiting  Description: INTERVENTIONS:  - Maintain adequate hydration with IV or PO as ordered and tolerated  - Nasogastric tube to low intermittent suction as ordered  - Evaluate effectiveness of ordered antiemetic medications  - Provide nonpharmacologic comfort measures as appropriate  - Advance diet as tolerated, if ordered  - Obtain nutritional consult as needed  - Evaluate fluid balance  Outcome: Progressing  Goal: Maintains or returns to baseline bowel function  Description: INTERVENTIONS:  - Assess bowel function  - Maintain adequate hydration with IV or PO as ordered and tolerated  - Evaluate effectiveness of GI medications  - Encourage mobilization and activity  - Obtain nutritional consult as needed  - Establish a toileting routine/schedule  - Consider collaborating with pharmacy to review patient's medication profile  Outcome: Progressing     Problem: METABOLIC/FLUID AND ELECTROLYTES - ADULT  Goal: Electrolytes maintained within normal limits  Description: INTERVENTIONS:  - Monitor labs and rhythm and assess patient for signs and symptoms of electrolyte imbalances  - Administer electrolyte replacement as ordered  - Monitor response to electrolyte replacements, including rhythm and repeat lab results as appropriate  - Fluid restriction as ordered  - Instruct patient on fluid and nutrition restrictions as appropriate  Outcome: Progressing     Problem: SKIN/TISSUE INTEGRITY - ADULT  Goal: Skin integrity remains intact  Description: INTERVENTIONS  - Assess and document risk factors for pressure ulcer development  - Assess and document skin integrity  - Monitor for areas of redness and/or skin breakdown  - Initiate interventions, skin care algorithm/standards of care as needed  Outcome: Progressing     Problem: Patient/Family Goals  Goal: Patient/Family Long Term Goal  Description: Patient's Long Term Goal: \"to be discharged\"    Interventions:  -Follow up MD  appt  -Take meds as prescribed  -Use of assistive device if needed  - See additional Care Plan goals for specific interventions  Outcome: Progressing     Problem: SAFETY ADULT - FALL  Goal: Free from fall injury  Description: INTERVENTIONS:  - Assess pt frequently for physical needs  - Identify cognitive and physical deficits and behaviors that affect risk of falls.  - [place] fall precautions as indicated by assessment.  - Educate pt/family on patient safety including physical limitations  - Instruct pt to call for assistance with activity based on assessment  - Modify environment to reduce risk of injury  - Provide assistive devices as appropriate  - Consider OT/PT consult to assist with strengthening/mobility  - Encourage toileting schedule  Outcome: Progressing     Problem: GASTROINTESTINAL - ADULT  Goal: Minimal or absence of nausea and vomiting  Description: INTERVENTIONS:  - Maintain adequate hydration with IV or PO as ordered and tolerated  - Nasogastric tube to low intermittent suction as ordered  - Evaluate effectiveness of ordered antiemetic medications  - Provide nonpharmacologic comfort measures as appropriate  - Advance diet as tolerated, if ordered  - Obtain nutritional consult as needed  - Evaluate fluid balance  Outcome: Progressing     Problem: METABOLIC/FLUID AND ELECTROLYTES - ADULT  Goal: Electrolytes maintained within normal limits  Description: INTERVENTIONS:  - Monitor labs and rhythm and assess patient for signs and symptoms of electrolyte imbalances  - Administer electrolyte replacement as ordered  - Monitor response to electrolyte replacements, including rhythm and repeat lab results as appropriate  - Fluid restriction as ordered  - Instruct patient on fluid and nutrition restrictions as appropriate  Outcome: Progressing     Problem: SKIN/TISSUE INTEGRITY - ADULT  Goal: Skin integrity remains intact  Description: INTERVENTIONS  - Assess and document risk factors for pressure ulcer  development  - Assess and document skin integrity  - Monitor for areas of redness and/or skin breakdown  - Initiate interventions, skin care algorithm/standards of care as needed  Outcome: Progressing

## 2025-05-23 NOTE — PROGRESS NOTES
Progress Note     Justyn Hall Patient Status:  Inpatient    1940 MRN [id number]   Location [facility] 5SW/SE Attending Mariza Ruiz MD   Hosp Day # 9 PCP No primary care provider on file.     Subjective:   S: Patient is improved today, received 1 unit PRBC, on CLD per GI, no other issues. VSS     Review of Systems:   10 point ROS completed and was negative, except for pertinent positive and negatives stated in subjective.    Objective:   Vital signs:  Temp:  [97.2 °F (36.2 °C)-98.7 °F (37.1 °C)] 97.3 °F (36.3 °C)  Pulse:  [80-94] 88  Resp:  [14-33] 18  BP: ()/(48-99) 118/83  SpO2:  [93 %-100 %] 95 %    Wt Readings from Last 6 Encounters:   25 174 lb (78.9 kg)   04/10/25 175 lb 4.3 oz (79.5 kg)   25 168 lb 3.2 oz (76.3 kg)   10/10/19 182 lb (82.6 kg)   06/10/19 182 lb (82.6 kg)   19 181 lb (82.1 kg)         Physical Exam:    General: No acute distress. Alert ,         Respiratory: Clear to auscultation bilaterally. No wheezes. No rhonchi.  Cardiovascular: S1, S2. Regular rate and rhythm. No murmurs, rubs or gallops.   Abdomen: Soft, nontender, nondistended.  Positive bowel sounds. No rebound or guarding.  Neurologic: No focal neurological deficits.   Musculoskeletal: Moves all extremities.  Extremities: No edema.    Results:   Diagnostic Data:      Labs:    Labs Last 24 Hours:   BMP     CBC    Other     Na 141 Cl 107 BUN 25 Glu 97   Hb 8.1   PTT - Procal -   K 3.8 CO2 25.0 Cr 1.30   WBC 13.5 >< .0  INR - CRP -   Renal Lytes Endo    Hct 20.5   Trop - D dim -   eGFR - Ca 7.0 POC Gluc  -    LFT   pBNP - Lactic -   eGFR AA - PO4 - A1c -   AST - APk - Prot -  LDL -     Mg 1.6 TSH -   ALT - T faby - Alb -        COVID-19 Lab Results    COVID-19  Lab Results   Component Value Date    COVID19 Not Detected 2025    COVID19 Not Detected 04/10/2025       Pro-Calcitonin  No results for input(s): \"PCT\" in the last 168 hours.    Cardiac  No results for input(s): \"TROP\",  \"PBNP\" in the last 168 hours.    Creatinine Kinase  No results for input(s): \"CK\" in the last 168 hours.    Inflammatory Markers  No results for input(s): \"CRP\", \"TARIK\", \"LDH\", \"DDIMER\" in the last 168 hours.      Imaging: Imaging data reviewed in Epic.    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  Resume Flomax  Blood and urine cultures.:  No growth to date  IV Rocephin stopped based on neg cultures  Monitor hemodynamic status and temperature curve.      Acute GI bleed: Likely upper in view of melanotic stools  - Hemodynamically stable  - Required 2 units PRBC transfusion during admission.  - GI consulted, patient underwent endoscopy with findings of:   1.  Diminutive colon polyps  2.  Internal hemorrhoids  3.  Juno 1b+ duodenal ulceration  4.  LA grade D esophagitis  5.  Hiatal hernia    - advance diet as tolerated. GI recommending PPI BID oral indefinitely for duodenal ulcer   - Cont to monitor H/H, today hgb 7 , recheck now   - Aspirin/NSAIDs should be avoided if possible. Gastric path negative for H pylori.          Acute on chronic kidney injury.:  Back to baseline  Baseline creatinine 1.5  Continue IV fluids  Avoid nephrotoxic agents  Monitor renal function daily     Parkinson's disease  Cont Sinemet  Fall and aspiration precaution  PT OT when able     Chronic infrarenal abdominal aortic aneurysm  - per chart search patient was started on low-dose aspirin. In setting of above ulcer, will hold further asa doses, needs OP follow up with PCP to discuss  further        CODE status: DNAR select  Espinal: Espinal catheter in place  Dispo: BRIANNA Sal, once stable         MDM: High   I personally spent time on chart/note review, review of labs/imaging, discussion with patient, physical exam, discussion with staff, consultants, coordinating care, writing progress note, and discussion of plan of care.       Paola Lo MD, FAAFP      Supplementary Documentation:                         [1]    magnesium oxide  400 mg Oral Once    sodium chloride  250 mL Intravenous Once    piperacillin-tazobactam  3.375 g Intravenous Q8H    pantoprazole  40 mg Intravenous Q12H    aspirin  81 mg Oral Daily    carbidopa-levodopa  1 tablet Oral TID    [Held by provider] midodrine  2.5 mg Oral TID    tamsulosin  0.8 mg Oral Nightly    finasteride  5 mg Oral Daily

## 2025-05-23 NOTE — PROGRESS NOTES
[facility], [place]     Gastroenterology Progress Note    Justyn Hall Patient Status:  Inpatient    1940 MRN [id number]   Location [facility] 5SW/SE Attending Paola Lo MD   Hosp Day # 9 PCP No primary care provider on file.       Subjective:   Feeling better today  Wants to eat    He had a little nausea this am  No hematemesis or coffee ground emesis  No melena    Objective:   Blood pressure 118/83, pulse 83, temperature 97.3 °F (36.3 °C), temperature source Oral, resp. rate 18, height 6' (1.829 m), weight 174 lb (78.9 kg), SpO2 95%. Body mass index is 23.6 kg/m².    Gen: awake, alert patient, NAD  HEENT: EOMI, the sclera appears anicteric, oropharynx clear, mucus membranes appear moist  CV: RRR  Lung: no conversational dyspnea   Abdomen: soft NTND abdomen with NABS appreciated   Skin: dry, warm, no jaundice  Ext: no LE edema is evident  Neuro: Alert and interactive  Psych: calm, cooperative    Assessment and Plan:   1.  Juno 1b+ duodenal ulceration post endoscopic therapy  S/p repeat EGD on 2025. No further overt bleeding noted.   Continue PPI BID, he should be on a PPI indefinitely.  Aspirin/NSAIDs should be avoided if possible.  Gastric path negative for H pylori. Clear liquid diet now, can slowly advance     2.  LA grade D esophagitis  Acid peptic in nature. Esophageal bx consistent with erosive esophagitis.  Continue proton pump inhibitor therapy indefinitely.  Although a surveillance endoscopy to evaluate for Zeng's esophagus would normally be considered, observation on PPI therapy without repeat endoscopy would not be unreasonable in light of age and comorbidities.     3.  Diminutive colon polyps  These were subcentimeter and benign in appearance.  I would not proceed with a repeat colonoscopy and polypectomy in light of age and comorbidities.     Case discussed with Zak Payne MD and Dot MELCHOR.    Katt Davis PA-C  [facility] [address]  Gastroenterology  5/23/2025      Results:     Lab Results   Component Value Date    WBC 13.5 (H) 05/23/2025    HGB 8.1 (L) 05/23/2025    HCT 20.5 (L) 05/23/2025    .0 (H) 05/23/2025    CREATSERUM 1.30 05/23/2025    BUN 25 (H) 05/23/2025     05/23/2025    K 3.8 05/23/2025     05/23/2025    CO2 25.0 05/23/2025    GLU 97 05/23/2025    CA 7.0 (L) 05/23/2025    ALB 3.9 05/14/2025    ALKPHO 113 05/14/2025    BILT 1.6 (H) 05/14/2025    TP 6.9 05/14/2025    AST 20 05/14/2025    ALT <7 (L) 05/14/2025    PTT 27.8 04/10/2025    INR 1.11 04/10/2025    T4F 1.10 07/09/2015    TSH 2.507 04/05/2025    LIP 63 (H) 04/10/2025    PSA 2.46 10/10/2019    MG 1.6 05/23/2025    PHOS 2.7 05/19/2025     04/05/2025    B12 640 04/05/2025       CTA ABD/PEL (CPT=74174)  Result Date: 5/22/2025  CONCLUSION:  1. Postprocedural changes noted to a recent endoscopic treatment of a bleeding gastric duodenal ulcer.  No contrast extravasation at the site of the metallic clip to suggest active arterial hemorrhage. 2. 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 3. Moderate calcific aortoiliac atherosclerosis with superimposed soft plaque formation resulting in multifocal mild-to-moderate stenoses of the left superficial femoral through common iliac arteries, as well as mild-to-moderate stenoses of the right iliac arteries.  There is mild narrowing of the infrarenal abdominal aorta without flow limiting stenosis or aneurysm. 4. Metallic clip noted in the mid-descending colon, which could relate to an interval colonoscopy or distal passage of a clip placed in the more proximal GI tract.  Correlate clinically. 5. Prostatomegaly with findings suspicious for a previous TURP, which can be correlated clinically with the patient's procedural history.  Findings are again noted that are most consistent with a chronic bladder outlet obstruction, although the bladder is decompressed by Espinal  catheter.  Mild pelvocaliectasis in the right kidney has improved and hydronephrosis previously seen in the left kidney has resolved. 6. Cardiomegaly, coronary atherosclerosis and increase in size of small bilateral pleural effusions suggesting worsening mild CHF or fluid overload.  7. Stable moderate hiatal hernia. 8. Stable findings consistent with chronic pancreatitis. 9. Lesser incidental findings as above.     Elm-remote   Dictated by (CST): Harjit Guy MD on 5/22/2025 at 1:59 PM     Finalized by (CST): Harjit Guy MD on 5/22/2025 at 2:30 PM          XR CHEST AP PORTABLE  (CPT=71045)  Result Date: 5/22/2025  CONCLUSION:   Mildly increased left lung base atelectasis, which is nonspecific, though small volume aspiration may be considered in the appropriate clinical setting.    Dictated by (CST): Allen Singh MD on 5/22/2025 at 9:38 AM     Finalized by (CST): Allen Singh MD on 5/22/2025 at 9:40 AM

## 2025-05-23 NOTE — PLAN OF CARE
Problem: Patient Centered Care  Goal: Patient preferences are identified and integrated in the patient's plan of care  Description: Interventions:  - What would you like us to know as we care for you? \"I am from [facility].\"  - Provide timely, complete, and accurate information to patient/family  - Incorporate patient and family knowledge, values, beliefs, and cultural backgrounds into the planning and delivery of care  - Encourage patient/family to participate in care and decision-making at the level they choose  - Honor patient and family perspectives and choices  Outcome: Progressing     Problem: Patient/Family Goals  Goal: Patient/Family Long Term Goal  Description: Patient's Long Term Goal: \"to be discharged\"    Interventions:  -Follow up MD appt  -Take meds as prescribed  -Use of assistive device if needed  - See additional Care Plan goals for specific interventions  Outcome: Progressing  Goal: Patient/Family Short Term Goal  Description: Patient's Short Term Goal: \"to prevent pain and infection\"    Interventions:   -Monitor VS  -Check labs results  -Administer IVF and IV ABT as ordered  -Provide pain meds as prescribed  - See additional Care Plan goals for specific interventions  Outcome: Progressing     Problem: SAFETY ADULT - FALL  Goal: Free from fall injury  Description: INTERVENTIONS:  - Assess pt frequently for physical needs  - Identify cognitive and physical deficits and behaviors that affect risk of falls.  - [place] fall precautions as indicated by assessment.  - Educate pt/family on patient safety including physical limitations  - Instruct pt to call for assistance with activity based on assessment  - Modify environment to reduce risk of injury  - Provide assistive devices as appropriate  - Consider OT/PT consult to assist with strengthening/mobility  - Encourage toileting schedule  Outcome: Progressing     Problem: GASTROINTESTINAL - ADULT  Goal: Minimal or absence of nausea and  vomiting  Description: INTERVENTIONS:  - Maintain adequate hydration with IV or PO as ordered and tolerated  - Nasogastric tube to low intermittent suction as ordered  - Evaluate effectiveness of ordered antiemetic medications  - Provide nonpharmacologic comfort measures as appropriate  - Advance diet as tolerated, if ordered  - Obtain nutritional consult as needed  - Evaluate fluid balance  Outcome: Progressing  Goal: Maintains or returns to baseline bowel function  Description: INTERVENTIONS:  - Assess bowel function  - Maintain adequate hydration with IV or PO as ordered and tolerated  - Evaluate effectiveness of GI medications  - Encourage mobilization and activity  - Obtain nutritional consult as needed  - Establish a toileting routine/schedule  - Consider collaborating with pharmacy to review patient's medication profile  Outcome: Progressing     Problem: METABOLIC/FLUID AND ELECTROLYTES - ADULT  Goal: Electrolytes maintained within normal limits  Description: INTERVENTIONS:  - Monitor labs and rhythm and assess patient for signs and symptoms of electrolyte imbalances  - Administer electrolyte replacement as ordered  - Monitor response to electrolyte replacements, including rhythm and repeat lab results as appropriate  - Fluid restriction as ordered  - Instruct patient on fluid and nutrition restrictions as appropriate  Outcome: Progressing     Problem: SKIN/TISSUE INTEGRITY - ADULT  Goal: Skin integrity remains intact  Description: INTERVENTIONS  - Assess and document risk factors for pressure ulcer development  - Assess and document skin integrity  - Monitor for areas of redness and/or skin breakdown  - Initiate interventions, skin care algorithm/standards of care as needed  Outcome: Progressing

## 2025-05-23 NOTE — PLAN OF CARE
Problem: Patient Centered Care  Goal: Patient preferences are identified and integrated in the patient's plan of care  Description: Interventions:  - What would you like us to know as we care for you? \"I am from [facility].\"  - Provide timely, complete, and accurate information to patient/family  - Incorporate patient and family knowledge, values, beliefs, and cultural backgrounds into the planning and delivery of care  - Encourage patient/family to participate in care and decision-making at the level they choose  - Honor patient and family perspectives and choices  Outcome: Progressing     Problem: Patient/Family Goals  Goal: Patient/Family Long Term Goal  Description: Patient's Long Term Goal: \"to be discharged\"    Interventions:  -Follow up MD appt  -Take meds as prescribed  -Use of assistive device if needed  - See additional Care Plan goals for specific interventions  Outcome: Progressing  Goal: Patient/Family Short Term Goal  Description: Patient's Short Term Goal: \"to prevent pain and infection\"    Interventions:   -Monitor VS  -Check labs results  -Administer IVF and IV ABT as ordered  -Provide pain meds as prescribed  - See additional Care Plan goals for specific interventions  Outcome: Progressing     Problem: SAFETY ADULT - FALL  Goal: Free from fall injury  Description: INTERVENTIONS:  - Assess pt frequently for physical needs  - Identify cognitive and physical deficits and behaviors that affect risk of falls.  - [place] fall precautions as indicated by assessment.  - Educate pt/family on patient safety including physical limitations  - Instruct pt to call for assistance with activity based on assessment  - Modify environment to reduce risk of injury  - Provide assistive devices as appropriate  - Consider OT/PT consult to assist with strengthening/mobility  - Encourage toileting schedule  Outcome: Progressing     Problem: GASTROINTESTINAL - ADULT  Goal: Minimal or absence of nausea and  vomiting  Description: INTERVENTIONS:  - Maintain adequate hydration with IV or PO as ordered and tolerated  - Nasogastric tube to low intermittent suction as ordered  - Evaluate effectiveness of ordered antiemetic medications  - Provide nonpharmacologic comfort measures as appropriate  - Advance diet as tolerated, if ordered  - Obtain nutritional consult as needed  - Evaluate fluid balance  Outcome: Progressing  Goal: Maintains or returns to baseline bowel function  Description: INTERVENTIONS:  - Assess bowel function  - Maintain adequate hydration with IV or PO as ordered and tolerated  - Evaluate effectiveness of GI medications  - Encourage mobilization and activity  - Obtain nutritional consult as needed  - Establish a toileting routine/schedule  - Consider collaborating with pharmacy to review patient's medication profile  Outcome: Progressing     Problem: METABOLIC/FLUID AND ELECTROLYTES - ADULT  Goal: Electrolytes maintained within normal limits  Description: INTERVENTIONS:  - Monitor labs and rhythm and assess patient for signs and symptoms of electrolyte imbalances  - Administer electrolyte replacement as ordered  - Monitor response to electrolyte replacements, including rhythm and repeat lab results as appropriate  - Fluid restriction as ordered  - Instruct patient on fluid and nutrition restrictions as appropriate  Outcome: Progressing     Problem: SKIN/TISSUE INTEGRITY - ADULT  Goal: Skin integrity remains intact  Description: INTERVENTIONS  - Assess and document risk factors for pressure ulcer development  - Assess and document skin integrity  - Monitor for areas of redness and/or skin breakdown  - Initiate interventions, skin care algorithm/standards of care as needed  Outcome: Progressing

## 2025-05-24 LAB
ANTIBODY SCREEN: NEGATIVE
ATRIAL RATE: 67 BPM
BLOOD TYPE BARCODE: 9500
HCT VFR BLD AUTO: 20.6 % (ref 39–53)
HCT VFR BLD AUTO: 23.8 % (ref 39–53)
HCT VFR BLD AUTO: 25.9 % (ref 39–53)
HGB BLD-MCNC: 6.7 G/DL (ref 13–17.5)
HGB BLD-MCNC: 7.7 G/DL (ref 13–17.5)
HGB BLD-MCNC: 8 G/DL (ref 13–17.5)
P AXIS: -10 DEGREES
P-R INTERVAL: 260 MS
Q-T INTERVAL: 386 MS
QRS DURATION: 86 MS
QTC CALCULATION (BEZET): 407 MS
R AXIS: -33 DEGREES
RH BLOOD TYPE: POSITIVE
T AXIS: 31 DEGREES
UNIT VOLUME: 350 ML
VENTRICULAR RATE: 67 BPM

## 2025-05-24 PROCEDURE — 99232 SBSQ HOSP IP/OBS MODERATE 35: CPT | Performed by: INTERNAL MEDICINE

## 2025-05-24 PROCEDURE — 99233 SBSQ HOSP IP/OBS HIGH 50: CPT | Performed by: FAMILY MEDICINE

## 2025-05-24 RX ORDER — SODIUM CHLORIDE 9 MG/ML
INJECTION, SOLUTION INTRAVENOUS ONCE
Status: COMPLETED | OUTPATIENT
Start: 2025-05-24 | End: 2025-05-24

## 2025-05-24 NOTE — PROGRESS NOTES
[facility]  GI SERVICE PROGRESS NOTE    Justyn Hall Patient Status:  Inpatient    1940 MRN [id number]   Location [facility] 5SW/SE Attending Paola Lo MD   Hosp Day # 10 PCP No primary care provider on file.       Subjective:     Another bloody stool yesterday 2025  Stable overnight  Family at bedside  Transfused third unit RBCs this morning.    Afebrile; HR 80s; blood pressure stable    Objective:   Blood pressure 130/73, pulse 84, temperature 98.1 °F (36.7 °C), temperature source Oral, resp. rate 17, height 6' (1.829 m), weight 174 lb (78.9 kg), SpO2 100%.    GENERAL: ?Pleasantly confused; family at bedside  HEENT: Normocephalic; pupils equally round and reactive to light; no temporal wasting; no thyromegaly or cervical lymphadenopathy  PULM: Breathing and speaking comfortably  ABD: soft/nondistended  EXT: No edema  SKIN: No rash        Results:       Recent Labs   Lab 25  0526 25  0546 25  0844 25  1434 25  0523 25  1043 25  2000 25  0848   RBC 2.96* 2.65*  --   --  2.38*  --   --   --    HGB 7.9* 7.0*   < > 7.5* 6.6* 8.1* 7.6* 6.7*   HCT 24.6* 22.8*   < > 22.6* 20.5*  --   --  20.6*   MCV 83.1 86.0  --   --  86.1  --   --   --    MCH 26.7 26.4  --   --  27.7  --   --   --    MCHC 32.1 30.7*  --   --  32.2  --   --   --    RDW 15.6* 15.9*  --   --  15.6*  --   --   --    NEPRELIM 7.88* 8.04*  --   --  9.70*  --   --   --    WBC 12.6* 12.8*  --   --  13.5*  --   --   --    .0 490.0*  --   --  477.0*  --   --   --     < > = values in this interval not displayed.       Lab Results   Component Value Date    INR 1.11 04/10/2025       Recent Labs   Lab 25  0526 25  0546 25  0523   GLU 93 99 97   BUN 14 21 25*   CREATSERUM 1.19 1.21 1.30   CA 6.9* 6.8* 7.0*    138 141   K 3.8  3.8 3.8 3.8    104 107   CO2 28.0 26.0 25.0       No results for input(s): \"RIVER\", \"LIP\" in the last 168  hours.    Recent Labs   Lab 05/19/25  0541 05/20/25  0542 05/21/25  0526 05/22/25  0546 05/23/25  0523   MG 1.1*   < > 1.2* 1.8 1.6   PHOS 2.7  --   --   --   --     < > = values in this interval not displayed.       No results for input(s): \"URINE\", \"CULTI\", \"BLDSMR\" in the last 168 hours.      CTA ABD/PEL (CPT=74174)  Result Date: 5/22/2025  CONCLUSION:  1. Postprocedural changes noted to a recent endoscopic treatment of a bleeding gastric duodenal ulcer.  No contrast extravasation at the site of the metallic clip to suggest active arterial hemorrhage. 2. 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 3. Moderate calcific aortoiliac atherosclerosis with superimposed soft plaque formation resulting in multifocal mild-to-moderate stenoses of the left superficial femoral through common iliac arteries, as well as mild-to-moderate stenoses of the right iliac arteries.  There is mild narrowing of the infrarenal abdominal aorta without flow limiting stenosis or aneurysm. 4. Metallic clip noted in the mid-descending colon, which could relate to an interval colonoscopy or distal passage of a clip placed in the more proximal GI tract.  Correlate clinically. 5. Prostatomegaly with findings suspicious for a previous TURP, which can be correlated clinically with the patient's procedural history.  Findings are again noted that are most consistent with a chronic bladder outlet obstruction, although the bladder is decompressed by Espinal catheter.  Mild pelvocaliectasis in the right kidney has improved and hydronephrosis previously seen in the left kidney has resolved. 6. Cardiomegaly, coronary atherosclerosis and increase in size of small bilateral pleural effusions suggesting worsening mild CHF or fluid overload.  7. Stable moderate hiatal hernia. 8. Stable findings consistent with chronic pancreatitis. 9. Lesser incidental findings as above.     Elm-remote   Dictated by (CST):  Harjit Guy MD on 5/22/2025 at 1:59 PM     Finalized by (CST): Harjit Guy MD on 5/22/2025 at 2:30 PM              Assessment and Plan:     84-year-old gentleman with BMI 23.6, history of Parkinson's disease chronic kidney disease and prostatic obstruction concerns.    Admitted through the ED 5/14/2025 with initial concerns for weakness fever hypotension urinary retention at nursing facility.    Found to have melena, acute on chronic anemia during the hospitalization.    2 recent EGD examinations 5/17/2025 and 5/22/2025 show severe reflux esophagitis with multiple ulcers distal esophagus and a duodenal bulb ulcer with exposed vessel, question of recent rebleed.    More melena 5/23/2025.    Transfused total 3 units RBCs so far this admission      5/24/2025:  Hgb 6.6g --> 7.6g --> 6.7g; BUN trending up from 13 --> 25    1.  Juno 1b+ duodenal ulceration post endoscopic therapy  s/p repeat EGD examination on 5/22/2025. No further overt bleeding noted.   Gastric path negative for H pylori.   Continue high-dose pantoprazole PPI BID (IVP), he should be on a PPI indefinitely.    Aspirin/NSAIDs should be avoided if possible.    Clear liquid diet now, can slowly advance     2.  LA grade D esophagitis  Acid peptic in nature. Esophageal bx consistent with erosive esophagitis.    Continue proton pump inhibitor therapy indefinitely.  Although a surveillance endoscopy to evaluate for Zeng's esophagus would normally be considered, observation on PPI therapy without repeat endoscopy would not be unreasonable in light of age and comorbidities.     3.  Concern for possible aspiration pneumonia  Zosyn broad-spectrum antibiotic (5/22/2025)    4.   Diminutive colon polyps  These were subcentimeter and benign in appearance.    Would not proceed with a repeat colonoscopy and polypectomy in light of age and comorbidities.          Zak Payne MD        Over 30 minutes spent today reviewing today's and recent  data; greater than 50% of that time was spent counseling the patient and coordination of care with RN and other involved physicians.    Digital transcription software was utilized to produce this note. The note was proofread for content only. Typographical errors may remain.

## 2025-05-24 NOTE — PLAN OF CARE
Pt HR dropped to 39 during the shift 2nd degree type 2 per Tele Pt was asleep, no c/o chest pain or discomfort. message sent to MD via perfect serve order received.

## 2025-05-24 NOTE — PLAN OF CARE
Problem: Patient Centered Care  Goal: Patient preferences are identified and integrated in the patient's plan of care  Description: Interventions:  - What would you like us to know as we care for you? \"I am from [facility].\"  - Provide timely, complete, and accurate information to patient/family  - Incorporate patient and family knowledge, values, beliefs, and cultural backgrounds into the planning and delivery of care  - Encourage patient/family to participate in care and decision-making at the level they choose  - Honor patient and family perspectives and choices  Outcome: Progressing     Problem: Patient/Family Goals  Goal: Patient/Family Long Term Goal  Description: Patient's Long Term Goal: \"to be discharged\"    Interventions:  -Follow up MD appt  -Take meds as prescribed  -Use of assistive device if needed  - See additional Care Plan goals for specific interventions  Outcome: Progressing  Goal: Patient/Family Short Term Goal  Description: Patient's Short Term Goal: \"to prevent pain and infection\"    Interventions:   -Monitor VS  -Check labs results  -Administer IVF and IV ABT as ordered  -Provide pain meds as prescribed  - See additional Care Plan goals for specific interventions  Outcome: Progressing     Problem: SAFETY ADULT - FALL  Goal: Free from fall injury  Description: INTERVENTIONS:  - Assess pt frequently for physical needs  - Identify cognitive and physical deficits and behaviors that affect risk of falls.  - [place] fall precautions as indicated by assessment.  - Educate pt/family on patient safety including physical limitations  - Instruct pt to call for assistance with activity based on assessment  - Modify environment to reduce risk of injury  - Provide assistive devices as appropriate  - Consider OT/PT consult to assist with strengthening/mobility  - Encourage toileting schedule  Outcome: Progressing     Problem: GASTROINTESTINAL - ADULT  Goal: Minimal or absence of nausea and  vomiting  Description: INTERVENTIONS:  - Maintain adequate hydration with IV or PO as ordered and tolerated  - Nasogastric tube to low intermittent suction as ordered  - Evaluate effectiveness of ordered antiemetic medications  - Provide nonpharmacologic comfort measures as appropriate  - Advance diet as tolerated, if ordered  - Obtain nutritional consult as needed  - Evaluate fluid balance  Outcome: Progressing  Goal: Maintains or returns to baseline bowel function  Description: INTERVENTIONS:  - Assess bowel function  - Maintain adequate hydration with IV or PO as ordered and tolerated  - Evaluate effectiveness of GI medications  - Encourage mobilization and activity  - Obtain nutritional consult as needed  - Establish a toileting routine/schedule  - Consider collaborating with pharmacy to review patient's medication profile  Outcome: Progressing     Problem: METABOLIC/FLUID AND ELECTROLYTES - ADULT  Goal: Electrolytes maintained within normal limits  Description: INTERVENTIONS:  - Monitor labs and rhythm and assess patient for signs and symptoms of electrolyte imbalances  - Administer electrolyte replacement as ordered  - Monitor response to electrolyte replacements, including rhythm and repeat lab results as appropriate  - Fluid restriction as ordered  - Instruct patient on fluid and nutrition restrictions as appropriate  Outcome: Progressing     Problem: SKIN/TISSUE INTEGRITY - ADULT  Goal: Skin integrity remains intact  Description: INTERVENTIONS  - Assess and document risk factors for pressure ulcer development  - Assess and document skin integrity  - Monitor for areas of redness and/or skin breakdown  - Initiate interventions, skin care algorithm/standards of care as needed  Outcome: Progressing

## 2025-05-24 NOTE — PROGRESS NOTES
Progress Note     Justyn Hall Patient Status:  Inpatient    1940 MRN [id number]   Location [facility] 5SW/SE Attending Mariza Ruiz MD   Hosp Day # 10 PCP No primary care provider on file.     Subjective:   S: Patient Hb dropped again today, on CLD per GI, no other issues. VSS     Review of Systems:   10 point ROS completed and was negative, except for pertinent positive and negatives stated in subjective.    Objective:   Vital signs:  Temp:  [97.6 °F (36.4 °C)-98.1 °F (36.7 °C)] 98.1 °F (36.7 °C)  Pulse:  [75-87] 75  Resp:  [17-20] 17  BP: (123-156)/(59-81) 156/78  SpO2:  [95 %-100 %] 98 %    Wt Readings from Last 6 Encounters:   25 174 lb (78.9 kg)   04/10/25 175 lb 4.3 oz (79.5 kg)   25 168 lb 3.2 oz (76.3 kg)   10/10/19 182 lb (82.6 kg)   06/10/19 182 lb (82.6 kg)   19 181 lb (82.1 kg)         Physical Exam:    General: No acute distress. Alert ,         Respiratory: Clear to auscultation bilaterally. No wheezes. No rhonchi.  Cardiovascular: S1, S2. Regular rate and rhythm. No murmurs, rubs or gallops.   Abdomen: Soft, nontender, nondistended.  Positive bowel sounds. No rebound or guarding.  Neurologic: No focal neurological deficits.   Musculoskeletal: Moves all extremities.  Extremities: No edema.    Results:   Diagnostic Data:      Labs:    Labs Last 24 Hours:   BMP     CBC    Other     Na - Cl - BUN - Glu -   Hb 6.7   PTT - Procal -   K - CO2 - Cr -   WBC - >< PLT -  INR - CRP -   Renal Lytes Endo    Hct 20.6   Trop - D dim -   eGFR - Ca - POC Gluc  -    LFT   pBNP - Lactic -   eGFR AA - PO4 - A1c -   AST - APk - Prot -  LDL -     Mg - TSH -   ALT - T faby - Alb -        COVID-19 Lab Results    COVID-19  Lab Results   Component Value Date    COVID19 Not Detected 2025    COVID19 Not Detected 04/10/2025       Pro-Calcitonin  No results for input(s): \"PCT\" in the last 168 hours.    Cardiac  No results for input(s): \"TROP\", \"PBNP\" in the last 168  hours.    Creatinine Kinase  No results for input(s): \"CK\" in the last 168 hours.    Inflammatory Markers  No results for input(s): \"CRP\", \"TARIK\", \"LDH\", \"DDIMER\" in the last 168 hours.      Imaging: Imaging data reviewed in Epic.    Medications: Scheduled Medications[1]    Assessment & Plan:   ASSESSMENT / PLAN:       Hematemesis  Acute blood loss anemia   S/p 1 UNIT PRBC today -total 3 this admission   S/p repeat EGD 5/22, no overt bleeding  On PPI BID   Hb dropped again today, PRBC ordered, GI notified     Acute Hypoxic respiratory failure   Concern for Aspiration  Oxygen protocol-On RA   CXR wuth possible aspiration, continue  IV zosyn     Presumed CAUTI-  r/o UCX neg  Underlying 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  Resume Flomax  Blood and urine cultures.:  No growth to date  IV Rocephin stopped based on neg cultures  Monitor hemodynamic status and temperature curve.      Acute GI bleed: Likely upper in view of melanotic stools  - Hemodynamically stable  - Required 2 units PRBC transfusion during admission.  - GI consulted, patient underwent endoscopy with findings of:   1.  Diminutive colon polyps  2.  Internal hemorrhoids  3.  Juno 1b+ duodenal ulceration  4.  LA grade D esophagitis  5.  Hiatal hernia    - advance diet as tolerated. GI recommending PPI BID oral indefinitely for duodenal ulcer   - Cont to monitor H/H, today hgb 7 , recheck now   - Aspirin/NSAIDs should be avoided if possible. Gastric path negative for H pylori.          Acute on chronic kidney injury.:  Back to baseline  Baseline creatinine 1.5  Continue IV fluids  Avoid nephrotoxic agents  Monitor renal function daily     Parkinson's disease  Cont Sinemet  Fall and aspiration precaution  PT OT when able     Chronic infrarenal abdominal aortic aneurysm  - per chart search patient was started on low-dose aspirin. In setting of above ulcer, will hold further asa doses, needs OP follow  up with PCP to discuss further        CODE status: DNAR select  Espinal: Espinal catheter in place  Dispo: BRIANNA Sal, once stable         MDM: High   I personally spent time on chart/note review, review of labs/imaging, discussion with patient, physical exam, discussion with staff, consultants, coordinating care, writing progress note, and discussion of plan of care.       Paola Lo MD, FAAFP      Supplementary Documentation:                         [1]    magnesium oxide  400 mg Oral Once    sodium chloride  250 mL Intravenous Once    piperacillin-tazobactam  3.375 g Intravenous Q8H    pantoprazole  40 mg Intravenous Q12H    aspirin  81 mg Oral Daily    carbidopa-levodopa  1 tablet Oral TID    [Held by provider] midodrine  2.5 mg Oral TID    tamsulosin  0.8 mg Oral Nightly    finasteride  5 mg Oral Daily

## 2025-05-25 ENCOUNTER — ANESTHESIA (OUTPATIENT)
Dept: ENDOSCOPY | Facility: HOSPITAL | Age: 85
End: 2025-05-25
Payer: MEDICARE

## 2025-05-25 ENCOUNTER — ANESTHESIA EVENT (OUTPATIENT)
Dept: ENDOSCOPY | Facility: HOSPITAL | Age: 85
End: 2025-05-25
Payer: MEDICARE

## 2025-05-25 ENCOUNTER — TELEPHONE (OUTPATIENT)
Dept: MEDSURG UNIT | Facility: HOSPITAL | Age: 85
End: 2025-05-25

## 2025-05-25 LAB
ANION GAP SERPL CALC-SCNC: 10 MMOL/L (ref 0–18)
BASOPHILS # BLD AUTO: 0.07 X10(3) UL (ref 0–0.2)
BASOPHILS NFR BLD AUTO: 0.4 %
BLOOD TYPE BARCODE: 6200
BUN BLD-MCNC: 21 MG/DL (ref 9–23)
BUN/CREAT SERPL: 17.9 (ref 10–20)
CALCIUM BLD-MCNC: 6.8 MG/DL (ref 8.7–10.4)
CHLORIDE SERPL-SCNC: 109 MMOL/L (ref 98–112)
CO2 SERPL-SCNC: 23 MMOL/L (ref 21–32)
CREAT BLD-MCNC: 1.17 MG/DL (ref 0.7–1.3)
DEPRECATED RDW RBC AUTO: 48.9 FL (ref 35.1–46.3)
EGFRCR SERPLBLD CKD-EPI 2021: 61 ML/MIN/1.73M2 (ref 60–?)
EOSINOPHIL # BLD AUTO: 0.21 X10(3) UL (ref 0–0.7)
EOSINOPHIL NFR BLD AUTO: 1.3 %
ERYTHROCYTE [DISTWIDTH] IN BLOOD BY AUTOMATED COUNT: 15.5 % (ref 11–15)
GLUCOSE BLD-MCNC: 130 MG/DL (ref 70–99)
GLUCOSE BLDC GLUCOMTR-MCNC: 129 MG/DL (ref 70–99)
HCT VFR BLD AUTO: 17.4 % (ref 39–53)
HCT VFR BLD AUTO: 26.3 % (ref 39–53)
HGB BLD-MCNC: 5.6 G/DL (ref 13–17.5)
HGB BLD-MCNC: 8.2 G/DL (ref 13–17.5)
IMM GRANULOCYTES # BLD AUTO: 0.26 X10(3) UL (ref 0–1)
IMM GRANULOCYTES NFR BLD: 1.6 %
LYMPHOCYTES # BLD AUTO: 1.26 X10(3) UL (ref 1–4)
LYMPHOCYTES NFR BLD AUTO: 7.8 %
MCH RBC QN AUTO: 28.3 PG (ref 26–34)
MCHC RBC AUTO-ENTMCNC: 32.2 G/DL (ref 31–37)
MCV RBC AUTO: 87.9 FL (ref 80–100)
MONOCYTES # BLD AUTO: 0.96 X10(3) UL (ref 0.1–1)
MONOCYTES NFR BLD AUTO: 6 %
NEUTROPHILS # BLD AUTO: 13.31 X10 (3) UL (ref 1.5–7.7)
NEUTROPHILS # BLD AUTO: 13.31 X10(3) UL (ref 1.5–7.7)
NEUTROPHILS NFR BLD AUTO: 82.9 %
OSMOLALITY SERPL CALC.SUM OF ELEC: 299 MOSM/KG (ref 275–295)
PLATELET # BLD AUTO: 374 10(3)UL (ref 150–450)
POTASSIUM SERPL-SCNC: 3.2 MMOL/L (ref 3.5–5.1)
RBC # BLD AUTO: 1.98 X10(6)UL (ref 3.8–5.8)
SODIUM SERPL-SCNC: 142 MMOL/L (ref 136–145)
UNIT VOLUME: 350 ML
WBC # BLD AUTO: 16.1 X10(3) UL (ref 4–11)

## 2025-05-25 PROCEDURE — 99233 SBSQ HOSP IP/OBS HIGH 50: CPT | Performed by: FAMILY MEDICINE

## 2025-05-25 PROCEDURE — 43255 EGD CONTROL BLEEDING ANY: CPT | Performed by: INTERNAL MEDICINE

## 2025-05-25 PROCEDURE — 0W3P8ZZ CONTROL BLEEDING IN GASTROINTESTINAL TRACT, VIA NATURAL OR ARTIFICIAL OPENING ENDOSCOPIC: ICD-10-PCS | Performed by: INTERNAL MEDICINE

## 2025-05-25 PROCEDURE — 99232 SBSQ HOSP IP/OBS MODERATE 35: CPT | Performed by: INTERNAL MEDICINE

## 2025-05-25 RX ORDER — FUROSEMIDE 10 MG/ML
10 INJECTION INTRAMUSCULAR; INTRAVENOUS ONCE
Status: COMPLETED | OUTPATIENT
Start: 2025-05-25 | End: 2025-05-25

## 2025-05-25 RX ORDER — NALOXONE HYDROCHLORIDE 0.4 MG/ML
0.08 INJECTION, SOLUTION INTRAMUSCULAR; INTRAVENOUS; SUBCUTANEOUS ONCE AS NEEDED
Status: DISCONTINUED | OUTPATIENT
Start: 2025-05-25 | End: 2025-05-25 | Stop reason: HOSPADM

## 2025-05-25 RX ORDER — SODIUM CHLORIDE 9 MG/ML
INJECTION, SOLUTION INTRAVENOUS CONTINUOUS PRN
Status: DISCONTINUED | OUTPATIENT
Start: 2025-05-25 | End: 2025-05-25 | Stop reason: SURG

## 2025-05-25 RX ORDER — SODIUM CHLORIDE, SODIUM LACTATE, POTASSIUM CHLORIDE, CALCIUM CHLORIDE 600; 310; 30; 20 MG/100ML; MG/100ML; MG/100ML; MG/100ML
INJECTION, SOLUTION INTRAVENOUS CONTINUOUS
Status: DISCONTINUED | OUTPATIENT
Start: 2025-05-25 | End: 2025-05-27 | Stop reason: ALTCHOICE

## 2025-05-25 RX ORDER — SODIUM CHLORIDE 9 MG/ML
INJECTION, SOLUTION INTRAVENOUS ONCE
Status: DISCONTINUED | OUTPATIENT
Start: 2025-05-25 | End: 2025-05-29

## 2025-05-25 RX ADMIN — SODIUM CHLORIDE: 9 INJECTION, SOLUTION INTRAVENOUS at 12:00:00

## 2025-05-25 NOTE — PROGRESS NOTES
Progress Note     Justyn Hall Patient Status:  Inpatient    1940 MRN [id number]   Location [facility] 5SW/SE Attending Mariza Ruiz MD   Hosp Day # 11 PCP No primary care provider on file.     Subjective:   S: Patient continue to drop hb , however feels fine. Denies  any complaints.     Review of Systems:   10 point ROS completed and was negative, except for pertinent positive and negatives stated in subjective.    Objective:   Vital signs:  Temp:  [97.7 °F (36.5 °C)-98.4 °F (36.9 °C)] 98 °F (36.7 °C)  Pulse:  [70-99] 70  Resp:  [16-26] 17  BP: (107-152)/(63-91) 152/91  SpO2:  [95 %-100 %] 98 %    Wt Readings from Last 6 Encounters:   25 172 lb 8 oz (78.2 kg)   04/10/25 175 lb 4.3 oz (79.5 kg)   25 168 lb 3.2 oz (76.3 kg)   10/10/19 182 lb (82.6 kg)   06/10/19 182 lb (82.6 kg)   19 181 lb (82.1 kg)         Physical Exam:    General: No acute distress. Alert ,         Respiratory: Clear to auscultation bilaterally. No wheezes. No rhonchi.  Cardiovascular: S1, S2. Regular rate and rhythm. No murmurs, rubs or gallops.   Abdomen: Soft, nontender, nondistended.  Positive bowel sounds. No rebound or guarding.  Neurologic: No focal neurological deficits.   Musculoskeletal: Moves all extremities.  Extremities: No edema.    Results:   Diagnostic Data:      Labs:    Labs Last 24 Hours:   BMP     CBC    Other     Na 142 Cl 109 BUN 21 Glu 130   Hb 5.6   PTT - Procal -   K 3.2 CO2 23.0 Cr 1.17   WBC 16.1 >< .0  INR - CRP -   Renal Lytes Endo    Hct 17.4   Trop - D dim -   eGFR - Ca 6.8 POC Gluc  129    LFT   pBNP - Lactic -   eGFR AA - PO4 - A1c -   AST - APk - Prot -  LDL -     Mg - TSH -   ALT - T faby - Alb -        COVID-19 Lab Results    COVID-19  Lab Results   Component Value Date    COVID19 Not Detected 2025    COVID19 Not Detected 04/10/2025       Pro-Calcitonin  No results for input(s): \"PCT\" in the last 168 hours.    Cardiac  No results for input(s): \"TROP\",  \"PBNP\" in the last 168 hours.    Creatinine Kinase  No results for input(s): \"CK\" in the last 168 hours.    Inflammatory Markers  No results for input(s): \"CRP\", \"TARIK\", \"LDH\", \"DDIMER\" in the last 168 hours.      Imaging: Imaging data reviewed in Epic.    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  Resume Flomax  Blood and urine cultures.:  No growth to date  IV Rocephin stopped based on neg cultures  Monitor hemodynamic status and temperature curve.      Acute GI bleed: Likely upper in view of melanotic stools  - Hemodynamically stable  - Required 2 units PRBC transfusion during admission.  - GI consulted, patient underwent endoscopy with findings of:   1.  Diminutive colon polyps  2.  Internal hemorrhoids  3.  Juno 1b+ duodenal ulceration  4.  LA grade D esophagitis  5.  Hiatal hernia    - advance diet as tolerated. GI recommending PPI BID oral indefinitely for duodenal ulcer   - Cont to monitor H/H, today hgb 7 , recheck now   - Aspirin/NSAIDs should be avoided if possible. Gastric path negative for H pylori.          Acute on chronic kidney injury.:  Back to baseline  Baseline creatinine 1.5  Continue IV fluids  Avoid nephrotoxic agents  Monitor renal function daily     Parkinson's disease  Cont Sinemet  Fall and aspiration precaution  PT OT when able     Chronic infrarenal abdominal aortic aneurysm  - per  chart search patient was started on low-dose aspirin. In setting of above ulcer, will hold further asa doses, needs OP follow up with PCP to discuss further        CODE status: DNAR select  Espinal: Espinal catheter in place  Dispo: BRIANNA Sal, once stable         MDM: High   I personally spent time on chart/note review, review of labs/imaging, discussion with patient, physical exam, discussion with staff, consultants, coordinating care, writing progress note, and discussion of plan of care.       Paola Lo MD, FAAFP      Supplementary Documentation:          [1]    potassium chloride  40 mEq Intravenous Once    sodium chloride   Intravenous Once    sodium chloride   Intravenous Once    furosemide  10 mg Intravenous Once    pantoprazole  40 mg Intravenous Q8H    magnesium oxide  400 mg Oral Once    sodium chloride  250 mL Intravenous Once    piperacillin-tazobactam  3.375 g Intravenous Q8H    aspirin  81 mg Oral Daily    carbidopa-levodopa  1 tablet Oral TID    [Held by provider] midodrine  2.5 mg Oral TID    tamsulosin  0.8 mg Oral Nightly    finasteride  5 mg Oral Daily

## 2025-05-25 NOTE — PLAN OF CARE
Problem: Patient Centered Care  Goal: Patient preferences are identified and integrated in the patient's plan of care  Description: Interventions:  - What would you like us to know as we care for you? \"I am from [facility].\"  - Provide timely, complete, and accurate information to patient/family  - Incorporate patient and family knowledge, values, beliefs, and cultural backgrounds into the planning and delivery of care  - Encourage patient/family to participate in care and decision-making at the level they choose  - Honor patient and family perspectives and choices  5/25/2025 1738 by Maryjane Orozco RN  Outcome: Progressing  5/25/2025 1738 by Maryjane Orozco RN  Outcome: Progressing     Problem: Patient/Family Goals  Goal: Patient/Family Long Term Goal  Description: Patient's Long Term Goal: \"to be discharged\"    Interventions:  -Follow up MD appt  -Take meds as prescribed  -Use of assistive device if needed  - See additional Care Plan goals for specific interventions  5/25/2025 1738 by Maryjane Orozco RN  Outcome: Progressing  5/25/2025 1738 by Maryjane Orozco RN  Outcome: Progressing  Goal: Patient/Family Short Term Goal  Description: Patient's Short Term Goal: \"to prevent pain and infection\"    Interventions:   -Monitor VS  -Check labs results  -Administer IVF and IV ABT as ordered  -Provide pain meds as prescribed  - See additional Care Plan goals for specific interventions  5/25/2025 1738 by Maryjane Orozco RN  Outcome: Progressing  5/25/2025 1738 by Maryjane Orozco RN  Outcome: Progressing     Problem: SAFETY ADULT - FALL  Goal: Free from fall injury  Description: INTERVENTIONS:  - Assess pt frequently for physical needs  - Identify cognitive and physical deficits and behaviors that affect risk of falls.  - [place] fall precautions as indicated by assessment.  - Educate pt/family on patient safety including physical limitations  - Instruct pt to call for  assistance with activity based on assessment  - Modify environment to reduce risk of injury  - Provide assistive devices as appropriate  - Consider OT/PT consult to assist with strengthening/mobility  - Encourage toileting schedule  5/25/2025 1738 by Maryjane Orozco RN  Outcome: Progressing  5/25/2025 1738 by Maryjane Orozco RN  Outcome: Progressing     Problem: GASTROINTESTINAL - ADULT  Goal: Minimal or absence of nausea and vomiting  Description: INTERVENTIONS:  - Maintain adequate hydration with IV or PO as ordered and tolerated  - Nasogastric tube to low intermittent suction as ordered  - Evaluate effectiveness of ordered antiemetic medications  - Provide nonpharmacologic comfort measures as appropriate  - Advance diet as tolerated, if ordered  - Obtain nutritional consult as needed  - Evaluate fluid balance  5/25/2025 1738 by Maryjane Orozco RN  Outcome: Progressing  5/25/2025 1738 by Maryjane Orozco RN  Outcome: Progressing  Goal: Maintains or returns to baseline bowel function  Description: INTERVENTIONS:  - Assess bowel function  - Maintain adequate hydration with IV or PO as ordered and tolerated  - Evaluate effectiveness of GI medications  - Encourage mobilization and activity  - Obtain nutritional consult as needed  - Establish a toileting routine/schedule  - Consider collaborating with pharmacy to review patient's medication profile  5/25/2025 1738 by Maryjane Orozco RN  Outcome: Progressing  5/25/2025 1738 by Maryjane Orozco RN  Outcome: Progressing     Problem: METABOLIC/FLUID AND ELECTROLYTES - ADULT  Goal: Electrolytes maintained within normal limits  Description: INTERVENTIONS:  - Monitor labs and rhythm and assess patient for signs and symptoms of electrolyte imbalances  - Administer electrolyte replacement as ordered  - Monitor response to electrolyte replacements, including rhythm and repeat lab results as appropriate  - Fluid restriction as ordered  -  Instruct patient on fluid and nutrition restrictions as appropriate  5/25/2025 1738 by Maryjane Orozco RN  Outcome: Progressing  5/25/2025 1738 by Maryjane Orozco, RN  Outcome: Progressing     Problem: SKIN/TISSUE INTEGRITY - ADULT  Goal: Skin integrity remains intact  Description: INTERVENTIONS  - Assess and document risk factors for pressure ulcer development  - Assess and document skin integrity  - Monitor for areas of redness and/or skin breakdown  - Initiate interventions, skin care algorithm/standards of care as needed  5/25/2025 1738 by Maryjane Orozco RN  Outcome: Progressing  5/25/2025 1738 by Maryjane Orozco RN  Outcome: Progressing     Problem: CARDIOVASCULAR - ADULT  Goal: Maintains optimal cardiac output and hemodynamic stability  Description: INTERVENTIONS:  - Monitor vital signs, rhythm, and trends  - Monitor for bleeding, hypotension and signs of decreased cardiac output  - Evaluate effectiveness of vasoactive medications to optimize hemodynamic stability  - Monitor arterial and/or venous puncture sites for bleeding and/or hematoma  - Assess quality of pulses, skin color and temperature  - Assess for signs of decreased coronary artery perfusion - ex. Angina  - Evaluate fluid balance, assess for edema, trend weights  Outcome: Progressing

## 2025-05-25 NOTE — PLAN OF CARE
Problem: Patient Centered Care  Goal: Patient preferences are identified and integrated in the patient's plan of care  Description: Interventions:  - What would you like us to know as we care for you? \"I am from [facility].\"  - Provide timely, complete, and accurate information to patient/family  - Incorporate patient and family knowledge, values, beliefs, and cultural backgrounds into the planning and delivery of care  - Encourage patient/family to participate in care and decision-making at the level they choose  - Honor patient and family perspectives and choices  Outcome: Progressing     Problem: Patient/Family Goals  Goal: Patient/Family Long Term Goal  Description: Patient's Long Term Goal: \"to be discharged\"    Interventions:  -Follow up MD appt  -Take meds as prescribed  -Use of assistive device if needed  - See additional Care Plan goals for specific interventions  Outcome: Progressing  Goal: Patient/Family Short Term Goal  Description: Patient's Short Term Goal: \"to prevent pain and infection\"    Interventions:   -Monitor VS  -Check labs results  -Administer IVF and IV ABT as ordered  -Provide pain meds as prescribed  - See additional Care Plan goals for specific interventions  Outcome: Progressing     Problem: SAFETY ADULT - FALL  Goal: Free from fall injury  Description: INTERVENTIONS:  - Assess pt frequently for physical needs  - Identify cognitive and physical deficits and behaviors that affect risk of falls.  - [place] fall precautions as indicated by assessment.  - Educate pt/family on patient safety including physical limitations  - Instruct pt to call for assistance with activity based on assessment  - Modify environment to reduce risk of injury  - Provide assistive devices as appropriate  - Consider OT/PT consult to assist with strengthening/mobility  - Encourage toileting schedule  Outcome: Progressing     Problem: GASTROINTESTINAL - ADULT  Goal: Minimal or absence of nausea and  vomiting  Description: INTERVENTIONS:  - Maintain adequate hydration with IV or PO as ordered and tolerated  - Nasogastric tube to low intermittent suction as ordered  - Evaluate effectiveness of ordered antiemetic medications  - Provide nonpharmacologic comfort measures as appropriate  - Advance diet as tolerated, if ordered  - Obtain nutritional consult as needed  - Evaluate fluid balance  Outcome: Progressing  Goal: Maintains or returns to baseline bowel function  Description: INTERVENTIONS:  - Assess bowel function  - Maintain adequate hydration with IV or PO as ordered and tolerated  - Evaluate effectiveness of GI medications  - Encourage mobilization and activity  - Obtain nutritional consult as needed  - Establish a toileting routine/schedule  - Consider collaborating with pharmacy to review patient's medication profile  Outcome: Progressing     Problem: METABOLIC/FLUID AND ELECTROLYTES - ADULT  Goal: Electrolytes maintained within normal limits  Description: INTERVENTIONS:  - Monitor labs and rhythm and assess patient for signs and symptoms of electrolyte imbalances  - Administer electrolyte replacement as ordered  - Monitor response to electrolyte replacements, including rhythm and repeat lab results as appropriate  - Fluid restriction as ordered  - Instruct patient on fluid and nutrition restrictions as appropriate  Outcome: Progressing     Problem: SKIN/TISSUE INTEGRITY - ADULT  Goal: Skin integrity remains intact  Description: INTERVENTIONS  - Assess and document risk factors for pressure ulcer development  - Assess and document skin integrity  - Monitor for areas of redness and/or skin breakdown  - Initiate interventions, skin care algorithm/standards of care as needed  Outcome: Progressing

## 2025-05-25 NOTE — ANESTHESIA POSTPROCEDURE EVALUATION
Patient: Justyn Hall    Procedure Summary       Date: 05/25/25 Room / Location: [facility] ENDOSCOPY 05 / [facility] ENDOSCOPY    Anesthesia Start: 1156 Anesthesia Stop:     Procedure: ESOPHAGOGASTRODUODENOSCOPY (EGD) Diagnosis: (duodenal ulcer with hemorrhage)    Surgeons: Zak Payne MD Anesthesiologist: Jessica Jovel MD    Anesthesia Type: MAC ASA Status: 4 - Emergent            Anesthesia Type: MAC    Vitals Value Taken Time   /63 05/25/25 12:22   Temp  05/25/25 12:22   Pulse 78 05/25/25 12:22   Resp 24 05/25/25 12:22   SpO2 97 % 05/25/25 12:22   Vitals shown include unfiled device data.    [facility] AN Post Evaluation:   Patient Evaluated in PACU  Patient Participation: complete - patient participated  Level of Consciousness: awake  Pain Score: 0  Pain Management: adequate  Airway Patency:patent  Dental exam unchanged from preop  Yes    Cardiovascular Status: acceptable  Respiratory Status: acceptable  Postoperative Hydration acceptable      JESSICA JOVEL MD  5/25/2025 12:22 PM

## 2025-05-25 NOTE — OPERATIVE REPORT
[facility]      EGD PROCEDURE REPORT    DATE OF PROCEDURE:  5/25/2025    PCP: No primary care provider on file.     PREOPERATIVE DIAGNOSIS: Duodenal ulcer with exposed vessel, GI bleeding with melena and acute blood loss anemia     POSTOPERATIVE DIAGNOSIS:  See impression.     SURGEON:  Zak Payne M.D.     SEDATION:    MAC anesthesia provided by the Anesthesia Service.  MAC anesthesia requested due to suspected active GI bleed and potential need for intubation even resuscitation; anticipated intolerance of EGD examination under safe doses conscious sedation medications    EGD PROCEDURE:    After the nature and risks of EGD examination with possible therapeutic maneuvers for control of bleeding were discussed with the patient and all questions answered, informed consent was obtained.  The patient was sedated as above.    Once sedated, the Olympus adult double channel therapeutic gastroscope was placed in the patient's mouth and advanced under direct visualization through the oropharynx into the esophagus, on down through the stomach and pylorus to the second portion of the duodenum.  Retroflexion was performed in the stomach.    Estimated blood loss: < 20 mL       EGD FINDINGS:    Esophagus and GE junction: Impressive improvement in the reflux esophagitis and distal esophageal ulcerations.  Ulcerations esophageal mucosa are now much more superficial.  No red spots or deeper ulcers to suggest of recent bleeding.    Stomach: Clear secretions present.  No ulcer or changes in the stomach.    Duodenum: Fresh red blood pooled in the duodenal bulb, washed and suctioned out.  The previous 8+ mm white duodenal bulb ulcer was again present.  There was one red spot approximately near a remaining Endo Clip which was not bleeding and did not bleed when irrigated.  Immediately distal to this area and the Endo Clip there was a second protuberant lesion, visible ruptured vessel which went irrigated with  the gastroscope immediately began pulsatile type bleeding.  Washing and suctioning out fresh blood through the second dedicated suction channel, we were able to keep a view while preparing the APC system.  The somewhat superficial Endo Clip was swept out of the way.    APC probe with generator set for duodenal setting was used to make several burns over the duodenal ulcer bleeding vessel with hemostasis.  I watched for several minutes send there was no further bleeding.  Photographs taken.    IMPRESSION:  Rebleed or a second bleeding point from the known duodenal ulcer, treated today with cautery as above.    RECOMMENDATIONS:    Continue high-dose IV pantoprazole PPI therapy  Clear liquid diet for now, continue close observation

## 2025-05-25 NOTE — PROGRESS NOTES
[facility]  GI SERVICE PROGRESS NOTE    Justyn Hall Patient Status:  Inpatient    1940 MRN [id number]   Location [facility] 5SW/SE Attending Paola Lo MD   Hosp Day # 11 PCP No primary care provider on file.       Subjective:     More melena, frankly bloody stools overnight and this morning.  Remains pleasantly confused, oblivious, denies pain.    Morning labs today show Hgb 5.6g from previous Hgb 8.0 g and 7.7 g last evening.  He woke up with a hemoglobin 6.7 g 24 hours ago 2025 morning labs.    Afebrile; vitals stable no change in blood pressure.    Objective:   Blood pressure 134/81, pulse 78, temperature 98.1 °F (36.7 °C), temperature source Oral, resp. rate 24, height 6' (1.829 m), weight 172 lb 8 oz (78.2 kg), SpO2 99%.    GENERAL: ?Pleasantly confused; seen and assessed in the endoscopy holding area  HEENT: Normocephalic; pupils equally round and reactive to light; no temporal wasting; no thyromegaly or cervical lymphadenopathy  PULM: Breathing and speaking comfortably  ABD: soft/nondistended nontender  EXT: No edema  SKIN: No rash        Results:       Recent Labs   Lab 25  0546 25  0844 25  0523 25  1043 25  1456 25  1960 25  0721   RBC 2.65*  --  2.38*  --   --   --  1.98*   HGB 7.0*   < > 6.6*   < > 7.7* 8.0* 5.6*   HCT 22.8*   < > 20.5*   < > 23.8* 25.9* 17.4*   MCV 86.0  --  86.1  --   --   --  87.9   MCH 26.4  --  27.7  --   --   --  28.3   MCHC 30.7*  --  32.2  --   --   --  32.2   RDW 15.9*  --  15.6*  --   --   --  15.5*   NEPRELIM 8.04*  --  9.70*  --   --   --  13.31*   WBC 12.8*  --  13.5*  --   --   --  16.1*   .0*  --  477.0*  --   --   --  374.0    < > = values in this interval not displayed.       Lab Results   Component Value Date    INR 1.11 04/10/2025       Recent Labs   Lab 25  0546 25  0523 25  0615   GLU 99 97 130*   BUN 21 25* 21   CREATSERUM 1.21 1.30 1.17   CA 6.8* 7.0*  6.8*    141 142   K 3.8 3.8 3.2*    107 109   CO2 26.0 25.0 23.0       No results for input(s): \"RIVER\", \"LIP\" in the last 168 hours.    Recent Labs   Lab 05/19/25  0541 05/20/25  0542 05/21/25  0526 05/22/25  0546 05/23/25  0523   MG 1.1*   < > 1.2* 1.8 1.6   PHOS 2.7  --   --   --   --     < > = values in this interval not displayed.       No results for input(s): \"URINE\", \"CULTI\", \"BLDSMR\" in the last 168 hours.      No results found.        Assessment and Plan:     84-year-old gentleman with BMI 23.6, history of Parkinson's disease chronic kidney disease and prostatic obstruction concerns.    Admitted through the ED 5/14/2025 with initial concerns for weakness fever hypotension urinary retention at nursing facility.    Found to have melena, acute on chronic anemia during the hospitalization.    2 recent EGD examinations 5/17/2025 and 5/22/2025 show severe reflux esophagitis with multiple ulcers distal esophagus and a duodenal bulb ulcer with exposed vessel, question of recent rebleed.    More melena 5/23/2025.    Transfused total 3 units RBCs so far this admission      5/24/2025:  Hgb 6.6g --> 7.6g --> 6.7g; BUN trending up from 13 --> 25  5/25/2025: More melena and suspected bleeding; morning labs show hemoglobin 5.6g    1.  Juno 1b+ duodenal ulceration post endoscopic therapy  s/p repeat EGD examination on 5/22/2025. No further overt bleeding noted.   Gastric path negative for H pylori.   Continue high-dose pantoprazole PPI BID (IVP), he should be on a PPI indefinitely.    Aspirin/NSAIDs should be avoided if possible.      Despite 2 interventions so far, ongoing concern for melena and suspected bleeding acute blood loss anemia most likely from this duodenal ulcer.  We will proceed with another therapeutic endoscopy examination this morning.     2.  LA grade D esophagitis  Acid peptic in nature. Esophageal bx consistent with erosive esophagitis.  Should be resolving on high-dose PPI  therapy.  Continue proton pump inhibitor therapy indefinitely.  Although a surveillance endoscopy to evaluate for Zeng's esophagus would normally be considered, observation on PPI therapy without repeat endoscopy would not be unreasonable in light of age and comorbidities.     3.  Concern for possible aspiration pneumonia  Zosyn broad-spectrum antibiotic (5/22/2025)    4.   Diminutive colon polyps  These were subcentimeter and benign in appearance.    Would not proceed with a repeat colonoscopy and polypectomy in light of age and comorbidities.        Please see today's EGD procedure report to follow.      Zak Payne MD        Over 25 minutes spent today reviewing today's and recent data; greater than 50% of that time was spent counseling the patient and coordination of care with RN and other involved physicians.    Digital transcription software was utilized to produce this note. The note was proofread for content only. Typographical errors may remain.

## 2025-05-25 NOTE — SIGNIFICANT EVENT
RRT    *See RRT Documentation Record*    Reason the RRT was called: Patient became dizzy after standing and fainted in bed, and was not responding, or able to follow commands.   Assessment of patient leading up to RRT: Alert and oriented times four, vitals within normal limits.  Interventions/Testing: N/a  Patient Outcome/Disposition: Patient started feeling better, dangled legs at edge of bed, currently resting comfortably in bed   Family Notified: no  Name of family notified: n/a

## 2025-05-25 NOTE — ANESTHESIA PREPROCEDURE EVALUATION
Anesthesia PreOp Note    HPI:     Justyn Hall is a 84 year old male who presents for preoperative consultation requested by: Zak Payne MD    Date of Surgery: 5/14/2025 - 5/25/2025    Procedure(s):  ESOPHAGOGASTRODUODENOSCOPY (EGD)  Indication: GI bleed    Relevant Problems   No relevant active problems       NPO:  Last Liquid Consumption Date: 05/25/25  Last Liquid Consumption Time: 2100  Last Solid Consumption Date: 05/24/25  Last Solid Consumption Time: 1900  Last Liquid Consumption Date: 05/25/25          History Review:  Patient Active Problem List    Diagnosis Date Noted    Hyponatremia 05/14/2025    Anemia 05/14/2025    Azotemia 05/14/2025    Acute kidney injury (HCC) 05/14/2025    UTI (urinary tract infection) 05/14/2025    Urinary tract infection with hematuria, site unspecified 05/14/2025    JORDON (acute kidney injury) 05/14/2025    Counseling regarding advance care planning and goals of care 04/15/2025    Dysphagia 04/11/2025    Palliative care by specialist 04/11/2025    Constipation 04/11/2025    Leukocytosis 04/11/2025    History of recent fall 04/11/2025    Urinary retention 04/10/2025    Abdominal pain, acute 04/10/2025    Acute renal failure, unspecified acute renal failure type 04/10/2025    Community acquired pneumonia, unspecified laterality 04/10/2025    Hyperkalemia 04/10/2025    Anemia, unspecified type 04/10/2025    Obstructed, uropathy 04/10/2025    Parkinsonism (HCC) 04/05/2025    Contusion of left hip, initial encounter 04/04/2025    Contusion of left shoulder, initial encounter 04/04/2025    Left hip pain 04/04/2025    Hypercholesterolemia 06/20/2017    Elevated PSA 10/01/2014       Past Medical History[1]    Past Surgical History[2]    Prescriptions Prior to Admission[3]  Current Medications and Prescriptions Ordered in Epic[4]    Allergies[5]    Family History[6]  Social Hx on file[7]    Available pre-op labs reviewed.  Lab Results   Component Value Date    WBC 16.1  (H) 05/25/2025    RBC 1.98 (L) 05/25/2025    HGB 5.6 (LL) 05/25/2025    HCT 17.4 (L) 05/25/2025    MCV 87.9 05/25/2025    MCH 28.3 05/25/2025    MCHC 32.2 05/25/2025    RDW 15.5 (H) 05/25/2025    .0 05/25/2025     Lab Results   Component Value Date     05/25/2025    K 3.2 (L) 05/25/2025     05/25/2025    CO2 23.0 05/25/2025    BUN 21 05/25/2025    CREATSERUM 1.17 05/25/2025     (H) 05/25/2025    PGLU 129 (H) 05/25/2025    CA 6.8 (L) 05/25/2025          Vital Signs:  Body mass index is 23.4 kg/m².   height is 1.829 m (6') and weight is 78.2 kg (172 lb 8 oz). His oral temperature is 98.1 °F (36.7 °C). His blood pressure is 134/81 and his pulse is 78. His respiration is 24 and oxygen saturation is 99%.   Vitals:    05/25/25 0941 05/25/25 0959 05/25/25 1043 05/25/25 1111   BP: 119/74 126/74 124/67 134/81   Pulse: 87 82 89 78   Resp: 18 16 17 24   Temp: 98.2 °F (36.8 °C) 98.4 °F (36.9 °C) 98.1 °F (36.7 °C)    TempSrc: Oral Oral Oral    SpO2: 98% 96% 99% 99%   Weight:       Height:            Anesthesia Evaluation     Patient summary reviewed and Nursing notes reviewed    Airway   Mallampati: II  TM distance: <3 FB  Neck ROM: limited  Dental      Comment: poor    Pulmonary - negative ROS   (+) decreased breath sounds  Cardiovascular - normal exam    ROS comment: Severe anemia    Neuro/Psych      Comments: parkinson    GI/Hepatic/Renal - negative ROS   (+) chronic renal disease    Endo/Other - negative ROS   Abdominal  - normal exam                 Anesthesia Plan:   ASA:  4  Emergent    Plan:   MAC  Post-op Pain Management: IV analgesics  Plan Comments: Getting transfused    Informed Consent Plan and Risks Discussed With:  Patient  Use of Blood Products Discussed With:  Patient      I have informed Justyn Hall and/or legal guardian or family member of the nature of the anesthetic plan, benefits, risks including possible dental damage if relevant, major complications, and any alternative  forms of anesthetic management.   All of the patient's questions were answered to the best of my ability. The patient desires the anesthetic management as planned.  JESSICA JOVEL MD  2025 11:17 AM  Present on Admission:   Hyponatremia   Anemia   Azotemia   Acute kidney injury (HCC)           [1]   Past Medical History:   Fracture of arm    fracture of left arm - cast   [2]   Past Surgical History:  Procedure Laterality Date    Colonoscopy N/A 2025    Procedure: COLONOSCOPY;  Surgeon: Jayden Vargas MD;  [facility] ENDOSCOPY    Hip surgery      Other surgical history  2014     prostate biopsy    Spinal fusion     [3]   Medications Prior to Admission   Medication Sig Dispense Refill Last Dose/Taking    acetaminophen 325 MG Oral Tab Take 1 tablet (325 mg total) by mouth every 6 (six) hours as needed for Pain.   Taking As Needed    polyethylene glycol, PEG 3350, 17 g Oral Powd Pack Take 17 g by mouth daily.   Taking    midodrine 2.5 MG Oral Tab Take 1 tablet (2.5 mg total) by mouth in the morning and 1 tablet (2.5 mg total) at noon and 1 tablet (2.5 mg total) in the evening.   Taking    [] tamsulosin 0.4 MG Oral Cap Take 1 capsule (0.4 mg total) by mouth at bedtime. 30 capsule 0 Taking    carbidopa-levodopa  MG Oral Tab Take 0.5 tablets by mouth 3 (three) times daily for 14 days, THEN 1 tablet 3 (three) times daily. Give half a tab 3 times a day, 5 hours between each dose for 3 days.  Then full tab 3 times a day 5 hours between each dose.  Give ideally on empty stomach.  Give 30 to 45 minutes before a meal or 45 to 60 minutes after a meal.. 249 tablet 0 Taking    aspirin 81 MG Oral Tab EC Take 1 tablet (81 mg total) by mouth daily. 30 tablet 0 Taking   [4]   Current Facility-Administered Medications Ordered in Epic   Medication Dose Route Frequency Provider Last Rate Last Admin    potassium chloride 40 mEq in 250mL sodium chloride 0.9% IVPB premix  40 mEq Intravenous Once Teresita,  MD Paola 62.5 mL/hr at 05/25/25 1018 40 mEq at 05/25/25 1018    sodium chloride 0.9% infusion   Intravenous Once Paola Lo MD        sodium chloride 0.9% infusion   Intravenous Once Paola Lo MD        furosemide (Lasix) 10 mg/mL injection 10 mg  10 mg Intravenous Once Paola Lo MD        magnesium oxide (Mag-Ox) tab 400 mg  400 mg Oral Once Mariza Ruiz MD        sodium chloride 0.9 % IV bolus 250 mL  250 mL Intravenous Once Mariza Ruiz MD        piperacillin-tazobactam (Zosyn) 3.375 g in dextrose 5% 100 mL IVPB-ADDV  3.375 g Intravenous Q8H Paola Lo MD 25 mL/hr at 05/25/25 0530 3.375 g at 05/25/25 0530    sodium chloride 0.9% infusion   Intravenous Continuous Paola Lo MD 75 mL/hr at 05/25/25 0456 New Bag at 05/25/25 0456    pantoprazole (Protonix) 40 mg in sodium chloride 0.9% PF 10 mL IV push  40 mg Intravenous Q12H Hattie Alex MD   40 mg at 05/25/25 0533    aspirin DR tab 81 mg  81 mg Oral Daily Alyx Melendez MD   81 mg at 05/24/25 0941    carbidopa-levodopa (SINEMET)  MG per tab 1 tablet  1 tablet Oral TID Alyx Melendez MD   1 tablet at 05/24/25 2123    [Held by provider] midodrine (ProAmatine) tab 2.5 mg  2.5 mg Oral TID Alyx Melendez MD        tamsulosin (Flomax) cap 0.8 mg  0.8 mg Oral Nightly Mary Crocker APRN   0.8 mg at 05/24/25 2123    finasteride (Proscar) tab 5 mg  5 mg Oral Daily Mary Crocker APRN   5 mg at 05/24/25 0941    acetaminophen (Tylenol Extra Strength) tab 500 mg  500 mg Oral Q4H PRN Tg Clarke MD   500 mg at 05/14/25 2331    ondansetron (Zofran) 4 MG/2ML injection 4 mg  4 mg Intravenous Q6H PRN Tg Clarke MD   4 mg at 05/15/25 2248    metoclopramide (Reglan) 5 mg/mL injection 5 mg  5 mg Intravenous Q8H PRN Tg Clarke MD        labetalol (Trandate) 5 mg/mL injection 10 mg  10 mg Intravenous Q4H PRN Andria Galvan MD   10 mg at 05/21/25 0519    acetaminophen (OfJohn Paul Jones Hospitalev) 10 mg/mL  infusion premix 1,000 mg  1,000 mg Intravenous Q6H PRN Andria Galvan  mL/hr at 05/15/25 0004 1,000 mg at 05/15/25 0004     No current [facility]-ordered outpatient medications on file.   [5] No Known Allergies  [6] History reviewed. No pertinent family history.  [7]   Social History  Socioeconomic History    Marital status: Single   Tobacco Use    Smoking status: Never    Smokeless tobacco: Never   Vaping Use    Vaping status: Never Used   Substance and Sexual Activity    Alcohol use: No     Alcohol/week: 0.0 standard drinks of alcohol    Drug use: No   Other Topics Concern    Caffeine Concern Yes     Comment: coffee, occasionally    Reaction to local anesthetic No    Right Handed Yes

## 2025-05-26 LAB
ALBUMIN SERPL-MCNC: 2.6 G/DL (ref 3.2–4.8)
ALBUMIN/GLOB SERPL: 1.3 {RATIO} (ref 1–2)
ALP LIVER SERPL-CCNC: 55 U/L (ref 45–117)
ALT SERPL-CCNC: <7 U/L (ref 10–49)
ANION GAP SERPL CALC-SCNC: 9 MMOL/L (ref 0–18)
AST SERPL-CCNC: 9 U/L (ref ?–34)
BASOPHILS # BLD AUTO: 0.11 X10(3) UL (ref 0–0.2)
BASOPHILS NFR BLD AUTO: 0.9 %
BILIRUB SERPL-MCNC: 1.1 MG/DL (ref 0.2–1.1)
BLOOD TYPE BARCODE: 6200
BUN BLD-MCNC: 19 MG/DL (ref 9–23)
BUN/CREAT SERPL: 14.3 (ref 10–20)
CALCIUM BLD-MCNC: 7 MG/DL (ref 8.7–10.4)
CHLORIDE SERPL-SCNC: 109 MMOL/L (ref 98–112)
CO2 SERPL-SCNC: 23 MMOL/L (ref 21–32)
CREAT BLD-MCNC: 1.33 MG/DL (ref 0.7–1.3)
DEPRECATED RDW RBC AUTO: 49.5 FL (ref 35.1–46.3)
EGFRCR SERPLBLD CKD-EPI 2021: 52 ML/MIN/1.73M2 (ref 60–?)
EOSINOPHIL # BLD AUTO: 0.38 X10(3) UL (ref 0–0.7)
EOSINOPHIL NFR BLD AUTO: 3 %
ERYTHROCYTE [DISTWIDTH] IN BLOOD BY AUTOMATED COUNT: 15.5 % (ref 11–15)
GLOBULIN PLAS-MCNC: 2 G/DL (ref 2–3.5)
GLUCOSE BLD-MCNC: 84 MG/DL (ref 70–99)
HCT VFR BLD AUTO: 19.4 % (ref 39–53)
HCT VFR BLD AUTO: 22.9 % (ref 39–53)
HCT VFR BLD AUTO: 25 % (ref 39–53)
HGB BLD-MCNC: 6.1 G/DL (ref 13–17.5)
HGB BLD-MCNC: 7.4 G/DL (ref 13–17.5)
HGB BLD-MCNC: 7.9 G/DL (ref 13–17.5)
IMM GRANULOCYTES # BLD AUTO: 0.19 X10(3) UL (ref 0–1)
IMM GRANULOCYTES NFR BLD: 1.5 %
LYMPHOCYTES # BLD AUTO: 1.88 X10(3) UL (ref 1–4)
LYMPHOCYTES NFR BLD AUTO: 14.8 %
MCH RBC QN AUTO: 28.9 PG (ref 26–34)
MCHC RBC AUTO-ENTMCNC: 32.3 G/DL (ref 31–37)
MCV RBC AUTO: 89.5 FL (ref 80–100)
MONOCYTES # BLD AUTO: 0.95 X10(3) UL (ref 0.1–1)
MONOCYTES NFR BLD AUTO: 7.5 %
NEUTROPHILS # BLD AUTO: 9.15 X10 (3) UL (ref 1.5–7.7)
NEUTROPHILS # BLD AUTO: 9.15 X10(3) UL (ref 1.5–7.7)
NEUTROPHILS NFR BLD AUTO: 72.3 %
OSMOLALITY SERPL CALC.SUM OF ELEC: 293 MOSM/KG (ref 275–295)
PLATELET # BLD AUTO: 356 10(3)UL (ref 150–450)
POTASSIUM SERPL-SCNC: 3.5 MMOL/L (ref 3.5–5.1)
POTASSIUM SERPL-SCNC: 3.5 MMOL/L (ref 3.5–5.1)
PROT SERPL-MCNC: 4.6 G/DL (ref 5.7–8.2)
RBC # BLD AUTO: 2.56 X10(6)UL (ref 3.8–5.8)
SODIUM SERPL-SCNC: 141 MMOL/L (ref 136–145)
UNIT VOLUME: 350 ML
WBC # BLD AUTO: 12.7 X10(3) UL (ref 4–11)

## 2025-05-26 PROCEDURE — 99233 SBSQ HOSP IP/OBS HIGH 50: CPT | Performed by: FAMILY MEDICINE

## 2025-05-26 PROCEDURE — 99233 SBSQ HOSP IP/OBS HIGH 50: CPT | Performed by: INTERNAL MEDICINE

## 2025-05-26 RX ORDER — SODIUM CHLORIDE 9 MG/ML
INJECTION, SOLUTION INTRAVENOUS ONCE
Status: COMPLETED | OUTPATIENT
Start: 2025-05-26 | End: 2025-05-26

## 2025-05-26 NOTE — PROGRESS NOTES
[facility]  GI SERVICE PROGRESS NOTE    Justyn Hall Patient Status:  Inpatient    1940 MRN [id number]   Location [facility] 5SW/SE Attending Paola Lo MD   Hosp Day # 12 PCP No primary care provider on file.       Subjective:     Seen and assessed approximately 1400 this afternoon.  Passed one more small brown/red stool approximately 05 45 this morning  No abdominal pain, again good appetite  H&H seems to be trending up after ?Hgb 6.1 g this morning    Family gathered at bedside.  We discussed options if bleeding definitively resumes including repeat endoscopy or abdominal angiography/embolization.    Afebrile; HR 60s-70s; blood pressures stable    Objective:   Blood pressure 131/69, pulse 74, temperature 97.7 °F (36.5 °C), temperature source Oral, resp. rate 16, height 6' (1.829 m), weight 171 lb 11.2 oz (77.9 kg), SpO2 97%.    GENERAL: ?Pleasantly confused; sitting up in chair  HEENT: Normocephalic; pupils equally round and reactive to light; no temporal wasting; no thyromegaly or cervical lymphadenopathy  PULM: Breathing and speaking comfortably  ABD: soft/nondistended nontender  EXT: No edema  SKIN: No rash        Results:       Recent Labs   Lab 25  0523 25  1043 25  0721 25  1322 25  0132 25  0858   RBC 2.38*  --  1.98*  --   --  2.56*   HGB 6.6*   < > 5.6* 8.2* 6.1* 7.4*   HCT 20.5*   < > 17.4* 26.3* 19.4* 22.9*   MCV 86.1  --  87.9  --   --  89.5   MCH 27.7  --  28.3  --   --  28.9   MCHC 32.2  --  32.2  --   --  32.3   RDW 15.6*  --  15.5*  --   --  15.5*   NEPRELIM 9.70*  --  13.31*  --   --  9.15*   WBC 13.5*  --  16.1*  --   --  12.7*   .0*  --  374.0  --   --  356.0    < > = values in this interval not displayed.       Lab Results   Component Value Date    INR 1.11 04/10/2025       Recent Labs   Lab 25  0523 25  0615 25  0858   GLU 97 130* 84   BUN 25* 21 19   CREATSERUM 1.30 1.17 1.33*   CA 7.0* 6.8*  7.0*   ALB  --   --  2.6*    142 141   K 3.8 3.2* 3.5  3.5    109 109   CO2 25.0 23.0 23.0   ALKPHO  --   --  55   AST  --   --  9   ALT  --   --  <7*   BILT  --   --  1.1   TP  --   --  4.6*       No results for input(s): \"RIVER\", \"LIP\" in the last 168 hours.    Recent Labs   Lab 05/21/25  0526 05/22/25  0546 05/23/25  0523   MG 1.2* 1.8 1.6       No results for input(s): \"URINE\", \"CULTI\", \"BLDSMR\" in the last 168 hours.      No results found.        Assessment and Plan:     84-year-old gentleman with BMI 23.6, history of Parkinson's disease chronic kidney disease and prostatic obstruction concerns.    Admitted through the ED 5/14/2025 with initial concerns for weakness fever hypotension urinary retention at nursing facility.    Found to have melena, acute on chronic anemia during the hospitalization.    2 recent EGD examinations 5/17/2025 and 5/22/2025 show severe reflux esophagitis with multiple ulcers distal esophagus and a duodenal bulb ulcer with exposed vessel, question of recent rebleed.    More melena 5/23 - 5/24/2025.    s/p repeat EGD examination 5/25/2025 with APC cautery of bleeding vessel same duodenal ulcer    1 small brown/red stool approximately 05:45 5/26/2025 AM    Transfused total 8 units RBCs so far this admission since 5/16/2025    Last transfusion was 1 RBC approximately 0300 this morning 5/26/2025 5/26/2025:  Hgb 5.6g 5/25 --> 8.2g --> 6.1g --> 7.4g --> 7.9g; BUN trend 13 --> 25 --> 21 --> 19      1.  Juno 1b+ duodenal ulceration post endoscopic therapy  s/p repeat EGD examination w intervenstion on 5/25/2025    Gastric path negative for H pylori.  Family denies prior aspirin/NSAID use.  Hold inpatient aspirin therapy (!)  for this patient hospitalized with severe ulcer bleed  Continue high-dose pantoprazole PPI q8hr (IVP); he should be on a PPI indefinitely.    Aspirin/NSAIDs should be avoided if possible.      Unclear if ongoing active/intermittent bleeding 5/26 or residual  melena output and hematocrit reequilibration from brisk bleed previous 2 days.    Would discontinue every 12hr H&H assays unless further silvano/large volume melena  Next CBC tomorrow morning    As above, further options to address suspected active duodenal ulcer bleeding would include repeat EGD and endoscopic therapy (Hemospray?) versus abdominal angiogram and embolization ?gastroduodenal artery or branches.  Risks of each of those interventions reviewed with family at bedside today.       2.  LA grade D esophagitis  Acid peptic in nature. Esophageal bx consistent with erosive esophagitis.  Rapidly resolving on high-dose PPI therapy as per EGD findings 5/25/2025.  Continue proton pump inhibitor therapy indefinitely.  Although a surveillance endoscopy to evaluate for Zeng's esophagus would normally be considered, observation on PPI therapy without repeat endoscopy would not be unreasonable in light of age and comorbidities.     3.  Concern for possible aspiration pneumonia  Zosyn broad-spectrum antibiotic (5/22/2025)    4.   Diminutive colon polyps  These were subcentimeter and benign in appearance.    Would not proceed with a repeat colonoscopy and polypectomy in light of age and comorbidities.          Zak Payne MD        Over 35 minutes spent today reviewing today's and recent data; greater than 50% of that time was spent counseling the patient and coordination of care with RN and other involved physicians.    Digital transcription software was utilized to produce this note. The note was proofread for content only. Typographical errors may remain.

## 2025-05-26 NOTE — PLAN OF CARE
Problem: Patient Centered Care  Goal: Patient preferences are identified and integrated in the patient's plan of care  Description: Interventions:  - What would you like us to know as we care for you? \"I am from [facility].\"  - Provide timely, complete, and accurate information to patient/family  - Incorporate patient and family knowledge, values, beliefs, and cultural backgrounds into the planning and delivery of care  - Encourage patient/family to participate in care and decision-making at the level they choose  - Honor patient and family perspectives and choices  Outcome: Progressing     Problem: Patient/Family Goals  Goal: Patient/Family Long Term Goal  Description: Patient's Long Term Goal: \"to be discharged\"    Interventions:  -Follow up MD appt  -Take meds as prescribed  -Use of assistive device if needed  - See additional Care Plan goals for specific interventions  Outcome: Progressing  Goal: Patient/Family Short Term Goal  Description: Patient's Short Term Goal: \"to prevent pain and infection\"    Interventions:   -Monitor VS  -Check labs results  -Administer IVF and IV ABT as ordered  -Provide pain meds as prescribed  - See additional Care Plan goals for specific interventions  Outcome: Progressing     Problem: SAFETY ADULT - FALL  Goal: Free from fall injury  Description: INTERVENTIONS:  - Assess pt frequently for physical needs  - Identify cognitive and physical deficits and behaviors that affect risk of falls.  - [place] fall precautions as indicated by assessment.  - Educate pt/family on patient safety including physical limitations  - Instruct pt to call for assistance with activity based on assessment  - Modify environment to reduce risk of injury  - Provide assistive devices as appropriate  - Consider OT/PT consult to assist with strengthening/mobility  - Encourage toileting schedule  Outcome: Progressing     Problem: GASTROINTESTINAL - ADULT  Goal: Minimal or absence of nausea and  vomiting  Description: INTERVENTIONS:  - Maintain adequate hydration with IV or PO as ordered and tolerated  - Nasogastric tube to low intermittent suction as ordered  - Evaluate effectiveness of ordered antiemetic medications  - Provide nonpharmacologic comfort measures as appropriate  - Advance diet as tolerated, if ordered  - Obtain nutritional consult as needed  - Evaluate fluid balance  Outcome: Progressing  Goal: Maintains or returns to baseline bowel function  Description: INTERVENTIONS:  - Assess bowel function  - Maintain adequate hydration with IV or PO as ordered and tolerated  - Evaluate effectiveness of GI medications  - Encourage mobilization and activity  - Obtain nutritional consult as needed  - Establish a toileting routine/schedule  - Consider collaborating with pharmacy to review patient's medication profile  Outcome: Progressing     Problem: METABOLIC/FLUID AND ELECTROLYTES - ADULT  Goal: Electrolytes maintained within normal limits  Description: INTERVENTIONS:  - Monitor labs and rhythm and assess patient for signs and symptoms of electrolyte imbalances  - Administer electrolyte replacement as ordered  - Monitor response to electrolyte replacements, including rhythm and repeat lab results as appropriate  - Fluid restriction as ordered  - Instruct patient on fluid and nutrition restrictions as appropriate  Outcome: Progressing     Problem: SKIN/TISSUE INTEGRITY - ADULT  Goal: Skin integrity remains intact  Description: INTERVENTIONS  - Assess and document risk factors for pressure ulcer development  - Assess and document skin integrity  - Monitor for areas of redness and/or skin breakdown  - Initiate interventions, skin care algorithm/standards of care as needed  Outcome: Progressing     Problem: CARDIOVASCULAR - ADULT  Goal: Maintains optimal cardiac output and hemodynamic stability  Description: INTERVENTIONS:  - Monitor vital signs, rhythm, and trends  - Monitor for bleeding, hypotension and  signs of decreased cardiac output  - Evaluate effectiveness of vasoactive medications to optimize hemodynamic stability  - Monitor arterial and/or venous puncture sites for bleeding and/or hematoma  - Assess quality of pulses, skin color and temperature  - Assess for signs of decreased coronary artery perfusion - ex. Angina  - Evaluate fluid balance, assess for edema, trend weights  Outcome: Progressing

## 2025-05-26 NOTE — PROGRESS NOTES
Progress Note     Justyn Hall Patient Status:  Inpatient    1940 MRN [id number]   Location [facility] 5SW/SE Attending Mariza Ruiz MD   Hosp Day # 12 PCP No primary care provider on file.     Subjective:   S: Patient continue to drop hb , however feels fine. Denies  any complaints. Again this am hb at 6.1, PRBC ordered per night shift    Review of Systems:   10 point ROS completed and was negative, except for pertinent positive and negatives stated in subjective.    Objective:   Vital signs:  Temp:  [97.3 °F (36.3 °C)-98.1 °F (36.7 °C)] 98 °F (36.7 °C)  Pulse:  [57-81] 75  Resp:  [16-26] 16  BP: (107-152)/(61-91) 133/67  SpO2:  [95 %-100 %] 100 %    Wt Readings from Last 6 Encounters:   25 171 lb 11.2 oz (77.9 kg)   04/10/25 175 lb 4.3 oz (79.5 kg)   25 168 lb 3.2 oz (76.3 kg)   10/10/19 182 lb (82.6 kg)   06/10/19 182 lb (82.6 kg)   19 181 lb (82.1 kg)         Physical Exam:    General: No acute distress. Alert ,         Respiratory: Clear to auscultation bilaterally. No wheezes. No rhonchi.  Cardiovascular: S1, S2. Regular rate and rhythm. No murmurs, rubs or gallops.   Abdomen: Soft, nontender, nondistended.  Positive bowel sounds. No rebound or guarding.  Neurologic: No focal neurological deficits.   Musculoskeletal: Moves all extremities.  Extremities: No edema.    Results:   Diagnostic Data:      Labs:    Labs Last 24 Hours:   BMP     CBC    Other     Na 141 Cl 109 BUN 19 Glu 84   Hb 7.4   PTT - Procal -   K 3.5; 3.5 CO2 23.0 Cr 1.33   WBC 12.7 >< .0  INR - CRP -   Renal Lytes Endo    Hct 22.9   Trop - D dim -   eGFR - Ca 7.0 POC Gluc  -    LFT   pBNP - Lactic -   eGFR AA - PO4 - A1c -   AST 9 APk 55 Prot 4.6  LDL -     Mg - TSH -   ALT <7 T faby 1.1 Alb 2.6        COVID-19 Lab Results    COVID-19  Lab Results   Component Value Date    COVID19 Not Detected 2025    COVID19 Not Detected 04/10/2025       Pro-Calcitonin  No results for input(s): \"PCT\" in the  last 168 hours.    Cardiac  No results for input(s): \"TROP\", \"PBNP\" in the last 168 hours.    Creatinine Kinase  No results for input(s): \"CK\" in the last 168 hours.    Inflammatory Markers  No results for input(s): \"CRP\", \"TARIK\", \"LDH\", \"DDIMER\" in the last 168 hours.      Imaging: Imaging data reviewed in Epic.    Medications: Scheduled Medications[1]    Assessment & Plan:   ASSESSMENT / PLAN:       Hematemesis  Acute blood loss anemia   S/p 1 UNIT PRBC today -Total 7 this admission   S/p Repeat EGD 5/22, no overt bleeding  On PPI BID   Hb dropped again today, PRBC ordered, GI notified   5/25: EGD today ->Second bleeding point from the known duodenal ulcer, treated today with cautery   GI notified of hb drop, VSS, had two red brown stool since EGD yesterday     Acute Hypoxic respiratory failure   Concern for Aspiration  Oxygen protocol-On RA   CXR wuth possible aspiration, continue  IV zosyn     Presumed CAUTI-  r/o UCX neg  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  Resume Flomax  Blood and urine cultures.:  No growth to date  IV Rocephin stopped based on neg cultures  Monitor hemodynamic status and temperature curve.      Acute GI bleed: Likely upper in view of melanotic stools  - Hemodynamically stable  - Required 2 units PRBC transfusion during admission.  - GI consulted, patient underwent endoscopy with findings of:   1.  Diminutive colon polyps  2.  Internal hemorrhoids  3.  Juno 1b+ duodenal ulceration  4.  LA grade D esophagitis  5.  Hiatal hernia    - advance diet as tolerated. GI recommending PPI BID oral indefinitely for duodenal ulcer   - Cont to monitor H/H, today hgb 7 , recheck now   - Aspirin/NSAIDs should be avoided if possible. Gastric path negative for H pylori.          Acute on chronic kidney injury.:  Back to baseline  Baseline creatinine 1.5  Continue IV fluids  Avoid nephrotoxic agents  Monitor renal function daily      Parkinson's disease  Cont Sinemet  Fall and aspiration precaution  PT OT when able     Chronic infrarenal abdominal aortic aneurysm  - per chart search patient was started on low-dose aspirin. In setting of above ulcer, will hold further asa doses, needs OP follow up with PCP to discuss further        CODE status: DNAR select  Espinal: Espinal catheter in place  Dispo: BRIANNA Sal, once stable         MDM: High   I personally spent time on chart/note review, review of labs/imaging, discussion with patient, physical exam, discussion with staff, consultants, coordinating care, writing progress note, and discussion of plan of care.       Paola Lo MD, FAAFP      Supplementary Documentation:          [1]    sodium chloride   Intravenous Once    sodium chloride   Intravenous Once    pantoprazole  40 mg Intravenous Q8H    magnesium oxide  400 mg Oral Once    sodium chloride  250 mL Intravenous Once    piperacillin-tazobactam  3.375 g Intravenous Q8H    aspirin  81 mg Oral Daily    carbidopa-levodopa  1 tablet Oral TID    [Held by provider] midodrine  2.5 mg Oral TID    tamsulosin  0.8 mg Oral Nightly    finasteride  5 mg Oral Daily

## 2025-05-26 NOTE — PLAN OF CARE
Problem: Patient Centered Care  Goal: Patient preferences are identified and integrated in the patient's plan of care  Description: Interventions:  - What would you like us to know as we care for you? \"I am from [facility].\"  - Provide timely, complete, and accurate information to patient/family  - Incorporate patient and family knowledge, values, beliefs, and cultural backgrounds into the planning and delivery of care  - Encourage patient/family to participate in care and decision-making at the level they choose  - Honor patient and family perspectives and choices  Outcome: Progressing     Problem: Patient/Family Goals  Goal: Patient/Family Long Term Goal  Description: Patient's Long Term Goal: \"to be discharged\"    Interventions:  -Follow up MD appt  -Take meds as prescribed  -Use of assistive device if needed  - See additional Care Plan goals for specific interventions  Outcome: Progressing  Goal: Patient/Family Short Term Goal  Description: Patient's Short Term Goal: \"to prevent pain and infection\"    Interventions:   -Monitor VS  -Check labs results  -Administer IVF and IV ABT as ordered  -Provide pain meds as prescribed  - See additional Care Plan goals for specific interventions  Outcome: Progressing     Problem: SAFETY ADULT - FALL  Goal: Free from fall injury  Description: INTERVENTIONS:  - Assess pt frequently for physical needs  - Identify cognitive and physical deficits and behaviors that affect risk of falls.  - [place] fall precautions as indicated by assessment.  - Educate pt/family on patient safety including physical limitations  - Instruct pt to call for assistance with activity based on assessment  - Modify environment to reduce risk of injury  - Provide assistive devices as appropriate  - Consider OT/PT consult to assist with strengthening/mobility  - Encourage toileting schedule  Outcome: Progressing     Problem: GASTROINTESTINAL - ADULT  Goal: Minimal or absence of nausea and  vomiting  Description: INTERVENTIONS:  - Maintain adequate hydration with IV or PO as ordered and tolerated  - Nasogastric tube to low intermittent suction as ordered  - Evaluate effectiveness of ordered antiemetic medications  - Provide nonpharmacologic comfort measures as appropriate  - Advance diet as tolerated, if ordered  - Obtain nutritional consult as needed  - Evaluate fluid balance  Outcome: Progressing  Goal: Maintains or returns to baseline bowel function  Description: INTERVENTIONS:  - Assess bowel function  - Maintain adequate hydration with IV or PO as ordered and tolerated  - Evaluate effectiveness of GI medications  - Encourage mobilization and activity  - Obtain nutritional consult as needed  - Establish a toileting routine/schedule  - Consider collaborating with pharmacy to review patient's medication profile  Outcome: Progressing     Problem: METABOLIC/FLUID AND ELECTROLYTES - ADULT  Goal: Electrolytes maintained within normal limits  Description: INTERVENTIONS:  - Monitor labs and rhythm and assess patient for signs and symptoms of electrolyte imbalances  - Administer electrolyte replacement as ordered  - Monitor response to electrolyte replacements, including rhythm and repeat lab results as appropriate  - Fluid restriction as ordered  - Instruct patient on fluid and nutrition restrictions as appropriate  Outcome: Progressing     Problem: SKIN/TISSUE INTEGRITY - ADULT  Goal: Skin integrity remains intact  Description: INTERVENTIONS  - Assess and document risk factors for pressure ulcer development  - Assess and document skin integrity  - Monitor for areas of redness and/or skin breakdown  - Initiate interventions, skin care algorithm/standards of care as needed  Outcome: Progressing     Problem: CARDIOVASCULAR - ADULT  Goal: Maintains optimal cardiac output and hemodynamic stability  Description: INTERVENTIONS:  - Monitor vital signs, rhythm, and trends  - Monitor for bleeding, hypotension and  signs of decreased cardiac output  - Evaluate effectiveness of vasoactive medications to optimize hemodynamic stability  - Monitor arterial and/or venous puncture sites for bleeding and/or hematoma  - Assess quality of pulses, skin color and temperature  - Assess for signs of decreased coronary artery perfusion - ex. Angina  - Evaluate fluid balance, assess for edema, trend weights  Outcome: Progressing

## 2025-05-27 ENCOUNTER — TELEPHONE (OUTPATIENT)
Dept: SURGERY | Facility: CLINIC | Age: 85
End: 2025-05-27

## 2025-05-27 LAB
BASOPHILS # BLD AUTO: 0.1 X10(3) UL (ref 0–0.2)
BASOPHILS NFR BLD AUTO: 1.1 %
BLOOD TYPE BARCODE: 6200
DEPRECATED RDW RBC AUTO: 51.1 FL (ref 35.1–46.3)
EOSINOPHIL # BLD AUTO: 0.46 X10(3) UL (ref 0–0.7)
EOSINOPHIL NFR BLD AUTO: 5.1 %
ERYTHROCYTE [DISTWIDTH] IN BLOOD BY AUTOMATED COUNT: 15.7 % (ref 11–15)
HCT VFR BLD AUTO: 23.2 % (ref 39–53)
HCT VFR BLD AUTO: 24.9 % (ref 39–53)
HGB BLD-MCNC: 7.5 G/DL (ref 13–17.5)
HGB BLD-MCNC: 8 G/DL (ref 13–17.5)
IMM GRANULOCYTES # BLD AUTO: 0.13 X10(3) UL (ref 0–1)
IMM GRANULOCYTES NFR BLD: 1.4 %
LYMPHOCYTES # BLD AUTO: 1.69 X10(3) UL (ref 1–4)
LYMPHOCYTES NFR BLD AUTO: 18.8 %
MCH RBC QN AUTO: 29.4 PG (ref 26–34)
MCHC RBC AUTO-ENTMCNC: 32.3 G/DL (ref 31–37)
MCV RBC AUTO: 91 FL (ref 80–100)
MONOCYTES # BLD AUTO: 0.75 X10(3) UL (ref 0.1–1)
MONOCYTES NFR BLD AUTO: 8.4 %
NEUTROPHILS # BLD AUTO: 5.85 X10 (3) UL (ref 1.5–7.7)
NEUTROPHILS # BLD AUTO: 5.85 X10(3) UL (ref 1.5–7.7)
NEUTROPHILS NFR BLD AUTO: 65.2 %
PLATELET # BLD AUTO: 379 10(3)UL (ref 150–450)
RBC # BLD AUTO: 2.55 X10(6)UL (ref 3.8–5.8)
UNIT VOLUME: 350 ML
WBC # BLD AUTO: 9 X10(3) UL (ref 4–11)

## 2025-05-27 PROCEDURE — 99233 SBSQ HOSP IP/OBS HIGH 50: CPT | Performed by: FAMILY MEDICINE

## 2025-05-27 PROCEDURE — 99232 SBSQ HOSP IP/OBS MODERATE 35: CPT | Performed by: PHYSICIAN ASSISTANT

## 2025-05-27 RX ORDER — ACETAMINOPHEN 500 MG
1000 TABLET ORAL EVERY 6 HOURS PRN
Status: DISCONTINUED | OUTPATIENT
Start: 2025-05-27 | End: 2025-06-01

## 2025-05-27 NOTE — PROGRESS NOTES
Progress Note     Justyn Hall Patient Status:  Inpatient    1940 MRN [id number]   Location [facility] 5SW/SE Attending Mariza Ruiz MD   Hosp Day # 13 PCP No primary care provider on file.     Subjective:   S: Patient is doing well today, denies any Dark stools, hb improved     Review of Systems:   10 point ROS completed and was negative, except for pertinent positive and negatives stated in subjective.    Objective:   Vital signs:  Temp:  [97.7 °F (36.5 °C)-98.2 °F (36.8 °C)] 98 °F (36.7 °C)  Pulse:  [61-74] 68  Resp:  [16] 16  BP: (114-153)/(64-80) 148/80  SpO2:  [95 %-100 %] 95 %    Wt Readings from Last 6 Encounters:   25 171 lb 11.2 oz (77.9 kg)   04/10/25 175 lb 4.3 oz (79.5 kg)   25 168 lb 3.2 oz (76.3 kg)   10/10/19 182 lb (82.6 kg)   06/10/19 182 lb (82.6 kg)   19 181 lb (82.1 kg)         Physical Exam:    General: No acute distress. Alert ,         Respiratory: Clear to auscultation bilaterally. No wheezes. No rhonchi.  Cardiovascular: S1, S2. Regular rate and rhythm. No murmurs, rubs or gallops.   Abdomen: Soft, nontender, nondistended.  Positive bowel sounds. No rebound or guarding.  Neurologic: No focal neurological deficits.   Musculoskeletal: Moves all extremities.  Extremities: No edema.    Results:   Diagnostic Data:      Labs:    Labs Last 24 Hours:   BMP     CBC    Other     Na - Cl - BUN - Glu -   Hb 7.5   PTT - Procal -   K - CO2 - Cr -   WBC 9.0 >< .0  INR - CRP -   Renal Lytes Endo    Hct 23.2   Trop - D dim -   eGFR - Ca - POC Gluc  -    LFT   pBNP - Lactic -   eGFR AA - PO4 - A1c -   AST - APk - Prot -  LDL -     Mg - TSH -   ALT - T faby - Alb -        COVID-19 Lab Results    COVID-19  Lab Results   Component Value Date    COVID19 Not Detected 2025    COVID19 Not Detected 04/10/2025       Pro-Calcitonin  No results for input(s): \"PCT\" in the last 168 hours.    Cardiac  No results for input(s): \"TROP\", \"PBNP\" in the last 168  hours.    Creatinine Kinase  No results for input(s): \"CK\" in the last 168 hours.    Inflammatory Markers  No results for input(s): \"CRP\", \"TARIK\", \"LDH\", \"DDIMER\" in the last 168 hours.      Imaging: Imaging data reviewed in Epic.    Medications: Scheduled Medications[1]    Assessment & Plan:   ASSESSMENT / PLAN:       Hematemesis  Acute blood loss anemia   S/p 1 UNIT PRBC today -Total 4 this admission   S/p Repeat EGD 5/22, no overt bleeding  On PPI BID   Hb dropped again today, PRBC ordered, GI notified   5/25: EGD today ->Second bleeding point from the known duodenal ulcer, treated today with cautery   D/w GI, monitor today     Acute Hypoxic respiratory failure   Concern for Aspiration  Oxygen protocol-On RA   CXR wuth possible aspiration, continue  IV zosyn -dc tomorrow, will complete 7 days    Presumed CAUTI-  r/o UCX neg  Underlying 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  Blood and urine cultures.:  No growth to date  IV Rocephin stopped based on neg cultures  Monitor hemodynamic status and temperature curve.      Acute GI bleed: Likely upper in view of melanotic stools  - Hemodynamically stable  - Required 2 units PRBC transfusion during admission.  - GI consulted, patient underwent endoscopy with findings of:   1.  Diminutive colon polyps  2.  Internal hemorrhoids  3.  Juno 1b+ duodenal ulceration  4.  LA grade D esophagitis  5.  Hiatal hernia    - advance diet as tolerated. GI recommending PPI BID oral indefinitely for duodenal ulcer   - Cont to monitor H/H, today hgb 7 , recheck now   - Aspirin/NSAIDs should be avoided if possible. Gastric path negative for H pylori.          Acute on chronic kidney injury.:  Back to baseline  Baseline creatinine 1.5  Continue IV fluids  Avoid nephrotoxic agents  Monitor renal function daily     Parkinson's disease  Cont Sinemet  Fall and aspiration precaution  PT OT when able     Chronic infrarenal abdominal aortic  aneurysm  - per chart search patient was started on low-dose aspirin. In setting of above ulcer, will hold further asa doses, needs OP follow up with PCP to discuss further        CODE status: DNAR select  Espinal: Espinal catheter in place  Dispo: BRIANNA Sal, once stable         MDM: High   I personally spent time on chart/note review, review of labs/imaging, discussion with patient, physical exam, discussion with staff, consultants, coordinating care, writing progress note, and discussion of plan of care.       Paola Lo MD, FAAFP      Supplementary Documentation:          [1]    sodium chloride   Intravenous Once    sodium chloride   Intravenous Once    pantoprazole  40 mg Intravenous Q8H    magnesium oxide  400 mg Oral Once    sodium chloride  250 mL Intravenous Once    piperacillin-tazobactam  3.375 g Intravenous Q8H    carbidopa-levodopa  1 tablet Oral TID    [Held by provider] midodrine  2.5 mg Oral TID    tamsulosin  0.8 mg Oral Nightly    finasteride  5 mg Oral Daily

## 2025-05-27 NOTE — PROGRESS NOTES
[facility]     Gastroenterology Progress Note    Justyn Hall Patient Status:  Inpatient    1940 MRN [id number]   Location [facility] 5SW/SE Attending Paola Lo MD   Hosp Day # 13 PCP No primary care provider on file.       Subjective:   Awoke patient from sleep    He notes he did not have a BM last night and no BM so far this am    Denies nausea and abdominal pain  Objective:   Blood pressure 153/79, pulse 61, temperature 97.8 °F (36.6 °C), temperature source Oral, resp. rate 16, height 6' (1.829 m), weight 171 lb 11.2 oz (77.9 kg), SpO2 100%. Body mass index is 23.29 kg/m².    Gen: awake, alert patient, NAD  HEENT: EOMI, the sclera appears anicteric, oropharynx clear, mucus membranes appear moist  CV: RRR  Lung: no conversational dyspnea   Abdomen: soft NTND abdomen with NABS appreciated   Skin: dry, warm, no jaundice  Ext: no LE edema is evident  Neuro: Alert and interactive  Psych: calm, cooperative    Assessment and Plan:   84-year-old gentleman with BMI 23.6, history of Parkinson's disease chronic kidney disease and prostatic obstruction concerns.     Admitted through the ED 2025 with initial concerns for weakness fever hypotension urinary retention at nursing facility.     Found to have melena, acute on chronic anemia during the hospitalization.     2 recent EGD examinations 2025 and 2025 show severe reflux esophagitis with multiple ulcers distal esophagus and a duodenal bulb ulcer with exposed vessel, question of recent rebleed.     More melena  - 2025.     s/p repeat EGD examination 2025 with APC cautery of bleeding vessel same duodenal ulcer     1 small brown/red stool approximately 05:45 2025 AM     Transfused total 9 units RBCs so far this admission since 2025     Last transfusion was 1 RBC approximately 0300 morning 2025:  Hgb 7.5g; BUN 19        1.  Juno 1b+ duodenal ulceration post endoscopic  therapy  s/p repeat EGD examination w intervenstion on 5/25/2025    Gastric path negative for H pylori.  Family denies prior aspirin/NSAID use.  Hold inpatient aspirin therapy (!)  for this patient hospitalized with severe ulcer bleed  Continue high-dose pantoprazole PPI q8hr (IVP); he should be on a PPI indefinitely.    Aspirin/NSAIDs should be avoided if possible.       Unclear if ongoing active/intermittent bleeding 5/26 or residual melena output and hematocrit reequilibration from brisk bleed previous 2 days.        As above, further options to address suspected active duodenal ulcer bleeding would include repeat EGD and endoscopic therapy (Hemospray?) versus abdominal angiogram and embolization ?gastroduodenal artery or branches.  Continue to monitor for overt bleeding today.       2.  LA grade D esophagitis  Acid peptic in nature. Esophageal bx consistent with erosive esophagitis.  Rapidly resolving on high-dose PPI therapy as per EGD findings 5/25/2025.  Continue proton pump inhibitor therapy indefinitely.  Although a surveillance endoscopy to evaluate for Zeng's esophagus would normally be considered, observation on PPI therapy without repeat endoscopy would not be unreasonable in light of age and comorbidities.     3.  Concern for possible aspiration pneumonia  Zosyn broad-spectrum antibiotic (5/22/2025)     4.   Diminutive colon polyps  These were subcentimeter and benign in appearance.    Would not proceed with a repeat colonoscopy and polypectomy in light of age and comorbidities.    Case discussed with Hattie Alex MD.     Katt Davis PA-C  [facility]  5/27/2025      Results:     Lab Results   Component Value Date    WBC 9.0 05/27/2025    HGB 7.5 (L) 05/27/2025    HCT 23.2 (L) 05/27/2025    .0 05/27/2025    CREATSERUM 1.33 (H) 05/26/2025    BUN 19 05/26/2025     05/26/2025    K 3.5 05/26/2025    K 3.5 05/26/2025     05/26/2025    CO2 23.0 05/26/2025    GLU  84 05/26/2025    CA 7.0 (L) 05/26/2025    ALB 2.6 (L) 05/26/2025    ALKPHO 55 05/26/2025    BILT 1.1 05/26/2025    TP 4.6 (L) 05/26/2025    AST 9 05/26/2025    ALT <7 (L) 05/26/2025    PTT 27.8 04/10/2025    INR 1.11 04/10/2025    T4F 1.10 07/09/2015    TSH 2.507 04/05/2025    LIP 63 (H) 04/10/2025    PSA 2.46 10/10/2019    MG 1.6 05/23/2025    PHOS 2.7 05/19/2025     04/05/2025    B12 640 04/05/2025       No results found.

## 2025-05-27 NOTE — TELEPHONE ENCOUNTER
Pt's sister called to cancel the appointment Friday 5-30-25.  Pt is currently at [facility] room 533.

## 2025-05-27 NOTE — PLAN OF CARE
RN called [facility] to confirm when santiago was placed. RN at facility confirmed santiago was placed 5/13/25. Patient and sister verbalized understanding that santiago does not need to be exchanged.   Problem: Patient Centered Care  Goal: Patient preferences are identified and integrated in the patient's plan of care  Description: Interventions:  - What would you like us to know as we care for you? \"I am from [facility].\"  - Provide timely, complete, and accurate information to patient/family  - Incorporate patient and family knowledge, values, beliefs, and cultural backgrounds into the planning and delivery of care  - Encourage patient/family to participate in care and decision-making at the level they choose  - Honor patient and family perspectives and choices  Outcome: Progressing     Problem: Patient/Family Goals  Goal: Patient/Family Long Term Goal  Description: Patient's Long Term Goal: \"to be discharged\"    Interventions:  -Follow up MD appt  -Take meds as prescribed  -Use of assistive device if needed  - See additional Care Plan goals for specific interventions  Outcome: Progressing  Goal: Patient/Family Short Term Goal  Description: Patient's Short Term Goal: \"to prevent pain and infection\"    Interventions:   -Monitor VS  -Check labs results  -Administer IVF and IV ABT as ordered  -Provide pain meds as prescribed  - See additional Care Plan goals for specific interventions  Outcome: Progressing     Problem: SAFETY ADULT - FALL  Goal: Free from fall injury  Description: INTERVENTIONS:  - Assess pt frequently for physical needs  - Identify cognitive and physical deficits and behaviors that affect risk of falls.  - [place] fall precautions as indicated by assessment.  - Educate pt/family on patient safety including physical limitations  - Instruct pt to call for assistance with activity based on assessment  - Modify environment to reduce risk of injury  - Provide assistive devices as appropriate  -  Consider OT/PT consult to assist with strengthening/mobility  - Encourage toileting schedule  Outcome: Progressing     Problem: PAIN - ADULT  Goal: Verbalizes/displays adequate comfort level or patient's stated pain goal  Description: INTERVENTIONS:  - Encourage pt to monitor pain and request assistance  - Assess pain using appropriate pain scale  - Administer analgesics based on type and severity of pain and evaluate response  - Implement non-pharmacological measures as appropriate and evaluate response  - Consider cultural and social influences on pain and pain management  - Manage/alleviate anxiety  - Utilize distraction and/or relaxation techniques  - Monitor for opioid side effects  - Notify MD/LIP if interventions unsuccessful or patient reports new pain  - Anticipate increased pain with activity and pre-medicate as appropriate  Outcome: Progressing     Problem: RISK FOR INFECTION - ADULT  Goal: Absence of fever/infection during anticipated neutropenic period  Description: INTERVENTIONS  - Monitor WBC  - Administer growth factors as ordered  - Implement neutropenic guidelines  Outcome: Progressing     Problem: DISCHARGE PLANNING  Goal: Discharge to home or other facility with appropriate resources  Description: INTERVENTIONS:  - Identify barriers to discharge w/pt and caregiver  - Include patient/family/discharge partner in discharge planning  - Arrange for needed discharge resources and transportation as appropriate  - Identify discharge learning needs (meds, wound care, etc)  - Arrange for interpreters to assist at discharge as needed  - Consider post-discharge preferences of patient/family/discharge partner  - Complete POLST form as appropriate  - Assess patient's ability to be responsible for managing their own health  - Refer to Case Management Department for coordinating discharge planning if the patient needs post-hospital services based on physician/LIP order or complex needs related to functional  status, cognitive ability or social support system  Outcome: Progressing     Problem: GASTROINTESTINAL - ADULT  Goal: Minimal or absence of nausea and vomiting  Description: INTERVENTIONS:  - Maintain adequate hydration with IV or PO as ordered and tolerated  - Nasogastric tube to low intermittent suction as ordered  - Evaluate effectiveness of ordered antiemetic medications  - Provide nonpharmacologic comfort measures as appropriate  - Advance diet as tolerated, if ordered  - Obtain nutritional consult as needed  - Evaluate fluid balance  Outcome: Progressing  Goal: Maintains or returns to baseline bowel function  Description: INTERVENTIONS:  - Assess bowel function  - Maintain adequate hydration with IV or PO as ordered and tolerated  - Evaluate effectiveness of GI medications  - Encourage mobilization and activity  - Obtain nutritional consult as needed  - Establish a toileting routine/schedule  - Consider collaborating with pharmacy to review patient's medication profile  Outcome: Progressing     Problem: METABOLIC/FLUID AND ELECTROLYTES - ADULT  Goal: Electrolytes maintained within normal limits  Description: INTERVENTIONS:  - Monitor labs and rhythm and assess patient for signs and symptoms of electrolyte imbalances  - Administer electrolyte replacement as ordered  - Monitor response to electrolyte replacements, including rhythm and repeat lab results as appropriate  - Fluid restriction as ordered  - Instruct patient on fluid and nutrition restrictions as appropriate  Outcome: Progressing     Problem: SKIN/TISSUE INTEGRITY - ADULT  Goal: Skin integrity remains intact  Description: INTERVENTIONS  - Assess and document risk factors for pressure ulcer development  - Assess and document skin integrity  - Monitor for areas of redness and/or skin breakdown  - Initiate interventions, skin care algorithm/standards of care as needed  Outcome: Progressing     Problem: CARDIOVASCULAR - ADULT  Goal: Maintains optimal  cardiac output and hemodynamic stability  Description: INTERVENTIONS:  - Monitor vital signs, rhythm, and trends  - Monitor for bleeding, hypotension and signs of decreased cardiac output  - Evaluate effectiveness of vasoactive medications to optimize hemodynamic stability  - Monitor arterial and/or venous puncture sites for bleeding and/or hematoma  - Assess quality of pulses, skin color and temperature  - Assess for signs of decreased coronary artery perfusion - ex. Angina  - Evaluate fluid balance, assess for edema, trend weights  Outcome: Progressing     Problem: GENITOURINARY - ADULT  Goal: Absence of urinary retention  Description: INTERVENTIONS:  - Assess patient’s ability to void and empty bladder  - Monitor intake/output and perform bladder scan as needed  - Follow urinary retention protocol/standard of care  - Consider collaborating with pharmacy to review patient's medication profile  - Implement strategies to promote bladder emptying  Outcome: Progressing     Problem: MUSCULOSKELETAL - ADULT  Goal: Return mobility to safest level of function  Description: INTERVENTIONS:  - Assess patient stability and activity tolerance for standing, transferring and ambulating w/ or w/o assistive devices  - Assist with transfers and ambulation using safe patient handling equipment as needed  - Ensure adequate protection for wounds/incisions during mobilization  - Obtain PT/OT consults as needed  - Advance activity as appropriate  - Communicate ordered activity level and limitations with patient/family  Outcome: Progressing  Goal: Maintain proper alignment of affected body part  Description: INTERVENTIONS:  - Support and protect limb and body alignment per provider's orders  - Instruct and reinforce with patient and family use of appropriate assistive device and precautions (e.g. spinal or hip dislocation precautions)  Outcome: Progressing     Problem: Impaired Functional Mobility  Goal: Achieve highest/safest level of  mobility/gait  Description: Interventions:  - Assess patient's functional ability and stability  - Promote increasing activity/tolerance for mobility and gait  - Educate and engage patient/family in tolerated activity level and precautions  Outcome: Progressing     Problem: Impaired Swallowing  Goal: Minimize aspiration risk  Description: Interventions:  - Patient should be alert and upright for all feedings (90 degrees preferred)  - Offer food and liquids at a slow rate  - No straws  - Encourage small bites of food and small sips of liquid  - Offer pills one at a time, crush or deliver with applesauce as needed  - Discontinue feeding and notify MD (or speech pathologist) if coughing or persistent throat clearing or wet/gurgly vocal quality is noted  Outcome: Progressing

## 2025-05-27 NOTE — PLAN OF CARE
Problem: Patient Centered Care  Goal: Patient preferences are identified and integrated in the patient's plan of care  Description: Interventions:  - What would you like us to know as we care for you? \"I am from [facility].\"  - Provide timely, complete, and accurate information to patient/family  - Incorporate patient and family knowledge, values, beliefs, and cultural backgrounds into the planning and delivery of care  - Encourage patient/family to participate in care and decision-making at the level they choose  - Honor patient and family perspectives and choices  Outcome: Progressing     Problem: Patient/Family Goals  Goal: Patient/Family Long Term Goal  Description: Patient's Long Term Goal: To discharge from hospital     Interventions:   Monitor vital signs   - Monitor appropriate labs   - Pain management   - Administer medications per order   - Follow MD orders   - Diagnostics per order   - Update/inform patient and family on plan of care   - Discharge planning    - See additional Care Plan goals for specific interventions  Outcome: Progressing  Goal: Patient/Family Short Term Goal  Description: Patient's Short Term Goal: To stabilize Hemoglobin lab value     Interventions:    Monitor vital signs   - Monitor appropriate labs   - Pain management   - Administer medications per order   - Follow MD orders   - Diagnostics per order   - Update/inform patient and family on plan of care   - See additional Care Plan goals for specific interventions  Outcome: Progressing     Problem: SAFETY ADULT - FALL  Goal: Free from fall injury  Description: INTERVENTIONS:  - Assess pt frequently for physical needs  - Identify cognitive and physical deficits and behaviors that affect risk of falls.  - [place] fall precautions as indicated by assessment.  - Educate pt/family on patient safety including physical limitations  - Instruct pt to call for assistance with activity based on assessment  - Modify environment to  reduce risk of injury  - Provide assistive devices as appropriate  - Consider OT/PT consult to assist with strengthening/mobility  - Encourage toileting schedule  Outcome: Progressing     Problem: PAIN - ADULT  Goal: Verbalizes/displays adequate comfort level or patient's stated pain goal  Description: INTERVENTIONS:  - Encourage pt to monitor pain and request assistance  - Assess pain using appropriate pain scale  - Administer analgesics based on type and severity of pain and evaluate response  - Implement non-pharmacological measures as appropriate and evaluate response  - Consider cultural and social influences on pain and pain management  - Manage/alleviate anxiety  - Utilize distraction and/or relaxation techniques  - Monitor for opioid side effects  - Notify MD/LIP if interventions unsuccessful or patient reports new pain  - Anticipate increased pain with activity and pre-medicate as appropriate  Outcome: Progressing     Problem: RISK FOR INFECTION - ADULT  Goal: Absence of fever/infection during anticipated neutropenic period  Description: INTERVENTIONS  - Monitor WBC  - Administer growth factors as ordered  - Implement neutropenic guidelines  Outcome: Progressing     Problem: DISCHARGE PLANNING  Goal: Discharge to home or other facility with appropriate resources  Description: INTERVENTIONS:  - Identify barriers to discharge w/pt and caregiver  - Include patient/family/discharge partner in discharge planning  - Arrange for needed discharge resources and transportation as appropriate  - Identify discharge learning needs (meds, wound care, etc)  - Arrange for interpreters to assist at discharge as needed  - Consider post-discharge preferences of patient/family/discharge partner  - Complete POLST form as appropriate  - Assess patient's ability to be responsible for managing their own health  - Refer to Case Management Department for coordinating discharge planning if the patient needs post-hospital services  based on physician/LIP order or complex needs related to functional status, cognitive ability or social support system  Outcome: Progressing     Problem: GASTROINTESTINAL - ADULT  Goal: Minimal or absence of nausea and vomiting  Description: INTERVENTIONS:  - Maintain adequate hydration with IV or PO as ordered and tolerated  - Nasogastric tube to low intermittent suction as ordered  - Evaluate effectiveness of ordered antiemetic medications  - Provide nonpharmacologic comfort measures as appropriate  - Advance diet as tolerated, if ordered  - Obtain nutritional consult as needed  - Evaluate fluid balance  Outcome: Progressing  Goal: Maintains or returns to baseline bowel function  Description: INTERVENTIONS:  - Assess bowel function  - Maintain adequate hydration with IV or PO as ordered and tolerated  - Evaluate effectiveness of GI medications  - Encourage mobilization and activity  - Obtain nutritional consult as needed  - Establish a toileting routine/schedule  - Consider collaborating with pharmacy to review patient's medication profile  Outcome: Progressing     Problem: METABOLIC/FLUID AND ELECTROLYTES - ADULT  Goal: Electrolytes maintained within normal limits  Description: INTERVENTIONS:  - Monitor labs and rhythm and assess patient for signs and symptoms of electrolyte imbalances  - Administer electrolyte replacement as ordered  - Monitor response to electrolyte replacements, including rhythm and repeat lab results as appropriate  - Fluid restriction as ordered  - Instruct patient on fluid and nutrition restrictions as appropriate  Outcome: Progressing     Problem: SKIN/TISSUE INTEGRITY - ADULT  Goal: Skin integrity remains intact  Description: INTERVENTIONS  - Assess and document risk factors for pressure ulcer development  - Assess and document skin integrity  - Monitor for areas of redness and/or skin breakdown  - Initiate interventions, skin care algorithm/standards of care as needed  Outcome:  Progressing     Problem: CARDIOVASCULAR - ADULT  Goal: Maintains optimal cardiac output and hemodynamic stability  Description: INTERVENTIONS:  - Monitor vital signs, rhythm, and trends  - Monitor for bleeding, hypotension and signs of decreased cardiac output  - Evaluate effectiveness of vasoactive medications to optimize hemodynamic stability  - Monitor arterial and/or venous puncture sites for bleeding and/or hematoma  - Assess quality of pulses, skin color and temperature  - Assess for signs of decreased coronary artery perfusion - ex. Angina  - Evaluate fluid balance, assess for edema, trend weights  Outcome: Progressing     Problem: GENITOURINARY - ADULT  Goal: Absence of urinary retention  Description: INTERVENTIONS:  - Assess patient’s ability to void and empty bladder  - Monitor intake/output and perform bladder scan as needed  - Follow urinary retention protocol/standard of care  - Consider collaborating with pharmacy to review patient's medication profile  - Implement strategies to promote bladder emptying  Outcome: Progressing     Problem: MUSCULOSKELETAL - ADULT  Goal: Return mobility to safest level of function  Description: INTERVENTIONS:  - Assess patient stability and activity tolerance for standing, transferring and ambulating w/ or w/o assistive devices  - Assist with transfers and ambulation using safe patient handling equipment as needed  - Ensure adequate protection for wounds/incisions during mobilization  - Obtain PT/OT consults as needed  - Advance activity as appropriate  - Communicate ordered activity level and limitations with patient/family  Outcome: Progressing  Goal: Maintain proper alignment of affected body part  Description: INTERVENTIONS:  - Support and protect limb and body alignment per provider's orders  - Instruct and reinforce with patient and family use of appropriate assistive device and precautions (e.g. spinal or hip dislocation precautions)  Outcome: Progressing      Problem: Impaired Functional Mobility  Goal: Achieve highest/safest level of mobility/gait  Description: Interventions:  - Assess patient's functional ability and stability  - Promote increasing activity/tolerance for mobility and gait  - Educate and engage patient/family in tolerated activity level and precautions  Outcome: Progressing     Problem: Impaired Swallowing  Goal: Minimize aspiration risk  Description: Interventions:  - Patient should be alert and upright for all feedings (90 degrees preferred)  - Offer food and liquids at a slow rate  - No straws  - Encourage small bites of food and small sips of liquid  - Offer pills one at a time, crush or deliver with applesauce as needed  - Discontinue feeding and notify MD (or speech pathologist) if coughing or persistent throat clearing or wet/gurgly vocal quality is noted  Outcome: Progressing     Monitoring vital signs, stable at this time. No acute changes at this moment. Espinal in place. Fall precautions in place- bed alarm on, bed locked in lowest position, call light within reach. Frequent rounding by nursing staff.

## 2025-05-27 NOTE — DIETARY NOTE
ADULT NUTRITION INITIAL ASSESSMENT    Pt is at low nutrition risk.  Pt does not meet malnutrition criteria.      RECOMMENDATIONS TO MD: See nutrition intervention for ONS (oral nutrition supplements)    ADMITTING DIAGNOSIS:  JORDON (acute kidney injury) [N17.9]  Urinary tract infection with hematuria, site unspecified [N39.0, R31.9]  PERTINENT PAST MEDICAL HISTORY: Past Medical History[1]    PATIENT STATUS: Initial 05/27/25: Pt assessed due to re-screening for length of stay (hospital day 13) and noted on and off NPO/CLD/FLD over last week. Chart reviewed, pt admitted 5/14 with c/o fever. RRT called 5/22 r/t change in level of orientation and hematemesis. EGD completed 5/22 and 5/25 - noted duodenal bulb ulcer. Pt diet advanced to low fiber/soft on 5/18 until downgraded back to nothing by mouth (NPO) on 5/22. Discussion with RN, easing on to solid diet - currently on full liquid diet (FLD) since this morning (5/27). Intakes reviewed, 0-100% x 11 meals over last week (since 5/21) however pt noted to be on and off clear liquid diet (CLD) and FLD offering limited options since 5/23. Pt visited, reports tolerating diet (FLD) so far. Pt reports good intake/appetite prior to admission. Pt denies any nausea and reports feeling good. No weight loss reported. Current weight 171# 11.2 oz. Admit Wt: 150# 6 oz - unsure accuracy as noted 174# 3 oz on 5/21/25. Weight stable since 5/21 this admission. EMR weight review, last known weight # 4 oz on 4/10/25. Per EMR weight review, pt noted weighing 168# 3 oz on 4/4/25 and 182# on 10/10/19. Pt appears well-nourished. Monitor diet advancement and tolerance. Oral nutritional supplement (ONS) - Ensure Pre-Surgery added by GI MD on 5/24, adjusted given current diet restrictions and pt preferences. Notified RN.    FOOD/NUTRITION RELATED HISTORY:  Appetite: Good  Intake: ~0-100% x11 meals documented since 5/21/25 - Diet on and off CLD/FLD over last week - limited options available  NPO  5/22, CLD 5/23, FLD 5/24, CLD 5/24, NPO 5/25, CLD 5/25, FLD 5/27  Intake Meeting Needs: Intake good but not meeting needs due to limited options available on CLD/FLD diet. ONS added to help maximize nutrition  Percent Meals Eaten (last 6 days)       Date/Time Percent Meals Eaten (%)    05/21/25 0900 100 %    05/21/25 1400 90 %    05/21/25 1833 100 %    05/24/25 1041 100 %    05/24/25 1405 100 %     Percent Meals Eaten (%): lunch tray at 05/24/25 1405    05/25/25 0744 0 %     Percent Meals Eaten (%): NPO at 05/25/25 0744    05/25/25 1325 0 %     Percent Meals Eaten (%): NPO at 05/25/25 1325    05/25/25 1515 100 %    05/25/25 2004 45 %    05/26/25 1041 100 %    05/26/25 1325 100 %    05/26/25 1833 100 %    05/27/25 0800 0 %     Percent Meals Eaten (%): patient refused breakfast at 05/27/25 0800    05/27/25 1418 100 %           Food Allergies: No Known Food Allergies (NKFA)  Cultural/Ethnic/Gnosticist Preferences: Not Obtained    GASTROINTESTINAL: +BM 5/27/25 - medium;loose;brown;maroon  U/O 3700 ml (2 ml/kg/hr) - adequate    MEDICATIONS: reviewed Electrolyte replacement per protocol (non-cardiac)  IVF noted   sodium chloride   Intravenous Once    sodium chloride   Intravenous Once    pantoprazole  40 mg Intravenous Q8H    magnesium oxide  400 mg Oral Once    sodium chloride  250 mL Intravenous Once    piperacillin-tazobactam  3.375 g Intravenous Q8H    carbidopa-levodopa  1 tablet Oral TID    [Held by provider] midodrine  2.5 mg Oral TID    tamsulosin  0.8 mg Oral Nightly    finasteride  5 mg Oral Daily      sodium chloride 75 mL/hr at 05/27/25 1513     LABS: reviewed Hypokalemia resolved  Recent Labs     05/25/25  0615 05/26/25  0858   * 84   BUN 21 19   CREATSERUM 1.17 1.33*   CA 6.8* 7.0*    141   K 3.2* 3.5  3.5    109   CO2 23.0 23.0   OSMOCALC 299* 293     WEIGHT HISTORY:  Patient Weight(s) for the past 336 hrs:   Weight   05/26/25 0547 77.9 kg (171 lb 11.2 oz)   05/25/25 0536 78.2 kg (172 lb  8 oz)   05/22/25 0510 78.9 kg (174 lb)   05/21/25 0513 79 kg (174 lb 3.2 oz)   05/14/25 2046 68.2 kg (150 lb 6.4 oz)   05/14/25 1753 79.4 kg (175 lb)     Wt Readings from Last 10 Encounters:   05/26/25 77.9 kg (171 lb 11.2 oz)   04/10/25 79.5 kg (175 lb 4.3 oz)   04/04/25 76.3 kg (168 lb 3.2 oz)   10/10/19 82.6 kg (182 lb)   06/10/19 82.6 kg (182 lb)   04/25/19 82.1 kg (181 lb)   08/15/18 82.6 kg (182 lb)   06/07/18 83 kg (183 lb)   08/29/17 85.4 kg (188 lb 3.2 oz)   08/16/17 81.6 kg (180 lb)     ANTHROPOMETRICS:  HT: 182.9 cm (6')  Wt Readings from Last 1 Encounters:   05/26/25 77.9 kg (171 lb 11.2 oz)     Last weight: Likely accurate  BMI: Body mass index is 23.29 kg/m².  Dosing Weight: 77.9 kg - taken on 5/26/25, utilized for anthropometric calculations  BMI CLASSIFICATION: 18.5-24.9 kg/m2 - WNL  IBW/lbs (Calculated) Male: 178 lbs             96% IBW  Usual Body Wt: 182 lbs 2019 per EMR review       94% UBW    NUTRITION RELATED PHYSICAL FINDINGS:  - Nutrition Focused Physical Exam (NFPE): well nourished  - Fluid Accumulation: non-pitting Bilateral Feet and Right Upper extremity and +1 Bilateral Lower extremity per flowsheet review --> See RN documentation for details  - Skin Integrity: at risk and intact per flowsheet review --> See RN documentation for details    NUTRITION DIAGNOSIS/PROBLEM:   Inadequate oral intake related to Decreased ability to consume sufficient energy as evidenced by on and off CLD/FLD over last week providing limited options    NUTRITION INTERVENTION:   NUTRITION PRESCRIPTION:   Estimated Nutrition needs: --dosing wt of 77.9 kg - wt taken on 5/26/25  Calories: 6390-7180 calories/day (25-30 calories per kg Dosing wt)  Protein:  g protein/day (1.1-1.3 g protein/kg Dosing wt)    - Diet:       Procedures    Full liquid diet Texture Consistency: Soft / Easy to Chew ; Is Patient on Accuchecks? No; Misc Restriction: Low Fiber/Soft      - Nutrition Care Plan: Encouraged increased PO intake,  Encouraged small frequent meals with emphasis on high calorie/high protein, and Initiated ONS (oral nutritional supplements)  - ONS (Oral Nutrition Supplements)/Meals/Snacks: Ensure Enlive (350 calories/ 20 g protein each) Daily Vanilla   - Vitamin and mineral supplements: none  - Feeding assistance: meal set up  - Nutrition education: Discussed importance of adequate energy and protein intake    - Coordination of nutrition care: collaboration with other providers  - Discharge and transfer of nutrition care to new setting or provider: monitor plans from [facility]    MONITOR AND EVALUATE/NUTRITION GOALS:  - Food and Nutrient Intake:      Monitor: adequacy of PO intake, tolerance of PO intake, adequacy of supplement intake, and for PO diet advancement  - Food and Nutrient Administration:      Monitor: N/A  - Anthropometric Measurement:    Monitor weight  - Nutrition Goals:      allow wt loss due to fluid losses, PO and supplement greater than 75% of needs, good supplement intake, labs within acceptable limits, minimize lean body mass loss, prevent skin breakdown, maintain true wt within 5%, and improved GI status    DIETITIAN FOLLOW UP: RD to follow and monitor nutrition status    Mayda Lucero MS, LEONID, RDN, LDN  Clinical Dietitian  P: [phone]       [1]   Past Medical History:   Fracture of arm    fracture of left arm - cast

## 2025-05-28 ENCOUNTER — APPOINTMENT (OUTPATIENT)
Dept: ULTRASOUND IMAGING | Facility: HOSPITAL | Age: 85
End: 2025-05-28
Attending: HOSPITALIST
Payer: MEDICARE

## 2025-05-28 LAB
ANION GAP SERPL CALC-SCNC: 9 MMOL/L (ref 0–18)
BASOPHILS # BLD AUTO: 0.12 X10(3) UL (ref 0–0.2)
BASOPHILS NFR BLD AUTO: 1.5 %
BUN BLD-MCNC: 15 MG/DL (ref 9–23)
BUN/CREAT SERPL: 10.6 (ref 10–20)
CALCIUM BLD-MCNC: 7.2 MG/DL (ref 8.7–10.4)
CHLORIDE SERPL-SCNC: 108 MMOL/L (ref 98–112)
CO2 SERPL-SCNC: 25 MMOL/L (ref 21–32)
CREAT BLD-MCNC: 1.41 MG/DL (ref 0.7–1.3)
DEPRECATED RDW RBC AUTO: 51.1 FL (ref 35.1–46.3)
EGFRCR SERPLBLD CKD-EPI 2021: 49 ML/MIN/1.73M2 (ref 60–?)
EOSINOPHIL # BLD AUTO: 0.33 X10(3) UL (ref 0–0.7)
EOSINOPHIL NFR BLD AUTO: 4.2 %
ERYTHROCYTE [DISTWIDTH] IN BLOOD BY AUTOMATED COUNT: 15.4 % (ref 11–15)
GLUCOSE BLD-MCNC: 86 MG/DL (ref 70–99)
HCT VFR BLD AUTO: 23.8 % (ref 39–53)
HGB BLD-MCNC: 7.5 G/DL (ref 13–17.5)
HGB BLD-MCNC: 8.5 G/DL (ref 13–17.5)
IMM GRANULOCYTES # BLD AUTO: 0.07 X10(3) UL (ref 0–1)
IMM GRANULOCYTES NFR BLD: 0.9 %
LYMPHOCYTES # BLD AUTO: 1.5 X10(3) UL (ref 1–4)
LYMPHOCYTES NFR BLD AUTO: 19.2 %
MCH RBC QN AUTO: 28.8 PG (ref 26–34)
MCHC RBC AUTO-ENTMCNC: 31.5 G/DL (ref 31–37)
MCV RBC AUTO: 91.5 FL (ref 80–100)
MONOCYTES # BLD AUTO: 0.57 X10(3) UL (ref 0.1–1)
MONOCYTES NFR BLD AUTO: 7.3 %
NEUTROPHILS # BLD AUTO: 5.23 X10 (3) UL (ref 1.5–7.7)
NEUTROPHILS # BLD AUTO: 5.23 X10(3) UL (ref 1.5–7.7)
NEUTROPHILS NFR BLD AUTO: 66.9 %
OSMOLALITY SERPL CALC.SUM OF ELEC: 294 MOSM/KG (ref 275–295)
PLATELET # BLD AUTO: 393 10(3)UL (ref 150–450)
POTASSIUM SERPL-SCNC: 3.4 MMOL/L (ref 3.5–5.1)
RBC # BLD AUTO: 2.6 X10(6)UL (ref 3.8–5.8)
SODIUM SERPL-SCNC: 142 MMOL/L (ref 136–145)
WBC # BLD AUTO: 7.8 X10(3) UL (ref 4–11)

## 2025-05-28 PROCEDURE — 93971 EXTREMITY STUDY: CPT | Performed by: HOSPITALIST

## 2025-05-28 PROCEDURE — 99232 SBSQ HOSP IP/OBS MODERATE 35: CPT | Performed by: REGISTERED NURSE

## 2025-05-28 PROCEDURE — 99233 SBSQ HOSP IP/OBS HIGH 50: CPT | Performed by: HOSPITALIST

## 2025-05-28 RX ORDER — POTASSIUM CHLORIDE 1500 MG/1
40 TABLET, EXTENDED RELEASE ORAL ONCE
Status: COMPLETED | OUTPATIENT
Start: 2025-05-28 | End: 2025-05-28

## 2025-05-28 NOTE — PROGRESS NOTES
[facility]     Gastroenterology Progress Note    Justyn Hall Patient Status:  Inpatient    1940 MRN [id number]   Location [facility] 5SW/SE Attending Tova Mosquera, DO   Hosp Day # 14 PCP No primary care provider on file.       Subjective:   Pt up to chair  No ABD pain, difficult/painful swallowing, no BM since yesterday  Tolerated scrambled eggs for breakfast  No fever, chills  Objective:   Blood pressure 153/71, pulse 67, temperature 97.8 °F (36.6 °C), temperature source Oral, resp. rate 18, height 6' (1.829 m), weight 170 lb 11.2 oz (77.4 kg), SpO2 98%. Body mass index is 23.15 kg/m².    Gen: awake, alert patient, NAD  HEENT: EOMI, the sclera appears anicteric, oropharynx clear, mucus membranes appear moist  CV: RRR  Lung: no conversational dyspnea   Abdomen: soft NTND abdomen with NABS appreciated   Skin: dry, warm, no jaundice  Ext: no LE edema is evident  Neuro: Alert, oriented x3 and interactive  Psych: calm, cooperative    Assessment and Plan:   84-year-old gentleman with BMI 23.6, history of Parkinson's disease chronic kidney disease and prostatic obstruction concerns.     Admitted through the ED 2025 with initial concerns for weakness fever hypotension urinary retention at nursing facility.     Found to have melena, acute on chronic anemia during the hospitalization.     #Melena  #Acute on Chronic Anemia  #Esophagitis  #Duodenal Ulcer  -EGD 2025 and 2025 show severe reflux esophagitis with multiple ulcers distal esophagus and a duodenal bulb ulcer with exposed vessel, question of recent rebleed.  -Melena recurred -, s/p repeat EGD examination 2025 with APC cautery of bleeding vessel same duodenal ulcer, Path negative for H. Pylori  -Family denies NSAID use at home, plan to continue holding ASA while admitted  -s/p 9 blood transfusions since admission, last on   -Hgb stable since last transfusion 7.5-8, will order repeat this  afternoon given last BM reported as maroon yesterday afternoon  -No difficult/painful swallowing, ABD pain, tolerating diet, no BM today.  HDS.  -No plans for surveillance endoscopy to evaluate for Zeng's esophagus given age and co morbidities, will continue to monitor on PPI therapy indefinitely    #Diminutive colon polyps  These were subcentimeter and benign in appearance.  Would not proceed with a repeat colonoscopy and polypectomy in light of age and comorbidities.    Recommend:  -Repeat Hgb this afternoon  -Diet as tolerated with SLP recs  -Hold ASA  -High dose IV PPI therapy q8h, will need continued PPI indefinitely  -C/f possible aspiration PNA per primary, on IV zosyn    Case discussed with Hattie Alex MD and Vickie MELCHOR.    Minerva Black DNP, FNP-[facility]  5/28/2025    ADDENDUM:  Pt's repeat Hgb stable at 8.5.  Fine to resume ASA today.  Clear for discharge from GI stance.  Call for additional questions or concerns.    Results:     Lab Results   Component Value Date    WBC 7.8 05/28/2025    HGB 7.5 (L) 05/28/2025    HCT 23.8 (L) 05/28/2025    .0 05/28/2025    CREATSERUM 1.41 (H) 05/28/2025    BUN 15 05/28/2025     05/28/2025    K 3.4 (L) 05/28/2025     05/28/2025    CO2 25.0 05/28/2025    GLU 86 05/28/2025    CA 7.2 (L) 05/28/2025    ALB 2.6 (L) 05/26/2025    ALKPHO 55 05/26/2025    BILT 1.1 05/26/2025    TP 4.6 (L) 05/26/2025    AST 9 05/26/2025    ALT <7 (L) 05/26/2025    PTT 27.8 04/10/2025    INR 1.11 04/10/2025    T4F 1.10 07/09/2015    TSH 2.507 04/05/2025    LIP 63 (H) 04/10/2025    PSA 2.46 10/10/2019    MG 1.6 05/23/2025    PHOS 2.7 05/19/2025     04/05/2025    B12 640 04/05/2025       No results found.

## 2025-05-28 NOTE — PLAN OF CARE
Problem: Patient Centered Care  Goal: Patient preferences are identified and integrated in the patient's plan of care  Description: Interventions:  - What would you like us to know as we care for you? \"I am from [facility].\"  - Provide timely, complete, and accurate information to patient/family  - Incorporate patient and family knowledge, values, beliefs, and cultural backgrounds into the planning and delivery of care  - Encourage patient/family to participate in care and decision-making at the level they choose  - Honor patient and family perspectives and choices  Outcome: Progressing     Problem: Patient/Family Goals  Goal: Patient/Family Long Term Goal  Description: Patient's Long Term Goal: \"to be discharged\"    Interventions:  -Follow up MD appt  -Take meds as prescribed  -Use of assistive device if needed  - See additional Care Plan goals for specific interventions  Outcome: Progressing  Goal: Patient/Family Short Term Goal  Description: Patient's Short Term Goal: \"to prevent pain and infection\"    Interventions:   -Monitor VS  -Check labs results  -Administer IVF and IV ABT as ordered  -Provide pain meds as prescribed  - See additional Care Plan goals for specific interventions  Outcome: Progressing     Problem: SAFETY ADULT - FALL  Goal: Free from fall injury  Description: INTERVENTIONS:  - Assess pt frequently for physical needs  - Identify cognitive and physical deficits and behaviors that affect risk of falls.  - [place] fall precautions as indicated by assessment.  - Educate pt/family on patient safety including physical limitations  - Instruct pt to call for assistance with activity based on assessment  - Modify environment to reduce risk of injury  - Provide assistive devices as appropriate  - Consider OT/PT consult to assist with strengthening/mobility  - Encourage toileting schedule  Outcome: Progressing     Problem: GASTROINTESTINAL - ADULT  Goal: Minimal or absence of nausea and  vomiting  Description: INTERVENTIONS:  - Maintain adequate hydration with IV or PO as ordered and tolerated  - Nasogastric tube to low intermittent suction as ordered  - Evaluate effectiveness of ordered antiemetic medications  - Provide nonpharmacologic comfort measures as appropriate  - Advance diet as tolerated, if ordered  - Obtain nutritional consult as needed  - Evaluate fluid balance  Outcome: Progressing  Goal: Maintains or returns to baseline bowel function  Description: INTERVENTIONS:  - Assess bowel function  - Maintain adequate hydration with IV or PO as ordered and tolerated  - Evaluate effectiveness of GI medications  - Encourage mobilization and activity  - Obtain nutritional consult as needed  - Establish a toileting routine/schedule  - Consider collaborating with pharmacy to review patient's medication profile  Outcome: Progressing     Problem: METABOLIC/FLUID AND ELECTROLYTES - ADULT  Goal: Electrolytes maintained within normal limits  Description: INTERVENTIONS:  - Monitor labs and rhythm and assess patient for signs and symptoms of electrolyte imbalances  - Administer electrolyte replacement as ordered  - Monitor response to electrolyte replacements, including rhythm and repeat lab results as appropriate  - Fluid restriction as ordered  - Instruct patient on fluid and nutrition restrictions as appropriate  Outcome: Progressing     Problem: SKIN/TISSUE INTEGRITY - ADULT  Goal: Skin integrity remains intact  Description: INTERVENTIONS  - Assess and document risk factors for pressure ulcer development  - Assess and document skin integrity  - Monitor for areas of redness and/or skin breakdown  - Initiate interventions, skin care algorithm/standards of care as needed  Outcome: Progressing     Problem: CARDIOVASCULAR - ADULT  Goal: Maintains optimal cardiac output and hemodynamic stability  Description: INTERVENTIONS:  - Monitor vital signs, rhythm, and trends  - Monitor for bleeding, hypotension and  signs of decreased cardiac output  - Evaluate effectiveness of vasoactive medications to optimize hemodynamic stability  - Monitor arterial and/or venous puncture sites for bleeding and/or hematoma  - Assess quality of pulses, skin color and temperature  - Assess for signs of decreased coronary artery perfusion - ex. Angina  - Evaluate fluid balance, assess for edema, trend weights  Outcome: Progressing     Problem: PAIN - ADULT  Goal: Verbalizes/displays adequate comfort level or patient's stated pain goal  Description: INTERVENTIONS:  - Encourage pt to monitor pain and request assistance  - Assess pain using appropriate pain scale  - Administer analgesics based on type and severity of pain and evaluate response  - Implement non-pharmacological measures as appropriate and evaluate response  - Consider cultural and social influences on pain and pain management  - Manage/alleviate anxiety  - Utilize distraction and/or relaxation techniques  - Monitor for opioid side effects  - Notify MD/LIP if interventions unsuccessful or patient reports new pain  - Anticipate increased pain with activity and pre-medicate as appropriate  Outcome: Progressing     Problem: RISK FOR INFECTION - ADULT  Goal: Absence of fever/infection during anticipated neutropenic period  Description: INTERVENTIONS  - Monitor WBC  - Administer growth factors as ordered  - Implement neutropenic guidelines  Outcome: Progressing     Problem: DISCHARGE PLANNING  Goal: Discharge to home or other facility with appropriate resources  Description: INTERVENTIONS:  - Identify barriers to discharge w/pt and caregiver  - Include patient/family/discharge partner in discharge planning  - Arrange for needed discharge resources and transportation as appropriate  - Identify discharge learning needs (meds, wound care, etc)  - Arrange for interpreters to assist at discharge as needed  - Consider post-discharge preferences of patient/family/discharge partner  - Complete  POLST form as appropriate  - Assess patient's ability to be responsible for managing their own health  - Refer to Case Management Department for coordinating discharge planning if the patient needs post-hospital services based on physician/LIP order or complex needs related to functional status, cognitive ability or social support system  Outcome: Progressing     Problem: GENITOURINARY - ADULT  Goal: Absence of urinary retention  Description: INTERVENTIONS:  - Assess patient’s ability to void and empty bladder  - Monitor intake/output and perform bladder scan as needed  - Follow urinary retention protocol/standard of care  - Consider collaborating with pharmacy to review patient's medication profile  - Implement strategies to promote bladder emptying  Outcome: Progressing     Problem: MUSCULOSKELETAL - ADULT  Goal: Return mobility to safest level of function  Description: INTERVENTIONS:  - Assess patient stability and activity tolerance for standing, transferring and ambulating w/ or w/o assistive devices  - Assist with transfers and ambulation using safe patient handling equipment as needed  - Ensure adequate protection for wounds/incisions during mobilization  - Obtain PT/OT consults as needed  - Advance activity as appropriate  - Communicate ordered activity level and limitations with patient/family  Outcome: Progressing  Goal: Maintain proper alignment of affected body part  Description: INTERVENTIONS:  - Support and protect limb and body alignment per provider's orders  - Instruct and reinforce with patient and family use of appropriate assistive device and precautions (e.g. spinal or hip dislocation precautions)  Outcome: Progressing     Problem: Impaired Functional Mobility  Goal: Achieve highest/safest level of mobility/gait  Description: Interventions:  - Assess patient's functional ability and stability  - Promote increasing activity/tolerance for mobility and gait  - Educate and engage patient/family in  tolerated activity level and precautions  - Recommend patient transfer to bedside chair toward strongest side  Outcome: Progressing     Problem: Impaired Swallowing  Goal: Minimize aspiration risk  Description: Interventions:  - Patient should be alert and upright for all feedings (90 degrees preferred)  - Offer food and liquids at a slow rate  - No straws  - Encourage small bites of food and small sips of liquid  - Offer pills one at a time, crush or deliver with applesauce as needed  - Discontinue feeding and notify MD (or speech pathologist) if coughing or persistent throat clearing or wet/gurgly vocal quality is noted  Outcome: Progressing     Problem: HEMATOLOGIC - ADULT  Goal: Maintains hematologic stability  Description: INTERVENTIONS  - Assess for signs and symptoms of bleeding or hemorrhage  - Monitor labs and vital signs for trends  - Administer supportive blood products/factors, fluids and medications as ordered and appropriate  - Administer supportive blood products/factors as ordered and appropriate  Outcome: Progressing  Goal: Free from bleeding injury  Description: (Example usage: patient with low platelets)  INTERVENTIONS:  - Avoid intramuscular injections, enemas and rectal medication administration  - Ensure safe mobilization of patient  - Hold pressure on venipuncture sites to achieve adequate hemostasis  - Assess for signs and symptoms of internal bleeding  - Monitor lab trends  - Patient is to report abnormal signs of bleeding to staff  - Avoid use of toothpicks and dental floss  - Use electric shaver for shaving  - Use soft bristle tooth brush  - Limit straining and forceful nose blowing  Outcome: Progressing

## 2025-05-28 NOTE — PROGRESS NOTES
[facility]  part of [facility]    Progress Note    Justyn Hall Patient Status:  Inpatient    1940 MRN [id number]   Location [facility] 5SW/SE Attending Toav Mosquera,    Hosp Day # 14 PCP No primary care provider on file.     Chief complaint gi bleed     Subjective:   Justyn Hall is a(n) 84 year old male pt doing well today. No c/o's. No bleeding overnight. Eating well.     ROS:   No cp, sob   No c/d   No n/v     Objective:     Blood pressure 153/71, pulse 67, temperature 97.8 °F (36.6 °C), temperature source Oral, resp. rate 18, height 6' (1.829 m), weight 170 lb 11.2 oz (77.4 kg), SpO2 98%.      Intake/Output Summary (Last 24 hours) at 2025 0958  Last data filed at 2025 0824  Gross per 24 hour   Intake 545 ml   Output 5525 ml   Net -4980 ml       Patient Weight(s) for the past 336 hrs:   Weight   25 0614 170 lb 11.2 oz (77.4 kg)   25 0547 171 lb 11.2 oz (77.9 kg)   25 0536 172 lb 8 oz (78.2 kg)   25 0510 174 lb (78.9 kg)   25 0513 174 lb 3.2 oz (79 kg)   25 2046 150 lb 6.4 oz (68.2 kg)   25 1753 175 lb (79.4 kg)           General appearance: alert, appears stated age and cooperative  Pulmonary:  clear to auscultation bilaterally  Cardiovascular: S1, S2 normal, no murmur, click, rub or gallop, regular rate and rhythm  Abdominal: soft, non-tender; bowel sounds normal; no masses,  no organomegaly  Extremities: RLE swelling     Medicines:     Current Hospital Medications[1]                Blood Pressure and Cardiac Medications            midodrine 2.5 MG Oral Tab            Medication Infusions[2]        Lab Results   Component Value Date    WBC 7.8 2025    HGB 7.5 (L) 2025    HCT 23.8 (L) 2025    .0 2025    CREATSERUM 1.41 (H) 2025    BUN 15 2025     2025    K 3.4 (L) 2025     2025    CO2 25.0 2025    GLU 86 2025    CA 7.2 (L)  05/28/2025    ALB 2.6 (L) 05/26/2025    ALKPHO 55 05/26/2025    BILT 1.1 05/26/2025    TP 4.6 (L) 05/26/2025    AST 9 05/26/2025    ALT <7 (L) 05/26/2025    PTT 27.8 04/10/2025    INR 1.11 04/10/2025    T4F 1.10 07/09/2015    TSH 2.507 04/05/2025    LIP 63 (H) 04/10/2025    PSA 2.46 10/10/2019    MG 1.6 05/23/2025    PHOS 2.7 05/19/2025     04/05/2025    B12 640 04/05/2025       No results found.        Results:     CBC:    Lab Results   Component Value Date    WBC 7.8 05/28/2025    WBC 9.0 05/27/2025    WBC 12.7 (H) 05/26/2025     Lab Results   Component Value Date    HGB 7.5 (L) 05/28/2025    HGB 8.0 (L) 05/27/2025    HGB 7.5 (L) 05/27/2025      Lab Results   Component Value Date    .0 05/28/2025    .0 05/27/2025    .0 05/26/2025       Recent Labs   Lab 05/25/25  0615 05/26/25  0858 05/28/25  0550   * 84 86   BUN 21 19 15   CREATSERUM 1.17 1.33* 1.41*   CA 6.8* 7.0* 7.2*    141 142   K 3.2* 3.5  3.5 3.4*    109 108   CO2 23.0 23.0 25.0           Assessment and Plan:         Hematemesis  Acute blood loss anemia   S/p 1 UNIT PRBC today -Total 4 this admission   S/p Repeat EGD 5/22, no overt bleeding  On PPI BID   Hb dropped again today, PRBC ordered, GI notified   5/25: EGD today ->Second bleeding point from the known duodenal ulcer, treated today with cautery   Hb 7.5. 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  Blood and urine cultures.:  No growth to date  IV Rocephin stopped based on neg cultures  Monitor hemodynamic status and temperature curve.      Acute GI bleed: Likely upper in view of melanotic  stools  - Hemodynamically stable - hb 7.5 today  - Required 2 units PRBC transfusion during admission.  - GI consulted, patient underwent endoscopy with findings of:   1.  Diminutive colon polyps  2.  Internal hemorrhoids  3.  Juno 1b+ duodenal ulceration  4.  LA grade D esophagitis  5.  Hiatal hernia     - advance diet as tolerated. GI recommending PPI BID oral indefinitely for duodenal ulcer   - Cont to monitor H/H, today hgb 7.5  - Aspirin/NSAIDs should be avoided if possible. Gastric path negative for H pylori.          Acute on chronic kidney injury.:  Back to baseline  Baseline creatinine 1.5  Stop ivfs   Avoid nephrotoxic agents  Monitor renal function daily     Parkinson's disease  Cont Sinemet  Fall and aspiration precaution  PT OT when able     Chronic infrarenal abdominal aortic aneurysm  - per chart search patient was started on low-dose aspirin. In setting of above ulcer, will hold further asa doses, needs OP follow up with PCP to discuss further        CODE status: DNAR select  Espinal: Espinal catheter in place  Dispo: BRIANNA Sal, once stable          MDM: High   I personally spent time on chart/note review, review of labs/imaging, discussion with patient, physical exam, discussion with staff, consultants, coordinating care, writing progress note, and discussion of plan of care.                     Tova Mosquera, DO         Chart reviewed, including current vitals, notes, labs and imaging  Labs ordered and medications adjusted as outlined above  Coordinate care with care team/consultants  Discussed with patient results of tests, management plan as outlined above, and the need for ongoing hospitalization  D/w RN     [facility]        5/28/2025             Supplementary Documentation:   DVT Mechanical Prophylaxis:   SCDs,    DVT Pharmacologic Prophylaxis   Medication   None                Code Status: DNAR/Selective Treatment  Espinal: Espinal catheter in place  Espinal Duration (in days): 15  Central line:     ELISE: 5/28/2025                            [1]   Current Facility-Administered Medications   Medication Dose Route Frequency    acetaminophen (Tylenol Extra Strength) tab 1,000 mg  1,000 mg Oral Q6H PRN    sodium chloride 0.9% infusion   Intravenous Once    sodium chloride 0.9% infusion   Intravenous Once    pantoprazole (Protonix) 40 mg in sodium chloride 0.9% PF 10 mL IV push  40 mg Intravenous Q8H    magnesium oxide (Mag-Ox) tab 400 mg  400 mg Oral Once    sodium chloride 0.9 % IV bolus 250 mL  250 mL Intravenous Once    piperacillin-tazobactam (Zosyn) 3.375 g in dextrose 5% 100 mL IVPB-ADDV  3.375 g Intravenous Q8H    carbidopa-levodopa (SINEMET)  MG per tab 1 tablet  1 tablet Oral TID    [Held by provider] midodrine (ProAmatine) tab 2.5 mg  2.5 mg Oral TID    tamsulosin (Flomax) cap 0.8 mg  0.8 mg Oral Nightly    finasteride (Proscar) tab 5 mg  5 mg Oral Daily    acetaminophen (Tylenol Extra Strength) tab 500 mg  500 mg Oral Q4H PRN    ondansetron (Zofran) 4 MG/2ML injection 4 mg  4 mg Intravenous Q6H PRN    metoclopramide (Reglan) 5 mg/mL injection 5 mg  5 mg Intravenous Q8H PRN    labetalol (Trandate) 5 mg/mL injection 10 mg  10 mg Intravenous Q4H PRN   [2]

## 2025-05-28 NOTE — PLAN OF CARE
Problem: HEMATOLOGIC - ADULT  Goal: Maintains hematologic stability  Description: INTERVENTIONS  - Assess for signs and symptoms of bleeding or hemorrhage  - Monitor labs and vital signs for trends  - Administer supportive blood products/factors, fluids and medications as ordered and appropriate  - Administer supportive blood products/factors as ordered and appropriate  Outcome: Progressing  Goal: Free from bleeding injury  Description: (Example usage: patient with low platelets)  INTERVENTIONS:  - Avoid intramuscular injections, enemas and rectal medication administration  - Ensure safe mobilization of patient  - Hold pressure on venipuncture sites to achieve adequate hemostasis  - Assess for signs and symptoms of internal bleeding  - Monitor lab trends  - Patient is to report abnormal signs of bleeding to staff  - Avoid use of toothpicks and dental floss  - Use electric shaver for shaving  - Use soft bristle tooth brush  - Limit straining and forceful nose blowing  Outcome: Progressing

## 2025-05-28 NOTE — PLAN OF CARE
Problem: Patient Centered Care  Goal: Patient preferences are identified and integrated in the patient's plan of care  Description: Interventions:  - What would you like us to know as we care for you? \"I am from [facility].\"  - Provide timely, complete, and accurate information to patient/family  - Incorporate patient and family knowledge, values, beliefs, and cultural backgrounds into the planning and delivery of care  - Encourage patient/family to participate in care and decision-making at the level they choose  - Honor patient and family perspectives and choices  Outcome: Progressing     Problem: SAFETY ADULT - FALL  Goal: Free from fall injury  Description: INTERVENTIONS:  - Assess pt frequently for physical needs  - Identify cognitive and physical deficits and behaviors that affect risk of falls.  - [place] fall precautions as indicated by assessment.  - Educate pt/family on patient safety including physical limitations  - Instruct pt to call for assistance with activity based on assessment  - Modify environment to reduce risk of injury  - Provide assistive devices as appropriate  - Consider OT/PT consult to assist with strengthening/mobility  - Encourage toileting schedule  Outcome: Progressing     Problem: PAIN - ADULT  Goal: Verbalizes/displays adequate comfort level or patient's stated pain goal  Description: INTERVENTIONS:  - Encourage pt to monitor pain and request assistance  - Assess pain using appropriate pain scale  - Administer analgesics based on type and severity of pain and evaluate response  - Implement non-pharmacological measures as appropriate and evaluate response  - Consider cultural and social influences on pain and pain management  - Manage/alleviate anxiety  - Utilize distraction and/or relaxation techniques  - Monitor for opioid side effects  - Notify MD/LIP if interventions unsuccessful or patient reports new pain  - Anticipate increased pain with activity and  pre-medicate as appropriate  Outcome: Progressing     Problem: RISK FOR INFECTION - ADULT  Goal: Absence of fever/infection during anticipated neutropenic period  Description: INTERVENTIONS  - Monitor WBC  - Administer growth factors as ordered  - Implement neutropenic guidelines  Outcome: Progressing     Problem: DISCHARGE PLANNING  Goal: Discharge to home or other facility with appropriate resources  Description: INTERVENTIONS:  - Identify barriers to discharge w/pt and caregiver  - Include patient/family/discharge partner in discharge planning  - Arrange for needed discharge resources and transportation as appropriate  - Identify discharge learning needs (meds, wound care, etc)  - Arrange for interpreters to assist at discharge as needed  - Consider post-discharge preferences of patient/family/discharge partner  - Complete POLST form as appropriate  - Assess patient's ability to be responsible for managing their own health  - Refer to Case Management Department for coordinating discharge planning if the patient needs post-hospital services based on physician/LIP order or complex needs related to functional status, cognitive ability or social support system  Outcome: Progressing     Problem: GASTROINTESTINAL - ADULT  Goal: Minimal or absence of nausea and vomiting  Description: INTERVENTIONS:  - Maintain adequate hydration with IV or PO as ordered and tolerated  - Nasogastric tube to low intermittent suction as ordered  - Evaluate effectiveness of ordered antiemetic medications  - Provide nonpharmacologic comfort measures as appropriate  - Advance diet as tolerated, if ordered  - Obtain nutritional consult as needed  - Evaluate fluid balance  Outcome: Progressing  Goal: Maintains or returns to baseline bowel function  Description: INTERVENTIONS:  - Assess bowel function  - Maintain adequate hydration with IV or PO as ordered and tolerated  - Evaluate effectiveness of GI medications  - Encourage mobilization and  activity  - Obtain nutritional consult as needed  - Establish a toileting routine/schedule  - Consider collaborating with pharmacy to review patient's medication profile  Outcome: Progressing     Problem: METABOLIC/FLUID AND ELECTROLYTES - ADULT  Goal: Electrolytes maintained within normal limits  Description: INTERVENTIONS:  - Monitor labs and rhythm and assess patient for signs and symptoms of electrolyte imbalances  - Administer electrolyte replacement as ordered  - Monitor response to electrolyte replacements, including rhythm and repeat lab results as appropriate  - Fluid restriction as ordered  - Instruct patient on fluid and nutrition restrictions as appropriate  Outcome: Progressing     Problem: SKIN/TISSUE INTEGRITY - ADULT  Goal: Skin integrity remains intact  Description: INTERVENTIONS  - Assess and document risk factors for pressure ulcer development  - Assess and document skin integrity  - Monitor for areas of redness and/or skin breakdown  - Initiate interventions, skin care algorithm/standards of care as needed  Outcome: Progressing     Problem: CARDIOVASCULAR - ADULT  Goal: Maintains optimal cardiac output and hemodynamic stability  Description: INTERVENTIONS:  - Monitor vital signs, rhythm, and trends  - Monitor for bleeding, hypotension and signs of decreased cardiac output  - Evaluate effectiveness of vasoactive medications to optimize hemodynamic stability  - Monitor arterial and/or venous puncture sites for bleeding and/or hematoma  - Assess quality of pulses, skin color and temperature  - Assess for signs of decreased coronary artery perfusion - ex. Angina  - Evaluate fluid balance, assess for edema, trend weights  Outcome: Progressing     Problem: GENITOURINARY - ADULT  Goal: Absence of urinary retention  Description: INTERVENTIONS:  - Assess patient’s ability to void and empty bladder  - Monitor intake/output and perform bladder scan as needed  - Follow urinary retention protocol/standard of  care  - Consider collaborating with pharmacy to review patient's medication profile  - Implement strategies to promote bladder emptying  Outcome: Progressing     Problem: MUSCULOSKELETAL - ADULT  Goal: Return mobility to safest level of function  Description: INTERVENTIONS:  - Assess patient stability and activity tolerance for standing, transferring and ambulating w/ or w/o assistive devices  - Assist with transfers and ambulation using safe patient handling equipment as needed  - Ensure adequate protection for wounds/incisions during mobilization  - Obtain PT/OT consults as needed  - Advance activity as appropriate  - Communicate ordered activity level and limitations with patient/family  Outcome: Progressing  Goal: Maintain proper alignment of affected body part  Description: INTERVENTIONS:  - Support and protect limb and body alignment per provider's orders  - Instruct and reinforce with patient and family use of appropriate assistive device and precautions (e.g. spinal or hip dislocation precautions)  Outcome: Progressing     Problem: Impaired Functional Mobility  Goal: Achieve highest/safest level of mobility/gait  Description: Interventions:  - Assess patient's functional ability and stability  - Promote increasing activity/tolerance for mobility and gait  - Educate and engage patient/family in tolerated activity level and precautions  - Recommend use of  RW for transfers and ambulation  Outcome: Progressing     Problem: Impaired Swallowing  Goal: Minimize aspiration risk  Description: Interventions:  - Patient should be alert and upright for all feedings (90 degrees preferred)  - Offer food and liquids at a slow rate  - No straws  - Encourage small bites of food and small sips of liquid  - Offer pills one at a time, crush or deliver with applesauce as needed  - Discontinue feeding and notify MD (or speech pathologist) if coughing or persistent throat clearing or wet/gurgly vocal quality is noted  Outcome:  Progressing

## 2025-05-29 LAB
ANION GAP SERPL CALC-SCNC: 8 MMOL/L (ref 0–18)
ATRIAL RATE: 67 BPM
BASOPHILS # BLD AUTO: 0.11 X10(3) UL (ref 0–0.2)
BASOPHILS NFR BLD AUTO: 1.3 %
BUN BLD-MCNC: 15 MG/DL (ref 9–23)
BUN/CREAT SERPL: 12.6 (ref 10–20)
CALCIUM BLD-MCNC: 7.5 MG/DL (ref 8.7–10.4)
CHLORIDE SERPL-SCNC: 109 MMOL/L (ref 98–112)
CO2 SERPL-SCNC: 25 MMOL/L (ref 21–32)
CREAT BLD-MCNC: 1.19 MG/DL (ref 0.7–1.3)
DEPRECATED RDW RBC AUTO: 51 FL (ref 35.1–46.3)
EGFRCR SERPLBLD CKD-EPI 2021: 60 ML/MIN/1.73M2 (ref 60–?)
EOSINOPHIL # BLD AUTO: 0.43 X10(3) UL (ref 0–0.7)
EOSINOPHIL NFR BLD AUTO: 5 %
ERYTHROCYTE [DISTWIDTH] IN BLOOD BY AUTOMATED COUNT: 15.5 % (ref 11–15)
GLUCOSE BLD-MCNC: 98 MG/DL (ref 70–99)
HCT VFR BLD AUTO: 23 % (ref 39–53)
HGB BLD-MCNC: 7.3 G/DL (ref 13–17.5)
IMM GRANULOCYTES # BLD AUTO: 0.08 X10(3) UL (ref 0–1)
IMM GRANULOCYTES NFR BLD: 0.9 %
LYMPHOCYTES # BLD AUTO: 1.63 X10(3) UL (ref 1–4)
LYMPHOCYTES NFR BLD AUTO: 18.9 %
MCH RBC QN AUTO: 28.9 PG (ref 26–34)
MCHC RBC AUTO-ENTMCNC: 31.7 G/DL (ref 31–37)
MCV RBC AUTO: 90.9 FL (ref 80–100)
MONOCYTES # BLD AUTO: 0.66 X10(3) UL (ref 0.1–1)
MONOCYTES NFR BLD AUTO: 7.6 %
NEUTROPHILS # BLD AUTO: 5.72 X10 (3) UL (ref 1.5–7.7)
NEUTROPHILS # BLD AUTO: 5.72 X10(3) UL (ref 1.5–7.7)
NEUTROPHILS NFR BLD AUTO: 66.3 %
OSMOLALITY SERPL CALC.SUM OF ELEC: 295 MOSM/KG (ref 275–295)
P AXIS: 72 DEGREES
P-R INTERVAL: 310 MS
PLATELET # BLD AUTO: 392 10(3)UL (ref 150–450)
POTASSIUM SERPL-SCNC: 3.5 MMOL/L (ref 3.5–5.1)
POTASSIUM SERPL-SCNC: 3.5 MMOL/L (ref 3.5–5.1)
Q-T INTERVAL: 414 MS
QRS DURATION: 92 MS
QTC CALCULATION (BEZET): 437 MS
R AXIS: -20 DEGREES
RBC # BLD AUTO: 2.53 X10(6)UL (ref 3.8–5.8)
SODIUM SERPL-SCNC: 142 MMOL/L (ref 136–145)
T AXIS: 38 DEGREES
VENTRICULAR RATE: 67 BPM
WBC # BLD AUTO: 8.6 X10(3) UL (ref 4–11)

## 2025-05-29 PROCEDURE — 99233 SBSQ HOSP IP/OBS HIGH 50: CPT | Performed by: HOSPITALIST

## 2025-05-29 RX ORDER — ASPIRIN 81 MG/1
81 TABLET ORAL DAILY
Status: DISCONTINUED | OUTPATIENT
Start: 2025-05-29 | End: 2025-06-01

## 2025-05-29 RX ORDER — CARVEDILOL 3.12 MG/1
3.12 TABLET ORAL 2 TIMES DAILY WITH MEALS
Status: DISCONTINUED | OUTPATIENT
Start: 2025-05-29 | End: 2025-05-30

## 2025-05-29 RX ORDER — FERROUS SULFATE 325(65) MG
325 TABLET, DELAYED RELEASE (ENTERIC COATED) ORAL
Status: DISCONTINUED | OUTPATIENT
Start: 2025-05-30 | End: 2025-05-29

## 2025-05-29 RX ORDER — PANTOPRAZOLE SODIUM 40 MG/1
40 TABLET, DELAYED RELEASE ORAL
Status: DISCONTINUED | OUTPATIENT
Start: 2025-05-29 | End: 2025-06-01

## 2025-05-29 NOTE — PLAN OF CARE
Problem: Patient Centered Care  Goal: Patient preferences are identified and integrated in the patient's plan of care  Description: Interventions:  - What would you like us to know as we care for you? \"I am from [facility].\"  - Provide timely, complete, and accurate information to patient/family  - Incorporate patient and family knowledge, values, beliefs, and cultural backgrounds into the planning and delivery of care  - Encourage patient/family to participate in care and decision-making at the level they choose  - Honor patient and family perspectives and choices  Outcome: Progressing     Problem: SAFETY ADULT - FALL  Goal: Free from fall injury  Description: INTERVENTIONS:  - Assess pt frequently for physical needs  - Identify cognitive and physical deficits and behaviors that affect risk of falls.  - [place] fall precautions as indicated by assessment.  - Educate pt/family on patient safety including physical limitations  - Instruct pt to call for assistance with activity based on assessment  - Modify environment to reduce risk of injury  - Provide assistive devices as appropriate  - Consider OT/PT consult to assist with strengthening/mobility  - Encourage toileting schedule  Outcome: Progressing     Problem: PAIN - ADULT  Goal: Verbalizes/displays adequate comfort level or patient's stated pain goal  Description: INTERVENTIONS:  - Encourage pt to monitor pain and request assistance  - Assess pain using appropriate pain scale  - Administer analgesics based on type and severity of pain and evaluate response  - Implement non-pharmacological measures as appropriate and evaluate response  - Consider cultural and social influences on pain and pain management  - Manage/alleviate anxiety  - Utilize distraction and/or relaxation techniques  - Monitor for opioid side effects  - Notify MD/LIP if interventions unsuccessful or patient reports new pain  - Anticipate increased pain with activity and pre-medicate  as appropriate  Outcome: Progressing     Problem: RISK FOR INFECTION - ADULT  Goal: Absence of fever/infection during anticipated neutropenic period  Description: INTERVENTIONS  - Monitor WBC  - Administer growth factors as ordered  - Implement neutropenic guidelines  Outcome: Progressing     Problem: DISCHARGE PLANNING  Goal: Discharge to home or other facility with appropriate resources  Description: INTERVENTIONS:  - Identify barriers to discharge w/pt and caregiver  - Include patient/family/discharge partner in discharge planning  - Arrange for needed discharge resources and transportation as appropriate  - Identify discharge learning needs (meds, wound care, etc)  - Arrange for interpreters to assist at discharge as needed  - Consider post-discharge preferences of patient/family/discharge partner  - Complete POLST form as appropriate  - Assess patient's ability to be responsible for managing their own health  - Refer to Case Management Department for coordinating discharge planning if the patient needs post-hospital services based on physician/LIP order or complex needs related to functional status, cognitive ability or social support system  Outcome: Progressing     Problem: GASTROINTESTINAL - ADULT  Goal: Minimal or absence of nausea and vomiting  Description: INTERVENTIONS:  - Maintain adequate hydration with IV or PO as ordered and tolerated  - Nasogastric tube to low intermittent suction as ordered  - Evaluate effectiveness of ordered antiemetic medications  - Provide nonpharmacologic comfort measures as appropriate  - Advance diet as tolerated, if ordered  - Obtain nutritional consult as needed  - Evaluate fluid balance  Outcome: Progressing  Goal: Maintains or returns to baseline bowel function  Description: INTERVENTIONS:  - Assess bowel function  - Maintain adequate hydration with IV or PO as ordered and tolerated  - Evaluate effectiveness of GI medications  - Encourage mobilization and activity  -  Obtain nutritional consult as needed  - Establish a toileting routine/schedule  - Consider collaborating with pharmacy to review patient's medication profile  Outcome: Progressing     Problem: METABOLIC/FLUID AND ELECTROLYTES - ADULT  Goal: Electrolytes maintained within normal limits  Description: INTERVENTIONS:  - Monitor labs and rhythm and assess patient for signs and symptoms of electrolyte imbalances  - Administer electrolyte replacement as ordered  - Monitor response to electrolyte replacements, including rhythm and repeat lab results as appropriate  - Fluid restriction as ordered  - Instruct patient on fluid and nutrition restrictions as appropriate  Outcome: Progressing     Problem: SKIN/TISSUE INTEGRITY - ADULT  Goal: Skin integrity remains intact  Description: INTERVENTIONS  - Assess and document risk factors for pressure ulcer development  - Assess and document skin integrity  - Monitor for areas of redness and/or skin breakdown  - Initiate interventions, skin care algorithm/standards of care as needed  Outcome: Progressing     Problem: CARDIOVASCULAR - ADULT  Goal: Maintains optimal cardiac output and hemodynamic stability  Description: INTERVENTIONS:  - Monitor vital signs, rhythm, and trends  - Monitor for bleeding, hypotension and signs of decreased cardiac output  - Evaluate effectiveness of vasoactive medications to optimize hemodynamic stability  - Monitor arterial and/or venous puncture sites for bleeding and/or hematoma  - Assess quality of pulses, skin color and temperature  - Assess for signs of decreased coronary artery perfusion - ex. Angina  - Evaluate fluid balance, assess for edema, trend weights  Outcome: Progressing     Problem: GENITOURINARY - ADULT  Goal: Absence of urinary retention  Description: INTERVENTIONS:  - Assess patient’s ability to void and empty bladder  - Monitor intake/output and perform bladder scan as needed  - Follow urinary retention protocol/standard of care  -  Consider collaborating with pharmacy to review patient's medication profile  - Implement strategies to promote bladder emptying  Outcome: Progressing     Problem: MUSCULOSKELETAL - ADULT  Goal: Return mobility to safest level of function  Description: INTERVENTIONS:  - Assess patient stability and activity tolerance for standing, transferring and ambulating w/ or w/o assistive devices  - Assist with transfers and ambulation using safe patient handling equipment as needed  - Ensure adequate protection for wounds/incisions during mobilization  - Obtain PT/OT consults as needed  - Advance activity as appropriate  - Communicate ordered activity level and limitations with patient/family  Outcome: Progressing  Goal: Maintain proper alignment of affected body part  Description: INTERVENTIONS:  - Support and protect limb and body alignment per provider's orders  - Instruct and reinforce with patient and family use of appropriate assistive device and precautions (e.g. spinal or hip dislocation precautions)  Outcome: Progressing     Problem: Impaired Functional Mobility  Goal: Achieve highest/safest level of mobility/gait  Description: Interventions:  - Assess patient's functional ability and stability  - Promote increasing activity/tolerance for mobility and gait  - Educate and engage patient/family in tolerated activity level and precautions  - Recommend use of  RW for transfers and ambulation  Outcome: Progressing     Problem: Impaired Swallowing  Goal: Minimize aspiration risk  Description: Interventions:  - Patient should be alert and upright for all feedings (90 degrees preferred)  - Offer food and liquids at a slow rate  - No straws  - Encourage small bites of food and small sips of liquid  - Offer pills one at a time, crush or deliver with applesauce as needed  - Discontinue feeding and notify MD (or speech pathologist) if coughing or persistent throat clearing or wet/gurgly vocal quality is noted  Outcome:  Progressing     Problem: HEMATOLOGIC - ADULT  Goal: Maintains hematologic stability  Description: INTERVENTIONS  - Assess for signs and symptoms of bleeding or hemorrhage  - Monitor labs and vital signs for trends  - Administer supportive blood products/factors, fluids and medications as ordered and appropriate  - Administer supportive blood products/factors as ordered and appropriate  Outcome: Progressing  Goal: Free from bleeding injury  Description: (Example usage: patient with low platelets)  INTERVENTIONS:  - Avoid intramuscular injections, enemas and rectal medication administration  - Ensure safe mobilization of patient  - Hold pressure on venipuncture sites to achieve adequate hemostasis  - Assess for signs and symptoms of internal bleeding  - Monitor lab trends  - Patient is to report abnormal signs of bleeding to staff  - Avoid use of toothpicks and dental floss  - Use electric shaver for shaving  - Use soft bristle tooth brush  - Limit straining and forceful nose blowing  Outcome: Progressing

## 2025-05-29 NOTE — PROGRESS NOTES
[facility]  part of [facility]    Progress Note    Justyn Hall Patient Status:  Inpatient    1940 MRN [id number]   Location [facility] 5SW/SE Attending Tova Mosquera,    Hosp Day # 15 PCP No primary care provider on file.     Chief complaint gi bleed     Subjective:   Justyn Hall is a(n) 84 year old male pt doing well today. C/o left hip pain.   No bm yet     ROS:   No cp, sob   No c/d   No n/v     Objective:     Blood pressure 110/69, pulse 80, temperature 98 °F (36.7 °C), temperature source Oral, resp. rate 16, height 6' (1.829 m), weight 170 lb 11.2 oz (77.4 kg), SpO2 99%.      Intake/Output Summary (Last 24 hours) at 2025 1330  Last data filed at 2025 1033  Gross per 24 hour   Intake 790 ml   Output 3615 ml   Net -2825 ml       Patient Weight(s) for the past 336 hrs:   Weight   25 0614 170 lb 11.2 oz (77.4 kg)   25 0547 171 lb 11.2 oz (77.9 kg)   25 0536 172 lb 8 oz (78.2 kg)   25 0510 174 lb (78.9 kg)   25 0513 174 lb 3.2 oz (79 kg)           General appearance: alert, appears stated age and cooperative  Pulmonary:  clear to auscultation bilaterally  Cardiovascular: S1, S2 normal, no murmur, click, rub or gallop, regular rate and rhythm  Abdominal: soft, non-tender; bowel sounds normal; no masses,  no organomegaly  Extremities: RLE swelling     Medicines:     Current Hospital Medications[1]                Blood Pressure and Cardiac Medications            midodrine 2.5 MG Oral Tab            Medication Infusions[2]        Lab Results   Component Value Date    WBC 8.6 2025    HGB 7.3 (L) 2025    HCT 23.0 (L) 2025    .0 2025    CREATSERUM 1.19 2025    BUN 15 2025     2025    K 3.5 2025    K 3.5 2025     2025    CO2 25.0 2025    GLU 98 2025    CA 7.5 (L) 2025    ALB 2.6 (L) 2025    ALKPHO 55 2025    BILT 1.1 2025     TP 4.6 (L) 05/26/2025    AST 9 05/26/2025    ALT <7 (L) 05/26/2025    PTT 27.8 04/10/2025    INR 1.11 04/10/2025    T4F 1.10 07/09/2015    TSH 2.507 04/05/2025    LIP 63 (H) 04/10/2025    PSA 2.46 10/10/2019    MG 1.6 05/23/2025    PHOS 2.7 05/19/2025     04/05/2025    B12 640 04/05/2025       US VENOUS DOPPLER LEG RIGHT - DIAG IMG (CPT=93971)  Result Date: 5/28/2025  CONCLUSION:  1. No right lower extremity DVT. 2. 2.2 cm right Baker's cyst.    Dictated by (CST): Tim Norris MD on 5/28/2025 at 11:59 AM     Finalized by (CST): Tim Norris MD on 5/28/2025 at 12:00 PM          EKG 12 Lead  Result Date: 5/29/2025  Sinus rhythm with sinus arrhythmia with 1st degree A-V block Otherwise normal ECG When compared with ECG of 24-MAY-2025 08:01, Criteria for Anterior infarct are no longer Present Confirmed by MAULIK TOBAR, LIVIA ([id number]) on 5/29/2025 9:01:03 AM      Results:     CBC:    Lab Results   Component Value Date    WBC 8.6 05/29/2025    WBC 7.8 05/28/2025    WBC 9.0 05/27/2025     Lab Results   Component Value Date    HGB 7.3 (L) 05/29/2025    HGB 8.5 (L) 05/28/2025    HGB 7.5 (L) 05/28/2025      Lab Results   Component Value Date    .0 05/29/2025    .0 05/28/2025    .0 05/27/2025       Recent Labs   Lab 05/26/25  0858 05/28/25  0550 05/29/25  0613   GLU 84 86 98   BUN 19 15 15   CREATSERUM 1.33* 1.41* 1.19   CA 7.0* 7.2* 7.5*    142 142   K 3.5  3.5 3.4* 3.5  3.5    108 109   CO2 23.0 25.0 25.0           Assessment and Plan:         Hematemesis  Acute blood loss anemia   S/p 1 UNIT PRBC today -Total 4 this admission   S/p Repeat EGD 5/22, no overt bleeding  On PPI BID   Hb dropped again today, PRBC ordered, GI notified   5/25: EGD today ->Second bleeding point from the known duodenal ulcer, treated today with cautery   Hb 7.3 - add iv iron. 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  Blood and urine cultures.:  No growth to date  IV Rocephin stopped based on neg cultures  Monitor hemodynamic status and temperature curve.      Acute GI bleed: Likely upper in view of melanotic stools  - Hemodynamically stable - hb 7.3 today  - Required multiple units during admission   - GI consulted, patient underwent endoscopy with findings of:   1.  Diminutive colon polyps  2.  Internal hemorrhoids  3.  Juno 1b+ duodenal ulceration  4.  LA grade D esophagitis  5.  Hiatal hernia     - advance diet as tolerated. GI recommending PPI BID oral indefinitely for duodenal ulcer   - Cont to monitor H/H, today hgb 7.3; restarting asa   - Aspirin/NSAIDs should be avoided if possible. Gastric path negative for H pylori.          Acute on chronic kidney injury.:  Back to baseline  Baseline creatinine 1.5  Stop ivfs   Avoid nephrotoxic agents  Monitor renal function daily     Parkinson's disease  Cont Sinemet  Fall and aspiration precaution  PT OT when able     Chronic infrarenal abdominal aortic aneurysm  - per chart search patient was started on low-dose aspirin. Ok to restart asa         CODE status: DNAR select  Espinal: Espinal catheter in place; decath trial at f/u with urology   Dispo: [facility] Sal, once stable  - hopefully tomorrow         MDM: High   I personally spent time on chart/note review, review of labs/imaging, discussion with patient, physical exam, discussion with staff, consultants, coordinating care, writing progress note, and discussion of plan of care.                     Tova Mosquera, DO         Chart reviewed, including current vitals, notes, labs and imaging  Labs ordered and medications  adjusted as outlined above  Coordinate care with care team/consultants  Discussed with patient results of tests, management plan as outlined above, and the need for ongoing hospitalization  D/w RN     MDM high            Supplementary Documentation:   DVT Mechanical Prophylaxis:   SCDs,    DVT Pharmacologic Prophylaxis   Medication   None         DVT Pharmacologic prophylaxis: Aspirin 81 mg      Code Status: DNAR/Selective Treatment  Espinal: Espinal catheter in place  Espinal Duration (in days): 15  Central line:    ELISE: 5/30/2025                            [1]   Current Facility-Administered Medications   Medication Dose Route Frequency    aspirin DR tab 81 mg  81 mg Oral Daily    sodium ferric gluconate (Ferrlecit) 125 mg in sodium chloride 0.9% 100mL IVPB premix  125 mg Intravenous Once    pantoprazole (Protonix) DR tab 40 mg  40 mg Oral BID AC    acetaminophen (Tylenol Extra Strength) tab 1,000 mg  1,000 mg Oral Q6H PRN    carbidopa-levodopa (SINEMET)  MG per tab 1 tablet  1 tablet Oral TID    [Held by provider] midodrine (ProAmatine) tab 2.5 mg  2.5 mg Oral TID    tamsulosin (Flomax) cap 0.8 mg  0.8 mg Oral Nightly    finasteride (Proscar) tab 5 mg  5 mg Oral Daily    acetaminophen (Tylenol Extra Strength) tab 500 mg  500 mg Oral Q4H PRN    ondansetron (Zofran) 4 MG/2ML injection 4 mg  4 mg Intravenous Q6H PRN    metoclopramide (Reglan) 5 mg/mL injection 5 mg  5 mg Intravenous Q8H PRN    labetalol (Trandate) 5 mg/mL injection 10 mg  10 mg Intravenous Q4H PRN   [2]

## 2025-05-30 LAB
BASOPHILS # BLD AUTO: 0.11 X10(3) UL (ref 0–0.2)
BASOPHILS NFR BLD AUTO: 1.3 %
DEPRECATED RDW RBC AUTO: 51 FL (ref 35.1–46.3)
EOSINOPHIL # BLD AUTO: 0.34 X10(3) UL (ref 0–0.7)
EOSINOPHIL NFR BLD AUTO: 3.9 %
ERYTHROCYTE [DISTWIDTH] IN BLOOD BY AUTOMATED COUNT: 15.3 % (ref 11–15)
HCT VFR BLD AUTO: 25.4 % (ref 39–53)
HGB BLD-MCNC: 8 G/DL (ref 13–17.5)
IMM GRANULOCYTES # BLD AUTO: 0.06 X10(3) UL (ref 0–1)
IMM GRANULOCYTES NFR BLD: 0.7 %
LYMPHOCYTES # BLD AUTO: 1.63 X10(3) UL (ref 1–4)
LYMPHOCYTES NFR BLD AUTO: 18.8 %
MCH RBC QN AUTO: 28.5 PG (ref 26–34)
MCHC RBC AUTO-ENTMCNC: 31.5 G/DL (ref 31–37)
MCV RBC AUTO: 90.4 FL (ref 80–100)
MONOCYTES # BLD AUTO: 0.64 X10(3) UL (ref 0.1–1)
MONOCYTES NFR BLD AUTO: 7.4 %
NEUTROPHILS # BLD AUTO: 5.9 X10 (3) UL (ref 1.5–7.7)
NEUTROPHILS # BLD AUTO: 5.9 X10(3) UL (ref 1.5–7.7)
NEUTROPHILS NFR BLD AUTO: 67.9 %
PLATELET # BLD AUTO: 403 10(3)UL (ref 150–450)
RBC # BLD AUTO: 2.81 X10(6)UL (ref 3.8–5.8)
WBC # BLD AUTO: 8.7 X10(3) UL (ref 4–11)

## 2025-05-30 PROCEDURE — 99233 SBSQ HOSP IP/OBS HIGH 50: CPT | Performed by: HOSPITALIST

## 2025-05-30 NOTE — PROGRESS NOTES
[facility]  part of [facility]    Progress Note    Justyn Hall Patient Status:  Inpatient    1940 MRN [id number]   Location [facility] 5SW/SE Attending Tova Mosquera,    Hosp Day # 16 PCP No primary care provider on file.     Chief complaint gi bleed     Subjective:   Justyn Hall is a(n) 84 year old male pt up to bathroom today and bp dropped and pt became very dizzy . He got back in bed and bp improved.   Received 250 ml bolus   He had a bm without blood     ROS:   No cp, sob   No c/d   No n/v     Objective:     Blood pressure 137/77, pulse 75, temperature 98.1 °F (36.7 °C), temperature source Oral, resp. rate 18, height 6' (1.829 m), weight 170 lb 11.2 oz (77.4 kg), SpO2 99%.      Intake/Output Summary (Last 24 hours) at 2025 1805  Last data filed at 2025 1500  Gross per 24 hour   Intake 500 ml   Output 2770 ml   Net -2270 ml       Patient Weight(s) for the past 336 hrs:   Weight   25 0614 170 lb 11.2 oz (77.4 kg)   25 0547 171 lb 11.2 oz (77.9 kg)   25 0536 172 lb 8 oz (78.2 kg)   25 0510 174 lb (78.9 kg)   25 0513 174 lb 3.2 oz (79 kg)           General appearance: alert, appears stated age and cooperative  Pulmonary:  clear to auscultation bilaterally  Cardiovascular: S1, S2 normal, no murmur, click, rub or gallop, regular rate and rhythm  Abdominal: soft, non-tender; bowel sounds normal; no masses,  no organomegaly  Extremities: RLE swelling     Medicines:     Current Hospital Medications[1]                Blood Pressure and Cardiac Medications            midodrine 2.5 MG Oral Tab            Medication Infusions[2]        Lab Results   Component Value Date    WBC 8.7 2025    HGB 8.0 (L) 2025    HCT 25.4 (L) 2025    .0 2025    CREATSERUM 1.19 2025    BUN 15 2025     2025    K 3.5 2025    K 3.5 2025     2025    CO2 25.0 2025    GLU 98  05/29/2025    CA 7.5 (L) 05/29/2025    ALB 2.6 (L) 05/26/2025    ALKPHO 55 05/26/2025    BILT 1.1 05/26/2025    TP 4.6 (L) 05/26/2025    AST 9 05/26/2025    ALT <7 (L) 05/26/2025    PTT 27.8 04/10/2025    INR 1.11 04/10/2025    T4F 1.10 07/09/2015    TSH 2.507 04/05/2025    LIP 63 (H) 04/10/2025    PSA 2.46 10/10/2019    MG 1.6 05/23/2025    PHOS 2.7 05/19/2025     04/05/2025    B12 640 04/05/2025       No results found.    EKG 12 Lead  Result Date: 5/29/2025  Sinus rhythm with sinus arrhythmia with 1st degree A-V block Otherwise normal ECG When compared with ECG of 24-MAY-2025 08:01, Criteria for Anterior infarct are no longer Present Confirmed by MAULIK TOBAR, LIVIA ([id number]) on 5/29/2025 9:01:03 AM      Results:     CBC:    Lab Results   Component Value Date    WBC 8.7 05/30/2025    WBC 8.6 05/29/2025    WBC 7.8 05/28/2025     Lab Results   Component Value Date    HGB 8.0 (L) 05/30/2025    HGB 7.3 (L) 05/29/2025    HGB 8.5 (L) 05/28/2025      Lab Results   Component Value Date    .0 05/30/2025    .0 05/29/2025    .0 05/28/2025       Recent Labs   Lab 05/26/25  0858 05/28/25  0550 05/29/25  0613   GLU 84 86 98   BUN 19 15 15   CREATSERUM 1.33* 1.41* 1.19   CA 7.0* 7.2* 7.5*    142 142   K 3.5  3.5 3.4* 3.5  3.5    108 109   CO2 23.0 25.0 25.0           Assessment and Plan:         Hematemesis  Acute blood loss anemia   S/p 1 UNIT PRBC today -Total 4 this admission   S/p Repeat EGD 5/22, no overt bleeding  On PPI BID   Hb dropped again today, PRBC ordered, GI notified   5/25: EGD today ->Second bleeding point from the known duodenal ulcer, treated today with cautery   Hb 7.3 - add iv iron. Restart asa and monitor   5/30 - hb stable.     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  Blood and urine cultures.:  No growth to date  IV Rocephin stopped based on neg cultures  Monitor hemodynamic status and temperature curve.      Acute GI bleed: Likely upper in view of melanotic stools  - hb improved   - Required multiple units during admission   - GI consulted, patient underwent endoscopy with findings of:   1.  Diminutive colon polyps  2.  Internal hemorrhoids  3.  Juno 1b+ duodenal ulceration  4.  LA grade D esophagitis  5.  Hiatal hernia     - advance diet as tolerated. GI recommending PPI BID oral indefinitely for duodenal ulcer   - Cont to monitor H/H, today hgb 8; restarting asa - day #2  - Aspirin/NSAIDs should be avoided if possible. Gastric path negative for H pylori.          Acute on chronic kidney injury.:  Back to baseline  Baseline creatinine 1.5  Stop ivfs   Avoid nephrotoxic agents  Monitor renal function daily     Parkinson's disease  Cont Sinemet  Fall and aspiration precaution  PT OT when able    Orthostatic hypotension 2/2 parkinson  Normally on midodrine and coreg which were held due to gi bleed and hypotension. Coreg restarted yesterday and bp dropped when up to bathroom today. Hold coreg. Restart midodrine. Order binder. Discussed with pt's sister - if improved tomorrow, plan for dc      Chronic infrarenal abdominal aortic aneurysm  - per chart search patient was started on low-dose aspirin. Ok to restart asa         CODE status: DNAR select  Espinal: Espinal catheter in place; decath trial at f/u with urology   Dispo: [facility], once stable  - hopefully tomorrow if orthostasis improved        MDM: High   I personally spent time on chart/note review, review of labs/imaging, discussion with patient, physical exam, discussion with staff, consultants, coordinating care, writing progress note, and discussion of  plan of care.                     Tova Mosquera DO         Chart reviewed, including current vitals, notes, labs and imaging  Labs ordered and medications adjusted as outlined above  Coordinate care with care team/consultants  Discussed with patient results of tests, management plan as outlined above, and the need for ongoing hospitalization  D/w RN     LASHAE high            Supplementary Documentation:   DVT Mechanical Prophylaxis:   SCDs,    DVT Pharmacologic Prophylaxis   Medication   None         DVT Pharmacologic prophylaxis: Aspirin 81 mg      Code Status: DNAR/Selective Treatment  Espinal: Espinal catheter in place  Espinal Duration (in days): 15  Central line:    ELISE: 6/2/2025                            [1]   Current Facility-Administered Medications   Medication Dose Route Frequency    aspirin DR tab 81 mg  81 mg Oral Daily    pantoprazole (Protonix) DR tab 40 mg  40 mg Oral BID AC    acetaminophen (Tylenol Extra Strength) tab 1,000 mg  1,000 mg Oral Q6H PRN    carbidopa-levodopa (SINEMET)  MG per tab 1 tablet  1 tablet Oral TID    midodrine (ProAmatine) tab 2.5 mg  2.5 mg Oral TID    tamsulosin (Flomax) cap 0.8 mg  0.8 mg Oral Nightly    finasteride (Proscar) tab 5 mg  5 mg Oral Daily    acetaminophen (Tylenol Extra Strength) tab 500 mg  500 mg Oral Q4H PRN    ondansetron (Zofran) 4 MG/2ML injection 4 mg  4 mg Intravenous Q6H PRN    metoclopramide (Reglan) 5 mg/mL injection 5 mg  5 mg Intravenous Q8H PRN    labetalol (Trandate) 5 mg/mL injection 10 mg  10 mg Intravenous Q4H PRN   [2]

## 2025-05-30 NOTE — PLAN OF CARE
Problem: Patient Centered Care  Goal: Patient preferences are identified and integrated in the patient's plan of care  Description: Interventions:  - What would you like us to know as we care for you? \"I am from [facility].\"  - Provide timely, complete, and accurate information to patient/family  - Incorporate patient and family knowledge, values, beliefs, and cultural backgrounds into the planning and delivery of care  - Encourage patient/family to participate in care and decision-making at the level they choose  - Honor patient and family perspectives and choices  Outcome: Progressing     Problem: Patient/Family Goals  Goal: Patient/Family Long Term Goal  Description: Patient's Long Term Goal: \"to be discharged\"    Interventions:  -Follow up MD appt  -Take meds as prescribed  -Use of assistive device if needed  - See additional Care Plan goals for specific interventions  Outcome: Progressing  Goal: Patient/Family Short Term Goal  Description: Patient's Short Term Goal: \"to prevent pain and infection\"    Interventions:   -Monitor VS  -Check labs results  -Administer IVF and IV ABT as ordered  -Provide pain meds as prescribed  - See additional Care Plan goals for specific interventions  Outcome: Progressing     Problem: SAFETY ADULT - FALL  Goal: Free from fall injury  Description: INTERVENTIONS:  - Assess pt frequently for physical needs  - Identify cognitive and physical deficits and behaviors that affect risk of falls.  - [place] fall precautions as indicated by assessment.  - Educate pt/family on patient safety including physical limitations  - Instruct pt to call for assistance with activity based on assessment  - Modify environment to reduce risk of injury  - Provide assistive devices as appropriate  - Consider OT/PT consult to assist with strengthening/mobility  - Encourage toileting schedule  Outcome: Progressing     Problem: GASTROINTESTINAL - ADULT  Goal: Minimal or absence of nausea and  vomiting  Description: INTERVENTIONS:  - Maintain adequate hydration with IV or PO as ordered and tolerated  - Nasogastric tube to low intermittent suction as ordered  - Evaluate effectiveness of ordered antiemetic medications  - Provide nonpharmacologic comfort measures as appropriate  - Advance diet as tolerated, if ordered  - Obtain nutritional consult as needed  - Evaluate fluid balance  Outcome: Progressing  Goal: Maintains or returns to baseline bowel function  Description: INTERVENTIONS:  - Assess bowel function  - Maintain adequate hydration with IV or PO as ordered and tolerated  - Evaluate effectiveness of GI medications  - Encourage mobilization and activity  - Obtain nutritional consult as needed  - Establish a toileting routine/schedule  - Consider collaborating with pharmacy to review patient's medication profile  Outcome: Progressing     Problem: METABOLIC/FLUID AND ELECTROLYTES - ADULT  Goal: Electrolytes maintained within normal limits  Description: INTERVENTIONS:  - Monitor labs and rhythm and assess patient for signs and symptoms of electrolyte imbalances  - Administer electrolyte replacement as ordered  - Monitor response to electrolyte replacements, including rhythm and repeat lab results as appropriate  - Fluid restriction as ordered  - Instruct patient on fluid and nutrition restrictions as appropriate  Outcome: Progressing     Problem: SKIN/TISSUE INTEGRITY - ADULT  Goal: Skin integrity remains intact  Description: INTERVENTIONS  - Assess and document risk factors for pressure ulcer development  - Assess and document skin integrity  - Monitor for areas of redness and/or skin breakdown  - Initiate interventions, skin care algorithm/standards of care as needed  Outcome: Progressing     Problem: CARDIOVASCULAR - ADULT  Goal: Maintains optimal cardiac output and hemodynamic stability  Description: INTERVENTIONS:  - Monitor vital signs, rhythm, and trends  - Monitor for bleeding, hypotension and  signs of decreased cardiac output  - Evaluate effectiveness of vasoactive medications to optimize hemodynamic stability  - Monitor arterial and/or venous puncture sites for bleeding and/or hematoma  - Assess quality of pulses, skin color and temperature  - Assess for signs of decreased coronary artery perfusion - ex. Angina  - Evaluate fluid balance, assess for edema, trend weights  Outcome: Progressing     Problem: PAIN - ADULT  Goal: Verbalizes/displays adequate comfort level or patient's stated pain goal  Description: INTERVENTIONS:  - Encourage pt to monitor pain and request assistance  - Assess pain using appropriate pain scale  - Administer analgesics based on type and severity of pain and evaluate response  - Implement non-pharmacological measures as appropriate and evaluate response  - Consider cultural and social influences on pain and pain management  - Manage/alleviate anxiety  - Utilize distraction and/or relaxation techniques  - Monitor for opioid side effects  - Notify MD/LIP if interventions unsuccessful or patient reports new pain  - Anticipate increased pain with activity and pre-medicate as appropriate  Outcome: Progressing     Problem: RISK FOR INFECTION - ADULT  Goal: Absence of fever/infection during anticipated neutropenic period  Description: INTERVENTIONS  - Monitor WBC  - Administer growth factors as ordered  - Implement neutropenic guidelines  Outcome: Progressing     Problem: DISCHARGE PLANNING  Goal: Discharge to home or other facility with appropriate resources  Description: INTERVENTIONS:  - Identify barriers to discharge w/pt and caregiver  - Include patient/family/discharge partner in discharge planning  - Arrange for needed discharge resources and transportation as appropriate  - Identify discharge learning needs (meds, wound care, etc)  - Arrange for interpreters to assist at discharge as needed  - Consider post-discharge preferences of patient/family/discharge partner  - Complete  POLST form as appropriate  - Assess patient's ability to be responsible for managing their own health  - Refer to Case Management Department for coordinating discharge planning if the patient needs post-hospital services based on physician/LIP order or complex needs related to functional status, cognitive ability or social support system  Outcome: Progressing     Problem: GENITOURINARY - ADULT  Goal: Absence of urinary retention  Description: INTERVENTIONS:  - Assess patient’s ability to void and empty bladder  - Monitor intake/output and perform bladder scan as needed  - Follow urinary retention protocol/standard of care  - Consider collaborating with pharmacy to review patient's medication profile  - Implement strategies to promote bladder emptying  Outcome: Progressing     Problem: MUSCULOSKELETAL - ADULT  Goal: Return mobility to safest level of function  Description: INTERVENTIONS:  - Assess patient stability and activity tolerance for standing, transferring and ambulating w/ or w/o assistive devices  - Assist with transfers and ambulation using safe patient handling equipment as needed  - Ensure adequate protection for wounds/incisions during mobilization  - Obtain PT/OT consults as needed  - Advance activity as appropriate  - Communicate ordered activity level and limitations with patient/family  Outcome: Progressing  Goal: Maintain proper alignment of affected body part  Description: INTERVENTIONS:  - Support and protect limb and body alignment per provider's orders  - Instruct and reinforce with patient and family use of appropriate assistive device and precautions (e.g. spinal or hip dislocation precautions)  Outcome: Progressing     Problem: Impaired Functional Mobility  Goal: Achieve highest/safest level of mobility/gait  Description: Interventions:  - Assess patient's functional ability and stability  - Promote increasing activity/tolerance for mobility and gait  - Educate and engage patient/family in  tolerated activity level and precautions  - Recommend use of  RW for transfers and ambulation  Outcome: Progressing     Problem: Impaired Swallowing  Goal: Minimize aspiration risk  Description: Interventions:  - Patient should be alert and upright for all feedings (90 degrees preferred)  - Offer food and liquids at a slow rate  - No straws  - Encourage small bites of food and small sips of liquid  - Offer pills one at a time, crush or deliver with applesauce as needed  - Discontinue feeding and notify MD (or speech pathologist) if coughing or persistent throat clearing or wet/gurgly vocal quality is noted  Outcome: Progressing     Problem: HEMATOLOGIC - ADULT  Goal: Maintains hematologic stability  Description: INTERVENTIONS  - Assess for signs and symptoms of bleeding or hemorrhage  - Monitor labs and vital signs for trends  - Administer supportive blood products/factors, fluids and medications as ordered and appropriate  - Administer supportive blood products/factors as ordered and appropriate  Outcome: Progressing  Goal: Free from bleeding injury  Description: (Example usage: patient with low platelets)  INTERVENTIONS:  - Avoid intramuscular injections, enemas and rectal medication administration  - Ensure safe mobilization of patient  - Hold pressure on venipuncture sites to achieve adequate hemostasis  - Assess for signs and symptoms of internal bleeding  - Monitor lab trends  - Patient is to report abnormal signs of bleeding to staff  - Avoid use of toothpicks and dental floss  - Use electric shaver for shaving  - Use soft bristle tooth brush  - Limit straining and forceful nose blowing  Outcome: Progressing

## 2025-05-30 NOTE — CM/SW NOTE
Care Progression Note:  Length of stay: 16  GMLOS: 4.8  Avoidable Delays: N/A  Code Status: DNR/select     Acute Medical Issue/Factors:   Urinary tract infection with hematuria, site unspecified   Anemia  JORDON      Discharge Barriers: Physical/emotional and/or cognitive functioning   Expected discharge date: 6/2/25   Expected next site of care: Sub-acute Rehab.     [facility]    PLAN: DC to [facility] pending med clear    / available for discharge planning.     Sarah BARRIENTOS, LSW  Discharge Planner

## 2025-05-30 NOTE — PLAN OF CARE
Safety precautions in place. Call light within reach. Patient care needs addressed.     Problem: Patient Centered Care  Goal: Patient preferences are identified and integrated in the patient's plan of care  Description: Interventions:  - Provide timely, complete, and accurate information to patient/family  - Incorporate patient and family knowledge, values, beliefs, and cultural backgrounds into the planning and delivery of care  - Encourage patient/family to participate in care and decision-making at the level they choose  - Honor patient and family perspectives and choices  Outcome: Progressing    Problem: SAFETY ADULT - FALL  Goal: Free from fall injury  Description: INTERVENTIONS:  - Assess pt frequently for physical needs  - Identify cognitive and physical deficits and behaviors that affect risk of falls.  - [place] fall precautions as indicated by assessment.  - Educate pt/family on patient safety including physical limitations  - Instruct pt to call for assistance with activity based on assessment  - Modify environment to reduce risk of injury  - Provide assistive devices as appropriate  - Consider OT/PT consult to assist with strengthening/mobility  - Encourage toileting schedule  Outcome: Progressing     Problem: GASTROINTESTINAL - ADULT  Goal: Minimal or absence of nausea and vomiting  Description: INTERVENTIONS:  - Maintain adequate hydration with IV or PO as ordered and tolerated  - Nasogastric tube to low intermittent suction as ordered  - Evaluate effectiveness of ordered antiemetic medications  - Provide nonpharmacologic comfort measures as appropriate  - Advance diet as tolerated, if ordered  - Obtain nutritional consult as needed  - Evaluate fluid balance  Outcome: Progressing  Goal: Maintains or returns to baseline bowel function  Description: INTERVENTIONS:  - Assess bowel function  - Maintain adequate hydration with IV or PO as ordered and tolerated  - Evaluate effectiveness of GI  medications  - Encourage mobilization and activity  - Obtain nutritional consult as needed  - Establish a toileting routine/schedule  - Consider collaborating with pharmacy to review patient's medication profile  Outcome: Progressing     Problem: METABOLIC/FLUID AND ELECTROLYTES - ADULT  Goal: Electrolytes maintained within normal limits  Description: INTERVENTIONS:  - Monitor labs and rhythm and assess patient for signs and symptoms of electrolyte imbalances  - Administer electrolyte replacement as ordered  - Monitor response to electrolyte replacements, including rhythm and repeat lab results as appropriate  - Fluid restriction as ordered  - Instruct patient on fluid and nutrition restrictions as appropriate  Outcome: Progressing     Problem: SKIN/TISSUE INTEGRITY - ADULT  Goal: Skin integrity remains intact  Description: INTERVENTIONS  - Assess and document risk factors for pressure ulcer development  - Assess and document skin integrity  - Monitor for areas of redness and/or skin breakdown  - Initiate interventions, skin care algorithm/standards of care as needed  Outcome: Progressing     Problem: CARDIOVASCULAR - ADULT  Goal: Maintains optimal cardiac output and hemodynamic stability  Description: INTERVENTIONS:  - Monitor vital signs, rhythm, and trends  - Monitor for bleeding, hypotension and signs of decreased cardiac output  - Evaluate effectiveness of vasoactive medications to optimize hemodynamic stability  - Monitor arterial and/or venous puncture sites for bleeding and/or hematoma  - Assess quality of pulses, skin color and temperature  - Assess for signs of decreased coronary artery perfusion - ex. Angina  - Evaluate fluid balance, assess for edema, trend weights  Outcome: Progressing     Problem: PAIN - ADULT  Goal: Verbalizes/displays adequate comfort level or patient's stated pain goal  Description: INTERVENTIONS:  - Encourage pt to monitor pain and request assistance  - Assess pain using  appropriate pain scale  - Administer analgesics based on type and severity of pain and evaluate response  - Implement non-pharmacological measures as appropriate and evaluate response  - Consider cultural and social influences on pain and pain management  - Manage/alleviate anxiety  - Utilize distraction and/or relaxation techniques  - Monitor for opioid side effects  - Notify MD/LIP if interventions unsuccessful or patient reports new pain  - Anticipate increased pain with activity and pre-medicate as appropriate  Outcome: Progressing     Problem: DISCHARGE PLANNING  Goal: Discharge to home or other facility with appropriate resources  Description: INTERVENTIONS:  - Identify barriers to discharge w/pt and caregiver  - Include patient/family/discharge partner in discharge planning  - Arrange for needed discharge resources and transportation as appropriate  - Identify discharge learning needs (meds, wound care, etc)  - Arrange for interpreters to assist at discharge as needed  - Consider post-discharge preferences of patient/family/discharge partner  - Complete POLST form as appropriate  - Assess patient's ability to be responsible for managing their own health  - Refer to Case Management Department for coordinating discharge planning if the patient needs post-hospital services based on physician/LIP order or complex needs related to functional status, cognitive ability or social support system  Outcome: Progressing     Problem: GENITOURINARY - ADULT  Goal: Absence of urinary retention  Description: INTERVENTIONS:  - Assess patient’s ability to void and empty bladder  - Monitor intake/output and perform bladder scan as needed  - Follow urinary retention protocol/standard of care  - Consider collaborating with pharmacy to review patient's medication profile  - Implement strategies to promote bladder emptying  Outcome: Progressing     Problem: MUSCULOSKELETAL - ADULT  Goal: Return mobility to safest level of  function  Description: INTERVENTIONS:  - Assess patient stability and activity tolerance for standing, transferring and ambulating w/ or w/o assistive devices  - Assist with transfers and ambulation using safe patient handling equipment as needed  - Ensure adequate protection for wounds/incisions during mobilization  - Obtain PT/OT consults as needed  - Advance activity as appropriate  - Communicate ordered activity level and limitations with patient/family  Outcome: Progressing     Problem: Impaired Functional Mobility  Goal: Achieve highest/safest level of mobility/gait  Description: Interventions:  - Assess patient's functional ability and stability  - Promote increasing activity/tolerance for mobility and gait  - Educate and engage patient/family in tolerated activity level and precautions  - Recommend use of  RW for transfers and ambulation  Outcome: Progressing     Problem: Impaired Swallowing  Goal: Minimize aspiration risk  Description: Interventions:  - Patient should be alert and upright for all feedings (90 degrees preferred)  - Offer food and liquids at a slow rate  - No straws  - Encourage small bites of food and small sips of liquid  - Offer pills one at a time, crush or deliver with applesauce as needed  - Discontinue feeding and notify MD (or speech pathologist) if coughing or persistent throat clearing or wet/gurgly vocal quality is noted  Outcome: Progressing     Problem: HEMATOLOGIC - ADULT  Goal: Maintains hematologic stability  Description: INTERVENTIONS  - Assess for signs and symptoms of bleeding or hemorrhage  - Monitor labs and vital signs for trends  - Administer supportive blood products/factors, fluids and medications as ordered and appropriate  - Administer supportive blood products/factors as ordered and appropriate  Outcome: Progressing

## 2025-05-30 NOTE — PLAN OF CARE
Problem: SAFETY ADULT - FALL  Goal: Free from fall injury  Description: INTERVENTIONS:  - Assess pt frequently for physical needs  - Identify cognitive and physical deficits and behaviors that affect risk of falls.  - [place] fall precautions as indicated by assessment.  - Educate pt/family on patient safety including physical limitations  - Instruct pt to call for assistance with activity based on assessment  - Modify environment to reduce risk of injury  - Provide assistive devices as appropriate  - Consider OT/PT consult to assist with strengthening/mobility  - Encourage toileting schedule  Outcome: Progressing     Problem: GASTROINTESTINAL - ADULT  Goal: Minimal or absence of nausea and vomiting  Description: INTERVENTIONS:  - Maintain adequate hydration with IV or PO as ordered and tolerated  - Nasogastric tube to low intermittent suction as ordered  - Evaluate effectiveness of ordered antiemetic medications  - Provide nonpharmacologic comfort measures as appropriate  - Advance diet as tolerated, if ordered  - Obtain nutritional consult as needed  - Evaluate fluid balance  Outcome: Progressing  Goal: Maintains or returns to baseline bowel function  Description: INTERVENTIONS:  - Assess bowel function  - Maintain adequate hydration with IV or PO as ordered and tolerated  - Evaluate effectiveness of GI medications  - Encourage mobilization and activity  - Obtain nutritional consult as needed  - Establish a toileting routine/schedule  - Consider collaborating with pharmacy to review patient's medication profile  Outcome: Progressing     Problem: METABOLIC/FLUID AND ELECTROLYTES - ADULT  Goal: Electrolytes maintained within normal limits  Description: INTERVENTIONS:  - Monitor labs and rhythm and assess patient for signs and symptoms of electrolyte imbalances  - Administer electrolyte replacement as ordered  - Monitor response to electrolyte replacements, including rhythm and repeat lab results as  appropriate  - Fluid restriction as ordered  - Instruct patient on fluid and nutrition restrictions as appropriate  Outcome: Progressing     Problem: SKIN/TISSUE INTEGRITY - ADULT  Goal: Skin integrity remains intact  Description: INTERVENTIONS  - Assess and document risk factors for pressure ulcer development  - Assess and document skin integrity  - Monitor for areas of redness and/or skin breakdown  - Initiate interventions, skin care algorithm/standards of care as needed  Outcome: Progressing     Problem: CARDIOVASCULAR - ADULT  Goal: Maintains optimal cardiac output and hemodynamic stability  Description: INTERVENTIONS:  - Monitor vital signs, rhythm, and trends  - Monitor for bleeding, hypotension and signs of decreased cardiac output  - Evaluate effectiveness of vasoactive medications to optimize hemodynamic stability  - Monitor arterial and/or venous puncture sites for bleeding and/or hematoma  - Assess quality of pulses, skin color and temperature  - Assess for signs of decreased coronary artery perfusion - ex. Angina  - Evaluate fluid balance, assess for edema, trend weights  Outcome: Progressing     Problem: PAIN - ADULT  Goal: Verbalizes/displays adequate comfort level or patient's stated pain goal  Description: INTERVENTIONS:  - Encourage pt to monitor pain and request assistance  - Assess pain using appropriate pain scale  - Administer analgesics based on type and severity of pain and evaluate response  - Implement non-pharmacological measures as appropriate and evaluate response  - Consider cultural and social influences on pain and pain management  - Manage/alleviate anxiety  - Utilize distraction and/or relaxation techniques  - Monitor for opioid side effects  - Notify MD/LIP if interventions unsuccessful or patient reports new pain  - Anticipate increased pain with activity and pre-medicate as appropriate  Outcome: Progressing     Problem: RISK FOR INFECTION - ADULT  Goal: Absence of fever/infection  during anticipated neutropenic period  Description: INTERVENTIONS  - Monitor WBC  - Administer growth factors as ordered  - Implement neutropenic guidelines  Outcome: Progressing     Problem: DISCHARGE PLANNING  Goal: Discharge to home or other facility with appropriate resources  Description: INTERVENTIONS:  - Identify barriers to discharge w/pt and caregiver  - Include patient/family/discharge partner in discharge planning  - Arrange for needed discharge resources and transportation as appropriate  - Identify discharge learning needs (meds, wound care, etc)  - Arrange for interpreters to assist at discharge as needed  - Consider post-discharge preferences of patient/family/discharge partner  - Complete POLST form as appropriate  - Assess patient's ability to be responsible for managing their own health  - Refer to Case Management Department for coordinating discharge planning if the patient needs post-hospital services based on physician/LIP order or complex needs related to functional status, cognitive ability or social support system  Outcome: Progressing     Problem: GENITOURINARY - ADULT  Goal: Absence of urinary retention  Description: INTERVENTIONS:  - Assess patient’s ability to void and empty bladder  - Monitor intake/output and perform bladder scan as needed  - Follow urinary retention protocol/standard of care  - Consider collaborating with pharmacy to review patient's medication profile  - Implement strategies to promote bladder emptying  Outcome: Progressing     Problem: MUSCULOSKELETAL - ADULT  Goal: Return mobility to safest level of function  Description: INTERVENTIONS:  - Assess patient stability and activity tolerance for standing, transferring and ambulating w/ or w/o assistive devices  - Assist with transfers and ambulation using safe patient handling equipment as needed  - Ensure adequate protection for wounds/incisions during mobilization  - Obtain PT/OT consults as needed  - Advance activity  as appropriate  - Communicate ordered activity level and limitations with patient/family  Outcome: Progressing  Goal: Maintain proper alignment of affected body part  Description: INTERVENTIONS:  - Support and protect limb and body alignment per provider's orders  - Instruct and reinforce with patient and family use of appropriate assistive device and precautions (e.g. spinal or hip dislocation precautions)  Outcome: Progressing     Problem: Impaired Functional Mobility  Goal: Achieve highest/safest level of mobility/gait  Description: Interventions:  - Assess patient's functional ability and stability  - Promote increasing activity/tolerance for mobility and gait  - Educate and engage patient/family in tolerated activity level and precautions    Outcome: Progressing     Problem: Impaired Swallowing  Goal: Minimize aspiration risk  Description: Interventions:  - Patient should be alert and upright for all feedings (90 degrees preferred)  - Offer food and liquids at a slow rate  - No straws  - Encourage small bites of food and small sips of liquid  - Offer pills one at a time, crush or deliver with applesauce as needed  - Discontinue feeding and notify MD (or speech pathologist) if coughing or persistent throat clearing or wet/gurgly vocal quality is noted  Outcome: Progressing     Problem: HEMATOLOGIC - ADULT  Goal: Maintains hematologic stability  Description: INTERVENTIONS  - Assess for signs and symptoms of bleeding or hemorrhage  - Monitor labs and vital signs for trends  - Administer supportive blood products/factors, fluids and medications as ordered and appropriate  - Administer supportive blood products/factors as ordered and appropriate  Outcome: Progressing  Goal: Free from bleeding injury  Description: (Example usage: patient with low platelets)  INTERVENTIONS:  - Avoid intramuscular injections, enemas and rectal medication administration  - Ensure safe mobilization of patient  - Hold pressure on  venipuncture sites to achieve adequate hemostasis  - Assess for signs and symptoms of internal bleeding  - Monitor lab trends  - Patient is to report abnormal signs of bleeding to staff  - Avoid use of toothpicks and dental floss  - Use electric shaver for shaving  - Use soft bristle tooth brush  - Limit straining and forceful nose blowing  Outcome: Progressing

## 2025-05-31 LAB
ANION GAP SERPL CALC-SCNC: 9 MMOL/L (ref 0–18)
BASOPHILS # BLD AUTO: 0.13 X10(3) UL (ref 0–0.2)
BASOPHILS NFR BLD AUTO: 1.6 %
BUN BLD-MCNC: 18 MG/DL (ref 9–23)
BUN/CREAT SERPL: 18.2 (ref 10–20)
CALCIUM BLD-MCNC: 7.9 MG/DL (ref 8.7–10.4)
CHLORIDE SERPL-SCNC: 107 MMOL/L (ref 98–112)
CO2 SERPL-SCNC: 25 MMOL/L (ref 21–32)
CREAT BLD-MCNC: 0.99 MG/DL (ref 0.7–1.3)
DEPRECATED RDW RBC AUTO: 49.7 FL (ref 35.1–46.3)
EGFRCR SERPLBLD CKD-EPI 2021: 75 ML/MIN/1.73M2 (ref 60–?)
EOSINOPHIL # BLD AUTO: 0.45 X10(3) UL (ref 0–0.7)
EOSINOPHIL NFR BLD AUTO: 5.4 %
ERYTHROCYTE [DISTWIDTH] IN BLOOD BY AUTOMATED COUNT: 15.3 % (ref 11–15)
GLUCOSE BLD-MCNC: 93 MG/DL (ref 70–99)
HCT VFR BLD AUTO: 24.7 % (ref 39–53)
HCT VFR BLD AUTO: 26.7 % (ref 39–53)
HGB BLD-MCNC: 7.7 G/DL (ref 13–17.5)
HGB BLD-MCNC: 8.4 G/DL (ref 13–17.5)
IMM GRANULOCYTES # BLD AUTO: 0.08 X10(3) UL (ref 0–1)
IMM GRANULOCYTES NFR BLD: 1 %
LYMPHOCYTES # BLD AUTO: 1.63 X10(3) UL (ref 1–4)
LYMPHOCYTES NFR BLD AUTO: 19.5 %
MCH RBC QN AUTO: 27.9 PG (ref 26–34)
MCHC RBC AUTO-ENTMCNC: 31.2 G/DL (ref 31–37)
MCV RBC AUTO: 89.5 FL (ref 80–100)
MONOCYTES # BLD AUTO: 0.61 X10(3) UL (ref 0.1–1)
MONOCYTES NFR BLD AUTO: 7.3 %
NEUTROPHILS # BLD AUTO: 5.47 X10 (3) UL (ref 1.5–7.7)
NEUTROPHILS # BLD AUTO: 5.47 X10(3) UL (ref 1.5–7.7)
NEUTROPHILS NFR BLD AUTO: 65.2 %
OSMOLALITY SERPL CALC.SUM OF ELEC: 294 MOSM/KG (ref 275–295)
PLATELET # BLD AUTO: 458 10(3)UL (ref 150–450)
POTASSIUM SERPL-SCNC: 3.5 MMOL/L (ref 3.5–5.1)
RBC # BLD AUTO: 2.76 X10(6)UL (ref 3.8–5.8)
SODIUM SERPL-SCNC: 141 MMOL/L (ref 136–145)
WBC # BLD AUTO: 8.4 X10(3) UL (ref 4–11)

## 2025-05-31 PROCEDURE — 99233 SBSQ HOSP IP/OBS HIGH 50: CPT | Performed by: FAMILY MEDICINE

## 2025-05-31 NOTE — PLAN OF CARE
Problem: Patient Centered Care  Goal: Patient preferences are identified and integrated in the patient's plan of care  Description: Interventions:  - What would you like us to know as we care for you? \"I am from [facility].\"  - Provide timely, complete, and accurate information to patient/family  - Incorporate patient and family knowledge, values, beliefs, and cultural backgrounds into the planning and delivery of care  - Encourage patient/family to participate in care and decision-making at the level they choose  - Honor patient and family perspectives and choices  Outcome: Progressing     Problem: SAFETY ADULT - FALL  Goal: Free from fall injury  Description: INTERVENTIONS:  - Assess pt frequently for physical needs  - Identify cognitive and physical deficits and behaviors that affect risk of falls.  - [place] fall precautions as indicated by assessment.  - Educate pt/family on patient safety including physical limitations  - Instruct pt to call for assistance with activity based on assessment  - Modify environment to reduce risk of injury  - Provide assistive devices as appropriate  - Consider OT/PT consult to assist with strengthening/mobility  - Encourage toileting schedule  Outcome: Progressing     Problem: PAIN - ADULT  Goal: Verbalizes/displays adequate comfort level or patient's stated pain goal  Description: INTERVENTIONS:  - Encourage pt to monitor pain and request assistance  - Assess pain using appropriate pain scale  - Administer analgesics based on type and severity of pain and evaluate response  - Implement non-pharmacological measures as appropriate and evaluate response  - Consider cultural and social influences on pain and pain management  - Manage/alleviate anxiety  - Utilize distraction and/or relaxation techniques  - Monitor for opioid side effects  - Notify MD/LIP if interventions unsuccessful or patient reports new pain  - Anticipate increased pain with activity and pre-medicate  as appropriate  Outcome: Progressing     Problem: RISK FOR INFECTION - ADULT  Goal: Absence of fever/infection during anticipated neutropenic period  Description: INTERVENTIONS  - Monitor WBC  - Administer growth factors as ordered  - Implement neutropenic guidelines  Outcome: Progressing     Problem: DISCHARGE PLANNING  Goal: Discharge to home or other facility with appropriate resources  Description: INTERVENTIONS:  - Identify barriers to discharge w/pt and caregiver  - Include patient/family/discharge partner in discharge planning  - Arrange for needed discharge resources and transportation as appropriate  - Identify discharge learning needs (meds, wound care, etc)  - Arrange for interpreters to assist at discharge as needed  - Consider post-discharge preferences of patient/family/discharge partner  - Complete POLST form as appropriate  - Assess patient's ability to be responsible for managing their own health  - Refer to Case Management Department for coordinating discharge planning if the patient needs post-hospital services based on physician/LIP order or complex needs related to functional status, cognitive ability or social support system  Outcome: Progressing     Problem: GASTROINTESTINAL - ADULT  Goal: Minimal or absence of nausea and vomiting  Description: INTERVENTIONS:  - Maintain adequate hydration with IV or PO as ordered and tolerated  - Nasogastric tube to low intermittent suction as ordered  - Evaluate effectiveness of ordered antiemetic medications  - Provide nonpharmacologic comfort measures as appropriate  - Advance diet as tolerated, if ordered  - Obtain nutritional consult as needed  - Evaluate fluid balance  Outcome: Progressing  Goal: Maintains or returns to baseline bowel function  Description: INTERVENTIONS:  - Assess bowel function  - Maintain adequate hydration with IV or PO as ordered and tolerated  - Evaluate effectiveness of GI medications  - Encourage mobilization and activity  -  Obtain nutritional consult as needed  - Establish a toileting routine/schedule  - Consider collaborating with pharmacy to review patient's medication profile  Outcome: Progressing     Problem: METABOLIC/FLUID AND ELECTROLYTES - ADULT  Goal: Electrolytes maintained within normal limits  Description: INTERVENTIONS:  - Monitor labs and rhythm and assess patient for signs and symptoms of electrolyte imbalances  - Administer electrolyte replacement as ordered  - Monitor response to electrolyte replacements, including rhythm and repeat lab results as appropriate  - Fluid restriction as ordered  - Instruct patient on fluid and nutrition restrictions as appropriate  Outcome: Progressing     Problem: SKIN/TISSUE INTEGRITY - ADULT  Goal: Skin integrity remains intact  Description: INTERVENTIONS  - Assess and document risk factors for pressure ulcer development  - Assess and document skin integrity  - Monitor for areas of redness and/or skin breakdown  - Initiate interventions, skin care algorithm/standards of care as needed  Outcome: Progressing     Problem: CARDIOVASCULAR - ADULT  Goal: Maintains optimal cardiac output and hemodynamic stability  Description: INTERVENTIONS:  - Monitor vital signs, rhythm, and trends  - Monitor for bleeding, hypotension and signs of decreased cardiac output  - Evaluate effectiveness of vasoactive medications to optimize hemodynamic stability  - Monitor arterial and/or venous puncture sites for bleeding and/or hematoma  - Assess quality of pulses, skin color and temperature  - Assess for signs of decreased coronary artery perfusion - ex. Angina  - Evaluate fluid balance, assess for edema, trend weights  Outcome: Progressing     Problem: GENITOURINARY - ADULT  Goal: Absence of urinary retention  Description: INTERVENTIONS:  - Assess patient’s ability to void and empty bladder  - Monitor intake/output and perform bladder scan as needed  - Follow urinary retention protocol/standard of care  -  Consider collaborating with pharmacy to review patient's medication profile  - Implement strategies to promote bladder emptying  Outcome: Progressing     Problem: MUSCULOSKELETAL - ADULT  Goal: Return mobility to safest level of function  Description: INTERVENTIONS:  - Assess patient stability and activity tolerance for standing, transferring and ambulating w/ or w/o assistive devices  - Assist with transfers and ambulation using safe patient handling equipment as needed  - Ensure adequate protection for wounds/incisions during mobilization  - Obtain PT/OT consults as needed  - Advance activity as appropriate  - Communicate ordered activity level and limitations with patient/family  Outcome: Progressing  Goal: Maintain proper alignment of affected body part  Description: INTERVENTIONS:  - Support and protect limb and body alignment per provider's orders  - Instruct and reinforce with patient and family use of appropriate assistive device and precautions (e.g. spinal or hip dislocation precautions)  Outcome: Progressing     Problem: Impaired Functional Mobility  Goal: Achieve highest/safest level of mobility/gait  Description: Interventions:  - Assess patient's functional ability and stability  - Promote increasing activity/tolerance for mobility and gait  - Educate and engage patient/family in tolerated activity level and precautions    Outcome: Progressing     Problem: Impaired Swallowing  Goal: Minimize aspiration risk  Description: Interventions:  - Patient should be alert and upright for all feedings (90 degrees preferred)  - Offer food and liquids at a slow rate  - No straws  - Encourage small bites of food and small sips of liquid  - Offer pills one at a time, crush or deliver with applesauce as needed  - Discontinue feeding and notify MD (or speech pathologist) if coughing or persistent throat clearing or wet/gurgly vocal quality is noted  Outcome: Progressing     Problem: HEMATOLOGIC - ADULT  Goal: Maintains  hematologic stability  Description: INTERVENTIONS  - Assess for signs and symptoms of bleeding or hemorrhage  - Monitor labs and vital signs for trends  - Administer supportive blood products/factors, fluids and medications as ordered and appropriate  - Administer supportive blood products/factors as ordered and appropriate  Outcome: Progressing  Goal: Free from bleeding injury  Description: (Example usage: patient with low platelets)  INTERVENTIONS:  - Avoid intramuscular injections, enemas and rectal medication administration  - Ensure safe mobilization of patient  - Hold pressure on venipuncture sites to achieve adequate hemostasis  - Assess for signs and symptoms of internal bleeding  - Monitor lab trends  - Patient is to report abnormal signs of bleeding to staff  - Avoid use of toothpicks and dental floss  - Use electric shaver for shaving  - Use soft bristle tooth brush  - Limit straining and forceful nose blowing  Outcome: Progressing

## 2025-05-31 NOTE — PROGRESS NOTES
[facility]  part of [facility]    Progress Note    Justyn Hall Patient Status:  Inpatient    1940 MRN [id number]   Location [facility] 5SW/SE Attending Tova Mosquera,    Hosp Day # 17 PCP No primary care provider on file.     Chief complaint gi bleed     Subjective:   Justyn Hall is a(n) 84 year old male pt BP 74/45 this am, feeling dizzy,     ROS:   No cp, sob   No c/d   No n/v     Objective:     Blood pressure 105/76, pulse 89, temperature 98 °F (36.7 °C), temperature source Oral, resp. rate 18, height 6' (1.829 m), weight 169 lb (76.7 kg), SpO2 98%.      Intake/Output Summary (Last 24 hours) at 2025 1346  Last data filed at 2025 1257  Gross per 24 hour   Intake 620 ml   Output 2625 ml   Net -2005 ml       Patient Weight(s) for the past 336 hrs:   Weight   25 0608 169 lb (76.7 kg)   25 0614 170 lb 11.2 oz (77.4 kg)   25 0547 171 lb 11.2 oz (77.9 kg)   25 0536 172 lb 8 oz (78.2 kg)   25 0510 174 lb (78.9 kg)   25 0513 174 lb 3.2 oz (79 kg)           General appearance: alert, appears stated age and cooperative  Pulmonary:  clear to auscultation bilaterally  Cardiovascular: S1, S2 normal, no murmur, click, rub or gallop, regular rate and rhythm  Abdominal: soft, non-tender; bowel sounds normal; no masses,  no organomegaly  Extremities: RLE swelling     Medicines:     Current Hospital Medications[1]                Blood Pressure and Cardiac Medications            midodrine 2.5 MG Oral Tab            Medication Infusions[2]        Lab Results   Component Value Date    WBC 8.4 2025    HGB 8.4 (L) 2025    HCT 26.7 (L) 2025    .0 (H) 2025    CREATSERUM 0.99 2025    BUN 18 2025     2025    K 3.5 2025     2025    CO2 25.0 2025    GLU 93 2025    CA 7.9 (L) 2025    ALB 2.6 (L) 2025    ALKPHO 55 2025    BILT 1.1 2025    TP  4.6 (L) 05/26/2025    AST 9 05/26/2025    ALT <7 (L) 05/26/2025    PTT 27.8 04/10/2025    INR 1.11 04/10/2025    T4F 1.10 07/09/2015    TSH 2.507 04/05/2025    LIP 63 (H) 04/10/2025    PSA 2.46 10/10/2019    MG 1.6 05/23/2025    PHOS 2.7 05/19/2025     04/05/2025    B12 640 04/05/2025       No results found.            Results:     CBC:    Lab Results   Component Value Date    WBC 8.4 05/31/2025    WBC 8.7 05/30/2025    WBC 8.6 05/29/2025     Lab Results   Component Value Date    HGB 8.4 (L) 05/31/2025    HGB 7.7 (L) 05/31/2025    HGB 8.0 (L) 05/30/2025      Lab Results   Component Value Date    .0 (H) 05/31/2025    .0 05/30/2025    .0 05/29/2025       Recent Labs   Lab 05/28/25  0550 05/29/25  0613 05/31/25  0615   GLU 86 98 93   BUN 15 15 18   CREATSERUM 1.41* 1.19 0.99   CA 7.2* 7.5* 7.9*    142 141   K 3.4* 3.5  3.5 3.5    109 107   CO2 25.0 25.0 25.0           Assessment and Plan:         Hematemesis  Acute blood loss anemia   S/p 1 UNIT PRBC today -Total 4 this admission   S/p Repeat EGD 5/22, no overt bleeding  On PPI BID   Hb dropped again today, PRBC ordered, GI notified   5/25: EGD today ->Second bleeding point from the known duodenal ulcer, treated today with cautery   Hb 7.3 - add iv iron. Restart asa and monitor   5/30 - hb stable.   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  Blood and urine cultures.:  No growth to date  IV Rocephin stopped based on neg cultures  Monitor hemodynamic status and temperature curve.      Acute GI bleed: Likely upper in  view of melanotic stools  - hb improved   - Required multiple units during admission   - GI consulted, patient underwent endoscopy with findings of:   1.  Diminutive colon polyps  2.  Internal hemorrhoids  3.  Juno 1b+ duodenal ulceration  4.  LA grade D esophagitis  5.  Hiatal hernia     - advance diet as tolerated. GI recommending PPI BID oral indefinitely for duodenal ulcer   - Cont to monitor H/H, today hgb 8; restarting asa - day #2  - Aspirin/NSAIDs should be avoided if possible. Gastric path negative for H pylori.          Acute on chronic kidney injury.:  Back to baseline  Baseline creatinine 1.5  Stop ivfs   Avoid nephrotoxic agents  Monitor renal function daily     Parkinson's disease  Cont Sinemet  Fall and aspiration precaution  PT OT when able    Orthostatic hypotension 2/2 parkinson  Normally on midodrine and coreg which were held due to gi bleed and hypotension. Coreg restarted yesterday and bp dropped when up to bathroom today. Hold coreg. Restart midodrine. Order binder. Discussed with pt's sister - if improved tomorrow, plan for dc      Chronic infrarenal abdominal aortic aneurysm  - per chart search patient was started on low-dose aspirin. Ok to restart asa         CODE status: DNAR select  Espinal: Sepinal catheter in place; decath trial at f/u with urology   Dispo: BRIANNA Sal, once stable  - hopefully tomorrow if orthostasis improved        MDM: High   I personally spent time on chart/note review, review of labs/imaging, discussion with patient, physical exam, discussion with staff, consultants, coordinating care, writing progress note, and discussion of plan of care.                   Paola Lo MD, FAAFP     Chart reviewed, including current vitals, notes, labs and imaging  Labs ordered and medications adjusted as outlined above  Coordinate care with care team/consultants  Discussed with patient results of tests, management plan as outlined above, and the need for ongoing  hospitalization  D/w RN     [facility] high            Supplementary Documentation:   DVT Mechanical Prophylaxis:   SCDs,    DVT Pharmacologic Prophylaxis   Medication   None         DVT Pharmacologic prophylaxis: Aspirin 81 mg      Code Status: DNAR/Selective Treatment  Espinal: Espinal catheter in place  Espinal Duration (in days): 15  Central line:    ELISE: 6/2/2025                            [1]   Current Facility-Administered Medications   Medication Dose Route Frequency    aspirin DR tab 81 mg  81 mg Oral Daily    pantoprazole (Protonix) DR tab 40 mg  40 mg Oral BID AC    acetaminophen (Tylenol Extra Strength) tab 1,000 mg  1,000 mg Oral Q6H PRN    carbidopa-levodopa (SINEMET)  MG per tab 1 tablet  1 tablet Oral TID    midodrine (ProAmatine) tab 2.5 mg  2.5 mg Oral TID    tamsulosin (Flomax) cap 0.8 mg  0.8 mg Oral Nightly    finasteride (Proscar) tab 5 mg  5 mg Oral Daily    acetaminophen (Tylenol Extra Strength) tab 500 mg  500 mg Oral Q4H PRN    ondansetron (Zofran) 4 MG/2ML injection 4 mg  4 mg Intravenous Q6H PRN    metoclopramide (Reglan) 5 mg/mL injection 5 mg  5 mg Intravenous Q8H PRN    labetalol (Trandate) 5 mg/mL injection 10 mg  10 mg Intravenous Q4H PRN   [2]

## 2025-05-31 NOTE — PLAN OF CARE
Problem: Patient Centered Care  Goal: Patient preferences are identified and integrated in the patient's plan of care  Description: Interventions:  - What would you like us to know as we care for you? \"I am from [facility].\"  - Provide timely, complete, and accurate information to patient/family  - Incorporate patient and family knowledge, values, beliefs, and cultural backgrounds into the planning and delivery of care  - Encourage patient/family to participate in care and decision-making at the level they choose  - Honor patient and family perspectives and choices  Outcome: Progressing     Problem: Patient/Family Goals  Goal: Patient/Family Long Term Goal  Description: Patient's Long Term Goal: \"to be discharged\"    Interventions:  -Follow up MD appt  -Take meds as prescribed  -Use of assistive device if needed  - See additional Care Plan goals for specific interventions  Outcome: Progressing  Goal: Patient/Family Short Term Goal  Description: Patient's Short Term Goal: \"to prevent pain and infection\"    Interventions:   -Monitor VS  -Check labs results  -Administer IVF and IV ABT as ordered  -Provide pain meds as prescribed  - See additional Care Plan goals for specific interventions  Outcome: Progressing     Problem: SAFETY ADULT - FALL  Goal: Free from fall injury  Description: INTERVENTIONS:  - Assess pt frequently for physical needs  - Identify cognitive and physical deficits and behaviors that affect risk of falls.  - [place] fall precautions as indicated by assessment.  - Educate pt/family on patient safety including physical limitations  - Instruct pt to call for assistance with activity based on assessment  - Modify environment to reduce risk of injury  - Provide assistive devices as appropriate  - Consider OT/PT consult to assist with strengthening/mobility  - Encourage toileting schedule  Outcome: Progressing     Problem: PAIN - ADULT  Goal: Verbalizes/displays adequate comfort level or  patient's stated pain goal  Description: INTERVENTIONS:  - Encourage pt to monitor pain and request assistance  - Assess pain using appropriate pain scale  - Administer analgesics based on type and severity of pain and evaluate response  - Implement non-pharmacological measures as appropriate and evaluate response  - Consider cultural and social influences on pain and pain management  - Manage/alleviate anxiety  - Utilize distraction and/or relaxation techniques  - Monitor for opioid side effects  - Notify MD/LIP if interventions unsuccessful or patient reports new pain  - Anticipate increased pain with activity and pre-medicate as appropriate  Outcome: Progressing     Problem: RISK FOR INFECTION - ADULT  Goal: Absence of fever/infection during anticipated neutropenic period  Description: INTERVENTIONS  - Monitor WBC  - Administer growth factors as ordered  - Implement neutropenic guidelines  Outcome: Progressing     Problem: DISCHARGE PLANNING  Goal: Discharge to home or other facility with appropriate resources  Description: INTERVENTIONS:  - Identify barriers to discharge w/pt and caregiver  - Include patient/family/discharge partner in discharge planning  - Arrange for needed discharge resources and transportation as appropriate  - Identify discharge learning needs (meds, wound care, etc)  - Arrange for interpreters to assist at discharge as needed  - Consider post-discharge preferences of patient/family/discharge partner  - Complete POLST form as appropriate  - Assess patient's ability to be responsible for managing their own health  - Refer to Case Management Department for coordinating discharge planning if the patient needs post-hospital services based on physician/LIP order or complex needs related to functional status, cognitive ability or social support system  Outcome: Progressing     Problem: GASTROINTESTINAL - ADULT  Goal: Minimal or absence of nausea and vomiting  Description: INTERVENTIONS:  -  Maintain adequate hydration with IV or PO as ordered and tolerated  - Nasogastric tube to low intermittent suction as ordered  - Evaluate effectiveness of ordered antiemetic medications  - Provide nonpharmacologic comfort measures as appropriate  - Advance diet as tolerated, if ordered  - Obtain nutritional consult as needed  - Evaluate fluid balance  Outcome: Progressing  Goal: Maintains or returns to baseline bowel function  Description: INTERVENTIONS:  - Assess bowel function  - Maintain adequate hydration with IV or PO as ordered and tolerated  - Evaluate effectiveness of GI medications  - Encourage mobilization and activity  - Obtain nutritional consult as needed  - Establish a toileting routine/schedule  - Consider collaborating with pharmacy to review patient's medication profile  Outcome: Progressing     Problem: METABOLIC/FLUID AND ELECTROLYTES - ADULT  Goal: Electrolytes maintained within normal limits  Description: INTERVENTIONS:  - Monitor labs and rhythm and assess patient for signs and symptoms of electrolyte imbalances  - Administer electrolyte replacement as ordered  - Monitor response to electrolyte replacements, including rhythm and repeat lab results as appropriate  - Fluid restriction as ordered  - Instruct patient on fluid and nutrition restrictions as appropriate  Outcome: Progressing     Problem: SKIN/TISSUE INTEGRITY - ADULT  Goal: Skin integrity remains intact  Description: INTERVENTIONS  - Assess and document risk factors for pressure ulcer development  - Assess and document skin integrity  - Monitor for areas of redness and/or skin breakdown  - Initiate interventions, skin care algorithm/standards of care as needed  Outcome: Progressing     Problem: CARDIOVASCULAR - ADULT  Goal: Maintains optimal cardiac output and hemodynamic stability  Description: INTERVENTIONS:  - Monitor vital signs, rhythm, and trends  - Monitor for bleeding, hypotension and signs of decreased cardiac output  -  Evaluate effectiveness of vasoactive medications to optimize hemodynamic stability  - Monitor arterial and/or venous puncture sites for bleeding and/or hematoma  - Assess quality of pulses, skin color and temperature  - Assess for signs of decreased coronary artery perfusion - ex. Angina  - Evaluate fluid balance, assess for edema, trend weights  Outcome: Progressing     Problem: GENITOURINARY - ADULT  Goal: Absence of urinary retention  Description: INTERVENTIONS:  - Assess patient’s ability to void and empty bladder  - Monitor intake/output and perform bladder scan as needed  - Follow urinary retention protocol/standard of care  - Consider collaborating with pharmacy to review patient's medication profile  - Implement strategies to promote bladder emptying  Outcome: Progressing     Problem: MUSCULOSKELETAL - ADULT  Goal: Return mobility to safest level of function  Description: INTERVENTIONS:  - Assess patient stability and activity tolerance for standing, transferring and ambulating w/ or w/o assistive devices  - Assist with transfers and ambulation using safe patient handling equipment as needed  - Ensure adequate protection for wounds/incisions during mobilization  - Obtain PT/OT consults as needed  - Advance activity as appropriate  - Communicate ordered activity level and limitations with patient/family  Outcome: Progressing  Goal: Maintain proper alignment of affected body part  Description: INTERVENTIONS:  - Support and protect limb and body alignment per provider's orders  - Instruct and reinforce with patient and family use of appropriate assistive device and precautions (e.g. spinal or hip dislocation precautions)  Outcome: Progressing     Problem: Impaired Functional Mobility  Goal: Achieve highest/safest level of mobility/gait  Description: Interventions:  - Assess patient's functional ability and stability  - Promote increasing activity/tolerance for mobility and gait  - Educate and engage  patient/family in tolerated activity level and precautions  Outcome: Progressing     Problem: Impaired Swallowing  Goal: Minimize aspiration risk  Description: Interventions:  - Patient should be alert and upright for all feedings (90 degrees preferred)  - Offer food and liquids at a slow rate  - No straws  - Encourage small bites of food and small sips of liquid  - Offer pills one at a time, crush or deliver with applesauce as needed  - Discontinue feeding and notify MD (or speech pathologist) if coughing or persistent throat clearing or wet/gurgly vocal quality is noted  Outcome: Progressing     Problem: HEMATOLOGIC - ADULT  Goal: Maintains hematologic stability  Description: INTERVENTIONS  - Assess for signs and symptoms of bleeding or hemorrhage  - Monitor labs and vital signs for trends  - Administer supportive blood products/factors, fluids and medications as ordered and appropriate  - Administer supportive blood products/factors as ordered and appropriate  Outcome: Progressing  Goal: Free from bleeding injury  Description: (Example usage: patient with low platelets)  INTERVENTIONS:  - Avoid intramuscular injections, enemas and rectal medication administration  - Ensure safe mobilization of patient  - Hold pressure on venipuncture sites to achieve adequate hemostasis  - Assess for signs and symptoms of internal bleeding  - Monitor lab trends  - Patient is to report abnormal signs of bleeding to staff  - Avoid use of toothpicks and dental floss  - Use electric shaver for shaving  - Use soft bristle tooth brush  - Limit straining and forceful nose blowing  Outcome: Progressing

## 2025-06-01 VITALS
TEMPERATURE: 98 F | OXYGEN SATURATION: 98 % | BODY MASS INDEX: 22.89 KG/M2 | SYSTOLIC BLOOD PRESSURE: 103 MMHG | WEIGHT: 169 LBS | DIASTOLIC BLOOD PRESSURE: 65 MMHG | HEART RATE: 82 BPM | HEIGHT: 72 IN | RESPIRATION RATE: 18 BRPM

## 2025-06-01 PROCEDURE — 99239 HOSP IP/OBS DSCHRG MGMT >30: CPT | Performed by: FAMILY MEDICINE

## 2025-06-01 RX ORDER — PANTOPRAZOLE SODIUM 40 MG/1
40 TABLET, DELAYED RELEASE ORAL
Qty: 60 TABLET | Refills: 0 | Status: SHIPPED | OUTPATIENT
Start: 2025-06-01

## 2025-06-01 RX ORDER — TAMSULOSIN HYDROCHLORIDE 0.4 MG/1
0.8 CAPSULE ORAL NIGHTLY
Qty: 60 CAPSULE | Refills: 0 | Status: SHIPPED | OUTPATIENT
Start: 2025-06-01 | End: 2025-07-01

## 2025-06-01 RX ORDER — FINASTERIDE 5 MG/1
5 TABLET, FILM COATED ORAL DAILY
Qty: 30 TABLET | Refills: 0 | Status: SHIPPED | OUTPATIENT
Start: 2025-06-02

## 2025-06-01 NOTE — PLAN OF CARE
Problem: Patient Centered Care  Goal: Patient preferences are identified and integrated in the patient's plan of care  Description: Interventions:  - What would you like us to know as we care for you? \"I am from [facility].\"  - Provide timely, complete, and accurate information to patient/family  - Incorporate patient and family knowledge, values, beliefs, and cultural backgrounds into the planning and delivery of care  - Encourage patient/family to participate in care and decision-making at the level they choose  - Honor patient and family perspectives and choices  Outcome: Progressing     Problem: SAFETY ADULT - FALL  Goal: Free from fall injury  Description: INTERVENTIONS:  - Assess pt frequently for physical needs  - Identify cognitive and physical deficits and behaviors that affect risk of falls.  - [place] fall precautions as indicated by assessment.  - Educate pt/family on patient safety including physical limitations  - Instruct pt to call for assistance with activity based on assessment  - Modify environment to reduce risk of injury  - Provide assistive devices as appropriate  - Consider OT/PT consult to assist with strengthening/mobility  - Encourage toileting schedule  Outcome: Progressing     Problem: PAIN - ADULT  Goal: Verbalizes/displays adequate comfort level or patient's stated pain goal  Description: INTERVENTIONS:  - Encourage pt to monitor pain and request assistance  - Assess pain using appropriate pain scale  - Administer analgesics based on type and severity of pain and evaluate response  - Implement non-pharmacological measures as appropriate and evaluate response  - Consider cultural and social influences on pain and pain management  - Manage/alleviate anxiety  - Utilize distraction and/or relaxation techniques  - Monitor for opioid side effects  - Notify MD/LIP if interventions unsuccessful or patient reports new pain  - Anticipate increased pain with activity and pre-medicate  as appropriate  Outcome: Progressing     Problem: RISK FOR INFECTION - ADULT  Goal: Absence of fever/infection during anticipated neutropenic period  Description: INTERVENTIONS  - Monitor WBC  - Administer growth factors as ordered  - Implement neutropenic guidelines  Outcome: Progressing     Problem: DISCHARGE PLANNING  Goal: Discharge to home or other facility with appropriate resources  Description: INTERVENTIONS:  - Identify barriers to discharge w/pt and caregiver  - Include patient/family/discharge partner in discharge planning  - Arrange for needed discharge resources and transportation as appropriate  - Identify discharge learning needs (meds, wound care, etc)  - Arrange for interpreters to assist at discharge as needed  - Consider post-discharge preferences of patient/family/discharge partner  - Complete POLST form as appropriate  - Assess patient's ability to be responsible for managing their own health  - Refer to Case Management Department for coordinating discharge planning if the patient needs post-hospital services based on physician/LIP order or complex needs related to functional status, cognitive ability or social support system  Outcome: Progressing     Problem: GASTROINTESTINAL - ADULT  Goal: Minimal or absence of nausea and vomiting  Description: INTERVENTIONS:  - Maintain adequate hydration with IV or PO as ordered and tolerated  - Nasogastric tube to low intermittent suction as ordered  - Evaluate effectiveness of ordered antiemetic medications  - Provide nonpharmacologic comfort measures as appropriate  - Advance diet as tolerated, if ordered  - Obtain nutritional consult as needed  - Evaluate fluid balance  Outcome: Progressing  Goal: Maintains or returns to baseline bowel function  Description: INTERVENTIONS:  - Assess bowel function  - Maintain adequate hydration with IV or PO as ordered and tolerated  - Evaluate effectiveness of GI medications  - Encourage mobilization and activity  -  Obtain nutritional consult as needed  - Establish a toileting routine/schedule  - Consider collaborating with pharmacy to review patient's medication profile  Outcome: Progressing     Problem: METABOLIC/FLUID AND ELECTROLYTES - ADULT  Goal: Electrolytes maintained within normal limits  Description: INTERVENTIONS:  - Monitor labs and rhythm and assess patient for signs and symptoms of electrolyte imbalances  - Administer electrolyte replacement as ordered  - Monitor response to electrolyte replacements, including rhythm and repeat lab results as appropriate  - Fluid restriction as ordered  - Instruct patient on fluid and nutrition restrictions as appropriate  Outcome: Progressing     Problem: SKIN/TISSUE INTEGRITY - ADULT  Goal: Skin integrity remains intact  Description: INTERVENTIONS  - Assess and document risk factors for pressure ulcer development  - Assess and document skin integrity  - Monitor for areas of redness and/or skin breakdown  - Initiate interventions, skin care algorithm/standards of care as needed  Outcome: Progressing     Problem: CARDIOVASCULAR - ADULT  Goal: Maintains optimal cardiac output and hemodynamic stability  Description: INTERVENTIONS:  - Monitor vital signs, rhythm, and trends  - Monitor for bleeding, hypotension and signs of decreased cardiac output  - Evaluate effectiveness of vasoactive medications to optimize hemodynamic stability  - Monitor arterial and/or venous puncture sites for bleeding and/or hematoma  - Assess quality of pulses, skin color and temperature  - Assess for signs of decreased coronary artery perfusion - ex. Angina  - Evaluate fluid balance, assess for edema, trend weights  Outcome: Progressing     Problem: GENITOURINARY - ADULT  Goal: Absence of urinary retention  Description: INTERVENTIONS:  - Assess patient’s ability to void and empty bladder  - Monitor intake/output and perform bladder scan as needed  - Follow urinary retention protocol/standard of care  -  Consider collaborating with pharmacy to review patient's medication profile  - Implement strategies to promote bladder emptying  Outcome: Progressing     Problem: MUSCULOSKELETAL - ADULT  Goal: Return mobility to safest level of function  Description: INTERVENTIONS:  - Assess patient stability and activity tolerance for standing, transferring and ambulating w/ or w/o assistive devices  - Assist with transfers and ambulation using safe patient handling equipment as needed  - Ensure adequate protection for wounds/incisions during mobilization  - Obtain PT/OT consults as needed  - Advance activity as appropriate  - Communicate ordered activity level and limitations with patient/family  Outcome: Progressing  Goal: Maintain proper alignment of affected body part  Description: INTERVENTIONS:  - Support and protect limb and body alignment per provider's orders  - Instruct and reinforce with patient and family use of appropriate assistive device and precautions (e.g. spinal or hip dislocation precautions)  Outcome: Progressing     Problem: Impaired Functional Mobility  Goal: Achieve highest/safest level of mobility/gait  Description: Interventions:  - Assess patient's functional ability and stability  - Promote increasing activity/tolerance for mobility and gait  - Educate and engage patient/family in tolerated activity level and precautions    Outcome: Progressing     Problem: Impaired Swallowing  Goal: Minimize aspiration risk  Description: Interventions:  - Patient should be alert and upright for all feedings (90 degrees preferred)  - Offer food and liquids at a slow rate  - No straws  - Encourage small bites of food and small sips of liquid  - Offer pills one at a time, crush or deliver with applesauce as needed  - Discontinue feeding and notify MD (or speech pathologist) if coughing or persistent throat clearing or wet/gurgly vocal quality is noted  Outcome: Progressing     Problem: HEMATOLOGIC - ADULT  Goal: Maintains  hematologic stability  Description: INTERVENTIONS  - Assess for signs and symptoms of bleeding or hemorrhage  - Monitor labs and vital signs for trends  - Administer supportive blood products/factors, fluids and medications as ordered and appropriate  - Administer supportive blood products/factors as ordered and appropriate  Outcome: Progressing  Goal: Free from bleeding injury  Description: (Example usage: patient with low platelets)  INTERVENTIONS:  - Avoid intramuscular injections, enemas and rectal medication administration  - Ensure safe mobilization of patient  - Hold pressure on venipuncture sites to achieve adequate hemostasis  - Assess for signs and symptoms of internal bleeding  - Monitor lab trends  - Patient is to report abnormal signs of bleeding to staff  - Avoid use of toothpicks and dental floss  - Use electric shaver for shaving  - Use soft bristle tooth brush  - Limit straining and forceful nose blowing  Outcome: Progressing

## 2025-06-01 NOTE — PLAN OF CARE
Problem: Patient Centered Care  Goal: Patient preferences are identified and integrated in the patient's plan of care  Description: Interventions:  - What would you like us to know as we care for you? From [facility]  - Provide timely, complete, and accurate information to patient/family  - Incorporate patient and family knowledge, values, beliefs, and cultural backgrounds into the planning and delivery of care  - Encourage patient/family to participate in care and decision-making at the level they choose  - Honor patient and family perspectives and choices  Outcome: Progressing     Problem: Patient/Family Goals  Goal: Patient/Family Long Term Goal  Description: Patient's Long Term Goal: discharge from hospital     Interventions:  - Monitor vitals, labs, imaging   - Follow MD's orders   - Administer medications per order    - See additional Care Plan goals for specific interventions  Outcome: Progressing  Goal: Patient/Family Short Term Goal  Description: Patient's Short Term Goal: get stronger     Interventions:   - Monitor vitals, labs, imaging   - Follow MD's orders   - Administer medications per order    - See additional Care Plan goals for specific interventions  Outcome: Progressing     Problem: SAFETY ADULT - FALL  Goal: Free from fall injury  Description: INTERVENTIONS:  - Assess pt frequently for physical needs  - Identify cognitive and physical deficits and behaviors that affect risk of falls.  - [place] fall precautions as indicated by assessment.  - Educate pt/family on patient safety including physical limitations  - Instruct pt to call for assistance with activity based on assessment  - Modify environment to reduce risk of injury  - Provide assistive devices as appropriate  - Consider OT/PT consult to assist with strengthening/mobility  - Encourage toileting schedule  Outcome: Progressing     Problem: GASTROINTESTINAL - ADULT  Goal: Minimal or absence of nausea and vomiting  Description:  INTERVENTIONS:  - Maintain adequate hydration with IV or PO as ordered and tolerated  - Nasogastric tube to low intermittent suction as ordered  - Evaluate effectiveness of ordered antiemetic medications  - Provide nonpharmacologic comfort measures as appropriate  - Advance diet as tolerated, if ordered  - Obtain nutritional consult as needed  - Evaluate fluid balance  Outcome: Progressing  Goal: Maintains or returns to baseline bowel function  Description: INTERVENTIONS:  - Assess bowel function  - Maintain adequate hydration with IV or PO as ordered and tolerated  - Evaluate effectiveness of GI medications  - Encourage mobilization and activity  - Obtain nutritional consult as needed  - Establish a toileting routine/schedule  - Consider collaborating with pharmacy to review patient's medication profile  Outcome: Progressing     Problem: METABOLIC/FLUID AND ELECTROLYTES - ADULT  Goal: Electrolytes maintained within normal limits  Description: INTERVENTIONS:  - Monitor labs and rhythm and assess patient for signs and symptoms of electrolyte imbalances  - Administer electrolyte replacement as ordered  - Monitor response to electrolyte replacements, including rhythm and repeat lab results as appropriate  - Fluid restriction as ordered  - Instruct patient on fluid and nutrition restrictions as appropriate  Outcome: Progressing     Problem: SKIN/TISSUE INTEGRITY - ADULT  Goal: Skin integrity remains intact  Description: INTERVENTIONS  - Assess and document risk factors for pressure ulcer development  - Assess and document skin integrity  - Monitor for areas of redness and/or skin breakdown  - Initiate interventions, skin care algorithm/standards of care as needed  Outcome: Progressing     Problem: CARDIOVASCULAR - ADULT  Goal: Maintains optimal cardiac output and hemodynamic stability  Description: INTERVENTIONS:  - Monitor vital signs, rhythm, and trends  - Monitor for bleeding, hypotension and signs of decreased  cardiac output  - Evaluate effectiveness of vasoactive medications to optimize hemodynamic stability  - Monitor arterial and/or venous puncture sites for bleeding and/or hematoma  - Assess quality of pulses, skin color and temperature  - Assess for signs of decreased coronary artery perfusion - ex. Angina  - Evaluate fluid balance, assess for edema, trend weights  Outcome: Progressing     Problem: PAIN - ADULT  Goal: Verbalizes/displays adequate comfort level or patient's stated pain goal  Description: INTERVENTIONS:  - Encourage pt to monitor pain and request assistance  - Assess pain using appropriate pain scale  - Administer analgesics based on type and severity of pain and evaluate response  - Implement non-pharmacological measures as appropriate and evaluate response  - Consider cultural and social influences on pain and pain management  - Manage/alleviate anxiety  - Utilize distraction and/or relaxation techniques  - Monitor for opioid side effects  - Notify MD/LIP if interventions unsuccessful or patient reports new pain  - Anticipate increased pain with activity and pre-medicate as appropriate  Outcome: Progressing     Problem: RISK FOR INFECTION - ADULT  Goal: Absence of fever/infection during anticipated neutropenic period  Description: INTERVENTIONS  - Monitor WBC  - Administer growth factors as ordered  - Implement neutropenic guidelines  Outcome: Progressing     Problem: DISCHARGE PLANNING  Goal: Discharge to home or other facility with appropriate resources  Description: INTERVENTIONS:  - Identify barriers to discharge w/pt and caregiver  - Include patient/family/discharge partner in discharge planning  - Arrange for needed discharge resources and transportation as appropriate  - Identify discharge learning needs (meds, wound care, etc)  - Arrange for interpreters to assist at discharge as needed  - Consider post-discharge preferences of patient/family/discharge partner  - Complete POLST form as  appropriate  - Assess patient's ability to be responsible for managing their own health  - Refer to Case Management Department for coordinating discharge planning if the patient needs post-hospital services based on physician/LIP order or complex needs related to functional status, cognitive ability or social support system  Outcome: Progressing     Problem: GENITOURINARY - ADULT  Goal: Absence of urinary retention  Description: INTERVENTIONS:  - Assess patient’s ability to void and empty bladder  - Monitor intake/output and perform bladder scan as needed  - Follow urinary retention protocol/standard of care  - Consider collaborating with pharmacy to review patient's medication profile  - Implement strategies to promote bladder emptying  Outcome: Progressing     Problem: MUSCULOSKELETAL - ADULT  Goal: Return mobility to safest level of function  Description: INTERVENTIONS:  - Assess patient stability and activity tolerance for standing, transferring and ambulating w/ or w/o assistive devices  - Assist with transfers and ambulation using safe patient handling equipment as needed  - Ensure adequate protection for wounds/incisions during mobilization  - Obtain PT/OT consults as needed  - Advance activity as appropriate  - Communicate ordered activity level and limitations with patient/family  Outcome: Progressing  Goal: Maintain proper alignment of affected body part  Description: INTERVENTIONS:  - Support and protect limb and body alignment per provider's orders  - Instruct and reinforce with patient and family use of appropriate assistive device and precautions (e.g. spinal or hip dislocation precautions)  Outcome: Progressing     Problem: Impaired Functional Mobility  Goal: Achieve highest/safest level of mobility/gait  Description: Interventions:  - Assess patient's functional ability and stability  - Promote increasing activity/tolerance for mobility and gait  - Educate and engage patient/family in tolerated  activity level and precautions  - Recommend use of  RW for transfers and ambulation  Outcome: Progressing     Problem: Impaired Swallowing  Goal: Minimize aspiration risk  Description: Interventions:  - Patient should be alert and upright for all feedings (90 degrees preferred)  - Offer food and liquids at a slow rate  - No straws  - Encourage small bites of food and small sips of liquid  - Offer pills one at a time, crush or deliver with applesauce as needed  - Discontinue feeding and notify MD (or speech pathologist) if coughing or persistent throat clearing or wet/gurgly vocal quality is noted  Outcome: Progressing     Problem: HEMATOLOGIC - ADULT  Goal: Maintains hematologic stability  Description: INTERVENTIONS  - Assess for signs and symptoms of bleeding or hemorrhage  - Monitor labs and vital signs for trends  - Administer supportive blood products/factors, fluids and medications as ordered and appropriate  - Administer supportive blood products/factors as ordered and appropriate  Outcome: Progressing  Goal: Free from bleeding injury  Description: (Example usage: patient with low platelets)  INTERVENTIONS:  - Avoid intramuscular injections, enemas and rectal medication administration  - Ensure safe mobilization of patient  - Hold pressure on venipuncture sites to achieve adequate hemostasis  - Assess for signs and symptoms of internal bleeding  - Monitor lab trends  - Patient is to report abnormal signs of bleeding to staff  - Avoid use of toothpicks and dental floss  - Use electric shaver for shaving  - Use soft bristle tooth brush  - Limit straining and forceful nose blowing  Outcome: Progressing

## 2025-06-01 NOTE — PROGRESS NOTES
[facility]  part of [facility]    Progress Note    Justyn Hall Patient Status:  Inpatient    1940 MRN [id number]   Location [facility] 5SW/SE Attending Tova Mosquera,    Hosp Day # 18 PCP No primary care provider on file.     Chief complaint gi bleed     Subjective:   Justyn Hall is a(n) 84 year old male pt BP 74/45 this am, feeling dizzy,     ROS:   No cp, sob   No c/d   No n/v     Objective:     Blood pressure 119/63, pulse 78, temperature 98.2 °F (36.8 °C), temperature source Oral, resp. rate 18, height 6' (1.829 m), weight 169 lb (76.7 kg), SpO2 98%.      Intake/Output Summary (Last 24 hours) at 2025 0949  Last data filed at 2025 0934  Gross per 24 hour   Intake 1220 ml   Output 2275 ml   Net -1055 ml       Patient Weight(s) for the past 336 hrs:   Weight   25 0608 169 lb (76.7 kg)   25 0614 170 lb 11.2 oz (77.4 kg)   25 0547 171 lb 11.2 oz (77.9 kg)   25 0536 172 lb 8 oz (78.2 kg)   25 0510 174 lb (78.9 kg)   25 0513 174 lb 3.2 oz (79 kg)           General appearance: alert, appears stated age and cooperative  Pulmonary:  clear to auscultation bilaterally  Cardiovascular: S1, S2 normal, no murmur, click, rub or gallop, regular rate and rhythm  Abdominal: soft, non-tender; bowel sounds normal; no masses,  no organomegaly  Extremities: RLE swelling     Medicines:     Current Hospital Medications[1]                Blood Pressure and Cardiac Medications            midodrine 2.5 MG Oral Tab            Medication Infusions[2]        Lab Results   Component Value Date    WBC 8.4 2025    HGB 8.4 (L) 2025    HCT 26.7 (L) 2025    .0 (H) 2025    CREATSERUM 0.99 2025    BUN 18 2025     2025    K 3.5 2025     2025    CO2 25.0 2025    GLU 93 2025    CA 7.9 (L) 2025    ALB 2.6 (L) 2025    ALKPHO 55 2025    BILT 1.1 2025    TP  4.6 (L) 05/26/2025    AST 9 05/26/2025    ALT <7 (L) 05/26/2025    PTT 27.8 04/10/2025    INR 1.11 04/10/2025    T4F 1.10 07/09/2015    TSH 2.507 04/05/2025    LIP 63 (H) 04/10/2025    PSA 2.46 10/10/2019    MG 1.6 05/23/2025    PHOS 2.7 05/19/2025     04/05/2025    B12 640 04/05/2025       No results found.            Results:     CBC:    Lab Results   Component Value Date    WBC 8.4 05/31/2025    WBC 8.7 05/30/2025    WBC 8.6 05/29/2025     Lab Results   Component Value Date    HGB 8.4 (L) 05/31/2025    HGB 7.7 (L) 05/31/2025    HGB 8.0 (L) 05/30/2025      Lab Results   Component Value Date    .0 (H) 05/31/2025    .0 05/30/2025    .0 05/29/2025       Recent Labs   Lab 05/28/25  0550 05/29/25  0613 05/31/25  0615   GLU 86 98 93   BUN 15 15 18   CREATSERUM 1.41* 1.19 0.99   CA 7.2* 7.5* 7.9*    142 141   K 3.4* 3.5  3.5 3.5    109 107   CO2 25.0 25.0 25.0           Assessment and Plan:         Hematemesis  Acute blood loss anemia   S/p 1 UNIT PRBC today -Total 4 this admission   S/p Repeat EGD 5/22, no overt bleeding  On PPI BID   Hb dropped again today, PRBC ordered, GI notified   5/25: EGD today ->Second bleeding point from the known duodenal ulcer, treated today with cautery   Hb 7.3 - add iv iron. Restart asa and monitor   5/30 - hb stable.   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  Blood and urine cultures.:  No growth to date  IV Rocephin stopped based on neg cultures  Monitor hemodynamic status and  temperature curve.      Acute GI bleed: Likely upper in view of melanotic stools  - hb improved   - Required multiple units during admission   - GI consulted, patient underwent endoscopy with findings of:   1.  Diminutive colon polyps  2.  Internal hemorrhoids  3.  Juno 1b+ duodenal ulceration  4.  LA grade D esophagitis  5.  Hiatal hernia     - advance diet as tolerated. GI recommending PPI BID oral indefinitely for duodenal ulcer   - Cont to monitor H/H, today hgb 8; restarting asa - day #2  - Aspirin/NSAIDs should be avoided if possible. Gastric path negative for H pylori.          Acute on chronic kidney injury.:  Back to baseline  Baseline creatinine 1.5  Stop ivfs   Avoid nephrotoxic agents  Monitor renal function daily     Parkinson's disease  Cont Sinemet  Fall and aspiration precaution  PT OT when able    Orthostatic hypotension 2/2 parkinson  Normally on midodrine and coreg which were held due to gi bleed and hypotension. Coreg restarted yesterday and bp dropped when up to bathroom today. Hold coreg. Restart midodrine. Order binder. Discussed with pt's sister - if improved tomorrow, plan for dc      Chronic infrarenal abdominal aortic aneurysm  - per chart search patient was started on low-dose aspirin. Ok to restart asa         CODE status: DNAR select  Espinal: Espinal catheter in place; decath trial at f/u with urology   Dispo: [facility], once stable  - hopefully tomorrow if orthostasis improved        MDM: High   I personally spent time on chart/note review, review of labs/imaging, discussion with patient, physical exam, discussion with staff, consultants, coordinating care, writing progress note, and discussion of plan of care.                   Paola Lo MD, FAAFP     Chart reviewed, including current vitals, notes, labs and imaging  Labs ordered and medications adjusted as outlined above  Coordinate care with care team/consultants  Discussed with patient results of tests, management plan as  outlined above, and the need for ongoing hospitalization  D/w RN     MDM high            Supplementary Documentation:   DVT Mechanical Prophylaxis:   SCDs,    DVT Pharmacologic Prophylaxis   Medication   None         DVT Pharmacologic prophylaxis: Aspirin 81 mg      Code Status: DNAR/Selective Treatment  Espinal: Espinal catheter in place  Espinal Duration (in days): 15  Central line:    ELISE: 6/2/2025                            [1]   Current Facility-Administered Medications   Medication Dose Route Frequency    aspirin DR tab 81 mg  81 mg Oral Daily    pantoprazole (Protonix) DR tab 40 mg  40 mg Oral BID AC    acetaminophen (Tylenol Extra Strength) tab 1,000 mg  1,000 mg Oral Q6H PRN    carbidopa-levodopa (SINEMET)  MG per tab 1 tablet  1 tablet Oral TID    midodrine (ProAmatine) tab 2.5 mg  2.5 mg Oral TID    tamsulosin (Flomax) cap 0.8 mg  0.8 mg Oral Nightly    finasteride (Proscar) tab 5 mg  5 mg Oral Daily    acetaminophen (Tylenol Extra Strength) tab 500 mg  500 mg Oral Q4H PRN    ondansetron (Zofran) 4 MG/2ML injection 4 mg  4 mg Intravenous Q6H PRN    metoclopramide (Reglan) 5 mg/mL injection 5 mg  5 mg Intravenous Q8H PRN    labetalol (Trandate) 5 mg/mL injection 10 mg  10 mg Intravenous Q4H PRN   [2]

## 2025-06-01 NOTE — DISCHARGE SUMMARY
[facility]  part of [facility]    Discharge Summary    Justyn Hall Patient Status:  Inpatient    1940 MRN [id number]   Location [facility] 5SW/SE Attending Paola Lo MD   Hosp Day # 18 PCP No primary care provider on file.     Date of Admission: 2025 Disposition: SNF Subacute Rehab     Date of Discharge: 2025    Admitting Diagnosis: ELI (acute kidney injury) [N17.9]  Urinary tract infection with hematuria, site unspecified [N39.0, R31.9]    Hospital Discharge Diagnoses:   Hematemesis  Acute blood loss anemia   S/p 8 units PRBC transfusion   Acute Hypoxic respiratory failure   Aspiration Pneumonia  BPH  Eli  Parkinsons   Orthostatic hypotension 2/2 parkinson   Chronic infrarenal abdominal aortic aneurysm       Lace+ Score: 70  59-90 High Risk  29-58 Medium Risk  0-28   Low Risk.    TCM Follow-Up Recommendation:  LACE > 58: High Risk of readmission after discharge from the hospital.      Problem List: Problem List[1]      Physical Exam:   General appearance: alert, appears stated age and cooperative  Pulmonary:  clear to auscultation bilaterally  Cardiovascular: S1, S2 normal, no murmur, click, rub or gallop, regular rate and rhythm  Abdominal: soft, non-tender; bowel sounds normal; no masses,  no organomegaly  Extremities: extremities normal, atraumatic, no cyanosis or edema  Psychiatric: calm      HPI per admitting : The patient is an 84-year-old  male who lives in a nursing facility with Parkinson disease and benign prostate hypertrophy and indwelling Espinal catheter. Wife brought him in today for low blood pressure and seemingly being confused and more fatigued than usual. In the emergency room, chemistry showed sodium 132, chloride 96, BUN 53 and creatinine 2.62. GFR is 23 which is below his baseline, usually runs in the mid 50s. Urinalysis obtained from Espinal catheter showed gross urinary tract infection. Urine cultures and blood cultures were  obtained. CBC showed white blood cell count of 15.7 with left shift. Chest x-ray showed no acute findings.     Hospital Course: Hematemesis  Acute blood loss anemia   S/p 8 UNIT PRBC today -this admission   S/p Repeat EGD 5/22, no overt bleeding  On PPI BID   5/25: EGD ->Second bleeding point from the known duodenal ulcer, treated today with cautery   Hb 7.3 - add iv iron. Restart asa and monitor   5/30 - hb stable.   5/31: dizzy and hypotensive today, Hb stable, Bolus iVF   6/1: Pt BP improved, DC to [facility] today      Right LE swelling - check doppler r/o dvt - neg doppler for dvt, positive for bakers cyst asymp      Acute Hypoxic respiratory failure   Concern for Aspiration  Oxygen protocol-On RA   CXR wuth possible aspiration, continue  IV zosyn -dc today, will complete 7 days    Presumed CAUTI-  r/o UCX neg  Underlying 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  Blood and urine cultures.:  No growth to date  IV Rocephin stopped based on neg cultures  Monitor hemodynamic status and temperature curve.      Acute GI bleed: Likely upper in view of melanotic stools  - hb improved   - Required multiple units during admission   - GI consulted, patient underwent endoscopy with findings of:   1.  Diminutive colon polyps  2.  Internal hemorrhoids  3.  Juno 1b+ duodenal ulceration  4.  LA grade D esophagitis  5.  Hiatal hernia     - advance diet as tolerated. GI recommending PPI BID oral indefinitely for duodenal ulcer   - Cont to monitor H/H, today hgb 8; restarting asa - day #2  - Aspirin/NSAIDs should be avoided if possible. Gastric path negative for H pylori.          Acute on chronic kidney injury.:  Back to baseline  Baseline creatinine 1.5  Stop ivfs   Avoid nephrotoxic agents  Monitor renal function daily     Parkinson's disease  Cont Sinemet  Fall and aspiration precaution  PT OT when able     Orthostatic hypotension 2/2 parkinson  Normally on midodrine  and coreg which were held due to gi bleed and hypotension. Coreg restarted yesterday and bp dropped when up to bathroom today. Hold coreg. Restart midodrine. Order binder. Discussed with pt's sister - if improved tomorrow, plan for dc      Chronic infrarenal abdominal aortic aneurysm  - per chart search patient was started on low-dose aspirin. Ok to restart asa         CODE status: DNAR select  Espinal: Espinal catheter in place; decath trial at f/u with urology   Dispo: [facility], pt was discharged in a stable condition.     Consultations: GI     Procedures:     5/14: CXR   Stable heart size.  No edema   Small left basilar opacity could be atelectasis or infection   No pneumothorax     Renal US  1. Mild stable right renal pelviectasis.  No significant renal obstruction bilaterally.   2. Prostate enlargement.  Espinal catheter decompressing bladder.         5/17 : EGD -Postoperative Diagnosis:  1.  Diminutive colon polyps  2.  Internal hemorrhoids  3.  Juno 1b+ duodenal ulceration  4.  LA grade D esophagitis  5.  Hiatal hernia  Procedure:    Colonoscopy   Esophagogastroduodenoscopy with control of hemorrhage from duodenal ulceration and biopsies of the antrum and esophagus      5/22/2025 : EGD w/control of bleeding   1. Juno class II a duodenal bulb ulcer s/p endoscopic therapy       Complications: see hospital course     Discharge Condition: Good    Discharge Medications:      Discharge Medications        START taking these medications        Instructions Prescription details   finasteride 5 MG Tabs  Commonly known as: Proscar  Start taking on: June 2, 2025      Take 1 tablet (5 mg total) by mouth daily.   Quantity: 30 tablet  Refills: 0     pantoprazole 40 MG Tbec  Commonly known as: Protonix      Take 1 tablet (40 mg total) by mouth 2 (two) times daily before meals.   Quantity: 60 tablet  Refills: 0            CHANGE how you take these medications        Instructions Prescription details   tamsulosin 0.4 MG  Caps  Commonly known as: Flomax  What changed: how much to take      Take 2 capsules (0.8 mg total) by mouth at bedtime.   Stop taking on: July 1, 2025  Quantity: 60 capsule  Refills: 0            CONTINUE taking these medications        Instructions Prescription details   acetaminophen 325 MG Tabs  Commonly known as: Tylenol      Take 1 tablet (325 mg total) by mouth every 6 (six) hours as needed for Pain.   Refills: 0     aspirin 81 MG Tbec      Take 1 tablet (81 mg total) by mouth daily.   Quantity: 30 tablet  Refills: 0     carbidopa-levodopa  MG Tabs  Commonly known as: SINEMET  Start taking on: April 6, 2025      Take 0.5 tablets by mouth 3 (three) times daily for 14 days, THEN 1 tablet 3 (three) times daily. Give half a tab 3 times a day, 5 hours between each dose for 3 days.  Then full tab 3 times a day 5 hours between each dose.  Give ideally on empty stomach.  Give 30 to 45 minutes before a meal or 45 to 60 minutes after a meal..   Stop taking on: July 5, 2025  Quantity: 249 tablet  Refills: 0     midodrine 2.5 MG Tabs  Commonly known as: ProAmatine      Take 1 tablet (2.5 mg total) by mouth in the morning and 1 tablet (2.5 mg total) at noon and 1 tablet (2.5 mg total) in the evening.   Refills: 0     polyethylene glycol (PEG 3350) 17 g Pack  Commonly known as: Miralax      Take 17 g by mouth daily.   Refills: 0               Where to Get Your Medications        These medications were sent to [facility] [address] [place], [place] - [address] [address] [phone], [phone]  [address] [address]      Phone: [phone]   finasteride 5 MG Tabs  pantoprazole 40 MG Tbec  tamsulosin 0.4 MG Caps         Follow up Visits: Follow-up with PCP and GI in 1 week        Paola Lo MD  6/1/2025  9:51 AM    > 35 min          [1]   Patient Active Problem List  Diagnosis    Elevated PSA    Hypercholesterolemia    Contusion of left hip, initial encounter    Contusion of left shoulder,  initial encounter    Left hip pain    Parkinsonism (HCC)    Urinary retention    Abdominal pain, acute    Acute renal failure, unspecified acute renal failure type    Community acquired pneumonia, unspecified laterality    Hyperkalemia    Anemia, unspecified type    Obstructed, uropathy    Dysphagia    Palliative care by specialist    Constipation    Leukocytosis    History of recent fall    Counseling regarding advance care planning and goals of care    Hyponatremia    Anemia    Azotemia    Acute kidney injury (HCC)    UTI (urinary tract infection)    Urinary tract infection with hematuria, site unspecified    JORDON (acute kidney injury)

## 2025-06-01 NOTE — CM/SW NOTE
06/01/25 1100   Discharge disposition   Expected discharge disposition subacute   Post Acute Care Provider Burgess Reynoso   Discharge transportation Private car     Pt cleared to discharge. Clinical updates sent to [facility] via [facility]. [facility] requesting 1500 discharge. CANDACE spoke to pt's sister, Geetha, who is requesting if pt can go to [facility]. SW spoke w/ MOD Jeri at [facility], who stated there are currently no beds available. Pt's sister also requested to transport pt to [facility].     CANDACE attempted to return call to sister, no answer. CANDACE requested RN to relay message to sister that [facility] unable to accommodate d/t no beds and plan would be for pt to return to [facility] at 1500.     RN to call report to [facility] at [phone].     Miriam Carrera, RENATO - [phone]

## 2025-07-08 ENCOUNTER — TELEPHONE (OUTPATIENT)
Dept: SURGERY | Facility: CLINIC | Age: 85
End: 2025-07-08

## 2025-07-08 NOTE — TELEPHONE ENCOUNTER
Spoke with patients sister,  assisted in scheduling cath change. Per last telephone encounter, patient was also supposed to be seen for a follow up visit. Patient has been at [facility], is using a walker to ambulate.     Future Appointments   Date Time Provider Department Center   7/14/2025 11:00 AM Sierra Larose APRN [facility]   7/14/2025  1:00 PM Brooks Mart PA-C [facility]

## 2025-07-08 NOTE — TELEPHONE ENCOUNTER
Pt's sister called requesting an appointment for a santiago change before 6-16-25 or 6-17-25.  Please call

## 2025-07-08 NOTE — TELEPHONE ENCOUNTER
Per patient's sister calling stating patient had a catheter inserted on 6/17/2025. Per patient's sister requesting to schedule appointment to have catheter changed. Please call back.

## 2025-07-09 NOTE — PROGRESS NOTES
[facility]  Part of [facility]  Urology Clinic Note    Today I had the pleasure of seeing Justyn Hall in my clinic. Mr. Hall is a pleasant 84 year old male who I am seeing for cath change. Patient was last seen in this department on 5/9/2025.    History of Parkinson's with sequelae.    Interval Hx:  Presents with sister.  Admitted to hospital on 5/14 and discharged on 6/1 to SNF. Admitted for JORDON, hematemesis, acute hypoxic respiratory failure. Thought to have CAUTI but urine culture negative. Was supposed to have void trial around 5/30, but ultimately did not undergo due to being admitted to hospital.  No longer takes tamsulosin, but does take finasteride in the evening.    #Urinary retention  #BPH  #Elevated PSA  - Seen by Dr. Osorio in 2019 for above, at the time on tamsulosin and finasteride, lost to follow up  - 4/10/25 to ED, urinary retention with santiago output of 1.6L and tea colored urine, JORDON, hyperkalemia, hemoptysis, hypotension, leukocytosis, appears stopped his tamsulosin and finasteride many years prior  - 4/28/2025 voiding trial at [facility], found to have 900mL on PVR so santiago catheter reinserted, restarted on tamsulosin  - 5/12/2025  voiding trial attempted at nursing home, unable to urinate and so santiago catheter replaced; reattempt void trial around 5/30    PAST MEDICAL HISTORY:  Past Medical History[1]     PAST SURGICAL HISTORY:  Past Surgical History[2]    ALLERGIES:  Allergies[3]      MEDICATIONS:  Current Outpatient Medications   Medication Instructions    acetaminophen (TYLENOL) 325 mg, Every 6 hours PRN    aspirin 81 mg, Oral, Daily    finasteride (PROSCAR) 5 mg, Oral, Daily    midodrine (PROAMATINE) 2.5 mg, 3 times daily    pantoprazole (PROTONIX) 40 mg, Oral, 2 times daily before meals    polyethylene glycol (PEG 3350) (MIRALAX) 17 g, Daily        FAMILY HISTORY:  Family History[4]     SOCIAL HISTORY:  Short Social Hx on File[5]       PHYSICAL  EXAMINATION:  There were no vitals taken for this visit.  General: No acute distress, cooperative and communicative, alert and oriented  Skin: Warm and dry  HEENT: Normocephalic, atraumatic, conjunctivae normal, moist mucous membranes, no discharge, no gross neck abnormalities  Respiratory: No respiratory distress. Chest expansion equal bilaterally.  Extremities: Moves all extremities spontaneously  Psychiatric: Normal behavior, normal affect  Abdomen: Soft, non-tender, non-distended  Genitourinary: Espinal in place, urine quality corresponds with #0Y below with sediment; there is the beginnings of urethral erosion on the ventral aspect of the penis from the catheter      REVIEW OF SYSTEMS:  A comprehensive 10-point review of systems was completed.  Pertinent positives and negatives are noted in the the HPI.     LABORATORY DATA:  Urine Culture Results (last three years):  Lab Results   Component Value Date    URINECUL  05/14/2025     10-50,000 cfu/ml Multiple species present-probable contamination.          IMAGING REVIEW:  5/22/2026 CTA ABDOMEN AND PELVIS  FINDINGS:   AORTA: Moderate calcific atherosclerosis.  No aneurysm or dissection.  There is diffuse near circumferential soft plaque formation throughout the infrarenal abdominal aorta resulting in mild luminal narrowing.   ILIAC ARTERIES: Moderate calcific atherosclerosis, left greater than right.  Soft plaque formation noted in the aorta above extends into the common iliac arteries bilaterally resulting in an approximately 65% right common iliac artery stenosis (series 5,    image 159) and approximately 55% left common iliac artery stenosis (series 5, image 155).  Soft plaque extending into the proximal aspects of the external iliac arteries result in milder luminal narrowing on the right with an approximately 65% stenosis   on the left.   FEMORAL ARTERIES: Moderate calcific atherosclerosis in the left common femoral artery.  Mild involvement of the right common  femoral and visualized bilateral proximal superficial femoral arteries.  Superimposed soft plaque in the left common iliac artery    results in an approximately 60% stenosis (series 5, image 221) with milder narrowing in the visualized proximal left superficial femoral artery.   OTHER VESSELS: Mixed calcified/soft plaque formation at the celiac artery ostium results in a focally severe stenosis (series 13, image 82).  The remainder of the celiac axis is widely patent.  A metallic clip is noted along the lateral wall of the 2nd   portion of the duodenum consistent with the provided history of recent endoscopic treatment of a bleeding gastric duodenal ulcer.  There is no contrast extravasation in this region to suggest active arterial hemorrhage.   The superior and inferior mesenteric arteries are patent.  There is mild calcific atherosclerosis at the ostium of the single right renal artery.  A single left renal artery is also seen.  No significant renal artery stenosis.      LUNG BASES: The heart is enlarged.  There are coronary artery calcifications.  Small layering pleural effusions have increased in size, right larger than left.  There is mild associated passive atelectasis at both visualized lung bases.  There is no   visible pulmonary or pleural disease.   LIVER: There are stable coarse hepatic parenchymal calcifications.  The liver is otherwise normal in density and size.   BILIARY: The gallbladder is nondilated.  There is no biliary ductal dilatation.   PANCREAS: Coarse calcifications are again noted in the pancreatic head consistent with chronic pancreatitis.  No enhancing pancreatic mass, ductal dilatation or pancreatic atrophy.   SPLEEN: No enlargement.  There is a stable elongated coarse splenic calcification.   ADRENALS:   No defined mass or abnormal enlargement.    KIDNEYS:   There are no urinary tract stones.  Hydroureteronephrosis previously seen on the left has resolved.  Mild pelvocaliectasis in the  right kidney has also improved and is without hydroureter.  The kidneys otherwise enhance symmetrically without   suspicious lesions.  Slight delay of contrast excretion is seen into the right renal collecting system.  There is normal contrast excretion on the left.   GI/MESENTERY: Evaluation the gastrointestinal tract is limited without enteric contrast.  A metallic clip is now noted along the lateral wall of the 2nd portion of the duodenum.  There is a stable moderate hiatal hernia.  There is no bowel obstruction.   URINARY BLADDER: The bladder is again decompressed and contains intraluminal air secondary to Espinal catheter and is not well assessed.  Circumferential bladder wall thickening is again suspected.   PELVIC NODES: No lymphadenopathy.     PELVIC ORGANS: The prostate gland is again noted to be enlarged but demonstrates central lucency suspicious for a previous TURP.   RETROPERITONEUM: No mass or lymphadenopathy is apparent.    BONES:   No significant bony lesion or fracture.   ABDOMINAL WALL: Partial visualization of a left total hip arthroplasty.  Otherwise unchanged.   OTHER: No free air or fluid is seen in the abdomen or pelvis.       Impression  CONCLUSION:   1. Postprocedural changes noted to a recent endoscopic treatment of a bleeding gastric duodenal ulcer.  No contrast extravasation at the site of the metallic clip to suggest active arterial hemorrhage.   2. 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   3. Moderate calcific aortoiliac atherosclerosis with superimposed soft plaque formation resulting in multifocal mild-to-moderate stenoses of the left superficial femoral through common iliac arteries, as well as mild-to-moderate stenoses of the right   iliac arteries.  There is mild narrowing of the infrarenal abdominal aorta without flow limiting stenosis or aneurysm.   4. Metallic clip noted in the mid-descending colon, which could relate to an  interval colonoscopy or distal passage of a clip placed in the more proximal GI tract.  Correlate clinically.   5. Prostatomegaly with findings suspicious for a previous TURP, which can be correlated clinically with the patient's procedural history.  Findings are again noted that are most consistent with a chronic bladder outlet obstruction, although the bladder   is decompressed by Aleman catheter.  Mild pelvocaliectasis in the right kidney has improved and hydronephrosis previously seen in the left kidney has resolved.   6. Cardiomegaly, coronary atherosclerosis and increase in size of small bilateral pleural effusions suggesting worsening mild CHF or fluid overload.    7. Stable moderate hiatal hernia.   8. Stable findings consistent with chronic pancreatitis.   9. Lesser incidental findings as above.      4/16/2025 renal bladder ultrasound:  CONCLUSION:   1. Mild residual dilatation of right upper and lower pole renal collecting system without dilatation of renal pelvis or detectable hydroureter.   2. No left hydronephrosis.   3. Marked prostate enlargement.      4/11/2025 renal bladder ultrasound:  COMPARISON: [facility], CT ABDOMEN+PELVIS (CPT=74176), 4/10/2025, 9:12 AM.      INDICATIONS: Hydronephrosis      TECHNIQUE: Ultrasound examination was performed to visualize the kidneys and bladder.       Findings and impression:      CORRELATED WITH CT SCAN OF THE KIDNEYS FROM YESTERDAY      KIDNEYS MEASURE 9-10 CENTIMETER IN LENGTH.  MILD BILATERAL HYDRONEPHROSIS IS STILL PRESENT      RENAL PARENCHYMA IS POORLY VISUALIZED DUE TO BOWEL GAS AND BODY HABITUS.  NO OBVIOUS CALCULUS      BLADDER IS COMPLETELY DECOMPRESSED AROUND A ALEMAN   FINALIZED BY (CST): CYRIL THORPE MD ON 4/11/2025 AT 6:16 PM        4/10/2025 CT abdomen and pelvis:  CONCLUSION:   1.  Urinary bladder is collapsed around a Aleman catheter.  Mild bilateral hydroureteronephrosis which may be related to recent bladder outlet obstruction.   However correlate for signs of upper tract infection.   2.  Granulomatous calcifications within the liver and spleen.   3.  High density material within the gallbladder suggesting vicarious excretion of IV contrast, stones, or sludge.   4.  Coarse calcifications within the pancreatic head which may represent sequela of previous pancreatitis.   5.  Small bilateral pleural effusions.   6.  Moderate-sized hiatal hernia.   7.  Coronary atherosclerosis.   8.  Post left hip arthroplasty.  The hardware causes beam hardening artifact limiting assessment of adjacent structures.   9.  Prostate enlargement.      IMPRESSION:   #Urinary retention  We discussed that he has failed two voiding trials at this point. Given this and his medical history, discussed with patient that it is not recommended to attempt void trial again, though could if patient really wanted to.    We discussed that urinary retention can occur for a number of different reasons including BPH, post-anesthesia state, and dysfunctional bladder, among others. Medications including alpha-blockers as well as 5 DH T inhibitors (in men) are used to help patients urinate in this situation. Ultimately, if the patient has refractory urinary retention despite adequate opportunities to try and void, they may require either surgical treatment, clean intermittent catheterization, or if elderly or other significant comorbid medical problems the patient may require chronic Espinal catheterization for comfort.     We discussed that given his history and current dexterity he is not a good candidate for clean intermittent catheterization, and his bladder is potentially underactive or not active at this point.  Based on his comorbidities, evaluation for surgical intervention would include cystoscopy, transrectal ultrasound, urodynamics.    PLAN:  #Urinary retention  - Change every 4 weeks or as needed, whichever is sooner  - If not draining well, irrigation as needed and/or on a  schedule if necessary (q6h, q8h, q12h, etc.)  - If excessive sediment requiring constant need to change foleys, can consider low dose abx  - Pericare every day/BID  - Keep secured to leg with strap or Statlock    - If patient is not a good surgical candidate, would strongly consider conversion of Espinal catheter to SP tube given the beginnings of erosion at the urethra.  - Cysto/TRUS/urodynamics due to his history of Parkinson's with sequela  - Referral generated to primary care to establish relationship and get him set up for home health for catheter changes.        Brooks Mart PA-C      The 21st Century Cures Act makes medical notes available to patients in the interest of transparency.  However, please be advised that this is a medical document.  It is intended as a peer to peer communication.  It is written in medical language and may contain abbreviations or verbiage that are technical and unfamiliar.  It may appear blunt or direct.  Medical documents are intended to carry relevant information, facts as evident, and the clinical opinion of the practitioner.         [1]   Past Medical History:   Fracture of arm    fracture of left arm - cast   [2]   Past Surgical History:  Procedure Laterality Date    Colonoscopy N/A 05/17/2025    Procedure: COLONOSCOPY;  Surgeon: Jayden Vargas MD;  Location: [facility] ENDOSCOPY    Colonoscopy  05/25/2025    Dr. Payne, duodenal ulcer with hemorrhage    Egd  05/25/2025    Dr. Payne, duodenal ulcer    Hip surgery      Other surgical history  01/01/2014    prostate biopsy    Spinal fusion     [3] No Known Allergies  [4] No family history on file.  [5]   Social History  Socioeconomic History    Marital status: Single   Tobacco Use    Smoking status: Never    Smokeless tobacco: Never   Vaping Use    Vaping status: Never Used   Substance and Sexual Activity    Alcohol use: No     Alcohol/week: 0.0 standard drinks of alcohol    Drug use: No   Other Topics Concern    Caffeine  Concern Yes     Comment: coffee, occasionally    Reaction to local anesthetic No    Right Handed Yes     Social Drivers of Health     Food Insecurity: No Food Insecurity (5/14/2025)    NCSS - Food Insecurity     Worried About Running Out of Food in the Last Year: No     Ran Out of Food in the Last Year: No   Transportation Needs: No Transportation Needs (5/14/2025)    NCSS - Transportation     Lack of Transportation: No   Housing Stability: Not At Risk (5/14/2025)    NCSS - Housing/Utilities     Has Housing: Yes     Worried About Losing Housing: No     Unable to Get Utilities: No

## 2025-07-11 ENCOUNTER — TELEPHONE (OUTPATIENT)
Dept: SURGERY | Facility: CLINIC | Age: 85
End: 2025-07-11

## 2025-07-11 NOTE — TELEPHONE ENCOUNTER
I s/w pt's dtr and determined that pt has some leaking from where the santiago cath inserts into the penis and she states that there is still some urine in the drainage bag. I then asked if the cath is placed in a stat lock at the upper inner thigh with some play in the cath so that it doesnt pull on the tip of the penis and the sister stated \" I'm not a nurse and I can look at that stuff on my brother, and take care of that stuff for him:. I explained that we cannot have him come into the office as we do not conduct N/V 's in the afternoons. I asked if there was a male family member who could check that the cath is secured in the lock properly and she denied. I asked if there was alot of sediment in the urine or in the tubing and she stated that there was in the tubing alot of white stuff. I explained that if it is sediment it could be clogging the cath and causing bladder spasms to push urine around the cath and out into his clothes. I told her to empty the bag and monitor the output afterward and if she notices that THERE IS NO URINE IN THE BAG AND IT HAS BEEN 6-8 HRS  She will need to take pt to the ER as the cath may be clogged. She verbalized understanding and compliance.

## 2025-07-11 NOTE — TELEPHONE ENCOUNTER
Patient sister calling stating  catheter is leaking would like to know if patient can be see today.Please advise

## 2025-07-14 ENCOUNTER — OFFICE VISIT (OUTPATIENT)
Dept: SURGERY | Facility: CLINIC | Age: 85
End: 2025-07-14

## 2025-07-14 ENCOUNTER — TELEPHONE (OUTPATIENT)
Dept: SURGERY | Facility: CLINIC | Age: 85
End: 2025-07-14

## 2025-07-14 DIAGNOSIS — R33.9 URINARY RETENTION: Primary | ICD-10-CM

## 2025-07-14 DIAGNOSIS — N13.8 BPH WITH OBSTRUCTION/LOWER URINARY TRACT SYMPTOMS: ICD-10-CM

## 2025-07-14 DIAGNOSIS — N40.1 BPH WITH OBSTRUCTION/LOWER URINARY TRACT SYMPTOMS: ICD-10-CM

## 2025-07-14 PROCEDURE — 99213 OFFICE O/P EST LOW 20 MIN: CPT | Performed by: PHYSICIAN ASSISTANT

## 2025-07-14 NOTE — TELEPHONE ENCOUNTER
Per patient's sister states patient has \"no information on a santiago catheter change that had took place\". Per patient requesting to speak with nurse Tilley if possible. Please advise.

## 2025-08-04 ENCOUNTER — TELEPHONE (OUTPATIENT)
Dept: SURGERY | Facility: CLINIC | Age: 85
End: 2025-08-04

## 2025-08-08 ENCOUNTER — PROCEDURE (OUTPATIENT)
Dept: SURGERY | Facility: CLINIC | Age: 85
End: 2025-08-08

## 2025-08-08 VITALS — HEART RATE: 71 BPM | RESPIRATION RATE: 16 BRPM | SYSTOLIC BLOOD PRESSURE: 147 MMHG | DIASTOLIC BLOOD PRESSURE: 85 MMHG

## 2025-08-08 PROCEDURE — 51797 INTRAABDOMINAL PRESSURE TEST: CPT | Performed by: UROLOGY

## 2025-08-08 PROCEDURE — 52000 CYSTOURETHROSCOPY: CPT | Performed by: UROLOGY

## 2025-08-08 PROCEDURE — 76872 US TRANSRECTAL: CPT | Performed by: UROLOGY

## 2025-08-08 PROCEDURE — 51784 ANAL/URINARY MUSCLE STUDY: CPT | Performed by: UROLOGY

## 2025-08-08 PROCEDURE — 51729 CYSTOMETROGRAM W/VP&UP: CPT | Performed by: UROLOGY

## 2025-08-14 ENCOUNTER — OFFICE VISIT (OUTPATIENT)
Dept: SURGERY | Facility: CLINIC | Age: 85
End: 2025-08-14

## 2025-08-14 DIAGNOSIS — N40.1 BPH WITH OBSTRUCTION/LOWER URINARY TRACT SYMPTOMS: Primary | ICD-10-CM

## 2025-08-14 DIAGNOSIS — R33.9 URINARY RETENTION: ICD-10-CM

## 2025-08-14 DIAGNOSIS — N13.8 BPH WITH OBSTRUCTION/LOWER URINARY TRACT SYMPTOMS: Primary | ICD-10-CM

## 2025-08-14 DIAGNOSIS — N13.9 OBSTRUCTED, UROPATHY: ICD-10-CM

## 2025-08-14 PROCEDURE — 99213 OFFICE O/P EST LOW 20 MIN: CPT | Performed by: PHYSICIAN ASSISTANT

## 2025-08-28 ENCOUNTER — OFFICE VISIT (OUTPATIENT)
Dept: INTERNAL MEDICINE CLINIC | Facility: CLINIC | Age: 85
End: 2025-08-28

## 2025-08-28 VITALS
WEIGHT: 183 LBS | DIASTOLIC BLOOD PRESSURE: 76 MMHG | HEIGHT: 72 IN | TEMPERATURE: 98 F | SYSTOLIC BLOOD PRESSURE: 124 MMHG | HEART RATE: 68 BPM | BODY MASS INDEX: 24.79 KG/M2 | OXYGEN SATURATION: 98 %

## 2025-08-28 DIAGNOSIS — R33.8 BENIGN PROSTATIC HYPERPLASIA WITH URINARY RETENTION: ICD-10-CM

## 2025-08-28 DIAGNOSIS — G20.C PARKINSONISM, UNSPECIFIED PARKINSONISM TYPE (HCC): ICD-10-CM

## 2025-08-28 DIAGNOSIS — Z00.00 HEALTH MAINTENANCE EXAMINATION: ICD-10-CM

## 2025-08-28 DIAGNOSIS — K59.00 CONSTIPATION, UNSPECIFIED CONSTIPATION TYPE: ICD-10-CM

## 2025-08-28 DIAGNOSIS — I77.4 CELIAC ARTERY STENOSIS: ICD-10-CM

## 2025-08-28 DIAGNOSIS — D64.9 ANEMIA, UNSPECIFIED TYPE: Primary | ICD-10-CM

## 2025-08-28 DIAGNOSIS — E78.5 DYSLIPIDEMIA: ICD-10-CM

## 2025-08-28 DIAGNOSIS — R26.81 UNSTEADINESS: ICD-10-CM

## 2025-08-28 DIAGNOSIS — I95.9 HYPOTENSION, UNSPECIFIED HYPOTENSION TYPE: ICD-10-CM

## 2025-08-28 DIAGNOSIS — Z87.19 HISTORY OF DUODENAL ULCER: ICD-10-CM

## 2025-08-28 DIAGNOSIS — N40.1 BENIGN PROSTATIC HYPERPLASIA WITH URINARY RETENTION: ICD-10-CM

## 2025-08-28 DIAGNOSIS — R33.9 URINARY RETENTION: ICD-10-CM

## 2025-08-28 DIAGNOSIS — I77.1 ILIAC ARTERY STENOSIS, BILATERAL: ICD-10-CM

## 2025-08-28 PROBLEM — R13.10 DYSPHAGIA: Status: RESOLVED | Noted: 2025-04-11 | Resolved: 2025-08-28

## 2025-08-28 PROBLEM — S70.02XA CONTUSION OF LEFT HIP, INITIAL ENCOUNTER: Status: RESOLVED | Noted: 2025-04-04 | Resolved: 2025-08-28

## 2025-08-28 PROBLEM — Z51.5 PALLIATIVE CARE BY SPECIALIST: Status: RESOLVED | Noted: 2025-04-11 | Resolved: 2025-08-28

## 2025-08-28 PROBLEM — N39.0 UTI (URINARY TRACT INFECTION): Status: RESOLVED | Noted: 2025-05-14 | Resolved: 2025-08-28

## 2025-08-28 PROBLEM — Z71.89 COUNSELING REGARDING ADVANCE CARE PLANNING AND GOALS OF CARE: Status: RESOLVED | Noted: 2025-04-15 | Resolved: 2025-08-28

## 2025-08-28 PROBLEM — D72.829 LEUKOCYTOSIS: Status: RESOLVED | Noted: 2025-04-11 | Resolved: 2025-08-28

## 2025-08-28 PROBLEM — Z91.81 HISTORY OF RECENT FALL: Status: RESOLVED | Noted: 2025-04-11 | Resolved: 2025-08-28

## 2025-08-28 PROBLEM — N39.0 URINARY TRACT INFECTION WITH HEMATURIA, SITE UNSPECIFIED: Status: RESOLVED | Noted: 2025-05-14 | Resolved: 2025-08-28

## 2025-08-28 PROBLEM — R31.9 URINARY TRACT INFECTION WITH HEMATURIA, SITE UNSPECIFIED: Status: RESOLVED | Noted: 2025-05-14 | Resolved: 2025-08-28

## 2025-08-28 PROBLEM — J18.9 COMMUNITY ACQUIRED PNEUMONIA, UNSPECIFIED LATERALITY: Status: RESOLVED | Noted: 2025-04-10 | Resolved: 2025-08-28

## 2025-08-28 PROBLEM — R10.9 ABDOMINAL PAIN, ACUTE: Status: RESOLVED | Noted: 2025-04-10 | Resolved: 2025-08-28

## 2025-08-28 PROBLEM — S40.012A CONTUSION OF LEFT SHOULDER, INITIAL ENCOUNTER: Status: RESOLVED | Noted: 2025-04-04 | Resolved: 2025-08-28

## 2025-08-28 PROBLEM — R79.89 AZOTEMIA: Status: RESOLVED | Noted: 2025-05-14 | Resolved: 2025-08-28

## 2025-08-28 PROBLEM — N17.9 ACUTE RENAL FAILURE, UNSPECIFIED ACUTE RENAL FAILURE TYPE: Status: RESOLVED | Noted: 2025-04-10 | Resolved: 2025-08-28

## 2025-08-28 PROBLEM — N17.9 ACUTE KIDNEY INJURY: Status: RESOLVED | Noted: 2025-05-14 | Resolved: 2025-08-28

## 2025-08-28 PROBLEM — E87.5 HYPERKALEMIA: Status: RESOLVED | Noted: 2025-04-10 | Resolved: 2025-08-28

## 2025-08-28 PROBLEM — M25.552 LEFT HIP PAIN: Status: RESOLVED | Noted: 2025-04-04 | Resolved: 2025-08-28

## 2025-08-28 PROBLEM — N13.9 OBSTRUCTED, UROPATHY: Status: RESOLVED | Noted: 2025-04-10 | Resolved: 2025-08-28

## 2025-08-28 PROBLEM — E87.1 HYPONATREMIA: Status: RESOLVED | Noted: 2025-05-14 | Resolved: 2025-08-28

## 2025-08-28 PROBLEM — N17.9 AKI (ACUTE KIDNEY INJURY): Status: RESOLVED | Noted: 2025-05-14 | Resolved: 2025-08-28

## 2025-08-28 PROCEDURE — 99204 OFFICE O/P NEW MOD 45 MIN: CPT | Performed by: FAMILY MEDICINE

## 2025-08-28 PROCEDURE — G2211 COMPLEX E/M VISIT ADD ON: HCPCS | Performed by: FAMILY MEDICINE

## 2025-08-28 RX ORDER — ASPIRIN 81 MG/1
81 TABLET ORAL DAILY
COMMUNITY
Start: 2025-08-28

## 2025-08-28 RX ORDER — CARBIDOPA AND LEVODOPA 25; 100 MG/1; MG/1
1 TABLET ORAL 3 TIMES DAILY
COMMUNITY
Start: 2025-06-02

## (undated) DEVICE — MEDI-VAC NON-CONDUCTIVE SUCTION TUBING: Brand: CARDINAL HEALTH

## (undated) DEVICE — Device

## (undated) DEVICE — HEMOSPRAY ENDOSCOPIC HEMOSTAT: Brand: HEMOSPRAY

## (undated) DEVICE — YANKAUER,BULB TIP,W/O VENT,RIGID,STERILE: Brand: MEDLINE

## (undated) DEVICE — MEDI-VAC NON-CONDUCTIVE SUCTION TUBING 6MM X 1.8M (6FT.) L: Brand: CARDINAL HEALTH

## (undated) DEVICE — KIT CLEAN ENDOKIT 1.1OZ GOWNX2

## (undated) DEVICE — GIJAW SINGLE-USE BIOPSY FORCEPS WITH NEEDLE: Brand: GIJAW

## (undated) DEVICE — NEEDLE INJ 25GA CATH 230CM CHN 2.8MM ACUJECT

## (undated) DEVICE — KIT ENDO ORCAPOD 160/180/190

## (undated) DEVICE — 60 ML SYRINGE REGULAR TIP: Brand: MONOJECT

## (undated) DEVICE — FIAPC® PROBE W/ FILTER 2200 A OD 2.3MM/6.9FR; L 2.2M/7.2FT: Brand: ERBE

## (undated) DEVICE — PATIENT RETURN ELECTRODE, SINGLE-USE, CONTACT QUALITY MONITORING, ADULT, WITH 9FT CORD, FOR PATIENTS WEIGING OVER 33LBS. (15KG): Brand: MEGADYNE

## (undated) DEVICE — BIPOLAR ELECTROHEMOSTASIS CATHETER: Brand: GOLD PROBE

## (undated) DEVICE — V2 SPECIMEN COLLECTION MANIFOLD KIT: Brand: NEPTUNE

## (undated) DEVICE — CONMED SCOPE SAVER BITE BLOCK, 20X27 MM: Brand: SCOPE SAVER

## (undated) NOTE — MR AVS SNAPSHOT
[place]  [address]  [phone]               Thank you for choosing us for your health care visit with Aurea Castle MD.  We are glad to serve you and happy to provide you with this summary TAKE ONE CAPSULE BY MOUTH ONCE DAILY 30  MINUTES  FOLLOWING  THE  SAME  MEAL  EACH  DAY   What changed:  See the new instructions.    Commonly known as:  FLOMAX                Where to Get Your Medications      These medications were sent to [facility]

## (undated) NOTE — LETTER
[facility]  Administration of Anesthesia  IJustyn agree to be cared for by a physician anesthesiologist alone and/or with a nurse anesthetist, who is specially trained to monitor me and give me medicine to put me to sleep or keep me comfortable during my procedure    I understand that my anesthesiologist and/or anesthetist is not an employee or agent of [facility] or [employer]. He or she works for [facility]    As the patient asking for anesthesia services, I agree to:  Allow the anesthesiologist (anesthesia doctor) to give me medicine and do additional procedures as necessary. Some examples are: Starting or using an “IV” to give me medicine, fluids or blood during my procedure, and having a breathing tube placed to help me breathe when I’m asleep (intubation). In the event that my heart stops working properly, I understand that my anesthesiologist will make every effort to sustain my life, unless otherwise directed by [facility] Do Not Resuscitate documents.  Tell my anesthesia doctor before my procedure:  If I am pregnant.  The last time that I ate or drank.  iii. All of the medicines I take (including prescriptions, herbal supplements, and pills I can buy without a prescription (including street drugs/illegal medications). Failure to inform my anesthesiologist about these medicines may increase my risk of anesthetic complications.  iv.If I am allergic to anything or have had a reaction to anesthesia before.  I understand how the anesthesia medicine will help me (benefits).  I understand that with any type of anesthesia medicine there are risks:  The most common risks are: nausea, vomiting, sore throat, muscle soreness, damage to my eyes, mouth, or teeth (from breathing tube placement).  Rare risks include: remembering what happened during my procedure, allergic reactions to medications, injury to my airway, heart, lungs, vision, nerves, or  muscles and in extremely rare instances death.  My doctor has explained to me other choices available to me for my care (alternatives).  Pregnant Patients (“epidural”):  I understand that the risks of having an epidural (medicine given into my back to help control pain during labor), include itching, low blood pressure, difficulty urinating, headache or slowing of the baby’s heart. Very rare risks include infection, bleeding, seizure, irregular heart rhythms and nerve injury.  Regional Anesthesia (“spinal”, “epidural”, & “nerve blocks”):  I understand that rare but potential complications include headache, bleeding, infection, seizure, irregular heart rhythms, and nerve injury.    _____________________________________________________________________________  Patient (or Representative) Signature/Relationship to Patient  Date   Time    _____________________________________________________________________________   Name (if used)    Language/Organization   Time    _____________________________________________________________________________  Nurse Anesthetist Signature     Date   Time  _____________________________________________________________________________  Anesthesiologist Signature     Date   Time  I have discussed the procedure and information above with the patient (or patient’s representative) and answered their questions. The patient or their representative has agreed to have anesthesia services.    _____________________________________________________________________________  Witness        Date   Time  I have verified that the signature is that of the patient or patient’s representative, and that it was signed before the procedure  Patient Name: Justyn Hall     : 1940                 Printed: 2025 at 10:21 AM    Medical Record #: [id number]                                            Page 1 of 1  ----------ANESTHESIA CONSENT----------

## (undated) NOTE — MR AVS SNAPSHOT
[facility] SPECIALTY [facility] [place] [address] [address] [address]  [phone]               Thank you for choosing us for your health care visit with Tilford Boas, MD.  We are glad to serve you and happy to provide you with this summary of your Leeroy Robles MD   [address] [address]   Phone:  [phone]   Fax:  [phone]    Diagnoses:  Medicare annual wellness visit, subsequent   Hypercholesterolemia   Order:  Yovani Sanchez - Internal    MD Candy Suarez. health. Health counseling is essential, too. Below are guidelines for these, for men ages 72 and older. Talk with your healthcare provider to make sure you’re up-to-date on what you need.   Screening Who needs it How often   Abdominal aortic aneurysm Men ag Syphilis Men at increased risk for infection – talk with your healthcare provider At routine exams   Tuberculosis Men at increased risk for infection – talk with your healthcare provider Ask your healthcare provider   Vision All men in this age group Every with your healthcare provider At routine exams   Use of daily aspirin Men ages 39 to 78 at risk for cardiovascular health problems At routine exams   Use of tobacco and the health affects it can cause All men in this age group Every visit   1National Compr

## (undated) NOTE — LETTER
[facility]  [address], [place], [place]    Authorization for Surgical Operation and Procedure                               I hereby authorize Jayden Vargas MD, my physician and his/her assistants (if applicable), which may include medical students, residents, and/or fellows, to perform the following surgical operation/ procedure and administer such anesthesia as may be determined necessary by my physician: Operation/Procedure name (s) COLONOSCOPY on Justyn Hall   2.   I recognize that during the surgical operation/procedure, unforeseen conditions may necessitate additional or different procedures than those listed above.  I, therefore, further authorize and request that the above-named surgeon, assistants, or designees perform such procedures as are, in their judgment, necessary and desirable.    3.   My surgeon/physician has discussed prior to my surgery the potential benefits, risks and side effects of this procedure; the likelihood of achieving goals; and potential problems that might occur during recuperation.  They also discussed reasonable alternatives to the procedure, including risks, benefits, and side effects related to the alternatives and risks related to not receiving this procedure.  I have had all my questions answered and I acknowledge that no guarantee has been made as to the result that may be obtained.    4.   Should the need arise during my operation/procedure, which includes change of level of care prior to discharge, I also consent to the administration of blood and/or blood products.  Further, I understand that despite careful testing and screening of blood or blood products by collecting agencies, I may still be subject to ill effects as a result of receiving a blood transfusion and/or blood products.  The following are some, but not all, of the potential risks that can occur: fever and allergic reactions, hemolytic reactions, transmission of diseases such  as Hepatitis, AIDS and Cytomegalovirus (CMV) and fluid overload.  In the event that I wish to have an autologous transfusion of my own blood, or a directed donor transfusion, I will discuss this with my physician.  Check only if Refusing Blood or Blood Products  I understand refusal of blood or blood products as deemed necessary by my physician may have serious consequences to my condition to include possible death. I hereby assume responsibility for my refusal and release the hospital, its personnel, and my physicians from any responsibility for the consequences of my refusal.    o  Refuse   5.   I authorize the use of any specimen, organs, tissues, body parts or foreign objects that may be removed from my body during the operation/procedure for diagnosis, research or teaching purposes and their subsequent disposal by hospital authorities.  I also authorize the release of specimen test results and/or written reports to my treating physician on the hospital medical staff or other referring or consulting physicians involved in my care, at the discretion of the Pathologist or my treating physician.    6.   I consent to the photographing or videotaping of the operations or procedures to be performed, including appropriate portions of my body for medical, scientific, or educational purposes, provided my identity is not revealed by the pictures or by descriptive texts accompanying them.  If the procedure has been photographed/videotaped, the surgeon will obtain the original picture, image, videotape or CD.  The hospital will not be responsible for storage, release or maintenance of the picture, image, tape or CD.    7.   I consent to the presence of a  or observers in the operating room as deemed necessary by my physician or their designees.    8.   I recognize that in the event my procedure results in extended X-Ray/fluoroscopy time, I may develop a skin reaction.    9. If I have a Do Not Attempt  Resuscitation (DNAR) order in place, that status will be suspended while in the operating room, procedural suite, and during the recovery period unless otherwise explicitly stated by me (or a person authorized to consent on my behalf). The surgeon or my attending physician will determine when the applicable recovery period ends for purposes of reinstating the DNAR order.  10. Patients having a sterilization procedure: I understand that if the procedure is successful the results will be permanent and it will therefore be impossible for me to inseminate, conceive, or bear children.  I also understand that the procedure is intended to result in sterility, although the result has not been guaranteed.   11. I acknowledge that my physician has explained sedation/analgesia administration to me including the risk and benefits I consent to the administration of sedation/analgesia as may be necessary or desirable in the judgment of my physician.    I CERTIFY THAT I HAVE READ AND FULLY UNDERSTAND THE ABOVE CONSENT TO OPERATION and/or OTHER PROCEDURE.     ____________________________________  _________________________________        ______________________________  Signature of Patient    Signature of Responsible Person                Printed Name of Responsible Person                                      ____________________________________  _____________________________                ________________________________  Signature of Witness        Date  Time         Relationship to Patient    STATEMENT OF PHYSICIAN My signature below affirms that prior to the time of the procedure; I have explained to the patient and/or his/her legal representative, the risks and benefits involved in the proposed treatment and any reasonable alternative to the proposed treatment. I have also explained the risks and benefits involved in refusal of the proposed treatment and alternatives to the proposed treatment and have answered the patient's  questions. If I have a significant financial interest in a co-management agreement or a significant financial interest in any product or implant, or other significant relationship used in this procedure/surgery, I have disclosed this and had a discussion with my patient.     _____________________________________________________              _____________________________  (Signature of Physician)                                                                                         (Date)                                   (Time)  Patient Name: Justyn Hall      : 1940      Printed: 2025     Medical Record #: [id number]                                      Page 1 of 1

## (undated) NOTE — LETTER
[facility]  [address], [place], [place]    Authorization for Surgical Operation and Procedure                               I hereby authorize Zak Payne MD, my physician and his/her assistants (if applicable), which may include medical students, residents, and/or fellows, to perform the following surgical operation/ procedure and administer such anesthesia as may be determined necessary by my physician: Operation/Procedure name (s) ESOPHAGOGASTRODUODENOSCOPY (EGD) on Justyn Hall   2.   I recognize that during the surgical operation/procedure, unforeseen conditions may necessitate additional or different procedures than those listed above.  I, therefore, further authorize and request that the above-named surgeon, assistants, or designees perform such procedures as are, in their judgment, necessary and desirable.    3.   My surgeon/physician has discussed prior to my surgery the potential benefits, risks and side effects of this procedure; the likelihood of achieving goals; and potential problems that might occur during recuperation.  They also discussed reasonable alternatives to the procedure, including risks, benefits, and side effects related to the alternatives and risks related to not receiving this procedure.  I have had all my questions answered and I acknowledge that no guarantee has been made as to the result that may be obtained.    4.   Should the need arise during my operation/procedure, which includes change of level of care prior to discharge, I also consent to the administration of blood and/or blood products.  Further, I understand that despite careful testing and screening of blood or blood products by collecting agencies, I may still be subject to ill effects as a result of receiving a blood transfusion and/or blood products.  The following are some, but not all, of the potential risks that can occur: fever and allergic reactions, hemolytic reactions,  transmission of diseases such as Hepatitis, AIDS and Cytomegalovirus (CMV) and fluid overload.  In the event that I wish to have an autologous transfusion of my own blood, or a directed donor transfusion, I will discuss this with my physician.  Check only if Refusing Blood or Blood Products  I understand refusal of blood or blood products as deemed necessary by my physician may have serious consequences to my condition to include possible death. I hereby assume responsibility for my refusal and release the hospital, its personnel, and my physicians from any responsibility for the consequences of my refusal.    o  Refuse   5.   I authorize the use of any specimen, organs, tissues, body parts or foreign objects that may be removed from my body during the operation/procedure for diagnosis, research or teaching purposes and their subsequent disposal by hospital authorities.  I also authorize the release of specimen test results and/or written reports to my treating physician on the hospital medical staff or other referring or consulting physicians involved in my care, at the discretion of the Pathologist or my treating physician.    6.   I consent to the photographing or videotaping of the operations or procedures to be performed, including appropriate portions of my body for medical, scientific, or educational purposes, provided my identity is not revealed by the pictures or by descriptive texts accompanying them.  If the procedure has been photographed/videotaped, the surgeon will obtain the original picture, image, videotape or CD.  The hospital will not be responsible for storage, release or maintenance of the picture, image, tape or CD.    7.   I consent to the presence of a  or observers in the operating room as deemed necessary by my physician or their designees.    8.   I recognize that in the event my procedure results in extended X-Ray/fluoroscopy time, I may develop a skin reaction.    9. If  I have a Do Not Attempt Resuscitation (DNAR) order in place, that status will be suspended while in the operating room, procedural suite, and during the recovery period unless otherwise explicitly stated by me (or a person authorized to consent on my behalf). The surgeon or my attending physician will determine when the applicable recovery period ends for purposes of reinstating the DNAR order.  10. Patients having a sterilization procedure: I understand that if the procedure is successful the results will be permanent and it will therefore be impossible for me to inseminate, conceive, or bear children.  I also understand that the procedure is intended to result in sterility, although the result has not been guaranteed.   11. I acknowledge that my physician has explained sedation/analgesia administration to me including the risk and benefits I consent to the administration of sedation/analgesia as may be necessary or desirable in the judgment of my physician.    I CERTIFY THAT I HAVE READ AND FULLY UNDERSTAND THE ABOVE CONSENT TO OPERATION and/or OTHER PROCEDURE.     ____________________________________  _________________________________        ______________________________  Signature of Patient    Signature of Responsible Person                Printed Name of Responsible Person                                      ____________________________________  _____________________________                ________________________________  Signature of Witness        Date  Time         Relationship to Patient    STATEMENT OF PHYSICIAN My signature below affirms that prior to the time of the procedure; I have explained to the patient and/or his/her legal representative, the risks and benefits involved in the proposed treatment and any reasonable alternative to the proposed treatment. I have also explained the risks and benefits involved in refusal of the proposed treatment and alternatives to the proposed treatment and have  answered the patient's questions. If I have a significant financial interest in a co-management agreement or a significant financial interest in any product or implant, or other significant relationship used in this procedure/surgery, I have disclosed this and had a discussion with my patient.     _____________________________________________________              _____________________________  (Signature of Physician)                                                                                         (Date)                                   (Time)  Patient Name: Justyn Hall      : 1940      Printed: 2025     Medical Record #: [id number]                                      Page 1 of 1

## (undated) NOTE — LETTER
[address], [place], [place]    Authorization for Surgical Operation and Procedure                               I hereby authorize Jayden Vargas MD, my physician and his/her assistants (if applicable), which may include medical students, residents, and/or fellows, to perform the following surgical operation/ procedure and administer such anesthesia as may be determined necessary by my physician: Operation/Procedure name (s) ESOPHAGOGASTRODUODENOSCOPY (EGD) on Justyn Hall   2.   I recognize that during the surgical operation/procedure, unforeseen conditions may necessitate additional or different procedures than those listed above.  I, therefore, further authorize and request that the above-named surgeon, assistants, or designees perform such procedures as are, in their judgment, necessary and desirable.    3.   My surgeon/physician has discussed prior to my surgery the potential benefits, risks and side effects of this procedure; the likelihood of achieving goals; and potential problems that might occur during recuperation.  They also discussed reasonable alternatives to the procedure, including risks, benefits, and side effects related to the alternatives and risks related to not receiving this procedure.  I have had all my questions answered and I acknowledge that no guarantee has been made as to the result that may be obtained.    4.   Should the need arise during my operation/procedure, which includes change of level of care prior to discharge, I also consent to the administration of blood and/or blood products.  Further, I understand that despite careful testing and screening of blood or blood products by collecting agencies, I may still be subject to ill effects as a result of receiving a blood transfusion and/or blood products.  The following are some, but not all, of the potential risks that can occur: fever and allergic reactions, hemolytic reactions,  transmission of diseases such as Hepatitis, AIDS and Cytomegalovirus (CMV) and fluid overload.  In the event that I wish to have an autologous transfusion of my own blood, or a directed donor transfusion, I will discuss this with my physician.  Check only if Refusing Blood or Blood Products  I understand refusal of blood or blood products as deemed necessary by my physician may have serious consequences to my condition to include possible death. I hereby assume responsibility for my refusal and release the hospital, its personnel, and my physicians from any responsibility for the consequences of my refusal.    o  Refuse   5.   I authorize the use of any specimen, organs, tissues, body parts or foreign objects that may be removed from my body during the operation/procedure for diagnosis, research or teaching purposes and their subsequent disposal by hospital authorities.  I also authorize the release of specimen test results and/or written reports to my treating physician on the hospital medical staff or other referring or consulting physicians involved in my care, at the discretion of the Pathologist or my treating physician.    6.   I consent to the photographing or videotaping of the operations or procedures to be performed, including appropriate portions of my body for medical, scientific, or educational purposes, provided my identity is not revealed by the pictures or by descriptive texts accompanying them.  If the procedure has been photographed/videotaped, the surgeon will obtain the original picture, image, videotape or CD.  The hospital will not be responsible for storage, release or maintenance of the picture, image, tape or CD.    7.   I consent to the presence of a  or observers in the operating room as deemed necessary by my physician or their designees.    8.   I recognize that in the event my procedure results in extended X-Ray/fluoroscopy time, I may develop a skin reaction.    9. If  I have a Do Not Attempt Resuscitation (DNAR) order in place, that status will be suspended while in the operating room, procedural suite, and during the recovery period unless otherwise explicitly stated by me (or a person authorized to consent on my behalf). The surgeon or my attending physician will determine when the applicable recovery period ends for purposes of reinstating the DNAR order.  10. Patients having a sterilization procedure: I understand that if the procedure is successful the results will be permanent and it will therefore be impossible for me to inseminate, conceive, or bear children.  I also understand that the procedure is intended to result in sterility, although the result has not been guaranteed.   11. I acknowledge that my physician has explained sedation/analgesia administration to me including the risk and benefits I consent to the administration of sedation/analgesia as may be necessary or desirable in the judgment of my physician.    I CERTIFY THAT I HAVE READ AND FULLY UNDERSTAND THE ABOVE CONSENT TO OPERATION and/or OTHER PROCEDURE.     ____________________________________  _________________________________        ______________________________  Signature of Patient    Signature of Responsible Person                Printed Name of Responsible Person                                      ____________________________________  _____________________________                ________________________________  Signature of Witness        Date  Time         Relationship to Patient    STATEMENT OF PHYSICIAN My signature below affirms that prior to the time of the procedure; I have explained to the patient and/or his/her legal representative, the risks and benefits involved in the proposed treatment and any reasonable alternative to the proposed treatment. I have also explained the risks and benefits involved in refusal of the proposed treatment and alternatives to the proposed treatment and have  answered the patient's questions. If I have a significant financial interest in a co-management agreement or a significant financial interest in any product or implant, or other significant relationship used in this procedure/surgery, I have disclosed this and had a discussion with my patient.     _____________________________________________________              _____________________________  (Signature of Physician)                                                                                         (Date)                                   (Time)  Patient Name: Justyn Hall      : 1940      Printed: 2025     Medical Record #: [id number]                                      Page 1 of 1

## (undated) NOTE — Clinical Note
[facility]  [address], [place], [place]  AUTHORIZATION FOR SURGICAL OPERATION OR PROCEDURE                                                           1. I hereby authorize Surgeon(s):  Jayden Vargas MD, my physician and his/her assistants (if applicable), which may include medical students, residents, and/or fellows, to perform the following surgical operation/ procedure and administer such anesthesia as may be determined necessary by my physician:  Operation/Procedure name (s) COLONOSCOPY On Justyn Hall.  2.   I recognize that during the surgical operation/procedure, unforeseen conditions may necessitate additional or different procedures than those listed above.  I, therefore, further authorize and request that the above-named surgeon, assistants, or designees perform such procedures as are, in their judgment, necessary and desirable.    3.   My surgeon/physician has discussed prior to my surgery the potential benefits, risks and side effects of this procedure; the likelihood of achieving goals; and potential problems that might occur during recuperation.  They also discussed reasonable alternatives to the procedure, including risks, benefits, and side effects related to the alternatives and risks related to not receiving this procedure.  I have had all my questions answered and I acknowledge that no guarantee has been made as to the result that may be obtained.    4.   Should the need arise during my operation/procedure, which includes change of level of care prior to discharge, I also consent to the administration of blood and/or blood products.  Further, I understand that despite careful testing and screening of blood or blood products by collecting agencies, I may still be subject to ill effects as a result of receiving a blood transfusion and/or blood products.  The following are some, but not all, of the potential risks that can occur: fever and allergic reactions,  hemolytic reactions, transmission of diseases such as Hepatitis, AIDS and Cytomegalovirus (CMV) and fluid overload.  In the event that I wish to have an autologous transfusion of my own blood, or a directed donor transfusion, I will discuss this with my physician.  Check only if Refusing Blood or Blood Products  I understand refusal of blood or blood products as deemed necessary by my physician may have serious consequences to my condition to include possible death. I hereby assume responsibility for my refusal and release the hospital, its personnel, and my physicians from any responsibility for the consequences of my refusal.          o  Refuse   5.   I authorize the use of any specimen, organs, tissues, body parts or foreign objects that may be removed from my body during the operation/procedure for diagnosis, research or teaching purposes and their subsequent disposal by hospital authorities.  I also authorize the release of specimen test results and/or written reports to my treating physician on the hospital medical staff or other referring or consulting physicians involved in my care, at the discretion of the Pathologist or my treating physician.    6.   I consent to the photographing or videotaping of the operations or procedures to be performed, including appropriate portions of my body for medical, scientific, or educational purposes, provided my identity is not revealed by the pictures or by descriptive texts accompanying them.  If the procedure has been photographed/videotaped, the surgeon will obtain the original picture, image, videotape or CD.  The hospital will not be responsible for storage, release or maintenance of the picture, image, tape or CD.    7.   I consent to the presence of a  or observers in the operating room as deemed necessary by my physician or their designees.    8.   I recognize that in the event my procedure results in extended X-Ray/fluoroscopy time, I may  develop a skin reaction.    9. If I have a Do Not Attempt Resuscitation (DNAR) order in place, that status will be suspended while in the operating room, procedural suite, and during the recovery period unless otherwise explicitly stated by me (or a person authorized to consent on my behalf). The surgeon or my attending physician will determine when the applicable recovery period ends for purposes of reinstating the DNAR order.  10. Patients having a sterilization procedure: I understand that if the procedure is successful the results will be permanent and it will therefore be impossible for me to inseminate, conceive, or bear children.  I also understand that the procedure is intended to result in sterility, although the result has not been guaranteed.   11. I acknowledge that my physician has explained sedation/analgesia administration to me including the risk and benefits I consent to the administration of sedation/analgesia as may be necessary or desirable in the judgment of my physician.    I CERTIFY THAT I HAVE READ AND FULLY UNDERSTAND THE ABOVE CONSENT TO OPERATION and/or OTHER PROCEDURE.     _________________________________________ _________________________________     ___________________________________  Signature of Patient     Signature of Responsible Person                   Printed Name of Responsible Person                              _________________________________________ ______________________________        ___________________________________  Signature of Witness         Date  Time         Relationship to Patient    STATEMENT OF PHYSICIAN My signature below affirms that prior to the time of the procedure; I have explained to the patient and/or his/her legal representative, the risks and benefits involved in the proposed treatment and any reasonable alternative to the proposed treatment. I have also explained the risks and benefits involved in refusal of the proposed treatment and alternatives  to the proposed treatment and have answered the patient's questions. If I have a significant financial interest in a co-management agreement or a significant financial interest in any product or implant, or other significant relationship used in this procedure/surgery, I have disclosed this and had a discussion with my patient.     _______________________________________________________________ _____________________________  (Signature of Physician)                                                                                         (Date)                                   (Time)  Patient Name: Justyn Hall    : 1940   Printed: 2025      Medical Record #: [id number]                                              Page 1 of 1

## (undated) NOTE — LETTER
[facility] 5SW/SE  [address]  [facility] [address]  [phone]    Blood Transfusion Consent    In the course of your treatment, it may become necessary to administer a transfusion of blood or blood components. This form provides basic information concerning this procedure and, if signed by you, authorizes its administration. By signing this form, you agree that all of your questions about the administration of blood or blood products have been answered by the ordering medical professional or designee.    Description of Procedure  Blood is introduced into one of your veins, commonly in the arm, using a sterilized disposable needle. The amount of blood transfused, and whether the transfusion will be of blood or blood components is a judgement the physician will make based on your particular needs.    Risks  The transfusion is a common procedure of low risk.  MINOR AND TEMPORARY REACTIONS ARE NOT UNCOMMON, including a slight bruise, swelling or local reaction in the area where the needle pierces your skin, or a nonserious reaction to the transfused material itself, including headache, fever or mild skin reaction, such as rash.  Serious reactions are possible, though very unlikely, and include severe allergic reaction (shock) and destruction (hemolysis) of transfused blood cells.  Infectious diseases which are known to be transmitted by blood transfusion include certain types of viral Hepatitis(liver infection from a virus), Human Immunodeficiency Virus (HIV-1,2) infection, a viral infection known to cause Acquired Immunodeficiency Syndrome (AIDS), as well as certain other bacterial, viral, and parasitic diseases. While a minimal risk of acquiring an infectious disease from transfused blood exists, in accordance with the Federal and State law, all due care has been taken in donor selection and testing to avoid transmission of disease.    Alternatives  If loss of blood poses serious threats during your  treatment, THERE IS NO EFFECTIVE ALTERNATIVE TO BLOOD TRANSFUSION. However, if you have any further questions on this matter, your provider will fully explain the alternatives to you if it has not already been done.    I, ______________________________, have read/had read to me the above. I understand the matters bearing on the decision whether or not to authorize a transfusion of blood or blood components. I have no questions which have not been answered to my full satisfaction. I hereby consent to such transfusion as my physician may deem necessary or advisable in the course of my treatment.    ______________________________________________                    ___________________________  (Signature of Patient or Responsible party in case of minor,                 (Printed Name of Patient or incompetent, or unconscious patient)              Responsible Party)    ___________________________               _____________________  (Relationship to Patient if not self)                                    (Date and Time)    __________________________                                                           ______________________              (Signature of Witness)               (Printed Name of Witness)     Language line ()    Telephone/Verbal/Video Consent    __________________________                     ____________________  (Signature of 2nd Witness           (Printed Name of 2nd  Telephone/Verbal/Video Consent)           Witness)    Patient Name: Justyn Hall     : 1940                 Printed: May 24, 2025     Medical Record #: [id number]      Rev: 2023

## (undated) NOTE — MR AVS SNAPSHOT
[facility] [place] [facility] - [place] [address]  [id number] [phone]               Thank you for choosing us for your health care visit with Sherly Lambert MD.  We are glad to serve you and happy to provide you with this summary of your years, or colonoscopy every 10 years, or double-contrast barium enema every 5 years; yearly fecal occult blood test or fecal immunochemical test; or a stool DNA test as often as your healthcare provider advises; talk with your healthcare provider about [facility] Hepatitis B Men at increased risk for infection – talk with your healthcare provider 3 doses over 6 months; second dose should be given 1 month after the first dose; the third dose should be given at least 2 months after the second dose and at least 4 estuardo 128/75 mmHg 60 97.5 °F (36.4 °C) (Oral) 6' (1.829 m) 186 lb (84.369 kg) 25.22 kg/m2         Current Medications          This list is accurate as of: 6/6/17  3:13 PM.  Always use your most recent med list.                atorvastatin 10 MG Tabs   Take 1 t track your progress   You don’t need to join a gym. Home exercises work great.  Put more priority on exercise in your life                    Visit [facility] online at  [email or web address]

## (undated) NOTE — LETTER
[facility]  [address], [place], [place]    Authorization for Surgical Operation and Procedure                               I hereby authorize Jayden Vargas MD, my physician and his/her assistants (if applicable), which may include medical students, residents, and/or fellows, to perform the following surgical operation/ procedure and administer such anesthesia as may be determined necessary by my physician: Operation/Procedure name (s) Colonoscopy possible esophagogastroduodenoscopy  on Justyn Hall   2.   I recognize that during the surgical operation/procedure, unforeseen conditions may necessitate additional or different procedures than those listed above.  I, therefore, further authorize and request that the above-named surgeon, assistants, or designees perform such procedures as are, in their judgment, necessary and desirable.    3.   My surgeon/physician has discussed prior to my surgery the potential benefits, risks and side effects of this procedure; the likelihood of achieving goals; and potential problems that might occur during recuperation.  They also discussed reasonable alternatives to the procedure, including risks, benefits, and side effects related to the alternatives and risks related to not receiving this procedure.  I have had all my questions answered and I acknowledge that no guarantee has been made as to the result that may be obtained.    4.   Should the need arise during my operation/procedure, which includes change of level of care prior to discharge, I also consent to the administration of blood and/or blood products.  Further, I understand that despite careful testing and screening of blood or blood products by collecting agencies, I may still be subject to ill effects as a result of receiving a blood transfusion and/or blood products.  The following are some, but not all, of the potential risks that can occur: fever and allergic reactions, hemolytic  reactions, transmission of diseases such as Hepatitis, AIDS and Cytomegalovirus (CMV) and fluid overload.  In the event that I wish to have an autologous transfusion of my own blood, or a directed donor transfusion, I will discuss this with my physician.  Check only if Refusing Blood or Blood Products  I understand refusal of blood or blood products as deemed necessary by my physician may have serious consequences to my condition to include possible death. I hereby assume responsibility for my refusal and release the hospital, its personnel, and my physicians from any responsibility for the consequences of my refusal.    o  Refuse   5.   I authorize the use of any specimen, organs, tissues, body parts or foreign objects that may be removed from my body during the operation/procedure for diagnosis, research or teaching purposes and their subsequent disposal by hospital authorities.  I also authorize the release of specimen test results and/or written reports to my treating physician on the hospital medical staff or other referring or consulting physicians involved in my care, at the discretion of the Pathologist or my treating physician.    6.   I consent to the photographing or videotaping of the operations or procedures to be performed, including appropriate portions of my body for medical, scientific, or educational purposes, provided my identity is not revealed by the pictures or by descriptive texts accompanying them.  If the procedure has been photographed/videotaped, the surgeon will obtain the original picture, image, videotape or CD.  The hospital will not be responsible for storage, release or maintenance of the picture, image, tape or CD.    7.   I consent to the presence of a  or observers in the operating room as deemed necessary by my physician or their designees.    8.   I recognize that in the event my procedure results in extended X-Ray/fluoroscopy time, I may develop a skin  reaction.    9. If I have a Do Not Attempt Resuscitation (DNAR) order in place, that status will be suspended while in the operating room, procedural suite, and during the recovery period unless otherwise explicitly stated by me (or a person authorized to consent on my behalf). The surgeon or my attending physician will determine when the applicable recovery period ends for purposes of reinstating the DNAR order.  10. Patients having a sterilization procedure: I understand that if the procedure is successful the results will be permanent and it will therefore be impossible for me to inseminate, conceive, or bear children.  I also understand that the procedure is intended to result in sterility, although the result has not been guaranteed.   11. I acknowledge that my physician has explained sedation/analgesia administration to me including the risk and benefits I consent to the administration of sedation/analgesia as may be necessary or desirable in the judgment of my physician.    I CERTIFY THAT I HAVE READ AND FULLY UNDERSTAND THE ABOVE CONSENT TO OPERATION and/or OTHER PROCEDURE.     ____________________________________  _________________________________        ______________________________  Signature of Patient    Signature of Responsible Person                Printed Name of Responsible Person                                      ____________________________________  _____________________________                ________________________________  Signature of Witness        Date  Time         Relationship to Patient    STATEMENT OF PHYSICIAN My signature below affirms that prior to the time of the procedure; I have explained to the patient and/or his/her legal representative, the risks and benefits involved in the proposed treatment and any reasonable alternative to the proposed treatment. I have also explained the risks and benefits involved in refusal of the proposed treatment and alternatives to the proposed  treatment and have answered the patient's questions. If I have a significant financial interest in a co-management agreement or a significant financial interest in any product or implant, or other significant relationship used in this procedure/surgery, I have disclosed this and had a discussion with my patient.     _____________________________________________________              _____________________________  (Signature of Physician)                                                                                         (Date)                                   (Time)  Patient Name: Justyn Quinnkathleenbrenda      : 1940      Printed: 2025     Medical Record #: [id number]                                      Page 1 of 1

## (undated) NOTE — LETTER
[facility] [address], [place], [place]    Authorization for Surgical Operation and Procedure                               I hereby authorize Bimal Alex MD, my physician and his/her assistants (if applicable), which may include medical students, residents, and/or fellows, to perform the following surgical operation/ procedure and administer such anesthesia as may be determined necessary by my physician: Operation/Procedure name (s) ESOPHAGOGASTRODUODENOSCOPY (EGD) on Justyn Hall   2.   I recognize that during the surgical operation/procedure, unforeseen conditions may necessitate additional or different procedures than those listed above.  I, therefore, further authorize and request that the above-named surgeon, assistants, or designees perform such procedures as are, in their judgment, necessary and desirable.    3.   My surgeon/physician has discussed prior to my surgery the potential benefits, risks and side effects of this procedure; the likelihood of achieving goals; and potential problems that might occur during recuperation.  They also discussed reasonable alternatives to the procedure, including risks, benefits, and side effects related to the alternatives and risks related to not receiving this procedure.  I have had all my questions answered and I acknowledge that no guarantee has been made as to the result that may be obtained.    4.   Should the need arise during my operation/procedure, which includes change of level of care prior to discharge, I also consent to the administration of blood and/or blood products.  Further, I understand that despite careful testing and screening of blood or blood products by collecting agencies, I may still be subject to ill effects as a result of receiving a blood transfusion and/or blood products.  The following are some, but not all, of the potential risks that can occur: fever and allergic reactions, hemolytic reactions,  transmission of diseases such as Hepatitis, AIDS and Cytomegalovirus (CMV) and fluid overload.  In the event that I wish to have an autologous transfusion of my own blood, or a directed donor transfusion, I will discuss this with my physician.  Check only if Refusing Blood or Blood Products  I understand refusal of blood or blood products as deemed necessary by my physician may have serious consequences to my condition to include possible death. I hereby assume responsibility for my refusal and release the hospital, its personnel, and my physicians from any responsibility for the consequences of my refusal.    o  Refuse   5.   I authorize the use of any specimen, organs, tissues, body parts or foreign objects that may be removed from my body during the operation/procedure for diagnosis, research or teaching purposes and their subsequent disposal by hospital authorities.  I also authorize the release of specimen test results and/or written reports to my treating physician on the hospital medical staff or other referring or consulting physicians involved in my care, at the discretion of the Pathologist or my treating physician.    6.   I consent to the photographing or videotaping of the operations or procedures to be performed, including appropriate portions of my body for medical, scientific, or educational purposes, provided my identity is not revealed by the pictures or by descriptive texts accompanying them.  If the procedure has been photographed/videotaped, the surgeon will obtain the original picture, image, videotape or CD.  The hospital will not be responsible for storage, release or maintenance of the picture, image, tape or CD.    7.   I consent to the presence of a  or observers in the operating room as deemed necessary by my physician or their designees.    8.   I recognize that in the event my procedure results in extended X-Ray/fluoroscopy time, I may develop a skin reaction.    9. If  I have a Do Not Attempt Resuscitation (DNAR) order in place, that status will be suspended while in the operating room, procedural suite, and during the recovery period unless otherwise explicitly stated by me (or a person authorized to consent on my behalf). The surgeon or my attending physician will determine when the applicable recovery period ends for purposes of reinstating the DNAR order.  10. Patients having a sterilization procedure: I understand that if the procedure is successful the results will be permanent and it will therefore be impossible for me to inseminate, conceive, or bear children.  I also understand that the procedure is intended to result in sterility, although the result has not been guaranteed.   11. I acknowledge that my physician has explained sedation/analgesia administration to me including the risk and benefits I consent to the administration of sedation/analgesia as may be necessary or desirable in the judgment of my physician.    I CERTIFY THAT I HAVE READ AND FULLY UNDERSTAND THE ABOVE CONSENT TO OPERATION and/or OTHER PROCEDURE.     ____________________________________  _________________________________        ______________________________  Signature of Patient    Signature of Responsible Person                Printed Name of Responsible Person                                      ____________________________________  _____________________________                ________________________________  Signature of Witness        Date  Time         Relationship to Patient    STATEMENT OF PHYSICIAN My signature below affirms that prior to the time of the procedure; I have explained to the patient and/or his/her legal representative, the risks and benefits involved in the proposed treatment and any reasonable alternative to the proposed treatment. I have also explained the risks and benefits involved in refusal of the proposed treatment and alternatives to the proposed treatment and have  answered the patient's questions. If I have a significant financial interest in a co-management agreement or a significant financial interest in any product or implant, or other significant relationship used in this procedure/surgery, I have disclosed this and had a discussion with my patient.     _____________________________________________________              _____________________________  (Signature of Physician)                                                                                         (Date)                                   (Time)  Patient Name: Justyn Hall      : 1940      Printed: 2025     Medical Record #: [id number]                                      Page 1 of 1

## (undated) NOTE — LETTER
19      Patient: Valerio Pina  : 1940 Visit date: 2019    Dear  Stephani Saunders,      I examined your patient in consultation today. 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     Rudy Reynaga

## (undated) NOTE — LETTER
07/10/19        [facility]  [address] [address]      Dear Yoanna Gomez records indicate that you have outstanding lab work and or testing that was ordered for you and has not yet been completed:  Orders Placed This Encounter

## (undated) NOTE — LETTER
[address], [place], [place]    Authorization for Surgical Operation and Procedure                               I hereby authorize Hattie Alex MD, my physician and his/her assistants (if applicable), which may include medical students, residents, and/or fellows, to perform the following surgical operation/ procedure and administer such anesthesia as may be determined necessary by my physician: Operation/Procedure name (s) ESOPHAGOGASTRODUODENOSCOPY (EGD) on Justyn Hall   2.   I recognize that during the surgical operation/procedure, unforeseen conditions may necessitate additional or different procedures than those listed above.  I, therefore, further authorize and request that the above-named surgeon, assistants, or designees perform such procedures as are, in their judgment, necessary and desirable.    3.   My surgeon/physician has discussed prior to my surgery the potential benefits, risks and side effects of this procedure; the likelihood of achieving goals; and potential problems that might occur during recuperation.  They also discussed reasonable alternatives to the procedure, including risks, benefits, and side effects related to the alternatives and risks related to not receiving this procedure.  I have had all my questions answered and I acknowledge that no guarantee has been made as to the result that may be obtained.    4.   Should the need arise during my operation/procedure, which includes change of level of care prior to discharge, I also consent to the administration of blood and/or blood products.  Further, I understand that despite careful testing and screening of blood or blood products by collecting agencies, I may still be subject to ill effects as a result of receiving a blood transfusion and/or blood products.  The following are some, but not all, of the potential risks that can occur: fever and allergic reactions, hemolytic reactions, transmission  of diseases such as Hepatitis, AIDS and Cytomegalovirus (CMV) and fluid overload.  In the event that I wish to have an autologous transfusion of my own blood, or a directed donor transfusion, I will discuss this with my physician.  Check only if Refusing Blood or Blood Products  I understand refusal of blood or blood products as deemed necessary by my physician may have serious consequences to my condition to include possible death. I hereby assume responsibility for my refusal and release the hospital, its personnel, and my physicians from any responsibility for the consequences of my refusal.    o  Refuse   5.   I authorize the use of any specimen, organs, tissues, body parts or foreign objects that may be removed from my body during the operation/procedure for diagnosis, research or teaching purposes and their subsequent disposal by hospital authorities.  I also authorize the release of specimen test results and/or written reports to my treating physician on the hospital medical staff or other referring or consulting physicians involved in my care, at the discretion of the Pathologist or my treating physician.    6.   I consent to the photographing or videotaping of the operations or procedures to be performed, including appropriate portions of my body for medical, scientific, or educational purposes, provided my identity is not revealed by the pictures or by descriptive texts accompanying them.  If the procedure has been photographed/videotaped, the surgeon will obtain the original picture, image, videotape or CD.  The hospital will not be responsible for storage, release or maintenance of the picture, image, tape or CD.    7.   I consent to the presence of a  or observers in the operating room as deemed necessary by my physician or their designees.    8.   I recognize that in the event my procedure results in extended X-Ray/fluoroscopy time, I may develop a skin reaction.    9. If I have a Do  Not Attempt Resuscitation (DNAR) order in place, that status will be suspended while in the operating room, procedural suite, and during the recovery period unless otherwise explicitly stated by me (or a person authorized to consent on my behalf). The surgeon or my attending physician will determine when the applicable recovery period ends for purposes of reinstating the DNAR order.  10. Patients having a sterilization procedure: I understand that if the procedure is successful the results will be permanent and it will therefore be impossible for me to inseminate, conceive, or bear children.  I also understand that the procedure is intended to result in sterility, although the result has not been guaranteed.   11. I acknowledge that my physician has explained sedation/analgesia administration to me including the risk and benefits I consent to the administration of sedation/analgesia as may be necessary or desirable in the judgment of my physician.    I CERTIFY THAT I HAVE READ AND FULLY UNDERSTAND THE ABOVE CONSENT TO OPERATION and/or OTHER PROCEDURE.     ____________________________________  _________________________________        ______________________________  Signature of Patient    Signature of Responsible Person                Printed Name of Responsible Person                                      ____________________________________  _____________________________                ________________________________  Signature of Witness        Date  Time         Relationship to Patient    STATEMENT OF PHYSICIAN My signature below affirms that prior to the time of the procedure; I have explained to the patient and/or his/her legal representative, the risks and benefits involved in the proposed treatment and any reasonable alternative to the proposed treatment. I have also explained the risks and benefits involved in refusal of the proposed treatment and alternatives to the proposed treatment and have answered the  patient's questions. If I have a significant financial interest in a co-management agreement or a significant financial interest in any product or implant, or other significant relationship used in this procedure/surgery, I have disclosed this and had a discussion with my patient.     _____________________________________________________              _____________________________  (Signature of Physician)                                                                                         (Date)                                   (Time)  Patient Name: Justyn Hall      : 1940      Printed: 2025     Medical Record #: [id number]                                      Page 1 of 1

## (undated) NOTE — LETTER
[address], [place], [place]    Authorization for Surgical Operation and Procedure                               I hereby authorize Hattie Alex MD, my physician and his/her assistants (if applicable), which may include medical students, residents, and/or fellows, to perform the following surgical operation/ procedure and administer such anesthesia as may be determined necessary by my physician: Operation/Procedure name (s) Esophagogastroduodenoscopy (EGD)  on Justyn Hall   2.   I recognize that during the surgical operation/procedure, unforeseen conditions may necessitate additional or different procedures than those listed above.  I, therefore, further authorize and request that the above-named surgeon, assistants, or designees perform such procedures as are, in their judgment, necessary and desirable.    3.   My surgeon/physician has discussed prior to my surgery the potential benefits, risks and side effects of this procedure; the likelihood of achieving goals; and potential problems that might occur during recuperation.  They also discussed reasonable alternatives to the procedure, including risks, benefits, and side effects related to the alternatives and risks related to not receiving this procedure.  I have had all my questions answered and I acknowledge that no guarantee has been made as to the result that may be obtained.    4.   Should the need arise during my operation/procedure, which includes change of level of care prior to discharge, I also consent to the administration of blood and/or blood products.  Further, I understand that despite careful testing and screening of blood or blood products by collecting agencies, I may still be subject to ill effects as a result of receiving a blood transfusion and/or blood products.  The following are some, but not all, of the potential risks that can occur: fever and allergic reactions, hemolytic reactions, transmission  of diseases such as Hepatitis, AIDS and Cytomegalovirus (CMV) and fluid overload.  In the event that I wish to have an autologous transfusion of my own blood, or a directed donor transfusion, I will discuss this with my physician.  Check only if Refusing Blood or Blood Products  I understand refusal of blood or blood products as deemed necessary by my physician may have serious consequences to my condition to include possible death. I hereby assume responsibility for my refusal and release the hospital, its personnel, and my physicians from any responsibility for the consequences of my refusal.    o  Refuse   5.   I authorize the use of any specimen, organs, tissues, body parts or foreign objects that may be removed from my body during the operation/procedure for diagnosis, research or teaching purposes and their subsequent disposal by hospital authorities.  I also authorize the release of specimen test results and/or written reports to my treating physician on the hospital medical staff or other referring or consulting physicians involved in my care, at the discretion of the Pathologist or my treating physician.    6.   I consent to the photographing or videotaping of the operations or procedures to be performed, including appropriate portions of my body for medical, scientific, or educational purposes, provided my identity is not revealed by the pictures or by descriptive texts accompanying them.  If the procedure has been photographed/videotaped, the surgeon will obtain the original picture, image, videotape or CD.  The hospital will not be responsible for storage, release or maintenance of the picture, image, tape or CD.    7.   I consent to the presence of a  or observers in the operating room as deemed necessary by my physician or their designees.    8.   I recognize that in the event my procedure results in extended X-Ray/fluoroscopy time, I may develop a skin reaction.    9. If I have a Do  Not Attempt Resuscitation (DNAR) order in place, that status will be suspended while in the operating room, procedural suite, and during the recovery period unless otherwise explicitly stated by me (or a person authorized to consent on my behalf). The surgeon or my attending physician will determine when the applicable recovery period ends for purposes of reinstating the DNAR order.  10. Patients having a sterilization procedure: I understand that if the procedure is successful the results will be permanent and it will therefore be impossible for me to inseminate, conceive, or bear children.  I also understand that the procedure is intended to result in sterility, although the result has not been guaranteed.   11. I acknowledge that my physician has explained sedation/analgesia administration to me including the risk and benefits I consent to the administration of sedation/analgesia as may be necessary or desirable in the judgment of my physician.    I CERTIFY THAT I HAVE READ AND FULLY UNDERSTAND THE ABOVE CONSENT TO OPERATION and/or OTHER PROCEDURE.     ____________________________________  _________________________________        ______________________________  Signature of Patient    Signature of Responsible Person                Printed Name of Responsible Person                                      ____________________________________  _____________________________                ________________________________  Signature of Witness        Date  Time         Relationship to Patient    STATEMENT OF PHYSICIAN My signature below affirms that prior to the time of the procedure; I have explained to the patient and/or his/her legal representative, the risks and benefits involved in the proposed treatment and any reasonable alternative to the proposed treatment. I have also explained the risks and benefits involved in refusal of the proposed treatment and alternatives to the proposed treatment and have answered the  patient's questions. If I have a significant financial interest in a co-management agreement or a significant financial interest in any product or implant, or other significant relationship used in this procedure/surgery, I have disclosed this and had a discussion with my patient.     _____________________________________________________              _____________________________  (Signature of Physician)                                                                                         (Date)                                   (Time)  Patient Name: Justyn Hall      : 1940      Printed: 2025     Medical Record #: [id number]                                      Page 1 of 1

## (undated) NOTE — LETTER
[facility]  [address], [place], [place]    Authorization for Surgical Operation and Procedure                               I hereby authorize Zak Payne MD, my physician and his/her assistants (if applicable), which may include medical students, residents, and/or fellows, to perform the following surgical operation/ procedure and administer such anesthesia as may be determined necessary by my physician: Operation/Procedure name (s) ESOPHAGOGASTRODUODENOSCOPY (EGD) on Justyn Hall   2.   I recognize that during the surgical operation/procedure, unforeseen conditions may necessitate additional or different procedures than those listed above.  I, therefore, further authorize and request that the above-named surgeon, assistants, or designees perform such procedures as are, in their judgment, necessary and desirable.    3.   My surgeon/physician has discussed prior to my surgery the potential benefits, risks and side effects of this procedure; the likelihood of achieving goals; and potential problems that might occur during recuperation.  They also discussed reasonable alternatives to the procedure, including risks, benefits, and side effects related to the alternatives and risks related to not receiving this procedure.  I have had all my questions answered and I acknowledge that no guarantee has been made as to the result that may be obtained.    4.   Should the need arise during my operation/procedure, which includes change of level of care prior to discharge, I also consent to the administration of blood and/or blood products.  Further, I understand that despite careful testing and screening of blood or blood products by collecting agencies, I may still be subject to ill effects as a result of receiving a blood transfusion and/or blood products.  The following are some, but not all, of the potential risks that can occur: fever and allergic reactions, hemolytic reactions,  transmission of diseases such as Hepatitis, AIDS and Cytomegalovirus (CMV) and fluid overload.  In the event that I wish to have an autologous transfusion of my own blood, or a directed donor transfusion, I will discuss this with my physician.  Check only if Refusing Blood or Blood Products  I understand refusal of blood or blood products as deemed necessary by my physician may have serious consequences to my condition to include possible death. I hereby assume responsibility for my refusal and release the hospital, its personnel, and my physicians from any responsibility for the consequences of my refusal.    o  Refuse   5.   I authorize the use of any specimen, organs, tissues, body parts or foreign objects that may be removed from my body during the operation/procedure for diagnosis, research or teaching purposes and their subsequent disposal by hospital authorities.  I also authorize the release of specimen test results and/or written reports to my treating physician on the hospital medical staff or other referring or consulting physicians involved in my care, at the discretion of the Pathologist or my treating physician.    6.   I consent to the photographing or videotaping of the operations or procedures to be performed, including appropriate portions of my body for medical, scientific, or educational purposes, provided my identity is not revealed by the pictures or by descriptive texts accompanying them.  If the procedure has been photographed/videotaped, the surgeon will obtain the original picture, image, videotape or CD.  The hospital will not be responsible for storage, release or maintenance of the picture, image, tape or CD.    7.   I consent to the presence of a  or observers in the operating room as deemed necessary by my physician or their designees.    8.   I recognize that in the event my procedure results in extended X-Ray/fluoroscopy time, I may develop a skin reaction.    9. If  I have a Do Not Attempt Resuscitation (DNAR) order in place, that status will be suspended while in the operating room, procedural suite, and during the recovery period unless otherwise explicitly stated by me (or a person authorized to consent on my behalf). The surgeon or my attending physician will determine when the applicable recovery period ends for purposes of reinstating the DNAR order.  10. Patients having a sterilization procedure: I understand that if the procedure is successful the results will be permanent and it will therefore be impossible for me to inseminate, conceive, or bear children.  I also understand that the procedure is intended to result in sterility, although the result has not been guaranteed.   11. I acknowledge that my physician has explained sedation/analgesia administration to me including the risk and benefits I consent to the administration of sedation/analgesia as may be necessary or desirable in the judgment of my physician.    I CERTIFY THAT I HAVE READ AND FULLY UNDERSTAND THE ABOVE CONSENT TO OPERATION and/or OTHER PROCEDURE.     ____________________________________  _________________________________        ______________________________  Signature of Patient    Signature of Responsible Person                Printed Name of Responsible Person                                      ____________________________________  _____________________________                ________________________________  Signature of Witness        Date  Time         Relationship to Patient    STATEMENT OF PHYSICIAN My signature below affirms that prior to the time of the procedure; I have explained to the patient and/or his/her legal representative, the risks and benefits involved in the proposed treatment and any reasonable alternative to the proposed treatment. I have also explained the risks and benefits involved in refusal of the proposed treatment and alternatives to the proposed treatment and have  answered the patient's questions. If I have a significant financial interest in a co-management agreement or a significant financial interest in any product or implant, or other significant relationship used in this procedure/surgery, I have disclosed this and had a discussion with my patient.     _____________________________________________________              _____________________________  (Signature of Physician)                                                                                         (Date)                                   (Time)  Patient Name: Justyn Hall      : 1940      Printed: 2025     Medical Record #: [id number]                                      Page 1 of 1

## (undated) NOTE — [place] [address]  Authorization for Invasive Procedures  Date: ***           Time: ***    {[facility] ivs consent:[id number]}

## (undated) NOTE — LETTER
[facility]  Administration of Anesthesia  IJustyn agree to be cared for by a physician anesthesiologist alone and/or with a nurse anesthetist, who is specially trained to monitor me and give me medicine to put me to sleep or keep me comfortable during my procedure    I understand that my anesthesiologist and/or anesthetist is not an employee or agent of [facility] or [employer]. He or she works for [facility] [facility]    As the patient asking for anesthesia services, I agree to:  Allow the anesthesiologist (anesthesia doctor) to give me medicine and do additional procedures as necessary. Some examples are: Starting or using an “IV” to give me medicine, fluids or blood during my procedure, and having a breathing tube placed to help me breathe when I’m asleep (intubation). In the event that my heart stops working properly, I understand that my anesthesiologist will make every effort to sustain my life, unless otherwise directed by [facility] Do Not Resuscitate documents.  Tell my anesthesia doctor before my procedure:  If I am pregnant.  The last time that I ate or drank.  iii. All of the medicines I take (including prescriptions, herbal supplements, and pills I can buy without a prescription (including street drugs/illegal medications). Failure to inform my anesthesiologist about these medicines may increase my risk of anesthetic complications.  iv.If I am allergic to anything or have had a reaction to anesthesia before.  I understand how the anesthesia medicine will help me (benefits).  I understand that with any type of anesthesia medicine there are risks:  The most common risks are: nausea, vomiting, sore throat, muscle soreness, damage to my eyes, mouth, or teeth (from breathing tube placement).  Rare risks include: remembering what happened during my procedure, allergic reactions to medications, injury to my airway, heart, lungs, vision, nerves, or  muscles and in extremely rare instances death.  My doctor has explained to me other choices available to me for my care (alternatives).  Pregnant Patients (“epidural”):  I understand that the risks of having an epidural (medicine given into my back to help control pain during labor), include itching, low blood pressure, difficulty urinating, headache or slowing of the baby’s heart. Very rare risks include infection, bleeding, seizure, irregular heart rhythms and nerve injury.  Regional Anesthesia (“spinal”, “epidural”, & “nerve blocks”):  I understand that rare but potential complications include headache, bleeding, infection, seizure, irregular heart rhythms, and nerve injury.    _____________________________________________________________________________  Patient (or Representative) Signature/Relationship to Patient  Date   Time    _____________________________________________________________________________   Name (if used)    Language/Organization   Time    _____________________________________________________________________________  Nurse Anesthetist Signature     Date   Time  _____________________________________________________________________________  Anesthesiologist Signature     Date   Time  I have discussed the procedure and information above with the patient (or patient’s representative) and answered their questions. The patient or their representative has agreed to have anesthesia services.    _____________________________________________________________________________  Witness        Date   Time  I have verified that the signature is that of the patient or patient’s representative, and that it was signed before the procedure  Patient Name: Justyn Hall     : 1940                 Printed: 2025 at 9:16 AM    Medical Record #: [id number]                                            Page 1 of 1  ----------ANESTHESIA CONSENT----------